# Patient Record
Sex: MALE | Race: WHITE | NOT HISPANIC OR LATINO
[De-identification: names, ages, dates, MRNs, and addresses within clinical notes are randomized per-mention and may not be internally consistent; named-entity substitution may affect disease eponyms.]

---

## 2017-02-07 PROBLEM — Z00.00 ENCOUNTER FOR PREVENTIVE HEALTH EXAMINATION: Noted: 2017-02-07

## 2017-06-16 ENCOUNTER — APPOINTMENT (OUTPATIENT)
Dept: DERMATOLOGY | Facility: CLINIC | Age: 82
End: 2017-06-16

## 2017-06-16 VITALS — HEIGHT: 68 IN | BODY MASS INDEX: 33.8 KG/M2 | WEIGHT: 223 LBS

## 2017-06-16 DIAGNOSIS — Z86.79 PERSONAL HISTORY OF OTHER DISEASES OF THE CIRCULATORY SYSTEM: ICD-10-CM

## 2017-06-16 RX ORDER — COLCHICINE 0.6 MG/1
0.6 TABLET ORAL
Qty: 30 | Refills: 0 | Status: DISCONTINUED | COMMUNITY
Start: 2017-03-27

## 2017-06-16 RX ORDER — WARFARIN SODIUM 4 MG/1
4 TABLET ORAL
Qty: 90 | Refills: 0 | Status: DISCONTINUED | COMMUNITY
Start: 2016-12-19

## 2017-07-14 ENCOUNTER — APPOINTMENT (OUTPATIENT)
Dept: RHEUMATOLOGY | Facility: CLINIC | Age: 82
End: 2017-07-14

## 2017-07-14 VITALS
WEIGHT: 225 LBS | DIASTOLIC BLOOD PRESSURE: 68 MMHG | HEART RATE: 92 BPM | BODY MASS INDEX: 34.21 KG/M2 | SYSTOLIC BLOOD PRESSURE: 109 MMHG | OXYGEN SATURATION: 97 %

## 2017-07-14 DIAGNOSIS — G89.29 PAIN IN UNSPECIFIED WRIST: ICD-10-CM

## 2017-07-14 DIAGNOSIS — M25.539 PAIN IN UNSPECIFIED WRIST: ICD-10-CM

## 2017-07-15 PROBLEM — M25.539 CHRONIC WRIST PAIN, UNSPECIFIED LATERALITY: Status: ACTIVE | Noted: 2017-07-15

## 2017-07-15 RX ADMIN — METHYLPREDNISOLONE ACETATE MG/ML: 80 INJECTION, SUSPENSION INTRA-ARTICULAR; INTRALESIONAL; INTRAMUSCULAR; SOFT TISSUE at 00:00

## 2017-07-18 ENCOUNTER — MED ADMIN CHARGE (OUTPATIENT)
Age: 82
End: 2017-07-18

## 2017-07-18 RX ORDER — METHYLPRED ACET/NACL,ISO-OS/PF 80 MG/ML
80 VIAL (ML) INJECTION
Qty: 1 | Refills: 0 | Status: COMPLETED | OUTPATIENT
Start: 2017-07-15

## 2017-08-04 ENCOUNTER — APPOINTMENT (OUTPATIENT)
Dept: RHEUMATOLOGY | Facility: CLINIC | Age: 82
End: 2017-08-04
Payer: MEDICARE

## 2017-08-04 VITALS
DIASTOLIC BLOOD PRESSURE: 76 MMHG | HEART RATE: 72 BPM | SYSTOLIC BLOOD PRESSURE: 122 MMHG | BODY MASS INDEX: 33.8 KG/M2 | OXYGEN SATURATION: 97 % | WEIGHT: 223 LBS | HEIGHT: 68 IN

## 2017-08-04 PROCEDURE — 96372 THER/PROPH/DIAG INJ SC/IM: CPT

## 2017-08-04 PROCEDURE — 99214 OFFICE O/P EST MOD 30 MIN: CPT | Mod: 25

## 2017-08-04 RX ORDER — METHYLPRED ACET/NACL,ISO-OS/PF 80 MG/ML
80 VIAL (ML) INJECTION
Qty: 1 | Refills: 0 | Status: COMPLETED | OUTPATIENT
Start: 2017-08-04

## 2017-08-04 RX ADMIN — METHYLPREDNISOLONE ACETATE MG/ML: 80 INJECTION, SUSPENSION INTRA-ARTICULAR; INTRALESIONAL; INTRAMUSCULAR; SOFT TISSUE at 00:00

## 2017-10-12 ENCOUNTER — APPOINTMENT (OUTPATIENT)
Dept: ELECTROPHYSIOLOGY | Facility: CLINIC | Age: 82
End: 2017-10-12
Payer: MEDICARE

## 2017-10-12 VITALS
SYSTOLIC BLOOD PRESSURE: 148 MMHG | HEART RATE: 64 BPM | BODY MASS INDEX: 33.34 KG/M2 | DIASTOLIC BLOOD PRESSURE: 84 MMHG | HEIGHT: 68 IN | WEIGHT: 220 LBS

## 2017-10-12 PROCEDURE — 99205 OFFICE O/P NEW HI 60 MIN: CPT

## 2017-10-12 PROCEDURE — 93000 ELECTROCARDIOGRAM COMPLETE: CPT

## 2017-10-18 ENCOUNTER — APPOINTMENT (OUTPATIENT)
Dept: RHEUMATOLOGY | Facility: CLINIC | Age: 82
End: 2017-10-18
Payer: MEDICARE

## 2017-10-18 VITALS
SYSTOLIC BLOOD PRESSURE: 106 MMHG | HEIGHT: 68 IN | HEART RATE: 83 BPM | DIASTOLIC BLOOD PRESSURE: 70 MMHG | BODY MASS INDEX: 33.65 KG/M2 | OXYGEN SATURATION: 95 % | WEIGHT: 222 LBS

## 2017-10-18 PROCEDURE — 96372 THER/PROPH/DIAG INJ SC/IM: CPT

## 2017-10-18 PROCEDURE — 99214 OFFICE O/P EST MOD 30 MIN: CPT | Mod: 25

## 2017-10-18 RX ORDER — LISINOPRIL 20 MG/1
20 TABLET ORAL
Refills: 0 | Status: DISCONTINUED | COMMUNITY
End: 2017-10-18

## 2017-10-18 RX ORDER — UBIDECARENONE 200 MG
CAPSULE ORAL
Refills: 0 | Status: ACTIVE | COMMUNITY

## 2017-11-29 ENCOUNTER — OUTPATIENT (OUTPATIENT)
Dept: OUTPATIENT SERVICES | Facility: HOSPITAL | Age: 82
LOS: 1 days | Discharge: HOME | End: 2017-11-29

## 2017-11-29 VITALS
DIASTOLIC BLOOD PRESSURE: 80 MMHG | SYSTOLIC BLOOD PRESSURE: 141 MMHG | TEMPERATURE: 98 F | RESPIRATION RATE: 14 BRPM | HEART RATE: 89 BPM | OXYGEN SATURATION: 98 % | HEIGHT: 67.5 IN | WEIGHT: 227.96 LBS

## 2017-11-29 DIAGNOSIS — I50.22 CHRONIC SYSTOLIC (CONGESTIVE) HEART FAILURE: ICD-10-CM

## 2017-11-29 DIAGNOSIS — Z98.49 CATARACT EXTRACTION STATUS, UNSPECIFIED EYE: Chronic | ICD-10-CM

## 2017-11-29 DIAGNOSIS — Z96.7 PRESENCE OF OTHER BONE AND TENDON IMPLANTS: Chronic | ICD-10-CM

## 2017-11-29 DIAGNOSIS — Z98.890 OTHER SPECIFIED POSTPROCEDURAL STATES: Chronic | ICD-10-CM

## 2017-11-29 DIAGNOSIS — I48.2 CHRONIC ATRIAL FIBRILLATION: ICD-10-CM

## 2017-11-29 LAB
ANION GAP SERPL CALC-SCNC: 8 MMOL/L — SIGNIFICANT CHANGE UP (ref 5–17)
BASOPHILS # BLD AUTO: 0.1 K/UL — SIGNIFICANT CHANGE UP (ref 0–0.2)
BASOPHILS NFR BLD AUTO: 1.3 % — SIGNIFICANT CHANGE UP (ref 0–2)
BUN SERPL-MCNC: 14 MG/DL — SIGNIFICANT CHANGE UP (ref 7–23)
CALCIUM SERPL-MCNC: 9.5 MG/DL — SIGNIFICANT CHANGE UP (ref 8.5–10.1)
CHLORIDE SERPL-SCNC: 105 MMOL/L — SIGNIFICANT CHANGE UP (ref 96–108)
CO2 SERPL-SCNC: 27 MMOL/L — SIGNIFICANT CHANGE UP (ref 22–31)
CREAT SERPL-MCNC: 1.02 MG/DL — SIGNIFICANT CHANGE UP (ref 0.5–1.3)
EOSINOPHIL # BLD AUTO: 0.1 K/UL — SIGNIFICANT CHANGE UP (ref 0–0.5)
EOSINOPHIL NFR BLD AUTO: 1.9 % — SIGNIFICANT CHANGE UP (ref 0–6)
GLUCOSE SERPL-MCNC: 149 MG/DL — HIGH (ref 70–99)
HCT VFR BLD CALC: 48.5 % — SIGNIFICANT CHANGE UP (ref 39–50)
HGB BLD-MCNC: 15.8 G/DL — SIGNIFICANT CHANGE UP (ref 13–17)
INR BLD: 1.95 RATIO — HIGH (ref 0.88–1.16)
LYMPHOCYTES # BLD AUTO: 3.5 K/UL — HIGH (ref 1–3.3)
LYMPHOCYTES # BLD AUTO: 50.1 % — HIGH (ref 13–44)
MCHC RBC-ENTMCNC: 32.5 PG — SIGNIFICANT CHANGE UP (ref 27–34)
MCHC RBC-ENTMCNC: 32.6 GM/DL — SIGNIFICANT CHANGE UP (ref 32–36)
MCV RBC AUTO: 99.8 FL — SIGNIFICANT CHANGE UP (ref 80–100)
MONOCYTES # BLD AUTO: 0.5 K/UL — SIGNIFICANT CHANGE UP (ref 0–0.9)
MONOCYTES NFR BLD AUTO: 7.1 % — SIGNIFICANT CHANGE UP (ref 2–14)
NEUTROPHILS # BLD AUTO: 2.8 K/UL — SIGNIFICANT CHANGE UP (ref 1.8–7.4)
NEUTROPHILS NFR BLD AUTO: 39.7 % — LOW (ref 43–77)
PLATELET # BLD AUTO: 166 K/UL — SIGNIFICANT CHANGE UP (ref 150–400)
POTASSIUM SERPL-MCNC: 4 MMOL/L — SIGNIFICANT CHANGE UP (ref 3.5–5.3)
POTASSIUM SERPL-SCNC: 4 MMOL/L — SIGNIFICANT CHANGE UP (ref 3.5–5.3)
PROTHROM AB SERPL-ACNC: 21.4 SEC — HIGH (ref 9.8–12.7)
RBC # BLD: 4.86 M/UL — SIGNIFICANT CHANGE UP (ref 4.2–5.8)
RBC # FLD: 12.7 % — SIGNIFICANT CHANGE UP (ref 10.3–14.5)
SODIUM SERPL-SCNC: 140 MMOL/L — SIGNIFICANT CHANGE UP (ref 135–145)
WBC # BLD: 7.1 K/UL — SIGNIFICANT CHANGE UP (ref 3.8–10.5)
WBC # FLD AUTO: 7.1 K/UL — SIGNIFICANT CHANGE UP (ref 3.8–10.5)

## 2017-11-29 NOTE — H&P PST ADULT - HISTORY OF PRESENT ILLNESS
81 yo male presents to [facility], [place] prior to proposed procedure. Reports h/o afib managed with warfarin x 2 years. Reports cardiologist Dr Mckeon referred him to DR Bear for further evaluation. Denies chest pain, syncope or dizziness.   Now for said procedure. 83 yo male presents to [facility] prior to proposed procedure. Reports h/o afib managed with warfarin x 2 years. Reports cardiologist Dr Mckeon referred him to DR Bear for further evaluation. Denies chest pain, syncope or swelling in LEs.   Now for said procedure.

## 2017-11-29 NOTE — H&P PST ADULT - ASSESSMENT
83 yo male  scheduled for EP study & possible PPM & related procedures  with Dr. Bear on 12/7/17     1. Labs as per surgeon  2. discussed EZ sponges & mupirocin  3. Medication & day of procedure instructions 83 yo male  scheduled for EP study & possible PPM & related procedures  with Dr. Bear on 12/7/17     1. Labs as per surgeon  2. discussed EZ sponges & mupirocin instructions  3. Medication & day of procedure instructions

## 2017-11-29 NOTE — H&P PST ADULT - PMH
Afib    BPH (Benign Prostatic Hyperplasia)    CA - skin cancer    Cholesterol serum elevated    Glaucoma    Gout    Hearing loss    History of ankle fracture    HTN (hypertension)    Intolerance, drug  INTOLERANCE  TO STATIN  Myocardial infarction    Osteoarthritis    Rheumatoid arthritis

## 2017-11-29 NOTE — H&P PST ADULT - PSH
H/O coronary angiogram  x 2  H/O hernia repair  x  4 surgeries  H/O shoulder surgery  right  Hx of appendectomy    Hx of tonsillectomy    S/P laser cataract surgery  b/l  S/P ORIF (open reduction internal fixation) fracture  left ankle  S/P TKR (total knee replacement)  bilateral

## 2017-11-29 NOTE — H&P PST ADULT - FAMILY HISTORY
Father  Still living? No  Family history of other heart disease, Age at diagnosis: Age Unknown     Child  Still living? Yes, Estimated age: 51-60  Family history of cardiac arrhythmia, Age at diagnosis: Age Unknown

## 2017-11-30 LAB
MRSA PCR RESULT.: SIGNIFICANT CHANGE UP
S AUREUS DNA NOSE QL NAA+PROBE: SIGNIFICANT CHANGE UP

## 2017-12-01 ENCOUNTER — TRANSCRIPTION ENCOUNTER (OUTPATIENT)
Age: 82
End: 2017-12-01

## 2017-12-06 NOTE — ASU PATIENT PROFILE, ADULT - MEDICATIONS TO HOLD
As per MD instructions, pt to hold coumadin 2 days prior to procedure, but pt stated that he did take coumadin last on 12/5/17.  MD made aware of this.

## 2017-12-07 ENCOUNTER — OUTPATIENT (OUTPATIENT)
Dept: OUTPATIENT SERVICES | Facility: HOSPITAL | Age: 82
LOS: 1 days | Discharge: HOME | End: 2017-12-07
Payer: MEDICARE

## 2017-12-07 VITALS
HEART RATE: 95 BPM | DIASTOLIC BLOOD PRESSURE: 85 MMHG | TEMPERATURE: 97 F | HEIGHT: 67.5 IN | WEIGHT: 227.08 LBS | SYSTOLIC BLOOD PRESSURE: 145 MMHG | RESPIRATION RATE: 20 BRPM

## 2017-12-07 DIAGNOSIS — Z98.890 OTHER SPECIFIED POSTPROCEDURAL STATES: Chronic | ICD-10-CM

## 2017-12-07 DIAGNOSIS — Z98.49 CATARACT EXTRACTION STATUS, UNSPECIFIED EYE: Chronic | ICD-10-CM

## 2017-12-07 DIAGNOSIS — Z96.7 PRESENCE OF OTHER BONE AND TENDON IMPLANTS: Chronic | ICD-10-CM

## 2017-12-07 PROCEDURE — 33249 INSJ/RPLCMT DEFIB W/LEAD(S): CPT

## 2017-12-07 PROCEDURE — 93010 ELECTROCARDIOGRAM REPORT: CPT

## 2017-12-07 PROCEDURE — 71010: CPT | Mod: 26

## 2017-12-07 PROCEDURE — 93650 ICAR CATH ABLTJ AV NODE FUNC: CPT

## 2017-12-07 PROCEDURE — 33225 L VENTRIC PACING LEAD ADD-ON: CPT

## 2017-12-07 RX ORDER — LATANOPROST 0.05 MG/ML
1 SOLUTION/ DROPS OPHTHALMIC; TOPICAL AT BEDTIME
Qty: 0 | Refills: 0 | Status: DISCONTINUED | OUTPATIENT
Start: 2017-12-07 | End: 2017-12-08

## 2017-12-07 RX ORDER — METOPROLOL TARTRATE 50 MG
25 TABLET ORAL
Qty: 0 | Refills: 0 | Status: DISCONTINUED | OUTPATIENT
Start: 2017-12-07 | End: 2017-12-08

## 2017-12-07 RX ORDER — WARFARIN SODIUM 2.5 MG/1
0 TABLET ORAL
Qty: 0 | Refills: 0 | COMMUNITY

## 2017-12-07 RX ORDER — VANCOMYCIN HCL 1 G
1000 VIAL (EA) INTRAVENOUS ONCE
Qty: 0 | Refills: 0 | Status: COMPLETED | OUTPATIENT
Start: 2017-12-07 | End: 2017-12-07

## 2017-12-07 RX ORDER — HYDROCHLOROTHIAZIDE 25 MG
25 TABLET ORAL DAILY
Qty: 0 | Refills: 0 | Status: DISCONTINUED | OUTPATIENT
Start: 2017-12-07 | End: 2017-12-08

## 2017-12-07 RX ORDER — WARFARIN SODIUM 2.5 MG/1
4 TABLET ORAL DAILY
Qty: 0 | Refills: 0 | Status: DISCONTINUED | OUTPATIENT
Start: 2017-12-07 | End: 2017-12-07

## 2017-12-07 RX ORDER — UBIDECARENONE 100 MG
300 CAPSULE ORAL
Qty: 0 | Refills: 0 | COMMUNITY

## 2017-12-07 RX ORDER — LISINOPRIL 2.5 MG/1
2.5 TABLET ORAL DAILY
Qty: 0 | Refills: 0 | Status: DISCONTINUED | OUTPATIENT
Start: 2017-12-07 | End: 2017-12-08

## 2017-12-07 RX ORDER — GLUCOSAMINE HCL/CHONDROITIN SU 500-400 MG
1 CAPSULE ORAL
Qty: 0 | Refills: 0 | COMMUNITY

## 2017-12-07 RX ORDER — ALLOPURINOL 300 MG
300 TABLET ORAL DAILY
Qty: 0 | Refills: 0 | Status: DISCONTINUED | OUTPATIENT
Start: 2017-12-07 | End: 2017-12-08

## 2017-12-07 RX ORDER — CHOLECALCIFEROL (VITAMIN D3) 125 MCG
1 CAPSULE ORAL
Qty: 0 | Refills: 0 | COMMUNITY

## 2017-12-07 RX ORDER — SULFASALAZINE 500 MG
500 TABLET ORAL
Qty: 0 | Refills: 0 | Status: DISCONTINUED | OUTPATIENT
Start: 2017-12-07 | End: 2017-12-08

## 2017-12-07 RX ORDER — WARFARIN SODIUM 2.5 MG/1
4 TABLET ORAL ONCE
Qty: 0 | Refills: 0 | Status: COMPLETED | OUTPATIENT
Start: 2017-12-07 | End: 2017-12-07

## 2017-12-07 RX ADMIN — WARFARIN SODIUM 4 MILLIGRAM(S): 2.5 TABLET ORAL at 21:49

## 2017-12-07 RX ADMIN — LATANOPROST 1 DROP(S): 0.05 SOLUTION/ DROPS OPHTHALMIC; TOPICAL at 21:49

## 2017-12-07 RX ADMIN — Medication 25 MILLIGRAM(S): at 20:30

## 2017-12-07 RX ADMIN — Medication 250 MILLIGRAM(S): at 10:29

## 2017-12-07 RX ADMIN — Medication 300 MILLIGRAM(S): at 18:10

## 2017-12-07 RX ADMIN — Medication 500 MILLIGRAM(S): at 20:30

## 2017-12-07 NOTE — CHART NOTE - NSCHARTNOTEFT_GEN_A_CORE
[facility]  AV Node Ablation       Patient Information    Patient Name		Dick Castle  Procedure Date		2017  Med Record #		[id number]  			1935  Age			82yrs  Gender			Male  Referring Doc		Federico Mckeon MD  Electrophysiologist	Mamadou Bear MD     Procedure:  AV Node Ablation  Indication:  Persistent Atrial Fibrillation (I48.2)    Anesthesia:    Methods:  The right groin was prepped with chorhexidine and then draped via sterile technique.  A 1% lidocaine solution was used to anesthetize the groin site.  Using the modified seldinger technique and right femoral venous access was obtained and a sheath was placed (see below for further details).   The ablation Catheter was brought up to the heart AV node ablation was attempted (please see below for further detail).  At the completion of the procedure the catheter was removed the sheath was removed and pressure was held and hemostasis obtained.  The ICD was reprogrammed and tested and noted to be functioning normally.  The patient was observed in the recovery jurado and was noted to be in stable condition.      Equipment:  8fr –   8mm Biosense Li Ablation Catheter.    Ablation:  The ICD was turned to VOO 30.   The catheter was brought into the region of the His bundle using flouro guidance and conventional mapping to search for a His signal.  His signals were noted and AV Node ablation was performed.   At the completion complete heart block was created and this persisted during a waiting period.  After the cathter was brought down to the IVC the ICD was re-interrogated in which it was noted to be functioning properly.     Complications:  None    Impression:  Successful AV node Ablation with complete heart block created    Recommendation(s):    •	Device was programmed back to prior settings of VVIR  bpm (tachycardia therapy on also as prior)  •	F/U in 2 weeks             Mamadou Bear MD, RS, [facility]    of Cardiology  - [facility]

## 2017-12-07 NOTE — CHART NOTE - NSCHARTNOTEFT_GEN_A_CORE
[address] [address]  Electrophysiology Lab  Biventricular Defibrillator Implantation    Patient Information    Patient Name		Dick Castle  Procedure Date		2017  Med Rec #		[id number]  			1935  Age			82 yrs  Gender			Male  Referring		Federico Mckeon MD  Electrophysiologist	Mamadou Bear MD    Patient History   	Dick Castle has long standing persistent AF and a severe cardiomyopathy w/ severe LV Dysfunction which is persistent despite OMT.  He has NYHA Class III CHF and is here to undergo CRT-D Implant w/ subsequent AV Node Ablation.    Indication: Chronic systolic left ventricular dysfunction and congestive heart failure (I 50.22)    Procedure:   Biventricular Defibrillator Implantation    Anesthesia:		1% Lidocaine and moderate sedation (see anesthesiologist’s note)  Methods:   The patient was brought to the EP Lab in a fasting state.     IV Antibiotics were given. (Ancef 2gm IV)  The left chest was prepped with chlorhexidine solution and draped in a sterile fashion.   An incision was made in the left infraclavicular fossa.  A pocket was made with blunt dissection and electrocautery overlying the prepectoralis fascia.  Axillary venous access was obtained on 2 occasions.   A 9 Tajik Sheath was introduced over one of the guide wires.  A right ventricular ICD lead was advanced through one of the sheaths and brought down to the right atrium using fluoroscopic guidance.  The Safe-Sheath was peeled away. The lead was brought down to the right ventricular septum where it was actively fixated by "screw-in" technique.  The lead was tested and noted to be functioning properly (see below).  The ventricular lead was sewn into position with 2 Ethibond sutures.        A 9.5 Tajik Safe-Sheath was introduced over the remaining guidewire.  An outer guide and inner guide sheath was brought in through the sheath with attempts made to engage the coronary sinus using an inner guide catheter with a whisper wire and a postero-lateral cardiac vein was engaged and the inner and outer sheaths were advanced into this vessel.  The left ventricular lead was delivered through the inner sheath.  The left ventricular lead was tested and noted to be functioning adequately.  The sheath was slit away and the lead was secured in place with 2 Ethibond sutures.  The pocket was irrigated with copious amounts of antibiotic solution and the leads were connected to the new pulse generator and the pulse generator was then placed in the pocket after the leads were connected and ports connected (and or plugged). The pocket was closed with a layer of 2.0 Vicryl followed by a running layer of 3.0 Vicryl.  Finally a running layer of 4.0 Monocryl followed by Dermabond to seal the skin.  A telfa sterile dressing was then applied and the patient was brought to the recovery area in stable condition.      Device Information:        Pulse Generator – [employer] [id number] / [id number]       Leads - Right Atrial  - Port Plug                  Right Ventricular  [employer] Easyaula [id number] / [id number]                  Left Ventricular – [employer] 4675 / [id number]    Measured Data:             Right Ventricular -  Voltage  0.4V / Pulse width 0.5ms / Impedance 612 ohms / R wave 21.0mV      Left Ventricular (LV1-RV Coil) - Voltage 4.0V  / Pulse width 0.5ms/ Impedance 690 ohms                                   Settings:       Uriel:  VVIR  bpm     Tachy:   VF		 240 bpm, 41J x6, ATP During Charging   	 VT		 180 bpm, ATP (several rounds) then 41J X6   	 VT Monitor	 150 bpm         Summary:  Successful  Biventricular ICD Implant     Recommendations:      •	Post op Antibiotics were ordered.  •	Proceed w/ AV Node Ablation  •	Follow-up in 1-2 weeks           Mamadou Bear MD, RS, [facility]    of Cardiology [facility] of Medicine  ABIM Certification in Cardiac EP / Cardiovascular Disease & Internal Medicine  [facility]

## 2017-12-08 ENCOUNTER — TRANSCRIPTION ENCOUNTER (OUTPATIENT)
Age: 82
End: 2017-12-08

## 2017-12-08 VITALS — WEIGHT: 221.79 LBS

## 2017-12-08 DIAGNOSIS — I48.1 PERSISTENT ATRIAL FIBRILLATION: ICD-10-CM

## 2017-12-08 LAB
ANION GAP SERPL CALC-SCNC: 7 MMOL/L — SIGNIFICANT CHANGE UP (ref 5–17)
BASOPHILS # BLD AUTO: 0.1 K/UL — SIGNIFICANT CHANGE UP (ref 0–0.2)
BASOPHILS NFR BLD AUTO: 1.4 % — SIGNIFICANT CHANGE UP (ref 0–2)
BUN SERPL-MCNC: 14 MG/DL — SIGNIFICANT CHANGE UP (ref 7–23)
CALCIUM SERPL-MCNC: 9 MG/DL — SIGNIFICANT CHANGE UP (ref 8.5–10.1)
CHLORIDE SERPL-SCNC: 105 MMOL/L — SIGNIFICANT CHANGE UP (ref 96–108)
CO2 SERPL-SCNC: 27 MMOL/L — SIGNIFICANT CHANGE UP (ref 22–31)
CREAT SERPL-MCNC: 0.88 MG/DL — SIGNIFICANT CHANGE UP (ref 0.5–1.3)
EOSINOPHIL # BLD AUTO: 0.1 K/UL — SIGNIFICANT CHANGE UP (ref 0–0.5)
EOSINOPHIL NFR BLD AUTO: 0.7 % — SIGNIFICANT CHANGE UP (ref 0–6)
GLUCOSE SERPL-MCNC: 89 MG/DL — SIGNIFICANT CHANGE UP (ref 70–99)
HCT VFR BLD CALC: 44.8 % — SIGNIFICANT CHANGE UP (ref 39–50)
HGB BLD-MCNC: 15 G/DL — SIGNIFICANT CHANGE UP (ref 13–17)
INR BLD: 1.57 RATIO — HIGH (ref 0.88–1.16)
LYMPHOCYTES # BLD AUTO: 3.9 K/UL — HIGH (ref 1–3.3)
LYMPHOCYTES # BLD AUTO: 48.2 % — HIGH (ref 13–44)
MCHC RBC-ENTMCNC: 33.1 PG — SIGNIFICANT CHANGE UP (ref 27–34)
MCHC RBC-ENTMCNC: 33.4 GM/DL — SIGNIFICANT CHANGE UP (ref 32–36)
MCV RBC AUTO: 99 FL — SIGNIFICANT CHANGE UP (ref 80–100)
MONOCYTES # BLD AUTO: 0.7 K/UL — SIGNIFICANT CHANGE UP (ref 0–0.9)
MONOCYTES NFR BLD AUTO: 8.5 % — SIGNIFICANT CHANGE UP (ref 2–14)
NEUTROPHILS # BLD AUTO: 3.3 K/UL — SIGNIFICANT CHANGE UP (ref 1.8–7.4)
NEUTROPHILS NFR BLD AUTO: 41.1 % — LOW (ref 43–77)
PLATELET # BLD AUTO: 141 K/UL — LOW (ref 150–400)
POTASSIUM SERPL-MCNC: 3.9 MMOL/L — SIGNIFICANT CHANGE UP (ref 3.5–5.3)
POTASSIUM SERPL-SCNC: 3.9 MMOL/L — SIGNIFICANT CHANGE UP (ref 3.5–5.3)
PROTHROM AB SERPL-ACNC: 17.1 SEC — HIGH (ref 9.8–12.7)
RBC # BLD: 4.53 M/UL — SIGNIFICANT CHANGE UP (ref 4.2–5.8)
RBC # FLD: 12.8 % — SIGNIFICANT CHANGE UP (ref 10.3–14.5)
SODIUM SERPL-SCNC: 139 MMOL/L — SIGNIFICANT CHANGE UP (ref 135–145)
WBC # BLD: 8 K/UL — SIGNIFICANT CHANGE UP (ref 3.8–10.5)
WBC # FLD AUTO: 8 K/UL — SIGNIFICANT CHANGE UP (ref 3.8–10.5)

## 2017-12-08 PROCEDURE — 93010 ELECTROCARDIOGRAM REPORT: CPT

## 2017-12-08 RX ORDER — ALLOPURINOL 300 MG
1 TABLET ORAL
Qty: 0 | Refills: 0 | DISCHARGE
Start: 2017-12-08

## 2017-12-08 RX ORDER — SULFASALAZINE 500 MG
1 TABLET ORAL
Qty: 0 | Refills: 0 | DISCHARGE
Start: 2017-12-08

## 2017-12-08 RX ORDER — WARFARIN SODIUM 2.5 MG/1
1 TABLET ORAL
Qty: 30 | Refills: 0
Start: 2017-12-08 | End: 2018-01-07

## 2017-12-08 RX ORDER — METOPROLOL TARTRATE 50 MG
1 TABLET ORAL
Qty: 0 | Refills: 0 | DISCHARGE
Start: 2017-12-08

## 2017-12-08 RX ORDER — ALLOPURINOL 300 MG
1 TABLET ORAL
Qty: 0 | Refills: 0 | COMMUNITY

## 2017-12-08 RX ORDER — LISINOPRIL 2.5 MG/1
1 TABLET ORAL
Qty: 0 | Refills: 0 | DISCHARGE
Start: 2017-12-08

## 2017-12-08 RX ORDER — METOPROLOL TARTRATE 50 MG
1 TABLET ORAL
Qty: 0 | Refills: 0 | COMMUNITY

## 2017-12-08 RX ORDER — SULFASALAZINE 500 MG
1 TABLET ORAL
Qty: 0 | Refills: 0 | COMMUNITY

## 2017-12-08 RX ORDER — WARFARIN SODIUM 2.5 MG/1
1 TABLET ORAL
Qty: 0 | Refills: 0 | DISCHARGE
Start: 2017-12-08 | End: 2018-01-07

## 2017-12-08 RX ORDER — LATANOPROST 0.05 MG/ML
1 SOLUTION/ DROPS OPHTHALMIC; TOPICAL
Qty: 0 | Refills: 0 | DISCHARGE
Start: 2017-12-08

## 2017-12-08 RX ORDER — LATANOPROST 0.05 MG/ML
1 SOLUTION/ DROPS OPHTHALMIC; TOPICAL
Qty: 0 | Refills: 0 | COMMUNITY

## 2017-12-08 RX ADMIN — Medication 25 MILLIGRAM(S): at 05:33

## 2017-12-08 RX ADMIN — LISINOPRIL 2.5 MILLIGRAM(S): 2.5 TABLET ORAL at 05:33

## 2017-12-08 RX ADMIN — Medication 500 MILLIGRAM(S): at 05:32

## 2017-12-08 NOTE — DISCHARGE NOTE ADULT - PATIENT PORTAL LINK FT
“You can access the FollowHealth Patient Portal, offered by [facility], by registering with the following website: [email or web address]”

## 2017-12-08 NOTE — DISCHARGE NOTE ADULT - CARE PROVIDER_API CALL
Mamadou Bear), Cardiac Electrophysiology; Cardiovascular Disease; Internal Medicine  [address] [place] [address]  Phone: [phone]  Fax: [phone]

## 2017-12-08 NOTE — PROGRESS NOTE ADULT - SUBJECTIVE AND OBJECTIVE BOX
Mr Dick Castle is an 82 Y.O. patient who has a long standing persistent AF and a severe cardiomyopathy w/ severe LV Dysfunction which is persistent despite OMT.  He has NYHA Class III CHF and is here to undergo CRT-D Implant w/ subsequent AV Node Ablation.    Indication: Chronic systolic left ventricular dysfunction and congestive heart failure (I 50.22)  Pt is now s/p AVJ RFCA , and CRT-D implantation ( [employer]) POD #1 Left device implant site dermabonded without any evidence of bleeding or hematoma. Right groin site soft, non tender without bleeding or hematoma formation AGUDELO.        PAST MEDICAL & SURGICAL HISTORY:  Rheumatoid arthritis  Glaucoma  Osteoarthritis  Gout  Myocardial infarction  Hearing loss  Intolerance, drug: INTOLERANCE  TO STATIN  History of ankle fracture  CA - skin cancer  BPH (Benign Prostatic Hyperplasia)  Cholesterol serum elevated  HTN (hypertension)  Afib  S/P ORIF (open reduction internal fixation) fracture: left ankle  H/O shoulder surgery: right  S/P laser cataract surgery: b/l  H/O hernia repair: x  4 surgeries  H/O coronary angiogram: x 2  S/P TKR (total knee replacement): bilateral  Hx of tonsillectomy  Hx of appendectomy      Summary of admission HPI:  Mr Dick Castle is an 82 Y.O. patient who has a long standing persistent AF and a severe cardiomyopathy w/ severe LV Dysfunction which is persistent despite OMT.  He has NYHA Class III CHF and was admitted for  CRT-D Implant w/ subsequent AV Node Ablation by Dr Bear.    Indication: Chronic systolic left ventricular dysfunction and congestive heart failure (I 50.22)  Pt is now s/p AVJ RFCA , and CRT-D implantation POD #1 Left device implant site dermabonded without any evidence of bleeding or hematoma. Right groin site soft, non tender without bleeding or hematoma formation AGUDELO.                    PREVIOUS DIAGNOSTIC TESTING:      ECHO  FINDINGS: 20%    MEDICATIONS  (STANDING):  allopurinol 300 milliGRAM(s) Oral daily  hydrochlorothiazide 25 milliGRAM(s) Oral daily  latanoprost 0.005% Ophthalmic Solution 1 Drop(s) Both EYES at bedtime  lisinopril 2.5 milliGRAM(s) Oral daily  metoprolol     tartrate 25 milliGRAM(s) Oral two times a day  sulfaSALAzine 500 milliGRAM(s) Oral two times a day      FAMILY HISTORY:  Family history of cardiac arrhythmia (Child)  Family history of other heart disease (Father)          REVIEW OF SYSTEMS:    CONSTITUTIONAL: No fever, weight loss, chills, shakes, or fatigue  EYES: No eye pain, visual disturbances, or discharge  ENMT:  No difficulty hearing, tinnitus, vertigo; No sinus or throat pain  NECK: No pain or stiffness  BREASTS: No pain, masses, or nipple discharge  RESPIRATORY: No cough, wheezing, hemoptysis, or shortness of breath  CARDIOVASCULAR: No chest pain, dyspnea, palpitations, dizziness, syncope, paroxysmal nocturnal dyspnea, orthopnea, or arm or leg swelling  GASTROINTESTINAL: No abdominal  or epigastric pain, nausea, vomiting, hematemesis, diarrhea, constipation, melena or bright red blood.  GENITOURINARY: No dysuria, nocturia, hematuria, or urinary incontinence  NEUROLOGICAL: No headaches, memory loss, slurred speech, limb weakness, loss of strength, numbness, or tremors  SKIN: No itching, burning, rashes, or lesions   LYMPH NODES: No enlarged glands  ENDOCRINE: No heat or cold intolerance, or hair loss  MUSCULOSKELETAL: No joint pain or swelling, muscle, back, or extremity pain  PSYCHIATRIC: No depression, anxiety, or difficulty sleeping  HEME/LYMPH: No easy bruising or bleeding gums  ALLERY AND IMMUNOLOGIC: No hives or rash.      Vital Signs Last 24 Hrs  T(C): 36.1 (08 Dec 2017 08:00), Max: 37.3 (08 Dec 2017 00:05)  T(F): 97 (08 Dec 2017 08:00), Max: 99.2 (08 Dec 2017 00:05)  HR: 82 (08 Dec 2017 08:00) (75 - 105)  BP: 128/70 (08 Dec 2017 08:00) (100/58 - 145/85)  BP(mean): 85 (08 Dec 2017 08:00) (66 - 102)  RR: 22 (07 Dec 2017 16:00) (14 - 22)  SpO2: 95% (07 Dec 2017 16:00) (90% - 97%)    PHYSICAL EXAM:    GENERAL: In no apparent distress, well nourished, and hydrated.  HEAD:  Atraumatic, Normocephalic  EYES: EOMI, PERRLA, conjunctiva and sclera clear  ENMT: No tonsillar erythema, exudates, or enlargement; Moist mucous membranes, Good dentition, No lesions  NECK: Supple and normal thyroid.  No JVD or carotid bruit.  Carotid pulse is 2+ bilaterally.  HEART: Regular rate and rhythm; No murmurs, rubs, or gallops.  PULMONARY: Clear to auscultation and perfusion.  No rales, wheezing, or rhonchi bilaterally.  ABDOMEN: Soft, Nontender, Nondistended; Bowel sounds present  EXTREMITIES:  2+ Peripheral Pulses, No clubbing, cyanosis, or edema  LYMPH: No lymphadenopathy noted  NEUROLOGICAL: Grossly nonfocal          INTERPRETATION OF TELEMETRY: Bi V pacing at 80-90 with occ PVCs underlying AF    ECG: Bi-V Paced at 90 Af underlying occ PVCs    I&O's Detail    07 Dec 2017 07:01  -  08 Dec 2017 07:00  --------------------------------------------------------  IN:    Oral Fluid: 120 mL  Total IN: 120 mL    OUT:    Voided: 1275 mL  Total OUT: 1275 mL    Total NET: -1155 mL          LABS:                        15.0   8.0   )-----------( 141      ( 08 Dec 2017 05:38 )             44.8     12-08    139  |  105  |  14  ----------------------------<  89  3.9   |  27  |  0.88    Ca    9.0      08 Dec 2017 05:38          PT/INR - ( 08 Dec 2017 05:38 )   PT: 17.1 sec;   INR: 1.57 ratio             BNP  I&O's Detail    07 Dec 2017 07:01  -  08 Dec 2017 07:00  --------------------------------------------------------  IN:    Oral Fluid: 120 mL  Total IN: 120 mL    OUT:    Voided: 1275 mL  Total OUT: 1275 mL    Total NET: -1155 mL        Daily Height in cm: 171.45 (07 Dec 2017 09:35)    Daily     RADIOLOGY & ADDITIONAL STUDIES:

## 2017-12-08 NOTE — PROGRESS NOTE ADULT - ASSESSMENT
Mr Dick Castle is an 82 Y.O. patient who has a long standing persistent AF and a severe cardiomyopathy w/ severe LV Dysfunction which is persistent despite OMT.  He has NYHA Class III CHF and was admitted for  CRT-D Implant w/ subsequent AV Node Ablation by Dr Bear.    Indication: Chronic systolic left ventricular dysfunction and congestive heart failure (I 50.22)  Pt is now s/p AVJ RFCA , and CRT-D implantation POD #1 Left device implant site dermabonded without any evidence of bleeding or hematoma. Right groin site soft, non tender without bleeding or hematoma formation AGUDELO.

## 2017-12-08 NOTE — PROGRESS NOTE ADULT - PROBLEM SELECTOR PLAN 1
F/U as outpatient in 10 days with Dr Bear  Continue Coumadin-check INR early next week-resume usual dosing  Continue all preprocedure meds

## 2017-12-08 NOTE — DISCHARGE NOTE ADULT - CARE PLAN
Principal Discharge DX:	Dilated cardiomyopathy  Goal:	wound healing optimal ADL  Instructions for follow-up, activity and diet:	as above

## 2017-12-08 NOTE — DISCHARGE NOTE ADULT - MEDICATION SUMMARY - MEDICATIONS TO TAKE
I will START or STAY ON the medications listed below when I get home from the hospital:    sulfaSALAzine 500 mg oral tablet  -- 1 tab(s) by mouth 2 times a day  -- Indication: For CONGESTIVE HEART FAILURE/CHRONIC LEFT VENTRICUALR SYSTOLIC DYSFUNCTION    lisinopril 2.5 mg oral tablet  -- 1 tab(s) by mouth once a day  -- Indication: For CONGESTIVE HEART FAILURE/CHRONIC LEFT VENTRICUALR SYSTOLIC DYSFUNCTION    Jantoven 3 mg oral tablet  -- 1 tab(s) by mouth once a day   -- Do not take this drug if you are pregnant.  It is very important that you take or use this exactly as directed.  Do not skip doses or discontinue unless directed by your doctor.  Obtain medical advice before taking any non-prescription drugs as some may affect the action of this medication.    -- Indication: For Persistent atrial fibrillation    allopurinol 300 mg oral tablet  -- 1 tab(s) by mouth once a day  -- Indication: For gout    lisinopril-hydroCHLOROthiazide 20 mg-25 mg oral tablet  -- 1 tab(s) by mouth once a day  -- Indication: For CONGESTIVE HEART FAILURE/CHRONIC LEFT VENTRICUALR SYSTOLIC DYSFUNCTION    metoprolol tartrate 25 mg oral tablet  -- 1 tab(s) by mouth 2 times a day  -- Indication: For CONGESTIVE HEART FAILURE/CHRONIC LEFT VENTRICUALR SYSTOLIC DYSFUNCTION    hydroCHLOROthiazide 25 mg oral tablet  -- 1 tab(s) by mouth once a day  -- Indication: For CONGESTIVE HEART FAILURE/CHRONIC LEFT VENTRICUALR SYSTOLIC DYSFUNCTION    latanoprost 0.005% ophthalmic solution  -- 1 drop(s) to each affected eye once a day (at bedtime)  -- Indication: For Persistent atrial fibrillation

## 2017-12-08 NOTE — DISCHARGE NOTE ADULT - NS AS ACTIVITY OBS
Walking-Outdoors allowed/No Heavy lifting/straining/Stairs allowed/Showering allowed/Sex allowed/Walking-Indoors allowed/Do not drive or operate machinery

## 2017-12-15 ENCOUNTER — APPOINTMENT (OUTPATIENT)
Dept: DERMATOLOGY | Facility: CLINIC | Age: 82
End: 2017-12-15
Payer: MEDICARE

## 2017-12-15 DIAGNOSIS — I11.0 HYPERTENSIVE HEART DISEASE WITH HEART FAILURE: ICD-10-CM

## 2017-12-15 DIAGNOSIS — I42.0 DILATED CARDIOMYOPATHY: ICD-10-CM

## 2017-12-15 DIAGNOSIS — Z85.828 PERSONAL HISTORY OF OTHER MALIGNANT NEOPLASM OF SKIN: ICD-10-CM

## 2017-12-15 DIAGNOSIS — Z96.653 PRESENCE OF ARTIFICIAL KNEE JOINT, BILATERAL: ICD-10-CM

## 2017-12-15 DIAGNOSIS — I44.2 ATRIOVENTRICULAR BLOCK, COMPLETE: ICD-10-CM

## 2017-12-15 DIAGNOSIS — I50.22 CHRONIC SYSTOLIC (CONGESTIVE) HEART FAILURE: ICD-10-CM

## 2017-12-15 DIAGNOSIS — N40.0 BENIGN PROSTATIC HYPERPLASIA WITHOUT LOWER URINARY TRACT SYMPTOMS: ICD-10-CM

## 2017-12-15 DIAGNOSIS — H91.90 UNSPECIFIED HEARING LOSS, UNSPECIFIED EAR: ICD-10-CM

## 2017-12-15 DIAGNOSIS — I25.2 OLD MYOCARDIAL INFARCTION: ICD-10-CM

## 2017-12-15 DIAGNOSIS — I48.1 PERSISTENT ATRIAL FIBRILLATION: ICD-10-CM

## 2017-12-15 DIAGNOSIS — M06.9 RHEUMATOID ARTHRITIS, UNSPECIFIED: ICD-10-CM

## 2017-12-15 DIAGNOSIS — I48.2 CHRONIC ATRIAL FIBRILLATION: ICD-10-CM

## 2017-12-15 DIAGNOSIS — H40.9 UNSPECIFIED GLAUCOMA: ICD-10-CM

## 2017-12-15 DIAGNOSIS — I48.91 UNSPECIFIED ATRIAL FIBRILLATION: ICD-10-CM

## 2017-12-15 DIAGNOSIS — M19.90 UNSPECIFIED OSTEOARTHRITIS, UNSPECIFIED SITE: ICD-10-CM

## 2017-12-15 DIAGNOSIS — Z79.01 LONG TERM (CURRENT) USE OF ANTICOAGULANTS: ICD-10-CM

## 2017-12-15 DIAGNOSIS — K44.9 DIAPHRAGMATIC HERNIA WITHOUT OBSTRUCTION OR GANGRENE: ICD-10-CM

## 2017-12-15 DIAGNOSIS — E78.5 HYPERLIPIDEMIA, UNSPECIFIED: ICD-10-CM

## 2017-12-15 PROCEDURE — 99213 OFFICE O/P EST LOW 20 MIN: CPT

## 2017-12-19 ENCOUNTER — RX RENEWAL (OUTPATIENT)
Age: 82
End: 2017-12-19

## 2017-12-21 ENCOUNTER — APPOINTMENT (OUTPATIENT)
Dept: ELECTROPHYSIOLOGY | Facility: CLINIC | Age: 82
End: 2017-12-21
Payer: MEDICARE

## 2017-12-21 VITALS
SYSTOLIC BLOOD PRESSURE: 118 MMHG | WEIGHT: 222 LBS | BODY MASS INDEX: 33.65 KG/M2 | HEART RATE: 68 BPM | HEIGHT: 68 IN | DIASTOLIC BLOOD PRESSURE: 70 MMHG

## 2017-12-21 DIAGNOSIS — Z86.79 PERSONAL HISTORY OF OTHER DISEASES OF THE CIRCULATORY SYSTEM: ICD-10-CM

## 2017-12-21 PROCEDURE — 99024 POSTOP FOLLOW-UP VISIT: CPT

## 2018-01-17 ENCOUNTER — APPOINTMENT (OUTPATIENT)
Dept: RHEUMATOLOGY | Facility: CLINIC | Age: 83
End: 2018-01-17
Payer: MEDICARE

## 2018-01-17 VITALS
SYSTOLIC BLOOD PRESSURE: 138 MMHG | OXYGEN SATURATION: 98 % | HEIGHT: 68 IN | DIASTOLIC BLOOD PRESSURE: 76 MMHG | HEART RATE: 70 BPM | BODY MASS INDEX: 33.95 KG/M2 | WEIGHT: 224 LBS

## 2018-01-17 DIAGNOSIS — M06.4 INFLAMMATORY POLYARTHROPATHY: ICD-10-CM

## 2018-01-17 PROCEDURE — 99214 OFFICE O/P EST MOD 30 MIN: CPT | Mod: 25

## 2018-01-17 PROCEDURE — 96372 THER/PROPH/DIAG INJ SC/IM: CPT

## 2018-01-18 ENCOUNTER — MED ADMIN CHARGE (OUTPATIENT)
Age: 83
End: 2018-01-18

## 2018-01-18 PROBLEM — M06.4 POLYARTHRITIS, INFLAMMATORY: Status: ACTIVE | Noted: 2017-07-14

## 2018-01-18 RX ORDER — METHYLPRED ACET/NACL,ISO-OS/PF 80 MG/ML
80 VIAL (ML) INJECTION
Qty: 1 | Refills: 0 | Status: COMPLETED | OUTPATIENT
Start: 2018-01-18

## 2018-01-18 RX ADMIN — METHYLPREDNISOLONE ACETATE MG/ML: 80 INJECTION, SUSPENSION INTRA-ARTICULAR; INTRALESIONAL; INTRAMUSCULAR; SOFT TISSUE at 00:00

## 2018-02-16 ENCOUNTER — APPOINTMENT (OUTPATIENT)
Dept: RHEUMATOLOGY | Facility: CLINIC | Age: 83
End: 2018-02-16
Payer: MEDICARE

## 2018-02-16 VITALS
HEART RATE: 95 BPM | WEIGHT: 222 LBS | DIASTOLIC BLOOD PRESSURE: 73 MMHG | OXYGEN SATURATION: 98 % | HEIGHT: 68 IN | BODY MASS INDEX: 33.65 KG/M2 | SYSTOLIC BLOOD PRESSURE: 116 MMHG

## 2018-02-16 PROCEDURE — 99214 OFFICE O/P EST MOD 30 MIN: CPT

## 2018-03-26 ENCOUNTER — APPOINTMENT (OUTPATIENT)
Dept: ELECTROPHYSIOLOGY | Facility: CLINIC | Age: 83
End: 2018-03-26
Payer: MEDICARE

## 2018-03-26 VITALS
HEART RATE: 91 BPM | HEIGHT: 68 IN | BODY MASS INDEX: 33.65 KG/M2 | SYSTOLIC BLOOD PRESSURE: 132 MMHG | DIASTOLIC BLOOD PRESSURE: 87 MMHG | WEIGHT: 222 LBS

## 2018-03-26 PROCEDURE — 93284 PRGRMG EVAL IMPLANTABLE DFB: CPT

## 2018-03-26 RX ORDER — TRAMADOL HYDROCHLORIDE 50 MG/1
50 TABLET, COATED ORAL
Qty: 30 | Refills: 0 | Status: DISCONTINUED | COMMUNITY
Start: 2017-10-18 | End: 2018-03-26

## 2018-03-26 RX ORDER — WARFARIN SODIUM 2.5 MG/1
2.5 TABLET ORAL
Refills: 0 | Status: DISCONTINUED | COMMUNITY
End: 2018-03-26

## 2018-03-26 RX ORDER — WARFARIN SODIUM 3 MG/1
3 TABLET ORAL
Qty: 30 | Refills: 0 | Status: DISCONTINUED | COMMUNITY
Start: 2017-08-10 | End: 2018-03-26

## 2018-03-26 RX ORDER — COLCHICINE 0.6 MG/1
0.6 TABLET ORAL
Qty: 15 | Refills: 1 | Status: DISCONTINUED | COMMUNITY
Start: 2017-08-04 | End: 2018-03-26

## 2018-03-26 RX ORDER — ALLOPURINOL 300 MG/1
300 TABLET ORAL
Refills: 0 | Status: DISCONTINUED | COMMUNITY
End: 2018-03-26

## 2018-03-28 ENCOUNTER — APPOINTMENT (OUTPATIENT)
Dept: RHEUMATOLOGY | Facility: CLINIC | Age: 83
End: 2018-03-28
Payer: MEDICARE

## 2018-03-28 VITALS
SYSTOLIC BLOOD PRESSURE: 112 MMHG | HEART RATE: 90 BPM | WEIGHT: 222 LBS | OXYGEN SATURATION: 88 % | BODY MASS INDEX: 33.65 KG/M2 | DIASTOLIC BLOOD PRESSURE: 66 MMHG | HEIGHT: 68 IN

## 2018-03-28 PROCEDURE — 99214 OFFICE O/P EST MOD 30 MIN: CPT

## 2018-04-02 ENCOUNTER — APPOINTMENT (OUTPATIENT)
Dept: ELECTROPHYSIOLOGY | Facility: CLINIC | Age: 83
End: 2018-04-02
Payer: MEDICARE

## 2018-04-02 ENCOUNTER — NON-APPOINTMENT (OUTPATIENT)
Age: 83
End: 2018-04-02

## 2018-04-02 VITALS
DIASTOLIC BLOOD PRESSURE: 80 MMHG | SYSTOLIC BLOOD PRESSURE: 135 MMHG | OXYGEN SATURATION: 97 % | HEART RATE: 73 BPM | HEIGHT: 68 IN | BODY MASS INDEX: 33.8 KG/M2 | RESPIRATION RATE: 16 BRPM | WEIGHT: 223 LBS

## 2018-04-02 PROCEDURE — 93284 PRGRMG EVAL IMPLANTABLE DFB: CPT

## 2018-04-02 PROCEDURE — 99213 OFFICE O/P EST LOW 20 MIN: CPT

## 2018-05-14 ENCOUNTER — APPOINTMENT (OUTPATIENT)
Dept: ELECTROPHYSIOLOGY | Facility: CLINIC | Age: 83
End: 2018-05-14
Payer: MEDICARE

## 2018-05-14 VITALS
HEIGHT: 68 IN | DIASTOLIC BLOOD PRESSURE: 76 MMHG | WEIGHT: 224 LBS | HEART RATE: 84 BPM | BODY MASS INDEX: 33.95 KG/M2 | SYSTOLIC BLOOD PRESSURE: 113 MMHG

## 2018-05-14 PROCEDURE — 93284 PRGRMG EVAL IMPLANTABLE DFB: CPT

## 2018-05-14 PROCEDURE — 99213 OFFICE O/P EST LOW 20 MIN: CPT

## 2018-05-14 RX ORDER — AMIODARONE HYDROCHLORIDE 200 MG/1
200 TABLET ORAL TWICE DAILY
Qty: 120 | Refills: 2 | Status: DISCONTINUED | COMMUNITY
Start: 2018-03-26 | End: 2018-05-14

## 2018-06-08 ENCOUNTER — APPOINTMENT (OUTPATIENT)
Dept: DERMATOLOGY | Facility: CLINIC | Age: 83
End: 2018-06-08
Payer: MEDICARE

## 2018-06-08 PROCEDURE — 17000 DESTRUCT PREMALG LESION: CPT

## 2018-06-08 PROCEDURE — 11100 BX SKIN SUBCUTANEOUS&/MUCOUS MEMBRANE 1 LESION: CPT | Mod: 59

## 2018-06-08 PROCEDURE — 99213 OFFICE O/P EST LOW 20 MIN: CPT | Mod: 25

## 2018-06-19 LAB — CORE LAB BIOPSY: NORMAL

## 2018-06-29 ENCOUNTER — APPOINTMENT (OUTPATIENT)
Dept: RHEUMATOLOGY | Facility: CLINIC | Age: 83
End: 2018-06-29
Payer: MEDICARE

## 2018-06-29 VITALS
OXYGEN SATURATION: 97 % | HEIGHT: 68 IN | DIASTOLIC BLOOD PRESSURE: 66 MMHG | HEART RATE: 85 BPM | SYSTOLIC BLOOD PRESSURE: 108 MMHG | BODY MASS INDEX: 33.8 KG/M2 | WEIGHT: 223 LBS

## 2018-06-29 PROCEDURE — 99214 OFFICE O/P EST MOD 30 MIN: CPT

## 2018-07-02 ENCOUNTER — APPOINTMENT (OUTPATIENT)
Dept: DERMATOLOGY | Facility: CLINIC | Age: 83
End: 2018-07-02
Payer: MEDICARE

## 2018-07-02 DIAGNOSIS — C53.9 MALIGNANT NEOPLASM OF CERVIX UTERI, UNSPECIFIED: ICD-10-CM

## 2018-07-02 DIAGNOSIS — Z85.828 PERSONAL HISTORY OF OTHER MALIGNANT NEOPLASM OF SKIN: ICD-10-CM

## 2018-07-02 PROCEDURE — 17282 DSTR MAL LS F/E/E/N/L/M1.1-2: CPT

## 2018-07-23 PROBLEM — Z85.828 HISTORY OF BASAL CELL CARCINOMA: Status: RESOLVED | Noted: 2017-06-16 | Resolved: 2018-07-23

## 2018-08-20 ENCOUNTER — APPOINTMENT (OUTPATIENT)
Dept: ELECTROPHYSIOLOGY | Facility: CLINIC | Age: 83
End: 2018-08-20
Payer: MEDICARE

## 2018-08-20 VITALS
DIASTOLIC BLOOD PRESSURE: 80 MMHG | WEIGHT: 216 LBS | SYSTOLIC BLOOD PRESSURE: 116 MMHG | HEART RATE: 87 BPM | HEIGHT: 68 IN | BODY MASS INDEX: 32.74 KG/M2

## 2018-08-20 PROBLEM — M19.90 UNSPECIFIED OSTEOARTHRITIS, UNSPECIFIED SITE: Chronic | Status: ACTIVE | Noted: 2017-11-29

## 2018-08-20 PROBLEM — M10.9 GOUT, UNSPECIFIED: Chronic | Status: ACTIVE | Noted: 2017-11-29

## 2018-08-20 PROBLEM — I21.9 ACUTE MYOCARDIAL INFARCTION, UNSPECIFIED: Chronic | Status: ACTIVE | Noted: 2017-11-29

## 2018-08-20 PROBLEM — H40.9 UNSPECIFIED GLAUCOMA: Chronic | Status: ACTIVE | Noted: 2017-11-29

## 2018-08-20 PROBLEM — M06.9 RHEUMATOID ARTHRITIS, UNSPECIFIED: Chronic | Status: ACTIVE | Noted: 2017-11-29

## 2018-08-20 PROBLEM — H91.90 UNSPECIFIED HEARING LOSS, UNSPECIFIED EAR: Chronic | Status: ACTIVE | Noted: 2017-11-29

## 2018-08-20 PROCEDURE — 93284 PRGRMG EVAL IMPLANTABLE DFB: CPT

## 2018-08-20 PROCEDURE — 99213 OFFICE O/P EST LOW 20 MIN: CPT

## 2018-09-21 ENCOUNTER — APPOINTMENT (OUTPATIENT)
Dept: ELECTROPHYSIOLOGY | Facility: CLINIC | Age: 83
End: 2018-09-21
Payer: MEDICARE

## 2018-09-21 VITALS
HEART RATE: 98 BPM | BODY MASS INDEX: 32.74 KG/M2 | DIASTOLIC BLOOD PRESSURE: 79 MMHG | WEIGHT: 216 LBS | HEIGHT: 68 IN | SYSTOLIC BLOOD PRESSURE: 124 MMHG

## 2018-09-21 PROCEDURE — 93284 PRGRMG EVAL IMPLANTABLE DFB: CPT

## 2018-10-31 ENCOUNTER — APPOINTMENT (OUTPATIENT)
Dept: RHEUMATOLOGY | Facility: CLINIC | Age: 83
End: 2018-10-31
Payer: MEDICARE

## 2018-10-31 VITALS
WEIGHT: 216 LBS | SYSTOLIC BLOOD PRESSURE: 117 MMHG | OXYGEN SATURATION: 97 % | HEART RATE: 84 BPM | DIASTOLIC BLOOD PRESSURE: 78 MMHG | BODY MASS INDEX: 32.74 KG/M2 | HEIGHT: 68 IN

## 2018-10-31 PROCEDURE — 99213 OFFICE O/P EST LOW 20 MIN: CPT

## 2018-12-07 ENCOUNTER — APPOINTMENT (OUTPATIENT)
Dept: DERMATOLOGY | Facility: CLINIC | Age: 83
End: 2018-12-07
Payer: MEDICARE

## 2018-12-07 VITALS — BODY MASS INDEX: 32.74 KG/M2 | WEIGHT: 216 LBS | HEIGHT: 68 IN

## 2018-12-07 PROCEDURE — 99213 OFFICE O/P EST LOW 20 MIN: CPT

## 2019-01-24 ENCOUNTER — RX RENEWAL (OUTPATIENT)
Age: 84
End: 2019-01-24

## 2019-02-11 ENCOUNTER — APPOINTMENT (OUTPATIENT)
Dept: RHEUMATOLOGY | Facility: CLINIC | Age: 84
End: 2019-02-11
Payer: MEDICARE

## 2019-02-11 VITALS
OXYGEN SATURATION: 98 % | SYSTOLIC BLOOD PRESSURE: 123 MMHG | HEIGHT: 68 IN | DIASTOLIC BLOOD PRESSURE: 71 MMHG | BODY MASS INDEX: 32.74 KG/M2 | HEART RATE: 74 BPM | WEIGHT: 216 LBS

## 2019-02-11 PROCEDURE — 99214 OFFICE O/P EST MOD 30 MIN: CPT

## 2019-02-11 NOTE — REVIEW OF SYSTEMS
[Arthralgias] : arthralgias [Joint Pain] : joint pain [Joint Swelling] : joint swelling [Joint Stiffness] : joint stiffness [Skin Lesions] : no skin lesions [Negative] : Heme/Lymph

## 2019-02-11 NOTE — PHYSICAL EXAM
[General Appearance - Alert] : alert [General Appearance - Well Nourished] : well nourished [General Appearance - Well Developed] : well developed [Sclera] : the sclera and conjunctiva were normal [Oropharynx] : the oropharynx was normal [Neck Appearance] : the appearance of the neck was normal [Thyroid Diffuse Enlargement] : the thyroid was not enlarged [Auscultation Breath Sounds / Voice Sounds] : lungs were clear to auscultation bilaterally [Heart Sounds] : normal S1 and S2 [Full Pulse] : the pedal pulses are present [Edema] : there was no peripheral edema [Abdomen Tenderness] : non-tender [Cervical Lymph Nodes Enlarged Anterior Bilaterally] : anterior cervical [Supraclavicular Lymph Nodes Enlarged Bilaterally] : supraclavicular [No Spinal Tenderness] : no spinal tenderness [Abnormal Walk] : normal gait [Motor Tone] : muscle strength and tone were normal [FreeTextEntry1] : Full range of motion neck and shoulders and elbows. wrists with FROM, right elbow with small nodule [] : no rash [Motor Exam] : the motor exam was normal [Affect] : the affect was normal

## 2019-02-11 NOTE — ASSESSMENT
[FreeTextEntry1] : 83 year old male with a history of coronary artery disease on anticoagulation, osteoarthritis status post knee replacements initially seen for further evaluation of a chronic inflammatory arthritis.\par His review of systems is positive for chronic wrist pain and swelling, evaluation by hand surgeon status post steroid injection with excellent results, and prolonged morning stiffness.\par \par Gout-\par He has presumed gout, his initial uric acid levels were high, he has responded very well to the allopurinol. He is currently on allopurinol 300 mg daily and his last uric acid was 5.2. His creatinine function is normal. He is to continue with daily allopurinol. He is no longer using colchicine.\par \par Rheumatoid arthritis-\par He has seronegative erosive rheumatoid arthritis, he is now on sulfasalazine 1.5 g daily. He does have a questionable nodule in the right elbow. His wrists are no longer active and he has full range of motion bilaterally. \par As far as the arthritis is concerned he is doing very well, he has noticed a metallic taste in his mouth. Sulfasalazine is known to cause a metallic taste in the mouth. Given that he has been fairly asymptomatic from the rheum point of view, he can continue dose to 1000 mg. If the joint pain returns he is to notify me.\par Will consider lowering dose further on next visit. \par \par OA-\par to try PT, to use tylenol daily and tramadol when pain is severe\par \par Followup 6 months. He is aware to call if there is any change in his underlying symptoms.

## 2019-02-11 NOTE — HISTORY OF PRESENT ILLNESS
[FreeTextEntry1] : Followup gout and rheumatoid arthritis\par \par For the gout he is using allopurinol 300 mg daily. His last uric acid is 5.2, his creatinine is normal. He has not had any gout flares.\par \par As far as her rheumatoid arthritis is concerned he is using sulfasalazine 1.5 g daily. He is definitely better as far as his wrist pain is concerned. He has not had any major flares with the pain. He rates his current wrist pain at a 2/10. He feels that the bump in his elbow is slightly smaller.\par \par He has other aches and pains, his hips hurt his back hurt his knee hurts, he rates his pain as a 3/10. He does not use Tylenol or any other medications. \par He has a good appetite and denies weight loss. He denies fevers chills or night sweats. he admits to a metallic taste in his mouth which has somewhat curbed his appetite. He is concerned about his wife's health.

## 2019-02-12 ENCOUNTER — NON-APPOINTMENT (OUTPATIENT)
Age: 84
End: 2019-02-12

## 2019-02-12 ENCOUNTER — APPOINTMENT (OUTPATIENT)
Dept: ELECTROPHYSIOLOGY | Facility: CLINIC | Age: 84
End: 2019-02-12
Payer: MEDICARE

## 2019-02-12 VITALS
HEIGHT: 68 IN | SYSTOLIC BLOOD PRESSURE: 120 MMHG | DIASTOLIC BLOOD PRESSURE: 66 MMHG | BODY MASS INDEX: 32.13 KG/M2 | WEIGHT: 212 LBS | HEART RATE: 64 BPM

## 2019-02-12 DIAGNOSIS — I47.2 VENTRICULAR TACHYCARDIA: ICD-10-CM

## 2019-02-12 PROCEDURE — 99214 OFFICE O/P EST MOD 30 MIN: CPT

## 2019-02-12 PROCEDURE — 93284 PRGRMG EVAL IMPLANTABLE DFB: CPT

## 2019-02-12 NOTE — REASON FOR VISIT
[Follow-Up - Clinic] : a clinic follow-up of [AICD Check] : implantable cardioverter-defibrillator [Atrial Fibrillation] : atrial fibrillation [Ventricular Tachycardia] : ventricular tachycardia

## 2019-02-12 NOTE — PROCEDURE
[NSR] : normal sinus rhythm [See Scanned Paceart Report] : See scanned paceart report [CRT-D] : Cardiac resynchronization therapy defibrillator

## 2019-02-12 NOTE — HISTORY OF PRESENT ILLNESS
[FreeTextEntry1] : This patient is a pleasant 83-year-old male with a history of nonischemic cardiomyopathy, hypertension and of atrial fibrillation.  In December 2017, he underwent AV Node Ablation and Biventricular ICD implant (no RA lead placed). JEFFREY ORTIZ  denies chest pain, chest pressure, shortness of breath, lightheadedness, dizziness, palpitations, syncope, presyncope, orthopnea, PND, or edema. \par

## 2019-02-12 NOTE — PHYSICAL EXAM
[General Appearance - Well Developed] : well developed [Normal Appearance] : normal appearance [Well Groomed] : well groomed [General Appearance - Well Nourished] : well nourished [No Deformities] : no deformities [General Appearance - In No Acute Distress] : no acute distress [Heart Rate And Rhythm] : heart rate and rhythm were normal [Heart Sounds] : normal S1 and S2 [Murmurs] : no murmurs present [Respiration, Rhythm And Depth] : normal respiratory rhythm and effort [Exaggerated Use Of Accessory Muscles For Inspiration] : no accessory muscle use [Auscultation Breath Sounds / Voice Sounds] : lungs were clear to auscultation bilaterally [Abdomen Soft] : soft [Abdomen Tenderness] : non-tender [Abdomen Mass (___ Cm)] : no abdominal mass palpated [Nail Clubbing] : no clubbing of the fingernails [Cyanosis, Localized] : no localized cyanosis [Petechial Hemorrhages (___cm)] : no petechial hemorrhages [Normal Conjunctiva] : the conjunctiva exhibited no abnormalities [Eyelids - No Xanthelasma] : the eyelids demonstrated no xanthelasmas [Normal Oral Mucosa] : normal oral mucosa [No Oral Pallor] : no oral pallor [No Oral Cyanosis] : no oral cyanosis [Normal Jugular Venous A Waves Present] : normal jugular venous A waves present [Normal Jugular Venous V Waves Present] : normal jugular venous V waves present [No Jugular Venous Carrera A Waves] : no jugular venous carrera A waves [Skin Color & Pigmentation] : normal skin color and pigmentation [] : no rash [No Venous Stasis] : no venous stasis [Skin Lesions] : no skin lesions [No Skin Ulcers] : no skin ulcer [No Xanthoma] : no  xanthoma was observed [Oriented To Time, Place, And Person] : oriented to person, place, and time [Affect] : the affect was normal [Mood] : the mood was normal [No Anxiety] : not feeling anxious [FreeTextEntry1] : Well healed over left ICD sided ICD PG with no erythema or induration.

## 2019-02-12 NOTE — DISCUSSION/SUMMARY
[FreeTextEntry1] : Continue Warfarin and current medical regimen.  F/U w Dr. Ferreira's practice in the next 6 months.  Remotely before then...

## 2019-03-01 ENCOUNTER — TRANSCRIPTION ENCOUNTER (OUTPATIENT)
Age: 84
End: 2019-03-01

## 2019-05-07 ENCOUNTER — APPOINTMENT (OUTPATIENT)
Dept: ELECTROPHYSIOLOGY | Facility: CLINIC | Age: 84
End: 2019-05-07
Payer: MEDICARE

## 2019-05-07 PROCEDURE — 93299: CPT

## 2019-05-07 PROCEDURE — 93297 REM INTERROG DEV EVAL ICPMS: CPT

## 2019-06-12 ENCOUNTER — APPOINTMENT (OUTPATIENT)
Dept: DERMATOLOGY | Facility: CLINIC | Age: 84
End: 2019-06-12
Payer: MEDICARE

## 2019-06-12 PROCEDURE — 17000 DESTRUCT PREMALG LESION: CPT

## 2019-06-12 PROCEDURE — 99213 OFFICE O/P EST LOW 20 MIN: CPT | Mod: 25

## 2019-06-12 PROCEDURE — 17003 DESTRUCT PREMALG LES 2-14: CPT

## 2019-06-12 NOTE — ASSESSMENT
[FreeTextEntry1] : Complete skin examination is negative for malignancy;\par No recurrent SCC in situ or BCC on face;\par LN2 to 2 AKs\par Continue regular exams; \par

## 2019-06-12 NOTE — PHYSICAL EXAM
[Full Body Skin Exam Performed] : performed [FreeTextEntry3] : Skin examination performed of the face, neck, trunk, arms, legs; \par The patient is well, alert and oriented, pleasant and cooperative.\par Eyelids, conjunctivae, oral mucosa, digits and nails all normal.  \par No cervical adenopathy.\par \par Normal findings include:\par \par Seborrheic keratoses- multiple on trunk, neck\par Angiomas\par Lentigines\par Healed D&C sites;  nose;\par scaling erythematous papules; Left posterior shoulder, scalp crown\par \par \par No lesions were suspicious for malignancy. \par \par

## 2019-06-12 NOTE — HISTORY OF PRESENT ILLNESS
[de-identified] : Pt. presents for skin check;\par No itching, bleeding, growing, changing lesions noted;\par Severity:  mild  \par Modifying factors:  none\par Associated symptoms:  none\par Context:  no association with activity \par

## 2019-08-02 ENCOUNTER — APPOINTMENT (OUTPATIENT)
Dept: RHEUMATOLOGY | Facility: CLINIC | Age: 84
End: 2019-08-02
Payer: MEDICARE

## 2019-08-02 VITALS
DIASTOLIC BLOOD PRESSURE: 75 MMHG | WEIGHT: 215 LBS | OXYGEN SATURATION: 97 % | SYSTOLIC BLOOD PRESSURE: 140 MMHG | BODY MASS INDEX: 32.58 KG/M2 | HEIGHT: 68 IN | HEART RATE: 33 BPM

## 2019-08-02 PROCEDURE — 99214 OFFICE O/P EST MOD 30 MIN: CPT

## 2019-08-02 NOTE — ASSESSMENT
[FreeTextEntry1] : 84 year old male with a history of coronary artery disease on anticoagulation, osteoarthritis status post knee replacements initially seen for further evaluation of a chronic inflammatory arthritis.\par \par Gout-\par He has presumed gout, his initial uric acid levels were high, he has responded very well to the allopurinol. He is currently on allopurinol 300 mg daily and his last uric acid was 5.2. His creatinine function is normal. He is to continue with daily allopurinol. He is no longer using colchicine.\par \par Rheumatoid arthritis-\par He has seronegative erosive rheumatoid arthritis, he is now on sulfasalazine 1 g daily.  His wrists are no longer active and he has full range of motion bilaterally. \par As far as the arthritis is concerned he is doing very well, Given that he has been fairly asymptomatic from the rheum point of view, he can lower the SSZ to 500 mg and d/c in 3 months.  If the joint pain returns he is to notify me.\par \par \par OA-\par to use tylenol daily and tramadol when pain is severe\par \par Followup 6 months. He is aware to call if there is any change in his underlying symptoms.

## 2019-08-02 NOTE — HISTORY OF PRESENT ILLNESS
[FreeTextEntry1] : Followup gout and rheumatoid arthritis\par \par For the gout he is using allopurinol 300 mg daily. His last uric acid is 5.2, his creatinine is normal. He has not had any gout flares.\par \par As far as her rheumatoid arthritis is concerned he is using sulfasalazine 1 g daily. He is definitely better as far as his wrist pain is concerned. He has not had any major flares with the pain. He rates his current wrist pain at a 2/10. He feels that the bump in his elbow is slightly smaller. the metallic taste has resolved\par \par He has other aches and pains, his hips hurt his back hurt his knee hurts, he rates his pain as a 3/10. He does not use Tylenol or any other medications. \par He has a good appetite and denies weight loss. He denies fevers chills or night sweats. 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 . He is concerned about his wife's health.

## 2019-08-02 NOTE — PHYSICAL EXAM
[General Appearance - Alert] : alert [General Appearance - Well Nourished] : well nourished [General Appearance - Well Developed] : well developed [Sclera] : the sclera and conjunctiva were normal [Oropharynx] : the oropharynx was normal [Neck Appearance] : the appearance of the neck was normal [Thyroid Diffuse Enlargement] : the thyroid was not enlarged [Auscultation Breath Sounds / Voice Sounds] : lungs were clear to auscultation bilaterally [Heart Sounds] : normal S1 and S2 [Full Pulse] : the pedal pulses are present [Edema] : there was no peripheral edema [Abdomen Tenderness] : non-tender [Cervical Lymph Nodes Enlarged Anterior Bilaterally] : anterior cervical [Supraclavicular Lymph Nodes Enlarged Bilaterally] : supraclavicular [No Spinal Tenderness] : no spinal tenderness [Abnormal Walk] : normal gait [Motor Tone] : muscle strength and tone were normal [FreeTextEntry1] : Full range of motion neck and shoulders and elbows. wrists with FROM, right elbow nodule resolved [] : no rash [Motor Exam] : the motor exam was normal [Affect] : the affect was normal

## 2019-08-13 ENCOUNTER — APPOINTMENT (OUTPATIENT)
Dept: ELECTROPHYSIOLOGY | Facility: CLINIC | Age: 84
End: 2019-08-13
Payer: MEDICARE

## 2019-08-13 PROCEDURE — 93295 DEV INTERROG REMOTE 1/2/MLT: CPT

## 2019-08-13 PROCEDURE — 93296 REM INTERROG EVL PM/IDS: CPT

## 2019-11-14 ENCOUNTER — APPOINTMENT (OUTPATIENT)
Dept: ELECTROPHYSIOLOGY | Facility: CLINIC | Age: 84
End: 2019-11-14
Payer: MEDICARE

## 2019-11-14 VITALS
HEART RATE: 75 BPM | OXYGEN SATURATION: 97 % | HEIGHT: 68 IN | SYSTOLIC BLOOD PRESSURE: 129 MMHG | BODY MASS INDEX: 33.34 KG/M2 | WEIGHT: 220 LBS | DIASTOLIC BLOOD PRESSURE: 80 MMHG

## 2019-11-14 PROCEDURE — 93284 PRGRMG EVAL IMPLANTABLE DFB: CPT

## 2019-11-14 RX ORDER — SULFASALAZINE 500 MG/1
500 TABLET, DELAYED RELEASE ORAL
Qty: 180 | Refills: 0 | Status: DISCONTINUED | COMMUNITY
Start: 2017-10-18 | End: 2019-11-14

## 2019-12-13 ENCOUNTER — APPOINTMENT (OUTPATIENT)
Dept: DERMATOLOGY | Facility: CLINIC | Age: 84
End: 2019-12-13
Payer: MEDICARE

## 2019-12-13 PROCEDURE — 17000 DESTRUCT PREMALG LESION: CPT | Mod: 59

## 2019-12-13 PROCEDURE — 99213 OFFICE O/P EST LOW 20 MIN: CPT | Mod: 25

## 2019-12-13 PROCEDURE — 11102 TANGNTL BX SKIN SINGLE LES: CPT | Mod: 59

## 2019-12-13 NOTE — PHYSICAL EXAM
[Full Body Skin Exam Performed] : performed [FreeTextEntry3] : Skin examination performed of the face, neck, trunk, arms, legs; \par The patient is well, alert and oriented, pleasant and cooperative.\par Eyelids, conjunctivae, oral mucosa, digits and nails all normal.  \par No cervical adenopathy.\par \par Normal findings include:\par \par Seborrheic keratoses- multiple on trunk, neck\par Angiomas\par Lentigines\par Healed D&C sites;  nose;\par scaling erythematous papules; nose tip, at medial edge of prior D&C site\par pigmented lesion with irregular color and border; Right anterior shoulder\par \par \par No lesions were suspicious for malignancy. \par \par

## 2019-12-13 NOTE — ASSESSMENT
[FreeTextEntry1] : The patient was instructed to check portal and/or call the office in one week for biopsy results. \par No recurrent SCC in situ or BCC on face;\par LN2 to AK nose tip; \par Bx r/o MM R anterior shoulder\par Continue regular exams; \par

## 2019-12-13 NOTE — HISTORY OF PRESENT ILLNESS
[de-identified] : Pt. presents for skin check;\par No itching, bleeding, growing, changing lesions noted;\par Severity:  mild  \par Modifying factors:  none\par Associated symptoms:  none\par Context:  no association with activity \par

## 2019-12-23 ENCOUNTER — TRANSCRIPTION ENCOUNTER (OUTPATIENT)
Age: 84
End: 2019-12-23

## 2019-12-23 LAB — CORE LAB BIOPSY: NORMAL

## 2020-02-07 ENCOUNTER — APPOINTMENT (OUTPATIENT)
Dept: RHEUMATOLOGY | Facility: CLINIC | Age: 85
End: 2020-02-07
Payer: MEDICARE

## 2020-02-07 VITALS
BODY MASS INDEX: 33.65 KG/M2 | HEIGHT: 68 IN | SYSTOLIC BLOOD PRESSURE: 128 MMHG | WEIGHT: 222 LBS | HEART RATE: 84 BPM | OXYGEN SATURATION: 95 % | DIASTOLIC BLOOD PRESSURE: 80 MMHG

## 2020-02-07 PROCEDURE — 99213 OFFICE O/P EST LOW 20 MIN: CPT

## 2020-02-07 NOTE — ASSESSMENT
[FreeTextEntry1] : 84 year old male with a history of coronary artery disease on anticoagulation, osteoarthritis status post knee replacements, CHF, CLL  with gout\par \par Gout-\par He has presumed gout, his initial uric acid levels were high, he has responded very well to the allopurinol. He is currently on allopurinol 300 mg daily and his last uric acid was 5.2. His creatinine function is normal. He is to continue with daily allopurinol. He is no longer using colchicine.\par \par Rheumatoid arthritis-\par When I initially met him he was already on allopurinol and the concern was raised for seronegative rheumatoid arthritis. I did treat him with sulfasalazine for at least 18 months and slowly weaned him off of it. He developed his Tesio from the sulfasalazine which is slowly getting better. At this time and no longer convinced with the diagnosis of rheumatoid arthritis.\par \par OA-\par to use tylenol daily and tramadol when pain is severe\par \par Followup 6 months. He is aware to call if there is any change in his underlying symptoms.

## 2020-02-07 NOTE — REVIEW OF SYSTEMS
[Arthralgias] : arthralgias [Joint Pain] : joint pain [Joint Swelling] : joint swelling [Joint Stiffness] : joint stiffness [Skin Lesions] : no skin lesions [Negative] : Endocrine

## 2020-02-07 NOTE — HISTORY OF PRESENT ILLNESS
[FreeTextEntry1] : six-month followup visit\par patient returns after 6 months. As far as the gout is concerned he is doing well. He continues to use the allopurinol 300 mg daily. He denies any gout flares. His wrist does not bother him, occasionally has had no bother him especially the right third finger. We stopped the sulfasalazine within 6 months ago.He does not misusing it. The dysgeusiahas improved but it still there to certain foods. It does not bother him. He avoids eating certain foods.\par \par He had a physical with his primary care physician. He has seen dermatology. He has also seen cardiology. His health is generally stable. He denies any major changes in it.\par \par His wife continues to suffer with her dementia but she is stable. He does stress about her health. As far as the joints are concerned he rates his symptoms but a 1/10.\par CLL has been stable

## 2020-02-07 NOTE — PHYSICAL EXAM
[General Appearance - Well Nourished] : well nourished [General Appearance - Alert] : alert [Sclera] : the sclera and conjunctiva were normal [General Appearance - Well Developed] : well developed [Oropharynx] : the oropharynx was normal [Neck Appearance] : the appearance of the neck was normal [Thyroid Diffuse Enlargement] : the thyroid was not enlarged [Heart Sounds] : normal S1 and S2 [Auscultation Breath Sounds / Voice Sounds] : lungs were clear to auscultation bilaterally [Edema] : there was no peripheral edema [Abdomen Tenderness] : non-tender [Full Pulse] : the pedal pulses are present [Supraclavicular Lymph Nodes Enlarged Bilaterally] : supraclavicular [Cervical Lymph Nodes Enlarged Anterior Bilaterally] : anterior cervical [No Spinal Tenderness] : no spinal tenderness [Abnormal Walk] : normal gait [FreeTextEntry1] : Full range of motion neck and shoulders and elbows. wrists with FROM, right elbow nodule resolved [Motor Tone] : muscle strength and tone were normal [] : no rash [Affect] : the affect was normal [Motor Exam] : the motor exam was normal

## 2020-02-11 ENCOUNTER — APPOINTMENT (OUTPATIENT)
Dept: ELECTROPHYSIOLOGY | Facility: CLINIC | Age: 85
End: 2020-02-11
Payer: MEDICARE

## 2020-02-11 PROCEDURE — 93296 REM INTERROG EVL PM/IDS: CPT

## 2020-02-11 PROCEDURE — 93295 DEV INTERROG REMOTE 1/2/MLT: CPT

## 2020-05-19 ENCOUNTER — APPOINTMENT (OUTPATIENT)
Dept: ELECTROPHYSIOLOGY | Facility: CLINIC | Age: 85
End: 2020-05-19
Payer: MEDICARE

## 2020-05-19 PROCEDURE — 93296 REM INTERROG EVL PM/IDS: CPT

## 2020-05-19 PROCEDURE — 93295 DEV INTERROG REMOTE 1/2/MLT: CPT

## 2020-07-31 ENCOUNTER — TRANSCRIPTION ENCOUNTER (OUTPATIENT)
Age: 85
End: 2020-07-31

## 2020-07-31 ENCOUNTER — RX RENEWAL (OUTPATIENT)
Age: 85
End: 2020-07-31

## 2020-08-07 ENCOUNTER — APPOINTMENT (OUTPATIENT)
Dept: RHEUMATOLOGY | Facility: CLINIC | Age: 85
End: 2020-08-07
Payer: MEDICARE

## 2020-08-07 VITALS
HEIGHT: 68 IN | DIASTOLIC BLOOD PRESSURE: 80 MMHG | BODY MASS INDEX: 33.49 KG/M2 | SYSTOLIC BLOOD PRESSURE: 140 MMHG | WEIGHT: 221 LBS | HEART RATE: 65 BPM | TEMPERATURE: 97.2 F | OXYGEN SATURATION: 96 %

## 2020-08-07 DIAGNOSIS — M25.562 PAIN IN RIGHT KNEE: ICD-10-CM

## 2020-08-07 DIAGNOSIS — M25.561 PAIN IN RIGHT KNEE: ICD-10-CM

## 2020-08-07 PROCEDURE — 99213 OFFICE O/P EST LOW 20 MIN: CPT

## 2020-08-07 NOTE — PHYSICAL EXAM
[General Appearance - Alert] : alert [General Appearance - Well Developed] : well developed [General Appearance - Well Nourished] : well nourished [Oropharynx] : the oropharynx was normal [Sclera] : the sclera and conjunctiva were normal [Neck Appearance] : the appearance of the neck was normal [Auscultation Breath Sounds / Voice Sounds] : lungs were clear to auscultation bilaterally [Thyroid Diffuse Enlargement] : the thyroid was not enlarged [Full Pulse] : the pedal pulses are present [Edema] : there was no peripheral edema [Heart Sounds] : normal S1 and S2 [Abdomen Tenderness] : non-tender [Cervical Lymph Nodes Enlarged Anterior Bilaterally] : anterior cervical [Supraclavicular Lymph Nodes Enlarged Bilaterally] : supraclavicular [No Spinal Tenderness] : no spinal tenderness [FreeTextEntry1] : Full range of motion neck and shoulders and elbows. wrists with FROM, right elbow nodule resolved [Motor Tone] : muscle strength and tone were normal [Abnormal Walk] : normal gait [] : no rash [Motor Exam] : the motor exam was normal [Affect] : the affect was normal

## 2020-08-07 NOTE — REVIEW OF SYSTEMS
[Joint Swelling] : joint swelling [Joint Pain] : joint pain [Arthralgias] : arthralgias [Skin Lesions] : no skin lesions [Joint Stiffness] : joint stiffness [Negative] : Heme/Lymph

## 2020-08-07 NOTE — HISTORY OF PRESENT ILLNESS
[FreeTextEntry1] : six-month followup visit\par patient returns after 6 months. As far as the gout is concerned he is doing well. He continues to use the allopurinol 300 mg daily. He denies any gout flares.His knees bother him, he is b/l TKR 17 years ago. \par We stopped the sulfasalazine, he does not misusing it. The dysgeusiahas improved but it still there to certain foods. It does not bother him. He avoids eating certain foods.\par \par He had a physical with his primary care physician. He has seen dermatology. He has also seen cardiology. His health is generally stable. He denies any major changes in it.\par \par His wife continues to suffer with her dementia but she is stable. He does stress about her health. As far as the joints are concerned he rates his symptoms but a 1/10.\par CLL has been stable

## 2020-08-07 NOTE — ASSESSMENT
[FreeTextEntry1] : 85 year old male with a history of coronary artery disease on anticoagulation, osteoarthritis status post knee replacements, CHF, CLL  with gout\par \par Gout-\par He has presumed gout, his initial uric acid levels were high, he has responded very well to the allopurinol. He is currently on allopurinol 300 mg daily and his last uric acid was 5.2. His creatinine function is normal. He is to continue with daily allopurinol. He is no longer using colchicine.\par \par Rheumatoid arthritis-\par When I initially met him he was already on allopurinol and the concern was raised for seronegative rheumatoid arthritis. I did treat him with sulfasalazine for at least 18 months and slowly weaned him off of it. He developed dysguesia from the sulfasalazine which has resolved . At this time and no longer convinced with the diagnosis of rheumatoid arthritis.\par \par OA-\par to use tylenol daily and tramadol when pain is severe\par \par Followup 8 months. He is aware to call if there is any change in his underlying symptoms.

## 2020-08-19 ENCOUNTER — APPOINTMENT (OUTPATIENT)
Dept: ELECTROPHYSIOLOGY | Facility: CLINIC | Age: 85
End: 2020-08-19
Payer: MEDICARE

## 2020-08-19 VITALS
OXYGEN SATURATION: 96 % | BODY MASS INDEX: 32.74 KG/M2 | HEIGHT: 68 IN | DIASTOLIC BLOOD PRESSURE: 79 MMHG | WEIGHT: 216 LBS | SYSTOLIC BLOOD PRESSURE: 144 MMHG | HEART RATE: 60 BPM

## 2020-08-19 PROCEDURE — 93284 PRGRMG EVAL IMPLANTABLE DFB: CPT

## 2020-09-09 ENCOUNTER — APPOINTMENT (OUTPATIENT)
Dept: DERMATOLOGY | Facility: CLINIC | Age: 85
End: 2020-09-09
Payer: MEDICARE

## 2020-09-09 VITALS — WEIGHT: 216 LBS | HEIGHT: 68 IN | BODY MASS INDEX: 32.74 KG/M2

## 2020-09-09 PROCEDURE — 17000 DESTRUCT PREMALG LESION: CPT

## 2020-09-09 PROCEDURE — 17003 DESTRUCT PREMALG LES 2-14: CPT

## 2020-09-09 PROCEDURE — 99213 OFFICE O/P EST LOW 20 MIN: CPT | Mod: 25

## 2020-09-09 NOTE — ASSESSMENT
[FreeTextEntry1] : Complete skin examination is negative for malignancy; \par No recurrent SCC in situ or BCC on face;\par LN2 to AKs\par \par Continue regular exams; Follow up for TBSE in 6 months \par

## 2020-09-09 NOTE — PHYSICAL EXAM
[Full Body Skin Exam Performed] : performed [FreeTextEntry3] : Skin examination performed of the face, neck, trunk, arms, legs; \par The patient is well, alert and oriented, pleasant and cooperative.\par Eyelids, conjunctivae, oral mucosa, digits and nails all normal.  \par No cervical adenopathy.\par \par Normal findings include:\par \par Seborrheic keratoses- multiple on trunk, neck\par Angiomas\par Lentigines\par Healed D&C sites;  nose;\par scaling erythematous patches:  b/l mid cheek, Right temple; \par \par \par No lesions were suspicious for malignancy. \par \par

## 2020-09-09 NOTE — HISTORY OF PRESENT ILLNESS
[de-identified] : Pt. presents for skin check;\par c/o some scaly raw areas on cheeks, face\par Severity:  mild  \par Modifying factors:  none\par Associated symptoms:  none\par Context:  no association with activity \par

## 2020-11-02 ENCOUNTER — RX RENEWAL (OUTPATIENT)
Age: 85
End: 2020-11-02

## 2020-11-18 ENCOUNTER — APPOINTMENT (OUTPATIENT)
Dept: ELECTROPHYSIOLOGY | Facility: CLINIC | Age: 85
End: 2020-11-18

## 2020-11-18 ENCOUNTER — APPOINTMENT (OUTPATIENT)
Dept: ELECTROPHYSIOLOGY | Facility: CLINIC | Age: 85
End: 2020-11-18
Payer: MEDICARE

## 2020-11-18 PROCEDURE — 93296 REM INTERROG EVL PM/IDS: CPT

## 2020-11-18 PROCEDURE — 93295 DEV INTERROG REMOTE 1/2/MLT: CPT

## 2021-01-12 ENCOUNTER — APPOINTMENT (OUTPATIENT)
Dept: RHEUMATOLOGY | Facility: CLINIC | Age: 86
End: 2021-01-12
Payer: MEDICARE

## 2021-01-12 VITALS
SYSTOLIC BLOOD PRESSURE: 140 MMHG | OXYGEN SATURATION: 96 % | TEMPERATURE: 97.1 F | WEIGHT: 222 LBS | HEART RATE: 66 BPM | BODY MASS INDEX: 33.65 KG/M2 | HEIGHT: 68 IN | DIASTOLIC BLOOD PRESSURE: 70 MMHG

## 2021-01-12 PROCEDURE — 99212 OFFICE O/P EST SF 10 MIN: CPT | Mod: 25

## 2021-01-12 PROCEDURE — 20610 DRAIN/INJ JOINT/BURSA W/O US: CPT | Mod: RT

## 2021-01-12 NOTE — PHYSICAL EXAM
[General Appearance - Alert] : alert [General Appearance - Well Nourished] : well nourished [General Appearance - Well Developed] : well developed [Auscultation Breath Sounds / Voice Sounds] : lungs were clear to auscultation bilaterally [Heart Sounds] : normal S1 and S2 [Abdomen Tenderness] : non-tender [FreeTextEntry1] : right shoulder limited ROM [Oriented To Time, Place, And Person] : oriented to person, place, and time [Impaired Insight] : insight and judgment were intact [Affect] : the affect was normal

## 2021-01-12 NOTE — PROCEDURE
[Today's Date:] : Date: [unfilled] [Patient] : the patient [Risks] : risks [Benefits] : benefits [Alternatives] : alternatives [Consent Obtained] : written consent was obtained prior to the procedure and is detailed in the patient's record [Therapeutic] : therapeutic [#1 Site: ______] : #1 site identified in the [unfilled] [Ethyl Chloride] : ethyl chloride [___ ml Inj] : [unfilled] ~Uml [2%] : 2% [Betadine] : betadine solution [Alcohol] : alcohol [Chlorhexidine] : chlorhexidine [25 gauge 1.5 inch] : A 25 gauge 1.5 inch needle was used [Depomedrol ___ mg] : Depomedrol [unfilled] mg [Tolerated Well] : the patient tolerated the procedure well [No Complications] : there were no complications [Instructions Given] : handouts/patient instructions were given to patient [Patient Instructed to Call] : patient was instructed to call if redness at site, a decrease in range of motion or an increase in pain is noted after procedure.

## 2021-01-12 NOTE — ASSESSMENT
[FreeTextEntry1] : 85-year-old male with a history of gout comes in  acutely today complaining of right shoulder pain. Today, on my exam he has evidence of right-sided rotator cuff tendinitis.\par \par Right rotator cuff tendinitis-\par -I injected the shoulder with Depo-Medrol 80 mg x1\par -He tolerated the procedure well \par -To call if pain does not improve\par \par Gout-\par -Stable\par -Denies any flares

## 2021-01-12 NOTE — HISTORY OF PRESENT ILLNESS
[FreeTextEntry1] : he comes in urgently today. He is complaining of pain in his right shoulder. He denies trauma recently. On last visit I had suggested a steroid injection but he deferred. No pain has become significant. He rates it as a 10 out of 10. He denies numbness or tingling no muscle weakness.\par \par His gout has been stable. He denies any recent flares. He continues to use his meds.

## 2021-01-29 ENCOUNTER — RX RENEWAL (OUTPATIENT)
Age: 86
End: 2021-01-29

## 2021-02-18 ENCOUNTER — APPOINTMENT (OUTPATIENT)
Dept: ELECTROPHYSIOLOGY | Facility: CLINIC | Age: 86
End: 2021-02-18
Payer: MEDICARE

## 2021-02-18 PROCEDURE — 93296 REM INTERROG EVL PM/IDS: CPT

## 2021-02-18 PROCEDURE — 93295 DEV INTERROG REMOTE 1/2/MLT: CPT

## 2021-02-19 ENCOUNTER — NON-APPOINTMENT (OUTPATIENT)
Age: 86
End: 2021-02-19

## 2021-03-08 ENCOUNTER — APPOINTMENT (OUTPATIENT)
Dept: RHEUMATOLOGY | Facility: CLINIC | Age: 86
End: 2021-03-08
Payer: MEDICARE

## 2021-03-08 VITALS
DIASTOLIC BLOOD PRESSURE: 70 MMHG | BODY MASS INDEX: 34.86 KG/M2 | HEART RATE: 42 BPM | OXYGEN SATURATION: 97 % | TEMPERATURE: 97.4 F | SYSTOLIC BLOOD PRESSURE: 140 MMHG | HEIGHT: 68 IN | WEIGHT: 230 LBS

## 2021-03-08 DIAGNOSIS — M06.09 RHEUMATOID ARTHRITIS W/OUT RHEUMATOID FACTOR, MULTIPLE SITES: ICD-10-CM

## 2021-03-08 DIAGNOSIS — M10.9 GOUT, UNSPECIFIED: ICD-10-CM

## 2021-03-08 PROCEDURE — 99213 OFFICE O/P EST LOW 20 MIN: CPT

## 2021-03-08 RX ORDER — ALLOPURINOL 300 MG/1
300 TABLET ORAL
Qty: 90 | Refills: 1 | Status: ACTIVE | COMMUNITY
Start: 2017-08-04 | End: 1900-01-01

## 2021-03-08 NOTE — PHYSICAL EXAM
[General Appearance - Alert] : alert [General Appearance - Well Nourished] : well nourished [General Appearance - Well Developed] : well developed [Auscultation Breath Sounds / Voice Sounds] : lungs were clear to auscultation bilaterally [Heart Sounds] : normal S1 and S2 [Abdomen Tenderness] : non-tender [FreeTextEntry1] : right shoulder limited ROM, no tophi [] : no rash [Oriented To Time, Place, And Person] : oriented to person, place, and time [Impaired Insight] : insight and judgment were intact [Affect] : the affect was normal

## 2021-03-08 NOTE — ASSESSMENT
[FreeTextEntry1] : 85-year-old male with a history of gout and Right rotator cuff tendinitis.\par \par Gout-\par -Stable on allopurinol 300 mg daily\par -Labs to be done with PMD\par -To call if gout flares\par \par Right shoulder pain-\par My concern is for a tear in the rotator cuff, he did not have great response to the steroid injection.\par -Unfortunately he was unable to get the MRI because of the pacemaker, he does not want to pursue a CAT scan of physical therapy\par -He has been doing Exercises on his own and has had relief with Voltaren gel, to continue use\par \par Followup 6 months to call if symptoms worsen

## 2021-03-08 NOTE — HISTORY OF PRESENT ILLNESS
[FreeTextEntry1] : Followup gout\par as far as the gout is concerned he denies any flares. He continues to use the allopurinol. He is scheduled for blood work with his primary care physician. He denies use of NSAIDs. He rates his pain at a 1/10. He denies fatigue. His wife has dementia, he is stressed with her, but his daughter is living with him now which has helped.\par \par I saw him in January with shoulder pain, I gave him a steroid injection at that time which did not help, but concern is for a tear in the right rotator cuff, he is now feeling better. He has been doing therapy on his own. He could not get the MRI since he has a pacemaker. He rates his shoulder pain a 5/10. He states that while tearing gel has been helping him. He does not want to see orthopedic

## 2021-03-19 ENCOUNTER — APPOINTMENT (OUTPATIENT)
Dept: DERMATOLOGY | Facility: CLINIC | Age: 86
End: 2021-03-19
Payer: MEDICARE

## 2021-03-19 DIAGNOSIS — Z85.828 PERSONAL HISTORY OF OTHER MALIGNANT NEOPLASM OF SKIN: ICD-10-CM

## 2021-03-19 PROCEDURE — 99213 OFFICE O/P EST LOW 20 MIN: CPT

## 2021-03-19 NOTE — HISTORY OF PRESENT ILLNESS
[de-identified] : Pt. presents for skin check;\par c/o few spots of concern;  \par Severity:  mild  \par Modifying factors:  none\par Associated symptoms:  none\par Context:  no association with activity\par \par

## 2021-03-19 NOTE — ASSESSMENT
[FreeTextEntry1] : Complete skin examination is negative for malignancy; Multiple new concerns were addressed and discussed.\par Therapeutic options and their risks and benefits; along with multiple diagnostic possibilities were discussed at length;\par risks and benefits of skin biopsy and/or other further study were discussed;\par \par No recurrent SCC in situ or BCC on face;\par \par persistent large AK R temple, diffuse on forehead;  Therapeutic options and their risks and benefits; along with multiple diagnostic possibilities were discussed at length; risks and benefits of further study were discussed;\par \par Set up PDT;  r/b explained;  to upper face, nose\par \par Continue regular exams; Follow up for TBSE in 6 months \par

## 2021-03-19 NOTE — PHYSICAL EXAM
[Full Body Skin Exam Performed] : performed [FreeTextEntry3] : Skin examination performed of the face, neck, trunk, arms, legs; \par The patient is well, alert and oriented, pleasant and cooperative.\par Eyelids, conjunctivae, oral mucosa, digits and nails all normal.  \par No cervical adenopathy.\par \par Normal findings include:\par \par Seborrheic keratoses- multiple on trunk, neck\par Angiomas\par Lentigines\par Healed D&C sites;  nose;\par scaling erythematous patches:  large plaque R temple, diffuse on forehead; \par \par No lesions were suspicious for malignancy. \par \par

## 2021-04-05 ENCOUNTER — APPOINTMENT (OUTPATIENT)
Dept: DERMATOLOGY | Facility: CLINIC | Age: 86
End: 2021-04-05
Payer: MEDICARE

## 2021-04-05 PROCEDURE — 99213 OFFICE O/P EST LOW 20 MIN: CPT | Mod: 25

## 2021-04-05 PROCEDURE — 96573 PDT DSTR PRMLG LES PHYS/QHP: CPT

## 2021-05-20 ENCOUNTER — NON-APPOINTMENT (OUTPATIENT)
Age: 86
End: 2021-05-20

## 2021-05-20 ENCOUNTER — APPOINTMENT (OUTPATIENT)
Dept: ELECTROPHYSIOLOGY | Facility: CLINIC | Age: 86
End: 2021-05-20
Payer: MEDICARE

## 2021-05-20 VITALS
BODY MASS INDEX: 34.25 KG/M2 | DIASTOLIC BLOOD PRESSURE: 79 MMHG | RESPIRATION RATE: 16 BRPM | WEIGHT: 226 LBS | SYSTOLIC BLOOD PRESSURE: 149 MMHG | HEART RATE: 67 BPM | OXYGEN SATURATION: 100 % | HEIGHT: 68 IN

## 2021-05-20 PROCEDURE — 93284 PRGRMG EVAL IMPLANTABLE DFB: CPT

## 2021-05-20 RX ORDER — AMLODIPINE BESYLATE 10 MG/1
10 TABLET ORAL DAILY
Refills: 0 | Status: DISCONTINUED | COMMUNITY
Start: 2016-12-19 | End: 2021-05-20

## 2021-05-20 RX ORDER — LATANOPROST 50 UG/ML
0.01 SOLUTION OPHTHALMIC
Refills: 0 | Status: DISCONTINUED | COMMUNITY
End: 2021-05-20

## 2021-05-20 RX ORDER — DICLOFENAC SODIUM 1% 10 MG/G
1 GEL TOPICAL
Qty: 3 | Refills: 3 | Status: DISCONTINUED | COMMUNITY
Start: 2021-03-08 | End: 2021-05-20

## 2021-05-20 RX ORDER — WARFARIN SODIUM 4 MG/1
4 TABLET ORAL DAILY
Refills: 0 | Status: DISCONTINUED | COMMUNITY
End: 2021-05-20

## 2021-05-20 RX ORDER — BISMUTH SUBSALICYLATE 262 MG
400 TABLET ORAL
Refills: 0 | Status: DISCONTINUED | COMMUNITY
End: 2021-05-20

## 2021-08-19 ENCOUNTER — APPOINTMENT (OUTPATIENT)
Dept: ELECTROPHYSIOLOGY | Facility: CLINIC | Age: 86
End: 2021-08-19
Payer: MEDICARE

## 2021-08-20 ENCOUNTER — NON-APPOINTMENT (OUTPATIENT)
Age: 86
End: 2021-08-20

## 2021-08-20 PROCEDURE — 93295 DEV INTERROG REMOTE 1/2/MLT: CPT

## 2021-08-20 PROCEDURE — 93296 REM INTERROG EVL PM/IDS: CPT

## 2021-09-24 ENCOUNTER — APPOINTMENT (OUTPATIENT)
Dept: DERMATOLOGY | Facility: CLINIC | Age: 86
End: 2021-09-24
Payer: MEDICARE

## 2021-09-24 DIAGNOSIS — L98.8 OTHER SPECIFIED DISORDERS OF THE SKIN AND SUBCUTANEOUS TISSUE: ICD-10-CM

## 2021-09-24 PROCEDURE — 99213 OFFICE O/P EST LOW 20 MIN: CPT | Mod: 25

## 2021-09-24 PROCEDURE — 11102 TANGNTL BX SKIN SINGLE LES: CPT | Mod: 59

## 2021-09-24 PROCEDURE — 17000 DESTRUCT PREMALG LESION: CPT | Mod: 59

## 2021-09-24 PROCEDURE — 17003 DESTRUCT PREMALG LES 2-14: CPT

## 2021-09-24 NOTE — HISTORY OF PRESENT ILLNESS
[de-identified] : Pt. presents for skin check;\par c/o few spots of concern;  s/p PDT to face in 4/2021\par Severity:  mild  \par Modifying factors:  none\par Associated symptoms:  none\par Context:  no association with activity\par \par

## 2021-09-24 NOTE — ASSESSMENT
[FreeTextEntry1] : Therapeutic options and their risks and benefits; along with multiple diagnostic possibilities were discussed at length; risks and benefits of further study were discussed;\par \par The patient was instructed to check portal and/or call the office in one week for biopsy results.\par \par Plan to treat by D&C if the biopsy is positive. Prob ISK, r/o BCC\par \par No recurrent SCC in situ or BCC on face;\par \par AKs did very well with PDT;  LN2 to 2 residual lesions R temple\par \par Continue regular exams; Follow up for TBSE in 6 months \par

## 2021-09-24 NOTE — PHYSICAL EXAM
[Full Body Skin Exam Performed] : performed [FreeTextEntry3] : Skin examination performed of the face, neck, trunk, arms, legs; \par The patient is well, alert and oriented, pleasant and cooperative.\par Eyelids, conjunctivae, oral mucosa, digits and nails all normal.  \par No cervical adenopathy.\par \par Normal findings include:\par \par Seborrheic keratoses- multiple on trunk, neck\par Angiomas\par Lentigines\par Healed D&C sites;  nose;\par scaling erythematous papules; R temple\par \par eroded plaque L anterior shoulder\par \par

## 2021-10-06 LAB — CORE LAB BIOPSY: NORMAL

## 2021-11-01 ENCOUNTER — APPOINTMENT (OUTPATIENT)
Dept: DERMATOLOGY | Facility: CLINIC | Age: 86
End: 2021-11-01
Payer: MEDICARE

## 2021-11-01 PROCEDURE — 11603 EXC TR-EXT MAL+MARG 2.1-3 CM: CPT

## 2021-11-01 PROCEDURE — 12032 INTMD RPR S/A/T/EXT 2.6-7.5: CPT

## 2021-11-08 LAB — CORE LAB BIOPSY: NORMAL

## 2021-11-15 ENCOUNTER — APPOINTMENT (OUTPATIENT)
Dept: DERMATOLOGY | Facility: CLINIC | Age: 86
End: 2021-11-15
Payer: MEDICARE

## 2021-11-15 PROCEDURE — ZZZZZ: CPT

## 2021-11-15 NOTE — HISTORY OF PRESENT ILLNESS
[de-identified] : Patient presents for suture removal- \par wound healed well, no signs infection\par sutures removed, dressed;\par Path:  Margins negative\par

## 2021-11-18 ENCOUNTER — APPOINTMENT (OUTPATIENT)
Dept: ELECTROPHYSIOLOGY | Facility: CLINIC | Age: 86
End: 2021-11-18
Payer: MEDICARE

## 2021-11-19 ENCOUNTER — NON-APPOINTMENT (OUTPATIENT)
Age: 86
End: 2021-11-19

## 2021-11-19 PROCEDURE — 93295 DEV INTERROG REMOTE 1/2/MLT: CPT | Mod: NC

## 2021-11-19 PROCEDURE — 93296 REM INTERROG EVL PM/IDS: CPT

## 2022-02-24 ENCOUNTER — APPOINTMENT (OUTPATIENT)
Dept: ELECTROPHYSIOLOGY | Facility: CLINIC | Age: 87
End: 2022-02-24
Payer: MEDICARE

## 2022-02-24 VITALS
OXYGEN SATURATION: 96 % | HEIGHT: 68 IN | BODY MASS INDEX: 33.34 KG/M2 | SYSTOLIC BLOOD PRESSURE: 149 MMHG | HEART RATE: 65 BPM | WEIGHT: 220 LBS | DIASTOLIC BLOOD PRESSURE: 85 MMHG

## 2022-02-24 PROCEDURE — 93284 PRGRMG EVAL IMPLANTABLE DFB: CPT

## 2022-02-24 RX ORDER — WARFARIN 3 MG/1
3 TABLET ORAL
Refills: 0 | Status: ACTIVE | COMMUNITY
Start: 2022-02-24

## 2022-02-24 RX ORDER — METOPROLOL TARTRATE 25 MG/1
25 TABLET, FILM COATED ORAL
Qty: 120 | Refills: 0 | Status: ACTIVE | COMMUNITY

## 2022-03-16 ENCOUNTER — APPOINTMENT (OUTPATIENT)
Dept: DERMATOLOGY | Facility: CLINIC | Age: 87
End: 2022-03-16
Payer: MEDICARE

## 2022-03-16 DIAGNOSIS — Z85.828 PERSONAL HISTORY OF OTHER MALIGNANT NEOPLASM OF SKIN: ICD-10-CM

## 2022-03-16 PROCEDURE — 99213 OFFICE O/P EST LOW 20 MIN: CPT

## 2022-03-16 NOTE — ASSESSMENT
[FreeTextEntry1] : Complete skin examination is negative for malignancy; Multiple new concerns were addressed and discussed.\par Therapeutic options and their risks and benefits; along with multiple diagnostic possibilities were discussed at length;\par risks and benefits of skin biopsy and/or other further study were discussed;\par \par recent SCC healed well\par Continue regular exams; Follow up for TBSE in 6 months \par

## 2022-03-16 NOTE — HISTORY OF PRESENT ILLNESS
[de-identified] : Pt. 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

## 2022-03-16 NOTE — PHYSICAL EXAM
[Full Body Skin Exam Performed] : performed [FreeTextEntry3] : Skin examination performed of the face, neck, trunk, arms, legs; \par The patient is well, alert and oriented, pleasant and cooperative.\par Eyelids, conjunctivae, oral mucosa, digits and nails all normal.  \par No cervical adenopathy.\par \par Normal findings include:\par \par Seborrheic keratoses- multiple on trunk, neck\par Angiomas\par Lentigines\par Healed D&C sites;  nose;\par healed surgical scar; L anterior shoulder\par \par  No lesions suspicious for malignancy.

## 2022-05-25 ENCOUNTER — APPOINTMENT (OUTPATIENT)
Dept: ELECTROPHYSIOLOGY | Facility: CLINIC | Age: 87
End: 2022-05-25
Payer: MEDICARE

## 2022-05-26 ENCOUNTER — NON-APPOINTMENT (OUTPATIENT)
Age: 87
End: 2022-05-26

## 2022-05-26 PROCEDURE — 93296 REM INTERROG EVL PM/IDS: CPT

## 2022-05-26 PROCEDURE — 93295 DEV INTERROG REMOTE 1/2/MLT: CPT

## 2022-08-24 ENCOUNTER — APPOINTMENT (OUTPATIENT)
Dept: ELECTROPHYSIOLOGY | Facility: CLINIC | Age: 87
End: 2022-08-24

## 2022-08-25 ENCOUNTER — APPOINTMENT (OUTPATIENT)
Dept: ELECTROPHYSIOLOGY | Facility: CLINIC | Age: 87
End: 2022-08-25

## 2022-08-25 ENCOUNTER — NON-APPOINTMENT (OUTPATIENT)
Age: 87
End: 2022-08-25

## 2022-08-25 PROCEDURE — 93296 REM INTERROG EVL PM/IDS: CPT

## 2022-08-25 PROCEDURE — 93295 DEV INTERROG REMOTE 1/2/MLT: CPT

## 2022-09-15 ENCOUNTER — INPATIENT (INPATIENT)
Facility: HOSPITAL | Age: 87
LOS: 9 days | Discharge: HOME HEALTH | DRG: 871 | End: 2022-09-25
Attending: FAMILY MEDICINE | Admitting: INTERNAL MEDICINE
Payer: MEDICARE

## 2022-09-15 VITALS — HEIGHT: 67.5 IN | OXYGEN SATURATION: 88 %

## 2022-09-15 DIAGNOSIS — J96.01 ACUTE RESPIRATORY FAILURE WITH HYPOXIA: ICD-10-CM

## 2022-09-15 DIAGNOSIS — Z98.49 CATARACT EXTRACTION STATUS, UNSPECIFIED EYE: Chronic | ICD-10-CM

## 2022-09-15 DIAGNOSIS — Z98.890 OTHER SPECIFIED POSTPROCEDURAL STATES: Chronic | ICD-10-CM

## 2022-09-15 DIAGNOSIS — Z96.7 PRESENCE OF OTHER BONE AND TENDON IMPLANTS: Chronic | ICD-10-CM

## 2022-09-15 LAB
ALBUMIN SERPL ELPH-MCNC: 4 G/DL — SIGNIFICANT CHANGE UP (ref 3.3–5)
ALP SERPL-CCNC: 139 U/L — HIGH (ref 40–120)
ALT FLD-CCNC: 56 U/L — SIGNIFICANT CHANGE UP (ref 12–78)
ANION GAP SERPL CALC-SCNC: 7 MMOL/L — SIGNIFICANT CHANGE UP (ref 5–17)
ANION GAP SERPL CALC-SCNC: 7 MMOL/L — SIGNIFICANT CHANGE UP (ref 5–17)
APPEARANCE UR: ABNORMAL
APTT BLD: 34.3 SEC — SIGNIFICANT CHANGE UP (ref 27.5–35.5)
AST SERPL-CCNC: 91 U/L — HIGH (ref 15–37)
BASE EXCESS BLDA CALC-SCNC: -11 MMOL/L — LOW (ref -2–3)
BASOPHILS # BLD AUTO: 0 K/UL — SIGNIFICANT CHANGE UP (ref 0–0.2)
BASOPHILS # BLD AUTO: 0 K/UL — SIGNIFICANT CHANGE UP (ref 0–0.2)
BASOPHILS NFR BLD AUTO: 0 % — SIGNIFICANT CHANGE UP (ref 0–2)
BASOPHILS NFR BLD AUTO: 0 % — SIGNIFICANT CHANGE UP (ref 0–2)
BILIRUB SERPL-MCNC: 1.3 MG/DL — HIGH (ref 0.2–1.2)
BILIRUB UR-MCNC: ABNORMAL
BLOOD GAS COMMENTS ARTERIAL: SIGNIFICANT CHANGE UP
BUN SERPL-MCNC: 22 MG/DL — SIGNIFICANT CHANGE UP (ref 7–23)
BUN SERPL-MCNC: 29 MG/DL — HIGH (ref 7–23)
CALCIUM SERPL-MCNC: 8.5 MG/DL — SIGNIFICANT CHANGE UP (ref 8.5–10.1)
CALCIUM SERPL-MCNC: 9.7 MG/DL — SIGNIFICANT CHANGE UP (ref 8.5–10.1)
CHLORIDE SERPL-SCNC: 106 MMOL/L — SIGNIFICANT CHANGE UP (ref 96–108)
CHLORIDE SERPL-SCNC: 110 MMOL/L — HIGH (ref 96–108)
CO2 BLDA-SCNC: 16 MMOL/L — LOW (ref 19–24)
CO2 SERPL-SCNC: 20 MMOL/L — LOW (ref 22–31)
CO2 SERPL-SCNC: 21 MMOL/L — LOW (ref 22–31)
COLOR SPEC: YELLOW — SIGNIFICANT CHANGE UP
CREAT SERPL-MCNC: 1.38 MG/DL — HIGH (ref 0.5–1.3)
CREAT SERPL-MCNC: 1.49 MG/DL — HIGH (ref 0.5–1.3)
DIFF PNL FLD: ABNORMAL
EGFR: 45 ML/MIN/1.73M2 — LOW
EGFR: 50 ML/MIN/1.73M2 — LOW
EOSINOPHIL # BLD AUTO: 0 K/UL — SIGNIFICANT CHANGE UP (ref 0–0.5)
EOSINOPHIL # BLD AUTO: 0 K/UL — SIGNIFICANT CHANGE UP (ref 0–0.5)
EOSINOPHIL NFR BLD AUTO: 0 % — SIGNIFICANT CHANGE UP (ref 0–6)
EOSINOPHIL NFR BLD AUTO: 0 % — SIGNIFICANT CHANGE UP (ref 0–6)
GAS PNL BLDA: SIGNIFICANT CHANGE UP
GLUCOSE SERPL-MCNC: 174 MG/DL — HIGH (ref 70–99)
GLUCOSE SERPL-MCNC: 93 MG/DL — SIGNIFICANT CHANGE UP (ref 70–99)
GLUCOSE UR QL: NEGATIVE — SIGNIFICANT CHANGE UP
GRAM STN FLD: SIGNIFICANT CHANGE UP
HCO3 BLDA-SCNC: 15 MMOL/L — LOW (ref 21–28)
HCT VFR BLD CALC: 47 % — SIGNIFICANT CHANGE UP (ref 39–50)
HCT VFR BLD CALC: 50.4 % — HIGH (ref 39–50)
HGB BLD-MCNC: 15.1 G/DL — SIGNIFICANT CHANGE UP (ref 13–17)
HGB BLD-MCNC: 15.3 G/DL — SIGNIFICANT CHANGE UP (ref 13–17)
INR BLD: 3.78 RATIO — HIGH (ref 0.88–1.16)
KETONES UR-MCNC: ABNORMAL
LACTATE SERPL-SCNC: 6 MMOL/L — CRITICAL HIGH (ref 0.7–2)
LEGIONELLA AG UR QL: NEGATIVE — SIGNIFICANT CHANGE UP
LEUKOCYTE ESTERASE UR-ACNC: ABNORMAL
LYMPHOCYTES # BLD AUTO: 144.6 K/UL — HIGH (ref 1–3.3)
LYMPHOCYTES # BLD AUTO: 64 % — HIGH (ref 13–44)
LYMPHOCYTES # BLD AUTO: 77.41 K/UL — HIGH (ref 1–3.3)
LYMPHOCYTES # BLD AUTO: 81 % — HIGH (ref 13–44)
MAGNESIUM SERPL-MCNC: 1.7 MG/DL — SIGNIFICANT CHANGE UP (ref 1.6–2.6)
MAGNESIUM SERPL-MCNC: 2.1 MG/DL — SIGNIFICANT CHANGE UP (ref 1.6–2.6)
MCHC RBC-ENTMCNC: 30 GM/DL — LOW (ref 32–36)
MCHC RBC-ENTMCNC: 32.2 PG — SIGNIFICANT CHANGE UP (ref 27–34)
MCHC RBC-ENTMCNC: 32.6 GM/DL — SIGNIFICANT CHANGE UP (ref 32–36)
MCHC RBC-ENTMCNC: 33.4 PG — SIGNIFICANT CHANGE UP (ref 27–34)
MCV RBC AUTO: 102.6 FL — HIGH (ref 80–100)
MCV RBC AUTO: 107.5 FL — HIGH (ref 80–100)
MONOCYTES # BLD AUTO: 1.21 K/UL — HIGH (ref 0–0.9)
MONOCYTES # BLD AUTO: 5.36 K/UL — HIGH (ref 0–0.9)
MONOCYTES NFR BLD AUTO: 1 % — LOW (ref 2–14)
MONOCYTES NFR BLD AUTO: 3 % — SIGNIFICANT CHANGE UP (ref 2–14)
NEUTROPHILS # BLD AUTO: 16.07 K/UL — HIGH (ref 1.8–7.4)
NEUTROPHILS # BLD AUTO: 7.26 K/UL — SIGNIFICANT CHANGE UP (ref 1.8–7.4)
NEUTROPHILS NFR BLD AUTO: 4 % — LOW (ref 43–77)
NEUTROPHILS NFR BLD AUTO: 9 % — LOW (ref 43–77)
NITRITE UR-MCNC: NEGATIVE — SIGNIFICANT CHANGE UP
NRBC # BLD: SIGNIFICANT CHANGE UP /100 WBCS (ref 0–0)
NRBC # BLD: SIGNIFICANT CHANGE UP /100 WBCS (ref 0–0)
NT-PROBNP SERPL-SCNC: 6050 PG/ML — HIGH (ref 0–450)
PCO2 BLDA: 35 MMHG — SIGNIFICANT CHANGE UP (ref 35–48)
PH BLDA: 7.25 — LOW (ref 7.35–7.45)
PH UR: 5 — SIGNIFICANT CHANGE UP (ref 5–8)
PHOSPHATE SERPL-MCNC: 2.8 MG/DL — SIGNIFICANT CHANGE UP (ref 2.5–4.5)
PLATELET # BLD AUTO: 149 K/UL — LOW (ref 150–400)
PLATELET # BLD AUTO: 198 K/UL — SIGNIFICANT CHANGE UP (ref 150–400)
PO2 BLDA: 81 MMHG — LOW (ref 83–108)
POTASSIUM SERPL-MCNC: 4.3 MMOL/L — SIGNIFICANT CHANGE UP (ref 3.5–5.3)
POTASSIUM SERPL-MCNC: 5.5 MMOL/L — HIGH (ref 3.5–5.3)
POTASSIUM SERPL-SCNC: 4.3 MMOL/L — SIGNIFICANT CHANGE UP (ref 3.5–5.3)
POTASSIUM SERPL-SCNC: 5.5 MMOL/L — HIGH (ref 3.5–5.3)
PROT SERPL-MCNC: 7.5 GM/DL — SIGNIFICANT CHANGE UP (ref 6–8.3)
PROT UR-MCNC: 30 MG/DL
PROTHROM AB SERPL-ACNC: 44.4 SEC — HIGH (ref 10.5–13.4)
RAPID RVP RESULT: SIGNIFICANT CHANGE UP
RBC # BLD: 4.58 M/UL — SIGNIFICANT CHANGE UP (ref 4.2–5.8)
RBC # BLD: 4.69 M/UL — SIGNIFICANT CHANGE UP (ref 4.2–5.8)
RBC # FLD: 14 % — SIGNIFICANT CHANGE UP (ref 10.3–14.5)
RBC # FLD: 14.1 % — SIGNIFICANT CHANGE UP (ref 10.3–14.5)
SAO2 % BLDA: 97 % — SIGNIFICANT CHANGE UP
SARS-COV-2 RNA SPEC QL NAA+PROBE: SIGNIFICANT CHANGE UP
SODIUM SERPL-SCNC: 134 MMOL/L — LOW (ref 135–145)
SODIUM SERPL-SCNC: 137 MMOL/L — SIGNIFICANT CHANGE UP (ref 135–145)
SP GR SPEC: 1.01 — SIGNIFICANT CHANGE UP (ref 1.01–1.02)
SPECIMEN SOURCE: SIGNIFICANT CHANGE UP
TROPONIN I, HIGH SENSITIVITY RESULT: 21.17 NG/L — SIGNIFICANT CHANGE UP
TROPONIN I, HIGH SENSITIVITY RESULT: 70.5 NG/L — SIGNIFICANT CHANGE UP
UROBILINOGEN FLD QL: 1
WBC # BLD: 120.95 K/UL — CRITICAL HIGH (ref 3.8–10.5)
WBC # BLD: 178.52 K/UL — CRITICAL HIGH (ref 3.8–10.5)
WBC # FLD AUTO: 120.95 K/UL — CRITICAL HIGH (ref 3.8–10.5)
WBC # FLD AUTO: 178.52 K/UL — CRITICAL HIGH (ref 3.8–10.5)

## 2022-09-15 PROCEDURE — 84484 ASSAY OF TROPONIN QUANT: CPT

## 2022-09-15 PROCEDURE — 71275 CT ANGIOGRAPHY CHEST: CPT | Mod: 26

## 2022-09-15 PROCEDURE — 82550 ASSAY OF CK (CPK): CPT

## 2022-09-15 PROCEDURE — 93971 EXTREMITY STUDY: CPT | Mod: 26

## 2022-09-15 PROCEDURE — 87081 CULTURE SCREEN ONLY: CPT

## 2022-09-15 PROCEDURE — 80053 COMPREHEN METABOLIC PANEL: CPT

## 2022-09-15 PROCEDURE — C9113: CPT

## 2022-09-15 PROCEDURE — 86359 T CELLS TOTAL COUNT: CPT

## 2022-09-15 PROCEDURE — 87077 CULTURE AEROBIC IDENTIFY: CPT

## 2022-09-15 PROCEDURE — 99291 CRITICAL CARE FIRST HOUR: CPT | Mod: CS

## 2022-09-15 PROCEDURE — 93308 TTE F-UP OR LMTD: CPT | Mod: 26

## 2022-09-15 PROCEDURE — 86355 B CELLS TOTAL COUNT: CPT

## 2022-09-15 PROCEDURE — 85025 COMPLETE CBC W/AUTO DIFF WBC: CPT

## 2022-09-15 PROCEDURE — 86357 NK CELLS TOTAL COUNT: CPT

## 2022-09-15 PROCEDURE — 84550 ASSAY OF BLOOD/URIC ACID: CPT

## 2022-09-15 PROCEDURE — 94761 N-INVAS EAR/PLS OXIMETRY MLT: CPT

## 2022-09-15 PROCEDURE — 93306 TTE W/DOPPLER COMPLETE: CPT | Mod: 26

## 2022-09-15 PROCEDURE — 36415 COLL VENOUS BLD VENIPUNCTURE: CPT

## 2022-09-15 PROCEDURE — 85610 PROTHROMBIN TIME: CPT

## 2022-09-15 PROCEDURE — 87449 NOS EACH ORGANISM AG IA: CPT

## 2022-09-15 PROCEDURE — 93010 ELECTROCARDIOGRAM REPORT: CPT

## 2022-09-15 PROCEDURE — 97163 PT EVAL HIGH COMPLEX 45 MIN: CPT | Mod: GP

## 2022-09-15 PROCEDURE — U0005: CPT

## 2022-09-15 PROCEDURE — 85027 COMPLETE CBC AUTOMATED: CPT

## 2022-09-15 PROCEDURE — 92610 EVALUATE SWALLOWING FUNCTION: CPT | Mod: GN

## 2022-09-15 PROCEDURE — 83735 ASSAY OF MAGNESIUM: CPT

## 2022-09-15 PROCEDURE — 94003 VENT MGMT INPAT SUBQ DAY: CPT

## 2022-09-15 PROCEDURE — 87086 URINE CULTURE/COLONY COUNT: CPT

## 2022-09-15 PROCEDURE — 97530 THERAPEUTIC ACTIVITIES: CPT | Mod: GP

## 2022-09-15 PROCEDURE — 71045 X-RAY EXAM CHEST 1 VIEW: CPT | Mod: 26

## 2022-09-15 PROCEDURE — 81001 URINALYSIS AUTO W/SCOPE: CPT

## 2022-09-15 PROCEDURE — 71275 CT ANGIOGRAPHY CHEST: CPT | Mod: MA

## 2022-09-15 PROCEDURE — 82803 BLOOD GASES ANY COMBINATION: CPT

## 2022-09-15 PROCEDURE — 99292 CRITICAL CARE ADDL 30 MIN: CPT

## 2022-09-15 PROCEDURE — 76700 US EXAM ABDOM COMPLETE: CPT

## 2022-09-15 PROCEDURE — 87070 CULTURE OTHR SPECIMN AEROBIC: CPT

## 2022-09-15 PROCEDURE — U0003: CPT

## 2022-09-15 PROCEDURE — 71045 X-RAY EXAM CHEST 1 VIEW: CPT

## 2022-09-15 PROCEDURE — 80048 BASIC METABOLIC PNL TOTAL CA: CPT

## 2022-09-15 PROCEDURE — 85810 BLOOD VISCOSITY EXAMINATION: CPT

## 2022-09-15 PROCEDURE — 87040 BLOOD CULTURE FOR BACTERIA: CPT

## 2022-09-15 PROCEDURE — 83605 ASSAY OF LACTIC ACID: CPT

## 2022-09-15 PROCEDURE — 97535 SELF CARE MNGMENT TRAINING: CPT | Mod: GP

## 2022-09-15 PROCEDURE — 84100 ASSAY OF PHOSPHORUS: CPT

## 2022-09-15 PROCEDURE — 74177 CT ABD & PELVIS W/CONTRAST: CPT

## 2022-09-15 PROCEDURE — 97116 GAIT TRAINING THERAPY: CPT | Mod: GP

## 2022-09-15 PROCEDURE — 36600 WITHDRAWAL OF ARTERIAL BLOOD: CPT

## 2022-09-15 PROCEDURE — 74177 CT ABD & PELVIS W/CONTRAST: CPT | Mod: 26

## 2022-09-15 PROCEDURE — 99291 CRITICAL CARE FIRST HOUR: CPT

## 2022-09-15 PROCEDURE — 93306 TTE W/DOPPLER COMPLETE: CPT

## 2022-09-15 PROCEDURE — 85730 THROMBOPLASTIN TIME PARTIAL: CPT

## 2022-09-15 PROCEDURE — 76604 US EXAM CHEST: CPT | Mod: 26

## 2022-09-15 PROCEDURE — 86360 T CELL ABSOLUTE COUNT/RATIO: CPT

## 2022-09-15 PROCEDURE — 92523 SPEECH SOUND LANG COMPREHEN: CPT | Mod: GN

## 2022-09-15 PROCEDURE — 80202 ASSAY OF VANCOMYCIN: CPT

## 2022-09-15 PROCEDURE — 94660 CPAP INITIATION&MGMT: CPT

## 2022-09-15 RX ORDER — CHLORHEXIDINE GLUCONATE 213 G/1000ML
15 SOLUTION TOPICAL EVERY 12 HOURS
Refills: 0 | Status: DISCONTINUED | OUTPATIENT
Start: 2022-09-15 | End: 2022-09-17

## 2022-09-15 RX ORDER — HYDROCORTISONE 20 MG
50 TABLET ORAL EVERY 8 HOURS
Refills: 0 | Status: DISCONTINUED | OUTPATIENT
Start: 2022-09-15 | End: 2022-09-17

## 2022-09-15 RX ORDER — VANCOMYCIN HCL 1 G
1250 VIAL (EA) INTRAVENOUS ONCE
Refills: 0 | Status: COMPLETED | OUTPATIENT
Start: 2022-09-15 | End: 2022-09-15

## 2022-09-15 RX ORDER — PROPOFOL 10 MG/ML
5 INJECTION, EMULSION INTRAVENOUS
Qty: 1000 | Refills: 0 | Status: ACTIVE | OUTPATIENT
Start: 2022-09-15 | End: 2023-08-14

## 2022-09-15 RX ORDER — SODIUM CHLORIDE 9 MG/ML
1000 INJECTION INTRAMUSCULAR; INTRAVENOUS; SUBCUTANEOUS ONCE
Refills: 0 | Status: COMPLETED | OUTPATIENT
Start: 2022-09-15 | End: 2022-09-15

## 2022-09-15 RX ORDER — CEFEPIME 1 G/1
2000 INJECTION, POWDER, FOR SOLUTION INTRAMUSCULAR; INTRAVENOUS EVERY 12 HOURS
Refills: 0 | Status: DISCONTINUED | OUTPATIENT
Start: 2022-09-15 | End: 2022-09-15

## 2022-09-15 RX ORDER — SODIUM CHLORIDE 9 MG/ML
1000 INJECTION, SOLUTION INTRAVENOUS
Refills: 0 | Status: DISCONTINUED | OUTPATIENT
Start: 2022-09-15 | End: 2022-09-16

## 2022-09-15 RX ORDER — SUCCINYLCHOLINE CHLORIDE 100 MG/5ML
150 SYRINGE (ML) INTRAVENOUS ONCE
Refills: 0 | Status: COMPLETED | OUTPATIENT
Start: 2022-09-15 | End: 2022-09-15

## 2022-09-15 RX ORDER — AZITHROMYCIN 500 MG/1
500 TABLET, FILM COATED ORAL EVERY 24 HOURS
Refills: 0 | Status: DISCONTINUED | OUTPATIENT
Start: 2022-09-16 | End: 2022-09-16

## 2022-09-15 RX ORDER — MIDAZOLAM HYDROCHLORIDE 1 MG/ML
2 INJECTION, SOLUTION INTRAMUSCULAR; INTRAVENOUS
Refills: 0 | Status: DISCONTINUED | OUTPATIENT
Start: 2022-09-15 | End: 2022-09-17

## 2022-09-15 RX ORDER — PANTOPRAZOLE SODIUM 20 MG/1
40 TABLET, DELAYED RELEASE ORAL DAILY
Refills: 0 | Status: DISCONTINUED | OUTPATIENT
Start: 2022-09-15 | End: 2022-09-20

## 2022-09-15 RX ORDER — ACETAMINOPHEN 500 MG
650 TABLET ORAL EVERY 6 HOURS
Refills: 0 | Status: DISCONTINUED | OUTPATIENT
Start: 2022-09-15 | End: 2022-09-25

## 2022-09-15 RX ORDER — FENTANYL CITRATE 50 UG/ML
25 INJECTION INTRAVENOUS ONCE
Refills: 0 | Status: DISCONTINUED | OUTPATIENT
Start: 2022-09-15 | End: 2022-09-15

## 2022-09-15 RX ORDER — AZITHROMYCIN 500 MG/1
500 TABLET, FILM COATED ORAL ONCE
Refills: 0 | Status: COMPLETED | OUTPATIENT
Start: 2022-09-15 | End: 2022-09-15

## 2022-09-15 RX ORDER — ACETAMINOPHEN 500 MG
1000 TABLET ORAL ONCE
Refills: 0 | Status: COMPLETED | OUTPATIENT
Start: 2022-09-15 | End: 2022-09-15

## 2022-09-15 RX ORDER — CEFEPIME 1 G/1
2000 INJECTION, POWDER, FOR SOLUTION INTRAMUSCULAR; INTRAVENOUS ONCE
Refills: 0 | Status: COMPLETED | OUTPATIENT
Start: 2022-09-15 | End: 2022-09-15

## 2022-09-15 RX ORDER — ETOMIDATE 2 MG/ML
20 INJECTION INTRAVENOUS ONCE
Refills: 0 | Status: COMPLETED | OUTPATIENT
Start: 2022-09-15 | End: 2022-09-15

## 2022-09-15 RX ORDER — AZITHROMYCIN 500 MG/1
TABLET, FILM COATED ORAL
Refills: 0 | Status: DISCONTINUED | OUTPATIENT
Start: 2022-09-15 | End: 2022-09-16

## 2022-09-15 RX ORDER — SODIUM CHLORIDE 9 MG/ML
500 INJECTION, SOLUTION INTRAVENOUS ONCE
Refills: 0 | Status: COMPLETED | OUTPATIENT
Start: 2022-09-15 | End: 2022-09-15

## 2022-09-15 RX ORDER — CEFEPIME 1 G/1
1000 INJECTION, POWDER, FOR SOLUTION INTRAMUSCULAR; INTRAVENOUS EVERY 8 HOURS
Refills: 0 | Status: DISCONTINUED | OUTPATIENT
Start: 2022-09-15 | End: 2022-09-15

## 2022-09-15 RX ORDER — MIDODRINE HYDROCHLORIDE 2.5 MG/1
10 TABLET ORAL EVERY 8 HOURS
Refills: 0 | Status: DISCONTINUED | OUTPATIENT
Start: 2022-09-15 | End: 2022-09-19

## 2022-09-15 RX ORDER — MAGNESIUM SULFATE 500 MG/ML
2 VIAL (ML) INJECTION ONCE
Refills: 0 | Status: COMPLETED | OUTPATIENT
Start: 2022-09-15 | End: 2022-09-15

## 2022-09-15 RX ORDER — DORZOLAMIDE HYDROCHLORIDE TIMOLOL MALEATE 20; 5 MG/ML; MG/ML
1 SOLUTION/ DROPS OPHTHALMIC
Refills: 0 | Status: DISCONTINUED | OUTPATIENT
Start: 2022-09-15 | End: 2022-09-25

## 2022-09-15 RX ORDER — CHLORHEXIDINE GLUCONATE 213 G/1000ML
1 SOLUTION TOPICAL
Refills: 0 | Status: DISCONTINUED | OUTPATIENT
Start: 2022-09-15 | End: 2022-09-15

## 2022-09-15 RX ORDER — LISINOPRIL/HYDROCHLOROTHIAZIDE 10-12.5 MG
1 TABLET ORAL
Qty: 0 | Refills: 0 | DISCHARGE

## 2022-09-15 RX ORDER — CEFEPIME 1 G/1
1000 INJECTION, POWDER, FOR SOLUTION INTRAMUSCULAR; INTRAVENOUS EVERY 12 HOURS
Refills: 0 | Status: DISCONTINUED | OUTPATIENT
Start: 2022-09-15 | End: 2022-09-22

## 2022-09-15 RX ORDER — NOREPINEPHRINE BITARTRATE/D5W 8 MG/250ML
0.05 PLASTIC BAG, INJECTION (ML) INTRAVENOUS
Qty: 8 | Refills: 0 | Status: DISCONTINUED | OUTPATIENT
Start: 2022-09-15 | End: 2022-09-18

## 2022-09-15 RX ORDER — ACETAMINOPHEN 500 MG
650 TABLET ORAL EVERY 6 HOURS
Refills: 0 | Status: DISCONTINUED | OUTPATIENT
Start: 2022-09-15 | End: 2022-09-15

## 2022-09-15 RX ADMIN — Medication 400 MILLIGRAM(S): at 03:52

## 2022-09-15 RX ADMIN — CEFEPIME 1000 MILLIGRAM(S): 1 INJECTION, POWDER, FOR SOLUTION INTRAMUSCULAR; INTRAVENOUS at 22:30

## 2022-09-15 RX ADMIN — MIDAZOLAM HYDROCHLORIDE 2 MILLIGRAM(S): 1 INJECTION, SOLUTION INTRAMUSCULAR; INTRAVENOUS at 17:24

## 2022-09-15 RX ADMIN — SODIUM CHLORIDE 75 MILLILITER(S): 9 INJECTION, SOLUTION INTRAVENOUS at 12:34

## 2022-09-15 RX ADMIN — MIDAZOLAM HYDROCHLORIDE 2 MILLIGRAM(S): 1 INJECTION, SOLUTION INTRAMUSCULAR; INTRAVENOUS at 22:59

## 2022-09-15 RX ADMIN — PROPOFOL 2.7 MICROGRAM(S)/KG/MIN: 10 INJECTION, EMULSION INTRAVENOUS at 14:16

## 2022-09-15 RX ADMIN — MIDAZOLAM HYDROCHLORIDE 2 MILLIGRAM(S): 1 INJECTION, SOLUTION INTRAMUSCULAR; INTRAVENOUS at 15:01

## 2022-09-15 RX ADMIN — Medication 8.44 MICROGRAM(S)/KG/MIN: at 14:16

## 2022-09-15 RX ADMIN — SODIUM CHLORIDE 75 MILLILITER(S): 9 INJECTION, SOLUTION INTRAVENOUS at 06:17

## 2022-09-15 RX ADMIN — PROPOFOL 2.7 MICROGRAM(S)/KG/MIN: 10 INJECTION, EMULSION INTRAVENOUS at 03:25

## 2022-09-15 RX ADMIN — CEFEPIME 100 MILLIGRAM(S): 1 INJECTION, POWDER, FOR SOLUTION INTRAMUSCULAR; INTRAVENOUS at 06:17

## 2022-09-15 RX ADMIN — SODIUM CHLORIDE 1000 MILLILITER(S): 9 INJECTION INTRAMUSCULAR; INTRAVENOUS; SUBCUTANEOUS at 06:18

## 2022-09-15 RX ADMIN — Medication 150 MILLIGRAM(S): at 02:19

## 2022-09-15 RX ADMIN — PROPOFOL 2.7 MICROGRAM(S)/KG/MIN: 10 INJECTION, EMULSION INTRAVENOUS at 18:41

## 2022-09-15 RX ADMIN — Medication 8.44 MICROGRAM(S)/KG/MIN: at 09:37

## 2022-09-15 RX ADMIN — AZITHROMYCIN 255 MILLIGRAM(S): 500 TABLET, FILM COATED ORAL at 11:45

## 2022-09-15 RX ADMIN — SODIUM CHLORIDE 1000 MILLILITER(S): 9 INJECTION INTRAMUSCULAR; INTRAVENOUS; SUBCUTANEOUS at 03:15

## 2022-09-15 RX ADMIN — Medication 50 MILLIGRAM(S): at 22:29

## 2022-09-15 RX ADMIN — Medication 25 GRAM(S): at 15:01

## 2022-09-15 RX ADMIN — DORZOLAMIDE HYDROCHLORIDE TIMOLOL MALEATE 1 DROP(S): 20; 5 SOLUTION/ DROPS OPHTHALMIC at 23:05

## 2022-09-15 RX ADMIN — CHLORHEXIDINE GLUCONATE 1 APPLICATION(S): 213 SOLUTION TOPICAL at 06:15

## 2022-09-15 RX ADMIN — Medication 8.44 MICROGRAM(S)/KG/MIN: at 22:59

## 2022-09-15 RX ADMIN — CEFEPIME 100 MILLIGRAM(S): 1 INJECTION, POWDER, FOR SOLUTION INTRAMUSCULAR; INTRAVENOUS at 04:15

## 2022-09-15 RX ADMIN — PROPOFOL 2.7 MICROGRAM(S)/KG/MIN: 10 INJECTION, EMULSION INTRAVENOUS at 07:49

## 2022-09-15 RX ADMIN — Medication 8.44 MICROGRAM(S)/KG/MIN: at 18:41

## 2022-09-15 RX ADMIN — MIDODRINE HYDROCHLORIDE 10 MILLIGRAM(S): 2.5 TABLET ORAL at 22:29

## 2022-09-15 RX ADMIN — SODIUM CHLORIDE 100 MILLILITER(S): 9 INJECTION, SOLUTION INTRAVENOUS at 19:41

## 2022-09-15 RX ADMIN — Medication 8.44 MICROGRAM(S)/KG/MIN: at 06:41

## 2022-09-15 RX ADMIN — CHLORHEXIDINE GLUCONATE 15 MILLILITER(S): 213 SOLUTION TOPICAL at 22:29

## 2022-09-15 RX ADMIN — PANTOPRAZOLE SODIUM 40 MILLIGRAM(S): 20 TABLET, DELAYED RELEASE ORAL at 09:09

## 2022-09-15 RX ADMIN — SODIUM CHLORIDE 100 MILLILITER(S): 9 INJECTION, SOLUTION INTRAVENOUS at 18:50

## 2022-09-15 RX ADMIN — ETOMIDATE 20 MILLIGRAM(S): 2 INJECTION INTRAVENOUS at 02:19

## 2022-09-15 RX ADMIN — Medication 650 MILLIGRAM(S): at 23:15

## 2022-09-15 RX ADMIN — CHLORHEXIDINE GLUCONATE 15 MILLILITER(S): 213 SOLUTION TOPICAL at 09:09

## 2022-09-15 RX ADMIN — SODIUM CHLORIDE 1000 MILLILITER(S): 9 INJECTION, SOLUTION INTRAVENOUS at 12:55

## 2022-09-15 RX ADMIN — MIDAZOLAM HYDROCHLORIDE 2 MILLIGRAM(S): 1 INJECTION, SOLUTION INTRAMUSCULAR; INTRAVENOUS at 19:40

## 2022-09-15 RX ADMIN — FENTANYL CITRATE 25 MICROGRAM(S): 50 INJECTION INTRAVENOUS at 13:00

## 2022-09-15 RX ADMIN — FENTANYL CITRATE 25 MICROGRAM(S): 50 INJECTION INTRAVENOUS at 12:33

## 2022-09-15 RX ADMIN — Medication 250 MILLIGRAM(S): at 07:41

## 2022-09-15 NOTE — PHARMACOTHERAPY INTERVENTION NOTE - COMMENTS
Medication reconciliation completed.  Patient was unable to provide medication information, spoke to Sindhu ([phone]) and they provided current medication list; confirmed with Dr. [facility].  Family states that any updates should be given to pt's son Dick ([phone]) as he is in the medical field and would understand better than they any information.

## 2022-09-15 NOTE — DIETITIAN INITIAL EVALUATION ADULT - OTHER INFO
88yo male with PMH significant for Afib on coumadin, severe cardiomyopathy, CAD s/p MI, HTN, HLD, CHF, s/p AICD, RA, gout, and leukemia p/w severe resp distress requiring emergent intubation.  admitted with bibasilar PNA, ADRIANA, lactic acidosis.  propofol infusing @ 15mL/hr (=396Kcal/day).   86yo male with PMH significant for Afib on coumadin, severe cardiomyopathy, CAD s/p MI, HTN, HLD, CHF, s/p AICD, RA, gout, and leukemia p/w severe resp distress requiring emergent intubation.  admitted with bibasilar PNA, ADRIANA, lactic acidosis.  propofol infusing @ 15mL/hr (=396Kcal/day).  one pressor infusing with MAP ~55-57.    *wt hx per EMR: 227.9# (11/29/17).  current bedscale wt on 9/15/22 of 219#. no significant wt change.  IBW: 151#  NFPE revealed of moderate muscle/fat wasting.   pt currently NPO.

## 2022-09-15 NOTE — ED ADULT TRIAGE NOTE - CHIEF COMPLAINT QUOTE
Worsening SOB all day, per EMS entire family is sick, febrile, COVID negative. At triage patient diaphoretic, labored breathing, mottled skin, dusky extremities, 02 sat was 70% per EMS, not tolerating CPAP, immediately brought to trauma room, MD Lange at bedside.

## 2022-09-15 NOTE — H&P ADULT - NSHPPHYSICALEXAM_GEN_ALL_CORE
General: Intubated.  Eyes: PERRL.  Neck: No JVD.  Heart: Paced rhythm, frequent PVCs.  Lungs: Diminished breath sounds b/l. General: Intubated.  Eyes: PERRL.  Neck: No JVD.  Heart: Paced rhythm, frequent PVCs.  Lungs: Diminished at bases b/l.  Abdomen: Soft, nontender, nondistended.  Skin: No rashes noted.  Extremities: No edema.  Neuro: Sedated, moving all extremities spontaneously.

## 2022-09-15 NOTE — ED ADULT NURSE NOTE - OBJECTIVE STATEMENT
Patient is an 87 year old male brought to the ED by EMS in respiratory distress. As per EMS< patient's family reported that he has been sick with cough and had worsening shortness of breath throughout the day.  EMS reported that the patient was transported on CPAP but became more unresponsive, starting with agonal breathing. BVM initiated upon arrival. Patient was immediately evaluated by Physician and was intubated at 0223am with 7 1/2 ET tube, 24 at the lip. Color change noted.

## 2022-09-15 NOTE — DIETITIAN INITIAL EVALUATION ADULT - ENTERAL
if pt becomes hemodynamically stable, start TF with Vital High Protein @ 20mL/hr and titrate to goal rate of 60mL/hr.  Which will provide ~1200Kcal, 105g protein, 1003mL free water, and total TF volume of 1200mL.

## 2022-09-15 NOTE — ED PROVIDER NOTE - CLINICAL SUMMARY MEDICAL DECISION MAKING FREE TEXT BOX
Acute respiratory failure with hypoxia; appears to be CHF, but hx of fever, cough, recent respiratory illness, will treat empirically for HCAP and send RVP.  Intubated - admit to ICU.

## 2022-09-15 NOTE — PROVIDER CONTACT NOTE (EICU) - RECOMMENDATIONS
CT chest reviewed. b/l consolidations. Agree with abx vanco and cefepime.  Continue ventilatory support.   IVF with  ml/hr for 12 hrs   Sputum and blood cx, MRSA, rvp, and Legionella  3 amps bicarbonate   Case discussed with ICU PA

## 2022-09-15 NOTE — H&P ADULT - ASSESSMENT
86 y/o M w/ PMHx a fib on coumadin, severe cardiomyopathy, CAD s/p MI, HTN, HLD, CHF, s/p AICD, RA, gout, and leukemia (diagnosed 1 yr ago, has not received treatment) admitted w/:    1.    PLAN:  - Sedated w/ propofol infusion.    Case discussed w/ eICU attending, Dr. Reveles.    CRITICAL CARE TIME SPENT: 47 minutes 86 y/o M w/ PMHx a fib on coumadin, severe cardiomyopathy, CAD s/p MI, HTN, HLD, CHF, s/p AICD, RA, gout, and leukemia (diagnosed 1 yr ago, has not received treatment) admitted w/:    1. Acute hypoxic respiratory failure  2. Bibasilar PNA  3. ADRIANA  4. Lactic acidosis    PLAN:  - Sedated w/ propofol infusion.  - Versed 2mg IV q2 hrs PRN for agitation/vent dyssynchrony.  - Vasopressor if needed to maintain MAP > 65.  - Actively titrating ventilator settings to maintain SpO2 > 92%.  - CTA chest revealed b/l lower lobe consolidations, and mucoid debris in the right bronchus intermedius and RLL bronchus.  - Start tube feeds.  - GI prophylaxis w/ protonix.  - Monitor renal function.  - Start maintenance LR at 75cc/hr.  - Insert gonzalez for strict I&Os.  - Trend lactate until clearance.  - Broad spectrum abx coverage w/ cefepime. F/u blood, urine, and sputum cultures.  - Significant leukocytosis in relation to known leukemia diagnosis. Heme/onc consult.  - Send coags STAT. If INR is subtherapeutic for a fib, will start heparin infusion.    Spoke extensively w/ pt's daughter, Jerri, in the ER. Updated/all questions answered. Confirmed FULL CODE status.    Case discussed w/ eICU attending, Dr. Reveles.    CRITICAL CARE TIME SPENT: 47 minutes

## 2022-09-15 NOTE — DIETITIAN INITIAL EVALUATION ADULT - PERTINENT LABORATORY DATA
09-15    134<L>  |  106  |  22  ----------------------------<  174<H>  5.5<H>   |  21<L>  |  1.49<H>    Ca    9.7      15 Sep 2022 02:23  Mg     2.1     09-15    TPro  7.5  /  Alb  4.0  /  TBili  1.3<H>  /  DBili  x   /  AST  91<H>  /  ALT  56  /  AlkPhos  139<H>  09-15  POCT Blood Glucose.: 179 mg/dL (09-15-22 @ 02:11)

## 2022-09-15 NOTE — ED PROVIDER NOTE - NSICDXPASTMEDICALHX_GEN_ALL_CORE_FT
PAST MEDICAL HISTORY:  Afib     BPH (Benign Prostatic Hyperplasia)     CA - skin cancer     Cholesterol serum elevated     Glaucoma     Gout     Hearing loss     History of ankle fracture     HTN (hypertension)     Intolerance, drug INTOLERANCE  TO STATIN    Myocardial infarction     Osteoarthritis     Rheumatoid arthritis      Epidermal Sutures: 4-0 Monocryl

## 2022-09-15 NOTE — ED PROVIDER NOTE - PROGRESS NOTE DETAILS
Discussed case for admission with ICU.  Agree with current management.  Some labs still pending.  Agree with CTA chest upon admission and will follow up results. Family requests to visit, but multiple family members also sick, so nursing admin discussed with family inability to come physically to bedside.  Patient currently with temp 102F, ofirmev ordered.  Patient is hypotensive, but also in CHF.  Will hang 2nd L of NSS and empiric antibiotics as lactate is 6.  If fluids do not help BP, will consider pressors. JG: Discussed intubation and ICU admission with son Caleb listed as emergency contact who agrees with management.  States patients grandson is an EMT and came into the hospital initially with him. Patient is full code.

## 2022-09-15 NOTE — CHART NOTE - NSCHARTNOTEFT_GEN_A_CORE
: Zacarias Briceno MD    INDICATION: shock, resp failure, hx leukemia     PROCEDURE:  [X] LIMITED ECHO  [X] LIMITED CHEST  [ ] LIMITED RETROPERITONEAL  [ ] LIMITED ABDOMINAL  [X] LIMITED DVT  [ ] NEEDLE GUIDANCE VASCULAR  [ ] NEEDLE GUIDANCE THORACENTESIS  [ ] NEEDLE GUIDANCE PARACENTESIS  [ ] NEEDLE GUIDANCE PERICARDIOCENTESIS  [ ] OTHER    FINDINGS: anterior a-lines, scattered mid-right b-lines, R base consolidation, no significant pleural effusion, normal to mild decreased LV sys fn, LVOT VTI 12 cm, septum round, normal RV size and fn, no pericardial effusion, leak in RA, RV, TR, IVC 1.8 cm w/o resp variation, b/l femoral and popliteal veins compressible without echogenic density       INTERPRETATION: R base consolidation, normal to mild LV sys dysfn, TR, no proximal LE DVT

## 2022-09-15 NOTE — H&P ADULT - HISTORY OF PRESENT ILLNESS
86 y/o M w/ PMHx a fib on coumadin, severe cardiomyopathy, CAD s/p MI, HTN, HLD, CHF, s/p AICD, RA, gout, and leukemia (diagnosed 1 yr ago, has not received treatment) who presented to ER in severe respiratory distress requiring emergent intubation. Per pt's daughter, Jerri, she and her daughter whom pt lives w/ have been sick w/ URI symptoms (tested negative for COVID-19). Yesterday afternoon, pt began to c/o headache, dizziness, and cough. Overnight, pt developed shortness of breath prompting EMS call. Labs revealed leukocytosis (WBC count 172K), ADRIANA (BUN/Cr 22/1.49), and lactic acidosis (lactate 6). In the ER, pt was administered 2 L IVF, cefepime, vancomycin, and ofirmev. Propofol infusion was initiated for sedation. Pt admitted to ICU for management of acute hypoxic respiratory failure.

## 2022-09-15 NOTE — ED PROVIDER NOTE - HEME LYMPH, MLM
No adenopathy or splenomegaly. No cervical or inguinal lymphadenopathy. +trace edema bilateral lower legs

## 2022-09-15 NOTE — ED PROVIDER NOTE - OBJECTIVE STATEMENT
Pt. is an 88 yo M with PMHx of DM, HTN, CAD, MI, gout, rheumatoid arthritis presents with difficulty breathing and AMS.  Per EMS family states patient has been sick with cough and had progression of worsening SOB throughout the day.  Per EMS, patient was transported on CPAP and then became more unresponsive, staring with agonal breathing.  Patient was immediately intubated in the ED.  +multiple sick contacts at home.

## 2022-09-15 NOTE — ED ADULT NURSE NOTE - NSICDXPASTMEDICALHX_GEN_ALL_CORE_FT
PAST MEDICAL HISTORY:  Afib     BPH (Benign Prostatic Hyperplasia)     CA - skin cancer     Cholesterol serum elevated     Glaucoma     Gout     Hearing loss     History of ankle fracture     HTN (hypertension)     Intolerance, drug INTOLERANCE  TO STATIN    Myocardial infarction     Osteoarthritis     Rheumatoid arthritis

## 2022-09-15 NOTE — PROVIDER CONTACT NOTE (EICU) - BACKGROUND
86 y/o male with h/o HTN, CAD/CHF with AICD, h/o MI, RA, and leukemia dxed ~1 yr ago - no treatment. Pt presented in severe respiratory distress and was emergently intubated in the ED. Pt had developed URI symptoms with shortness of breath. In the ER lactic acidosis with leukocytosis of 172K.

## 2022-09-15 NOTE — PROVIDER CONTACT NOTE (EICU) - ASSESSMENT
Acute hypoxic respiratory failure s/p intubation 9/15/22  Pneumonia, multifocal  ADRIANA  Lactic acidosis with metabolic acdid

## 2022-09-15 NOTE — ED PROVIDER NOTE - PSYCHIATRIC, MLM
Ongoing SW/CM Assessment/Plan of Care Note     See SW/CM flowsheets for goals and other objective data.    Patient/Family discharge goal (s):  Goal #1: Discharge to other institution(s) arranged          PT Recommendation:  Recommendation for Discharge: PT: Post acute therapy    OT Recommendation:  Recommendations for Discharge: OT: 24 Hour assist    SLP Recommendation:  Recommendations for Discharge: SLP: El at [facility] ([phone]) re: anticipated placement date - no call back.     Waiting to hear from Darlene at [facility] ([phone]) regarding new referral. Will continue to follow.    CHIDI Sepulveda [phone]           NA

## 2022-09-15 NOTE — DIETITIAN INITIAL EVALUATION ADULT - PERTINENT MEDS FT
MEDICATIONS  (STANDING):  azithromycin  IVPB      azithromycin  IVPB 500 milliGRAM(s) IV Intermittent once  cefepime  Injectable. 1000 milliGRAM(s) IV Push every 12 hours  chlorhexidine 0.12% Liquid 15 milliLiter(s) Oral Mucosa every 12 hours  dorzolamide 2%/timolol 0.5% Ophthalmic Solution 1 Drop(s) Both EYES two times a day  lactated ringers. 1000 milliLiter(s) (75 mL/Hr) IV Continuous <Continuous>  norepinephrine Infusion 0.05 MICROgram(s)/kG/Min (8.44 mL/Hr) IV Continuous <Continuous>  pantoprazole  Injectable 40 milliGRAM(s) IV Push daily  propofol Infusion 5 MICROgram(s)/kG/Min (2.7 mL/Hr) IV Continuous <Continuous>    MEDICATIONS  (PRN):  acetaminophen    Suspension .. 650 milliGRAM(s) Oral every 6 hours PRN Temp greater or equal to 38C (100.4F)  midazolam Injectable 2 milliGRAM(s) IV Push every 2 hours PRN Agitation/vent dyssynchrony

## 2022-09-15 NOTE — ED PROVIDER NOTE - ENMT, MLM
Airway patent with blood tinged froth in airway, Nasal mucosa clear. Mouth with normal mucosa. Throat has no vesicles, no oropharyngeal exudates and uvula is midline. +JVD

## 2022-09-15 NOTE — ED PROVIDER NOTE - NSICDXFAMILYHX_GEN_ALL_CORE_FT
FAMILY HISTORY:  Father  Still living? No  Family history of other heart disease, Age at diagnosis: Age Unknown    Child  Still living? Yes, Estimated age: 51-60  Family history of cardiac arrhythmia, Age at diagnosis: Age Unknown

## 2022-09-15 NOTE — DIETITIAN INITIAL EVALUATION ADULT - ADD RECOMMEND
1) if pt becomes hemodynamically stable, start TF with Vital High Protein @ 20mL/hr and titrate to goal rate of 60mL/hr  2) monitor TF tolerance, keep back of bed >35 degrees  3) monitor hydration status; adjust free water flushes prn  4) daily wt checks  5) monitor lytes/mins; replete/correct prn  6) monitor BM; if > 3 days, add bowel regimen

## 2022-09-15 NOTE — ED PROVIDER NOTE - NSICDXPASTSURGICALHX_GEN_ALL_CORE_FT
PAST SURGICAL HISTORY:  H/O coronary angiogram x 2    H/O hernia repair x  4 surgeries    H/O shoulder surgery right    Hx of appendectomy     Hx of tonsillectomy     S/P laser cataract surgery b/l    S/P ORIF (open reduction internal fixation) fracture left ankle    S/P TKR (total knee replacement) bilateral

## 2022-09-15 NOTE — PROGRESS NOTE ADULT - SUBJECTIVE AND OBJECTIVE BOX
Patient is a 87y old  Male who presents with a chief complaint of Acute hypoxic respiratory failure (15 Sep 2022 04:43)    24 hour events:     PAST MEDICAL & SURGICAL HISTORY:  Afib      HTN (hypertension)      Cholesterol serum elevated      BPH (Benign Prostatic Hyperplasia)      CA - skin cancer      History of ankle fracture      Intolerance, drug  INTOLERANCE  TO STATIN      Hearing loss      Myocardial infarction      Gout      Osteoarthritis      Glaucoma      Rheumatoid arthritis      Hx of appendectomy      Hx of tonsillectomy      S/P TKR (total knee replacement)  bilateral      H/O coronary angiogram  x 2      H/O hernia repair  x  4 surgeries      S/P laser cataract surgery  b/l      H/O shoulder surgery  right      S/P ORIF (open reduction internal fixation) fracture  left ankle          Allergies    penicillins (Unknown)    Intolerances      REVIEW OF SYSTEMS: SEE BELOW       ICU Vital Signs Last 24 Hrs  T(C): 37.7 (15 Sep 2022 08:00), Max: 38.9 (15 Sep 2022 03:20)  T(F): 99.9 (15 Sep 2022 08:00), Max: 102.1 (15 Sep 2022 03:20)  HR: 82 (15 Sep 2022 08:00) (65 - 102)  BP: 123/53 (15 Sep 2022 08:00) (68/23 - 123/53)  BP(mean): 70 (15 Sep 2022 08:00) (30 - 70)  ABP: --  ABP(mean): --  RR: 25 (15 Sep 2022 08:00) (22 - 30)  SpO2: 94% (15 Sep 2022 08:00) (88% - 99%)    O2 Parameters below as of 15 Sep 2022 08:00  Patient On (Oxygen Delivery Method): ventilator    O2 Concentration (%): 80        CAPILLARY BLOOD GLUCOSE      POCT Blood Glucose.: 179 mg/dL (15 Sep 2022 02:11)      I&O's Summary    14 Sep 2022 07:01  -  15 Sep 2022 07:00  --------------------------------------------------------  IN: 1050 mL / OUT: 75 mL / NET: 975 mL    15 Sep 2022 07:01  -  15 Sep 2022 09:12  --------------------------------------------------------  IN: 0 mL / OUT: 60 mL / NET: -60 mL        Mode: AC/ CMV (Assist Control/ Continuous Mandatory Ventilation)  RR (machine): 16  TV (machine): 450  FiO2: 80  PEEP: 5  ITime: 1.2  PIP: 27      MEDICATIONS  (STANDING):  cefepime   IVPB 2000 milliGRAM(s) IV Intermittent every 12 hours  chlorhexidine 0.12% Liquid 15 milliLiter(s) Oral Mucosa every 12 hours  lactated ringers. 1000 milliLiter(s) (75 mL/Hr) IV Continuous <Continuous>  norepinephrine Infusion 0.05 MICROgram(s)/kG/Min (8.44 mL/Hr) IV Continuous <Continuous>  pantoprazole  Injectable 40 milliGRAM(s) IV Push daily  propofol Infusion 5 MICROgram(s)/kG/Min (2.7 mL/Hr) IV Continuous <Continuous>      MEDICATIONS  (PRN):  acetaminophen    Suspension .. 650 milliGRAM(s) Oral every 6 hours PRN Temp greater or equal to 38C (100.4F)  midazolam Injectable 2 milliGRAM(s) IV Push every 2 hours PRN Agitation/vent dyssynchrony      PHYSICAL EXAM: SEE BELOW                          15.1   178.52 )-----------( 198      ( 15 Sep 2022 02:23 )             50.4       09-15    134<L>  |  106  |  22  ----------------------------<  174<H>  5.5<H>   |  21<L>  |  1.49<H>    Ca    9.7      15 Sep 2022 02:23  Mg     2.1     09-15    TPro  7.5  /  Alb  4.0  /  TBili  1.3<H>  /  DBili  x   /  AST  91<H>  /  ALT  56  /  AlkPhos  139<H>  09-15    Lactate 2.3           09-15 @ 05:20    Lactate 6.0           09-15 @ 02:23          PT/INR - ( 15 Sep 2022 05:20 )   PT: 44.4 sec;   INR: 3.78 ratio         PTT - ( 15 Sep 2022 05:20 )  PTT:34.3 sec        Rapid RVP Result: NotDetec (09-15 @ 02:23)       Patient is a 87y old  Male who presents with a chief complaint of Acute hypoxic respiratory failure (15 Sep 2022 04:43)    24 hour events: abdominal breathing, appears uncomfortable but he denies it   denies pain   on pressor   afebrile   poor UO     PAST MEDICAL & SURGICAL HISTORY:  Afib      HTN (hypertension)      Cholesterol serum elevated      BPH (Benign Prostatic Hyperplasia)      CA - skin cancer      History of ankle fracture      Intolerance, drug  INTOLERANCE  TO STATIN      Hearing loss      Myocardial infarction      Gout      Osteoarthritis      Glaucoma      Rheumatoid arthritis      Hx of appendectomy      Hx of tonsillectomy      S/P TKR (total knee replacement)  bilateral      H/O coronary angiogram  x 2      H/O hernia repair  x  4 surgeries      S/P laser cataract surgery  b/l      H/O shoulder surgery  right      S/P ORIF (open reduction internal fixation) fracture  left ankle          Allergies    penicillins (Unknown)    Intolerances      REVIEW OF SYSTEMS: SEE BELOW       ICU Vital Signs Last 24 Hrs  T(C): 37.7 (15 Sep 2022 08:00), Max: 38.9 (15 Sep 2022 03:20)  T(F): 99.9 (15 Sep 2022 08:00), Max: 102.1 (15 Sep 2022 03:20)  HR: 82 (15 Sep 2022 08:00) (65 - 102)  BP: 123/53 (15 Sep 2022 08:00) (68/23 - 123/53)  BP(mean): 70 (15 Sep 2022 08:00) (30 - 70)  ABP: --  ABP(mean): --  RR: 25 (15 Sep 2022 08:00) (22 - 30)  SpO2: 94% (15 Sep 2022 08:00) (88% - 99%)    O2 Parameters below as of 15 Sep 2022 08:00  Patient On (Oxygen Delivery Method): ventilator    O2 Concentration (%): 80        CAPILLARY BLOOD GLUCOSE      POCT Blood Glucose.: 179 mg/dL (15 Sep 2022 02:11)      I&O's Summary    14 Sep 2022 07:01  -  15 Sep 2022 07:00  --------------------------------------------------------  IN: 1050 mL / OUT: 75 mL / NET: 975 mL    15 Sep 2022 07:01  -  15 Sep 2022 09:12  --------------------------------------------------------  IN: 0 mL / OUT: 60 mL / NET: -60 mL        Mode: AC/ CMV (Assist Control/ Continuous Mandatory Ventilation)  RR (machine): 16  TV (machine): 450  FiO2: 80  PEEP: 5  ITime: 1.2  PIP: 27      MEDICATIONS  (STANDING):  cefepime   IVPB 2000 milliGRAM(s) IV Intermittent every 12 hours  chlorhexidine 0.12% Liquid 15 milliLiter(s) Oral Mucosa every 12 hours  lactated ringers. 1000 milliLiter(s) (75 mL/Hr) IV Continuous <Continuous>  norepinephrine Infusion 0.05 MICROgram(s)/kG/Min (8.44 mL/Hr) IV Continuous <Continuous>  pantoprazole  Injectable 40 milliGRAM(s) IV Push daily  propofol Infusion 5 MICROgram(s)/kG/Min (2.7 mL/Hr) IV Continuous <Continuous>      MEDICATIONS  (PRN):  acetaminophen    Suspension .. 650 milliGRAM(s) Oral every 6 hours PRN Temp greater or equal to 38C (100.4F)  midazolam Injectable 2 milliGRAM(s) IV Push every 2 hours PRN Agitation/vent dyssynchrony      PHYSICAL EXAM: SEE BELOW                          15.1   178.52 )-----------( 198      ( 15 Sep 2022 02:23 )             50.4       09-15    134<L>  |  106  |  22  ----------------------------<  174<H>  5.5<H>   |  21<L>  |  1.49<H>    Ca    9.7      15 Sep 2022 02:23  Mg     2.1     09-15    TPro  7.5  /  Alb  4.0  /  TBili  1.3<H>  /  DBili  x   /  AST  91<H>  /  ALT  56  /  AlkPhos  139<H>  09-15    Lactate 2.3           09-15 @ 05:20    Lactate 6.0           09-15 @ 02:23          PT/INR - ( 15 Sep 2022 05:20 )   PT: 44.4 sec;   INR: 3.78 ratio         PTT - ( 15 Sep 2022 05:20 )  PTT:34.3 sec        Rapid RVP Result: Aamirterosangela (09-15 @ 02:23)

## 2022-09-15 NOTE — PROGRESS NOTE ADULT - ASSESSMENT
Vent support   LR @ 75   Prop   Cefepime, add azithro   Vanc x 1  F/u BCx, send sputum cx, Legionella w/u   Levo 0.28  INR 3.78, hold warfarin today   Repeat labs  DVT ppx - on warfarin  Acute hypoxic resp failure   Septic shock   Bibasilar bacterial pneumonia   Prerenal ADRIANA     PMH CLL (no tx), CAD, CHF, s/p ICD, chronic A.fib on warfarin, cholecystectomy 10-20 yrs ago     Remains critical on multiple life support measures       Plan:     No SAT today   Continue prop, PRN Versed for vent synchrony   Levo for goal MAP>65  Add midodrine   Will also add stress steroids   INR 3.78, hold warfarin today   On empiric cefepime, received a dose of vanc  Will add azithro  Send Legionella, f/u sputum and blood cx   Has pneumobilia on CT scan with prior cholecystectomy, despite elevated TB he does not have any signs suggestive of biliary infection  Continue vent support, not a candidate for SBT today   IVF resuscitation - responded to it   Monitor UO and renal fn   Replete lytes prn   DVT ppx - on warfarin   Restraints to prevent pulling on medical devices   Maintain Barnes   No central line       Prognosis poor    Lori Jennings (EMT) updated by phone [phone] Acute hypoxic resp failure   Septic shock   Bibasilar bacterial pneumonia   Prerenal ADRIANA     PMH CLL (no tx), CAD, CHF, s/p ICD, chronic A.fib on warfarin, cholecystectomy 10-20 yrs ago     Remains critical on multiple life support measures       Plan:     No SAT today   Continue prop, PRN Versed for vent synchrony   Levo for goal MAP>65  Add midodrine   Will also add stress steroids   INR 3.78, hold warfarin today   F/u official ECHO   On empiric cefepime, received a dose of vanc  Will add azithro  Send Legionella, f/u sputum and blood cx   Has pneumobilia on CT scan with prior cholecystectomy, despite elevated TB he does not have any signs suggestive of biliary infection  Continue vent support, not a candidate for SBT today   IVF resuscitation - responded to it   Monitor UO and renal fn   Replete lytes prn   DVT ppx - on warfarin   Restraints to prevent pulling on medical devices   Maintain Barnes   No central line       Prognosis poor    Lori Jennings (EMT) updated by phone [phone]

## 2022-09-16 LAB
-  COAGULASE NEGATIVE STAPHYLOCOCCUS: SIGNIFICANT CHANGE UP
ALBUMIN SERPL ELPH-MCNC: 2.8 G/DL — LOW (ref 3.3–5)
ALP SERPL-CCNC: 128 U/L — HIGH (ref 40–120)
ALT FLD-CCNC: 45 U/L — SIGNIFICANT CHANGE UP (ref 12–78)
ANION GAP SERPL CALC-SCNC: 4 MMOL/L — LOW (ref 5–17)
ANION GAP SERPL CALC-SCNC: 6 MMOL/L — SIGNIFICANT CHANGE UP (ref 5–17)
ANION GAP SERPL CALC-SCNC: 7 MMOL/L — SIGNIFICANT CHANGE UP (ref 5–17)
AST SERPL-CCNC: 58 U/L — HIGH (ref 15–37)
BASE EXCESS BLDA CALC-SCNC: -6 MMOL/L — LOW (ref -2–3)
BASE EXCESS BLDA CALC-SCNC: -7.7 MMOL/L — LOW (ref -2–3)
BILIRUB SERPL-MCNC: 2.2 MG/DL — HIGH (ref 0.2–1.2)
BLOOD GAS COMMENTS ARTERIAL: SIGNIFICANT CHANGE UP
BLOOD GAS COMMENTS ARTERIAL: SIGNIFICANT CHANGE UP
BUN SERPL-MCNC: 28 MG/DL — HIGH (ref 7–23)
BUN SERPL-MCNC: 29 MG/DL — HIGH (ref 7–23)
BUN SERPL-MCNC: 30 MG/DL — HIGH (ref 7–23)
CALCIUM SERPL-MCNC: 8.5 MG/DL — SIGNIFICANT CHANGE UP (ref 8.5–10.1)
CALCIUM SERPL-MCNC: 8.8 MG/DL — SIGNIFICANT CHANGE UP (ref 8.5–10.1)
CALCIUM SERPL-MCNC: 9 MG/DL — SIGNIFICANT CHANGE UP (ref 8.5–10.1)
CHLORIDE SERPL-SCNC: 107 MMOL/L — SIGNIFICANT CHANGE UP (ref 96–108)
CHLORIDE SERPL-SCNC: 107 MMOL/L — SIGNIFICANT CHANGE UP (ref 96–108)
CHLORIDE SERPL-SCNC: 109 MMOL/L — HIGH (ref 96–108)
CK SERPL-CCNC: 40 U/L — SIGNIFICANT CHANGE UP (ref 26–308)
CO2 BLDA-SCNC: 22 MMOL/L — SIGNIFICANT CHANGE UP (ref 19–24)
CO2 BLDA-SCNC: 23 MMOL/L — SIGNIFICANT CHANGE UP (ref 19–24)
CO2 SERPL-SCNC: 19 MMOL/L — LOW (ref 22–31)
CO2 SERPL-SCNC: 22 MMOL/L — SIGNIFICANT CHANGE UP (ref 22–31)
CO2 SERPL-SCNC: 22 MMOL/L — SIGNIFICANT CHANGE UP (ref 22–31)
CREAT SERPL-MCNC: 1.42 MG/DL — HIGH (ref 0.5–1.3)
CREAT SERPL-MCNC: 1.44 MG/DL — HIGH (ref 0.5–1.3)
CREAT SERPL-MCNC: 1.47 MG/DL — HIGH (ref 0.5–1.3)
CULTURE RESULTS: NO GROWTH — SIGNIFICANT CHANGE UP
CULTURE RESULTS: SIGNIFICANT CHANGE UP
EGFR: 46 ML/MIN/1.73M2 — LOW
EGFR: 47 ML/MIN/1.73M2 — LOW
EGFR: 48 ML/MIN/1.73M2 — LOW
GAS PNL BLDA: SIGNIFICANT CHANGE UP
GAS PNL BLDA: SIGNIFICANT CHANGE UP
GLUCOSE SERPL-MCNC: 163 MG/DL — HIGH (ref 70–99)
GLUCOSE SERPL-MCNC: 86 MG/DL — SIGNIFICANT CHANGE UP (ref 70–99)
GLUCOSE SERPL-MCNC: 93 MG/DL — SIGNIFICANT CHANGE UP (ref 70–99)
GRAM STN FLD: SIGNIFICANT CHANGE UP
HCO3 BLDA-SCNC: 21 MMOL/L — SIGNIFICANT CHANGE UP (ref 21–28)
HCO3 BLDA-SCNC: 21 MMOL/L — SIGNIFICANT CHANGE UP (ref 21–28)
HCT VFR BLD CALC: 47.2 % — SIGNIFICANT CHANGE UP (ref 39–50)
HGB BLD-MCNC: 14.5 G/DL — SIGNIFICANT CHANGE UP (ref 13–17)
INR BLD: 5.16 RATIO — CRITICAL HIGH (ref 0.88–1.16)
LACTATE SERPL-SCNC: 1.9 MMOL/L — SIGNIFICANT CHANGE UP (ref 0.7–2)
MAGNESIUM SERPL-MCNC: 2.2 MG/DL — SIGNIFICANT CHANGE UP (ref 1.6–2.6)
MCHC RBC-ENTMCNC: 30.7 GM/DL — LOW (ref 32–36)
MCHC RBC-ENTMCNC: 32.2 PG — SIGNIFICANT CHANGE UP (ref 27–34)
MCV RBC AUTO: 104.7 FL — HIGH (ref 80–100)
METHOD TYPE: SIGNIFICANT CHANGE UP
PCO2 BLDA: 44 MMHG — SIGNIFICANT CHANGE UP (ref 35–48)
PCO2 BLDA: 55 MMHG — HIGH (ref 35–48)
PH BLDA: 7.19 — CRITICAL LOW (ref 7.35–7.45)
PH BLDA: 7.28 — LOW (ref 7.35–7.45)
PHOSPHATE SERPL-MCNC: 4.1 MG/DL — SIGNIFICANT CHANGE UP (ref 2.5–4.5)
PLATELET # BLD AUTO: 143 K/UL — LOW (ref 150–400)
PO2 BLDA: 78 MMHG — LOW (ref 83–108)
PO2 BLDA: 83 MMHG — SIGNIFICANT CHANGE UP (ref 83–108)
POTASSIUM SERPL-MCNC: 4.9 MMOL/L — SIGNIFICANT CHANGE UP (ref 3.5–5.3)
POTASSIUM SERPL-MCNC: 7.1 MMOL/L — CRITICAL HIGH (ref 3.5–5.3)
POTASSIUM SERPL-MCNC: 7.5 MMOL/L — CRITICAL HIGH (ref 3.5–5.3)
POTASSIUM SERPL-SCNC: 4.9 MMOL/L — SIGNIFICANT CHANGE UP (ref 3.5–5.3)
POTASSIUM SERPL-SCNC: 7.1 MMOL/L — CRITICAL HIGH (ref 3.5–5.3)
POTASSIUM SERPL-SCNC: 7.5 MMOL/L — CRITICAL HIGH (ref 3.5–5.3)
PROT SERPL-MCNC: 5.7 GM/DL — LOW (ref 6–8.3)
PROTHROM AB SERPL-ACNC: 60.9 SEC — HIGH (ref 10.5–13.4)
RBC # BLD: 4.51 M/UL — SIGNIFICANT CHANGE UP (ref 4.2–5.8)
RBC # FLD: 14.3 % — SIGNIFICANT CHANGE UP (ref 10.3–14.5)
SAO2 % BLDA: 97 % — SIGNIFICANT CHANGE UP
SAO2 % BLDA: 98 % — SIGNIFICANT CHANGE UP
SODIUM SERPL-SCNC: 133 MMOL/L — LOW (ref 135–145)
SODIUM SERPL-SCNC: 134 MMOL/L — LOW (ref 135–145)
SODIUM SERPL-SCNC: 136 MMOL/L — SIGNIFICANT CHANGE UP (ref 135–145)
SPECIMEN SOURCE: SIGNIFICANT CHANGE UP
URATE SERPL-MCNC: 4.2 MG/DL — SIGNIFICANT CHANGE UP (ref 3.4–8.8)
WBC # BLD: 156.94 K/UL — CRITICAL HIGH (ref 3.8–10.5)
WBC # FLD AUTO: 156.94 K/UL — CRITICAL HIGH (ref 3.8–10.5)

## 2022-09-16 PROCEDURE — 99292 CRITICAL CARE ADDL 30 MIN: CPT

## 2022-09-16 PROCEDURE — 99291 CRITICAL CARE FIRST HOUR: CPT

## 2022-09-16 PROCEDURE — 76700 US EXAM ABDOM COMPLETE: CPT | Mod: 26

## 2022-09-16 RX ORDER — DEXTROSE 50 % IN WATER 50 %
50 SYRINGE (ML) INTRAVENOUS ONCE
Refills: 0 | Status: COMPLETED | OUTPATIENT
Start: 2022-09-16 | End: 2022-09-16

## 2022-09-16 RX ORDER — VANCOMYCIN HCL 1 G
1500 VIAL (EA) INTRAVENOUS ONCE
Refills: 0 | Status: COMPLETED | OUTPATIENT
Start: 2022-09-16 | End: 2022-09-16

## 2022-09-16 RX ORDER — INSULIN HUMAN 100 [IU]/ML
10 INJECTION, SOLUTION SUBCUTANEOUS ONCE
Refills: 0 | Status: COMPLETED | OUTPATIENT
Start: 2022-09-16 | End: 2022-09-16

## 2022-09-16 RX ORDER — CALCIUM GLUCONATE 100 MG/ML
2 VIAL (ML) INTRAVENOUS ONCE
Refills: 0 | Status: COMPLETED | OUTPATIENT
Start: 2022-09-16 | End: 2022-09-16

## 2022-09-16 RX ORDER — VANCOMYCIN HCL 1 G
1000 VIAL (EA) INTRAVENOUS ONCE
Refills: 0 | Status: DISCONTINUED | OUTPATIENT
Start: 2022-09-16 | End: 2022-09-16

## 2022-09-16 RX ORDER — VANCOMYCIN HCL 1 G
750 VIAL (EA) INTRAVENOUS EVERY 24 HOURS
Refills: 0 | Status: DISCONTINUED | OUTPATIENT
Start: 2022-09-17 | End: 2022-09-22

## 2022-09-16 RX ORDER — SODIUM ZIRCONIUM CYCLOSILICATE 10 G/10G
5 POWDER, FOR SUSPENSION ORAL EVERY 8 HOURS
Refills: 0 | Status: DISCONTINUED | OUTPATIENT
Start: 2022-09-16 | End: 2022-09-16

## 2022-09-16 RX ORDER — POLYETHYLENE GLYCOL 3350 17 G/17G
17 POWDER, FOR SOLUTION ORAL DAILY
Refills: 0 | Status: DISCONTINUED | OUTPATIENT
Start: 2022-09-16 | End: 2022-09-25

## 2022-09-16 RX ORDER — SODIUM ZIRCONIUM CYCLOSILICATE 10 G/10G
5 POWDER, FOR SUSPENSION ORAL ONCE
Refills: 0 | Status: COMPLETED | OUTPATIENT
Start: 2022-09-16 | End: 2022-09-16

## 2022-09-16 RX ORDER — SENNA PLUS 8.6 MG/1
2 TABLET ORAL AT BEDTIME
Refills: 0 | Status: DISCONTINUED | OUTPATIENT
Start: 2022-09-16 | End: 2022-09-25

## 2022-09-16 RX ORDER — SODIUM BICARBONATE 1 MEQ/ML
50 SYRINGE (ML) INTRAVENOUS ONCE
Refills: 0 | Status: COMPLETED | OUTPATIENT
Start: 2022-09-16 | End: 2022-09-16

## 2022-09-16 RX ADMIN — Medication 200 GRAM(S): at 07:17

## 2022-09-16 RX ADMIN — Medication 50 MILLIEQUIVALENT(S): at 07:05

## 2022-09-16 RX ADMIN — DORZOLAMIDE HYDROCHLORIDE TIMOLOL MALEATE 1 DROP(S): 20; 5 SOLUTION/ DROPS OPHTHALMIC at 10:12

## 2022-09-16 RX ADMIN — INSULIN HUMAN 10 UNIT(S): 100 INJECTION, SOLUTION SUBCUTANEOUS at 07:17

## 2022-09-16 RX ADMIN — Medication 50 MILLILITER(S): at 07:17

## 2022-09-16 RX ADMIN — Medication 8.44 MICROGRAM(S)/KG/MIN: at 14:07

## 2022-09-16 RX ADMIN — Medication 8.44 MICROGRAM(S)/KG/MIN: at 18:24

## 2022-09-16 RX ADMIN — CHLORHEXIDINE GLUCONATE 15 MILLILITER(S): 213 SOLUTION TOPICAL at 21:51

## 2022-09-16 RX ADMIN — SODIUM CHLORIDE 100 MILLILITER(S): 9 INJECTION, SOLUTION INTRAVENOUS at 05:06

## 2022-09-16 RX ADMIN — MIDAZOLAM HYDROCHLORIDE 2 MILLIGRAM(S): 1 INJECTION, SOLUTION INTRAMUSCULAR; INTRAVENOUS at 13:05

## 2022-09-16 RX ADMIN — PROPOFOL 2.7 MICROGRAM(S)/KG/MIN: 10 INJECTION, EMULSION INTRAVENOUS at 17:56

## 2022-09-16 RX ADMIN — MIDODRINE HYDROCHLORIDE 10 MILLIGRAM(S): 2.5 TABLET ORAL at 13:05

## 2022-09-16 RX ADMIN — Medication 8.44 MICROGRAM(S)/KG/MIN: at 04:15

## 2022-09-16 RX ADMIN — PROPOFOL 2.7 MICROGRAM(S)/KG/MIN: 10 INJECTION, EMULSION INTRAVENOUS at 23:34

## 2022-09-16 RX ADMIN — Medication 50 MILLIGRAM(S): at 05:07

## 2022-09-16 RX ADMIN — SODIUM ZIRCONIUM CYCLOSILICATE 5 GRAM(S): 10 POWDER, FOR SUSPENSION ORAL at 10:10

## 2022-09-16 RX ADMIN — Medication 300 MILLIGRAM(S): at 08:36

## 2022-09-16 RX ADMIN — Medication 8.44 MICROGRAM(S)/KG/MIN: at 08:59

## 2022-09-16 RX ADMIN — PANTOPRAZOLE SODIUM 40 MILLIGRAM(S): 20 TABLET, DELAYED RELEASE ORAL at 10:10

## 2022-09-16 RX ADMIN — CEFEPIME 1000 MILLIGRAM(S): 1 INJECTION, POWDER, FOR SOLUTION INTRAMUSCULAR; INTRAVENOUS at 21:49

## 2022-09-16 RX ADMIN — Medication 50 MILLIGRAM(S): at 21:49

## 2022-09-16 RX ADMIN — PROPOFOL 2.7 MICROGRAM(S)/KG/MIN: 10 INJECTION, EMULSION INTRAVENOUS at 11:46

## 2022-09-16 RX ADMIN — PROPOFOL 2.7 MICROGRAM(S)/KG/MIN: 10 INJECTION, EMULSION INTRAVENOUS at 04:15

## 2022-09-16 RX ADMIN — Medication 650 MILLIGRAM(S): at 00:15

## 2022-09-16 RX ADMIN — MIDODRINE HYDROCHLORIDE 10 MILLIGRAM(S): 2.5 TABLET ORAL at 05:07

## 2022-09-16 RX ADMIN — SENNA PLUS 2 TABLET(S): 8.6 TABLET ORAL at 21:50

## 2022-09-16 RX ADMIN — MIDODRINE HYDROCHLORIDE 10 MILLIGRAM(S): 2.5 TABLET ORAL at 21:50

## 2022-09-16 RX ADMIN — MIDAZOLAM HYDROCHLORIDE 2 MILLIGRAM(S): 1 INJECTION, SOLUTION INTRAMUSCULAR; INTRAVENOUS at 16:41

## 2022-09-16 RX ADMIN — PROPOFOL 2.7 MICROGRAM(S)/KG/MIN: 10 INJECTION, EMULSION INTRAVENOUS at 00:48

## 2022-09-16 RX ADMIN — SODIUM ZIRCONIUM CYCLOSILICATE 5 GRAM(S): 10 POWDER, FOR SUSPENSION ORAL at 13:02

## 2022-09-16 RX ADMIN — Medication 8.44 MICROGRAM(S)/KG/MIN: at 23:34

## 2022-09-16 RX ADMIN — CEFEPIME 1000 MILLIGRAM(S): 1 INJECTION, POWDER, FOR SOLUTION INTRAMUSCULAR; INTRAVENOUS at 10:09

## 2022-09-16 RX ADMIN — CHLORHEXIDINE GLUCONATE 15 MILLILITER(S): 213 SOLUTION TOPICAL at 10:12

## 2022-09-16 RX ADMIN — DORZOLAMIDE HYDROCHLORIDE TIMOLOL MALEATE 1 DROP(S): 20; 5 SOLUTION/ DROPS OPHTHALMIC at 21:50

## 2022-09-16 RX ADMIN — Medication 50 MILLIGRAM(S): at 13:01

## 2022-09-16 NOTE — CONSULT NOTE ADULT - SUBJECTIVE AND OBJECTIVE BOX
Patient is a 87y old  Male who presents with a chief complaint of Acute hypoxic respiratory failure (16 Sep 2022 08:54)    HPI:  86 y/o M w/ PMHx a fib on coumadin, severe cardiomyopathy, CAD s/p MI, HTN, HLD, CHF, s/p AICD, RA, gout, and leukemia (diagnosed 1 yr ago, has not received treatment) admitted on 9/15 for evaluation of severe respiratory distress, requiring emergent intubation, patient had upper respiratory symptoms, shortness of breath, headache, dizziness and cough; history per medical record as patient is intubated and sedated.       PMH: as above  PSH: as above  Meds: per reconciliation sheet, noted below  MEDICATIONS  (STANDING):  cefepime  Injectable. 1000 milliGRAM(s) IV Push every 12 hours  chlorhexidine 0.12% Liquid 15 milliLiter(s) Oral Mucosa every 12 hours  dorzolamide 2%/timolol 0.5% Ophthalmic Solution 1 Drop(s) Both EYES two times a day  hydrocortisone sodium succinate Injectable 50 milliGRAM(s) IV Push every 8 hours  midodrine 10 milliGRAM(s) Oral every 8 hours  norepinephrine Infusion 0.05 MICROgram(s)/kG/Min (8.44 mL/Hr) IV Continuous <Continuous>  pantoprazole  Injectable 40 milliGRAM(s) IV Push daily  propofol Infusion 5 MICROgram(s)/kG/Min (2.7 mL/Hr) IV Continuous <Continuous>  sodium zirconium cyclosilicate 5 Gram(s) Oral every 8 hours    MEDICATIONS  (PRN):  acetaminophen     Tablet .. 650 milliGRAM(s) Oral every 6 hours PRN Temp greater or equal to 38C (100.4F)  midazolam Injectable 2 milliGRAM(s) IV Push every 2 hours PRN Agitation/vent dyssynchrony    Allergies    penicillins (Unknown)    Intolerances      Social: no smoking, no alcohol, no illegal drugs; no recent travel, no exposure to TB  FAMILY HISTORY:  Family history of other heart disease (Father)    Family history of cardiac arrhythmia (Child)       ROS unable to obtain secondary to patient medical condition     Vital Signs Last 24 Hrs  T(C): 36.9 (16 Sep 2022 11:00), Max: 38.1 (15 Sep 2022 23:00)  T(F): 98.4 (16 Sep 2022 11:00), Max: 100.6 (15 Sep 2022 23:00)  HR: 69 (16 Sep 2022 11:00) (65 - 111)  BP: 125/47 (16 Sep 2022 11:00) (92/44 - 142/62)  BP(mean): 66 (16 Sep 2022 11:00) (42 - 77)  RR: 22 (16 Sep 2022 11:00) (16 - 24)  SpO2: 99% (16 Sep 2022 11:00) (90% - 100%)    Parameters below as of 16 Sep 2022 11:00  Patient On (Oxygen Delivery Method): ventilator    O2 Concentration (%): 50  Daily     Daily     PE:    Constitutional: frail looking  HEENT: NC/AT, orally intubated  Neck: supple; thyroid not palpable  Back: no tenderness  Respiratory: respiratory effort normal; scattered coarse breath sounds  Cardiovascular: S1S2 regular, no murmurs  Abdomen: soft, not tender, not distended, positive BS; no liver or spleen organomegaly  Genitourinary: no suprapubic tenderness  Musculoskeletal: no muscle tenderness, no joint swelling or tenderness  Neurological/ Psychiatric: sedated  Skin: no rashes; no palpable lesions    Labs: all available labs reviewed                        14.5   156.94 )-----------( 143      ( 16 Sep 2022 05:27 )             47.2     -16    134<L>  |  109<H>  |  30<H>  ----------------------------<  93  7.1<HH>   |  19<L>  |  1.47<H>    Ca    8.8      16 Sep 2022 06:51  Phos  4.1       Mg     2.2         TPro  5.7<L>  /  Alb  2.8<L>  /  TBili  2.2<H>  /  DBili  x   /  AST  58<H>  /  ALT  45  /  AlkPhos  128<H>  16     LIVER FUNCTIONS - ( 16 Sep 2022 05:27 )  Alb: 2.8 g/dL / Pro: 5.7 gm/dL / ALK PHOS: 128 U/L / ALT: 45 U/L / AST: 58 U/L / GGT: x           Urinalysis Basic - ( 15 Sep 2022 12:40 )    Color: Yellow / Appearance: Slightly Turbid / S.015 / pH: x  Gluc: x / Ketone: Trace  / Bili: Small / Urobili: 1   Blood: x / Protein: 30 mg/dL / Nitrite: Negative   Leuk Esterase: Small / RBC: 25-50 /HPF / WBC 3-5   Sq Epi: x / Non Sq Epi: Occasional / Bacteria: Occasional    < from: CT Angio Chest PE Protocol w/ IV Cont (09.15.22 @ 04:57) >    ACC: [id number] EXAM:  CT ANGIO CHEST PULM ART [facility]                        ACC: [id number] EXAM:  CT ABDOMEN AND PELVIS IC                          PROCEDURE DATE:  09/15/2022          INTERPRETATION:  Final report:    CLINICAL INFORMATION: 87 years  Male with fever. lactate 6. Shortness of   breath. Acute respiratory failure. Hypoxia. Wells score greater than 4.    COMPARISON: None.    CONTRAST/COMPLICATIONS:  IV Contrast: IV contrast documented in associated exam (accession   [id number]), Omnipaque 350 (accession [id number])  100 cc administered   0 cc   discarded  Oral Contrast: NONE  Complications: None reported at time of study completion    PROCEDURE:  CT Angiography of the Chest was performed followed by portal venous phase   imaging of theAbdomen and Pelvis.  Sagittal and coronal reformats were performed as well as 3D (MIP)   reconstructions.    FINDINGS:  CHEST:  LUNGS AND LARGE AIRWAYS: Patent central airways. ET tube tip 3.5 cm above   the scar. Dense bilateral lower lobe consolidation with air   bronchograms secondary to atelectasis or pneumonia. Right lower lobe   bronchial secretions.  PLEURA: Small left pleural effusion. No right pleural effusion.  VESSELS: Coronary artery calcifications and/or stents. Aortic   atherosclerosis without aneurysm. No pulmonary embolism identified.  HEART: Moderate cardiomegaly. Intracardiac pacemaker leads. No   pericardial effusion.  MEDIASTINUM AND LORENZO: No lymphadenopathy.  CHEST WALL AND LOWER NECK: Pacemaker pulse generator in the left chest   wall.    ABDOMEN AND PELVIS:  LIVER: Within normal limits.  BILE DUCTS: Pneumobilia in the left intrahepatic biliary radicals.  GALLBLADDER: Cholecystectomy.  SPLEEN: Within normal limits.  PANCREAS: Within normal limits.  ADRENALS: Withinnormal limits.  KIDNEYS/URETERS: 1.7 cm right midpole cyst. Other cysts and hypodensities   too small to characterize. Nonspecific bilateral perinephric fat   stranding. No hydroureteronephrosis. Symmetrical nephrograms.    BLADDER: Collapsed around Barnes catheter.  REPRODUCTIVE ORGANS: Enlarged prostate 6.4 x 6.0 cm.    BOWEL: No bowel obstruction. Appendix is not visualized. No evidence of   inflammation in the pericecal region.. Enteric catheter tip terminating   in the stomach. Descending andsigmoid colonic diverticulosis without   diverticulitis.  PERITONEUM: No ascites.  VESSELS: Atherosclerotic changes.  RETROPERITONEUM/LYMPH NODES: No lymphadenopathy.  ABDOMINAL WALL: Bilateral fat-containing inguinal hernias, larger on the   left.  BONES: Degenerative changes.    IMPRESSION:    No pulmonary embolism identified.    Bilateral lower lobe consolidation secondary to atelectasis and/or   pneumonia.    Cardiomegaly. Pacemaker.    Pneumobilia post cholecystectomy.    Enlarged prostate.    Colonic diverticulosis without diverticulitis.    < end of copied text >        Radiology: all available radiological tests reviewed    Advanced directives addressed: full resuscitation

## 2022-09-16 NOTE — CONSULT NOTE ADULT - ASSESSMENT
88 y/o M w/ PMHx a fib on coumadin, severe cardiomyopathy, CAD s/p MI, HTN, HLD, CHF, s/p AICD, RA, gout, and leukemia (diagnosed 1 yr ago, has not received treatment) admitted on 9/15 for evaluation of severe respiratory distress, requiring emergent intubation, patient had upper respiratory symptoms, shortness of breath, headache, dizziness and cough; history per medical record as patient is intubated and sedated.     1. Patient admitted with respiratory failure and sepsis secondary to pneumonia; is immunocompromised given history of leukemia; at risk of severe infections; history of PPM and found to have coagulase negative staph in blood  - follow up cultures   - serial cbc and monitor temperature   - iv hydration and supportive care   - reviewed prior medical records to evaluate for resistant or atypical pathogens   - vent per icu  - will continue cefepime which will cover broadly for pneumonia and patient at risk for gram negative rods and other resistant bacteria   - will add vancomycin to treat resistant bacteria and staph especially given PPM  - consideration for echocardiogram to rule out vegetations  - check vancomycin trough prior to fourth dose   - will check t cell subsets, if needs PCP prophylaxis  2. other issues: per medicine

## 2022-09-16 NOTE — PROGRESS NOTE ADULT - ASSESSMENT
Acute hypoxic resp failure   Septic shock   Bibasilar bacterial pneumonia   Prerenal ADRIANA     PMH CLL (no tx), CAD, CHF, s/p ICD, chronic A.fib on warfarin, cholecystectomy 10-20 yrs ago     Remains critical on multiple life support measures       Plan:     No SAT today   Continue prop, PRN Versed for vent synchrony   Levo for goal MAP>65  Add midodrine   Will also add stress steroids   INR 3.78, hold warfarin today   F/u official ECHO   On empiric cefepime, received a dose of vanc  Will add azithro  Send Legionella, f/u sputum and blood cx   Has pneumobilia on CT scan with prior cholecystectomy, despite elevated TB he does not have any signs suggestive of biliary infection  Continue vent support, not a candidate for SBT today   IVF resuscitation - responded to it   Monitor UO and renal fn   Replete lytes prn   DVT ppx - on warfarin   Restraints to prevent pulling on medical devices   Maintain Barnes   No central line       Prognosis poor    Lori Jennings (EMT) updated by phone [phone] Acute hypoxic resp failure   Septic shock   Bibasilar gram negative pneumonia   CNS bacteremia  Prerenal ADRIANA     PMH CLL (no tx), CAD, CHF, s/p ICD, chronic A.fib on warfarin, cholecystectomy 10-20 yrs ago     Remains critical on multiple life support measures       Plan:     No SAT today   Decrease prop for goal RASS 0 to -1  PRN Versed   Levo dose slowly improving  Continue midodrine, stress steroids   INR 5.16, hold warfarin today   ECHO reviewed   Continue cefepime, also started on vanc by ID   D/c flora as Legionella neg   RUQ US w/o biliary dilatation, pneumobilia due to previous cholecystectomy   Continue vent support, not a candidate for SBT today   D/c IVF   Start TF, bowel regimen   UO and renal fn stable   Hyperkalemia resolved, d/c Lokelma   DVT ppx - on warfarin   Restraints to prevent pulling on medical devices   Maintain Barnes   No central line     Prognosis poor    Lori Jennings (EMT) updated by phone [phone]

## 2022-09-16 NOTE — PROGRESS NOTE ADULT - ASSESSMENT
86 y/o M w/ PMHx a fib on coumadin, severe cardiomyopathy, CAD s/p MI, HTN, HLD, CHF, s/p AICD, RA, gout, and CLL (diagnosed 1 yr ago, has not received treatment) admitted w/:    1. Acute hypoxic respiratory failure  2. Bibasilar PNA  3. Septic shock  4. ADRIANA  5. Lactic acidosis, resolved    PLAN:  - Sedated w/ propofol infusion.  - Versed 2mg IV q2 hrs PRN for agitation/vent dyssynchrony.  - Actively titrating levophed to maintain MAP > 65.  - Midodrine 10mg q8 hrs to assist w/ weaning of vasopressor.  - Stress dose steroids.  - F/u official TTE report.  - Actively titrating ventilator settings to maintain SpO2 > 92%.  - Monitor urine output.   - Continue cefepime. Atypical coverage added. F/u cultures.  - Start tube feeds in AM if he remains intubated.  - Coumadin held in the setting of supra therapeutic INR.

## 2022-09-16 NOTE — CHART NOTE - NSCHARTNOTEFT_GEN_A_CORE
Clinical Nutrition BRIEF NOTE    *86yo male admitted with acute hypoxic resp failure, septic shock, bibasilar bacterial PNA, prerenal ADRIANA; remains intubated on propofol at 15.2mL/hr (=401Kcal/day).  on one pressor with MAP> 65.    *received request for TF Rx: based on labs and propofol rate, rec'd Nepro with goal rate of 40mL/hr with 4pkts Prosource TF.  which will provide ~1576Kcal, 109g protein, 582mL free water, and total TF volume of 800mL.  with propofol, pt will meet 100% of nutr needs.    *labs reviewed; 09-16; hyponatremia; hyperkalemia (lokelma ordered).    134<L>  |  109<H>  |  30<H>  ----------------------------<  93  7.1<HH>   |  19<L>  |  1.47<H>    Ca    8.8      16 Sep 2022 06:51  Phos  4.1     09-16  Mg     2.2     09-16    TPro  5.7<L>  /  Alb  2.8<L>  /  TBili  2.2<H>  /  DBili  x   /  AST  58<H>  /  ALT  45  /  AlkPhos  128<H>  09-16      *burt score of 11, no PI documented.  (+4) Lt/Rt ankle edema documented.  no BM x 1 day, not on bowel regimen.      ESTIMATED NUTR NEEDS: based on adjusted BW: 76Kg  6685-2515 Kcal (20-30Kcal/Kg)  106-137g protein (1.4-1.8g/Kg)  1900-2200mL (25-30mL/Kg)    RECOMMENDATIONS:  1) Nepro with goal rate of 40mL/hr with 4pkts Prosource TF  2) monitor TF tolerance, keep back of bed >35 degrees  3) monitor hydration status; adjust free water flushes prn; consider free water flushes of 30mL q1hr if IVF stopped (=600mL additional free water daily)  4) daily wt checks  5) monitor lytes/mins; replete/correct prn  6) monitor BM; if > 3 days, add bowel regimen     *will monitor and adjust treatement plan prn*  Dannielle Red MA, RDN, CDN, [facility] [phone]  Nutrition

## 2022-09-16 NOTE — PROGRESS NOTE ADULT - SUBJECTIVE AND OBJECTIVE BOX
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      Hearing loss      Myocardial infarction      Gout      Osteoarthritis      Glaucoma      Rheumatoid arthritis      Hx of appendectomy      Hx of tonsillectomy      S/P TKR (total knee replacement)  bilateral      H/O coronary angiogram  x 2      H/O hernia repair  x  4 surgeries      S/P laser cataract surgery  b/l      H/O shoulder surgery  right      S/P ORIF (open reduction internal fixation) fracture  left ankle          Allergies    penicillins (Unknown)    Intolerances      REVIEW OF SYSTEMS: SEE BELOW       ICU Vital Signs Last 24 Hrs  T(C): 36.7 (16 Sep 2022 08:30), Max: 38.1 (15 Sep 2022 23:00)  T(F): 98.1 (16 Sep 2022 08:30), Max: 100.6 (15 Sep 2022 23:00)  HR: 66 (16 Sep 2022 08:30) (65 - 111)  BP: 142/62 (16 Sep 2022 08:30) (92/44 - 142/62)  BP(mean): 77 (16 Sep 2022 08:30) (42 - 77)  ABP: --  ABP(mean): --  RR: 22 (16 Sep 2022 08:30) (16 - 24)  SpO2: 100% (16 Sep 2022 08:30) (90% - 100%)    O2 Parameters below as of 16 Sep 2022 08:30  Patient On (Oxygen Delivery Method): ventilator    O2 Concentration (%): 60        CAPILLARY BLOOD GLUCOSE          I&O's Summary    15 Sep 2022 07:01  -  16 Sep 2022 07:00  --------------------------------------------------------  IN: 4423 mL / OUT: 1482 mL / NET: 2941 mL        Mode: AC/ CMV (Assist Control/ Continuous Mandatory Ventilation)  RR (machine): 22  TV (machine): 500  FiO2: 60  PEEP: 7  ITime: 1.2  MAP: 12  PIP: 23      MEDICATIONS  (STANDING):  cefepime  Injectable. 1000 milliGRAM(s) IV Push every 12 hours  chlorhexidine 0.12% Liquid 15 milliLiter(s) Oral Mucosa every 12 hours  dorzolamide 2%/timolol 0.5% Ophthalmic Solution 1 Drop(s) Both EYES two times a day  hydrocortisone sodium succinate Injectable 50 milliGRAM(s) IV Push every 8 hours  midodrine 10 milliGRAM(s) Oral every 8 hours  norepinephrine Infusion 0.05 MICROgram(s)/kG/Min (8.44 mL/Hr) IV Continuous <Continuous>  pantoprazole  Injectable 40 milliGRAM(s) IV Push daily  propofol Infusion 5 MICROgram(s)/kG/Min (2.7 mL/Hr) IV Continuous <Continuous>  sodium zirconium cyclosilicate 5 Gram(s) Oral once  sodium zirconium cyclosilicate 5 Gram(s) Oral every 8 hours      MEDICATIONS  (PRN):  acetaminophen     Tablet .. 650 milliGRAM(s) Oral every 6 hours PRN Temp greater or equal to 38C (100.4F)  midazolam Injectable 2 milliGRAM(s) IV Push every 2 hours PRN Agitation/vent dyssynchrony      PHYSICAL EXAM: SEE BELOW                          14.5   156.94 )-----------( 143      ( 16 Sep 2022 05:27 )             47.2     Bands 2.0        134<L>  |  109<H>  |  30<H>  ----------------------------<  93  7.1<HH>   |  19<L>  |  1.47<H>    Ca    8.8      16 Sep 2022 06:51  Phos  4.1       Mg     2.2         TPro  5.7<L>  /  Alb  2.8<L>  /  TBili  2.2<H>  /  DBili  x   /  AST  58<H>  /  ALT  45  /  AlkPhos  128<H>      Lactate 1.9            @ 05:27          PT/INR - ( 15 Sep 2022 05:20 )   PT: 44.4 sec;   INR: 3.78 ratio         PTT - ( 15 Sep 2022 05:20 )  PTT:34.3 sec  Urinalysis Basic - ( 15 Sep 2022 12:40 )    Color: Yellow / Appearance: Slightly Turbid / S.015 / pH: x  Gluc: x / Ketone: Trace  / Bili: Small / Urobili: 1   Blood: x / Protein: 30 mg/dL / Nitrite: Negative   Leuk Esterase: Small / RBC: 25-50 /HPF / WBC 3-5   Sq Epi: x / Non Sq Epi: Occasional / Bacteria: Occasional      .Sputum Sputum --   Rare Gram Negative Rods  Rare polymorphonuclear leukocytes 09-15 @ 08:00  Clean Catch Clean Catch (Midstream)   <10,000 CFU/mL Normal Urogenital Maryanne -- 09-15 @ 06:30  .Blood Blood-Peripheral   Growth in aerobic bottle: Gram Positive Cocci in Clusters  ***Blood Panel PCR results on this specimen are available  approximately 3 hours after the Gram stain result.***  Gram stain, PCR, and/or culture results may not always  correspond due to difference in methodologies.  ************************************************************  This PCR assay was performed by multiplex PCR. This  Assay tests for 66 bacterial and resistance gene targets.  Please refer to the [facility] test directory  at [email or web address] for details.   Growth in aerobic bottle: Gram Positive Cocci in Clusters 09-15 @ 02:23      Rapid RVP Result: NotDetec (09-15 @ 02:23)       Patient is a 87y old  Male who presents with a chief complaint of Acute hypoxic respiratory failure (16 Sep 2022 00:28)    24 hour events: remains on levo, prop, vent   BCx with CNS    PAST MEDICAL & SURGICAL HISTORY:  Afib      HTN (hypertension)      Cholesterol serum elevated      BPH (Benign Prostatic Hyperplasia)      CA - skin cancer      History of ankle fracture      Intolerance, drug  INTOLERANCE  TO STATIN      Hearing loss      Myocardial infarction      Gout      Osteoarthritis      Glaucoma      Rheumatoid arthritis      Hx of appendectomy      Hx of tonsillectomy      S/P TKR (total knee replacement)  bilateral      H/O coronary angiogram  x 2      H/O hernia repair  x  4 surgeries      S/P laser cataract surgery  b/l      H/O shoulder surgery  right      S/P ORIF (open reduction internal fixation) fracture  left ankle          Allergies    penicillins (Unknown)    Intolerances      REVIEW OF SYSTEMS: SEE BELOW       ICU Vital Signs Last 24 Hrs  T(C): 36.7 (16 Sep 2022 08:30), Max: 38.1 (15 Sep 2022 23:00)  T(F): 98.1 (16 Sep 2022 08:30), Max: 100.6 (15 Sep 2022 23:00)  HR: 66 (16 Sep 2022 08:30) (65 - 111)  BP: 142/62 (16 Sep 2022 08:30) (92/44 - 142/62)  BP(mean): 77 (16 Sep 2022 08:30) (42 - 77)  ABP: --  ABP(mean): --  RR: 22 (16 Sep 2022 08:30) (16 - 24)  SpO2: 100% (16 Sep 2022 08:30) (90% - 100%)    O2 Parameters below as of 16 Sep 2022 08:30  Patient On (Oxygen Delivery Method): ventilator    O2 Concentration (%): 60        CAPILLARY BLOOD GLUCOSE          I&O's Summary    15 Sep 2022 07:01  -  16 Sep 2022 07:00  --------------------------------------------------------  IN: 4423 mL / OUT: 1482 mL / NET: 2941 mL        Mode: AC/ CMV (Assist Control/ Continuous Mandatory Ventilation)  RR (machine): 22  TV (machine): 500  FiO2: 60  PEEP: 7  ITime: 1.2  MAP: 12  PIP: 23      MEDICATIONS  (STANDING):  cefepime  Injectable. 1000 milliGRAM(s) IV Push every 12 hours  chlorhexidine 0.12% Liquid 15 milliLiter(s) Oral Mucosa every 12 hours  dorzolamide 2%/timolol 0.5% Ophthalmic Solution 1 Drop(s) Both EYES two times a day  hydrocortisone sodium succinate Injectable 50 milliGRAM(s) IV Push every 8 hours  midodrine 10 milliGRAM(s) Oral every 8 hours  norepinephrine Infusion 0.05 MICROgram(s)/kG/Min (8.44 mL/Hr) IV Continuous <Continuous>  pantoprazole  Injectable 40 milliGRAM(s) IV Push daily  propofol Infusion 5 MICROgram(s)/kG/Min (2.7 mL/Hr) IV Continuous <Continuous>  sodium zirconium cyclosilicate 5 Gram(s) Oral once  sodium zirconium cyclosilicate 5 Gram(s) Oral every 8 hours      MEDICATIONS  (PRN):  acetaminophen     Tablet .. 650 milliGRAM(s) Oral every 6 hours PRN Temp greater or equal to 38C (100.4F)  midazolam Injectable 2 milliGRAM(s) IV Push every 2 hours PRN Agitation/vent dyssynchrony      PHYSICAL EXAM: SEE BELOW                          14.5   156.94 )-----------( 143      ( 16 Sep 2022 05:27 )             47.2     Bands 2.0        134<L>  |  109<H>  |  30<H>  ----------------------------<  93  7.1<HH>   |  19<L>  |  1.47<H>    Ca    8.8      16 Sep 2022 06:51  Phos  4.1       Mg     2.2         TPro  5.7<L>  /  Alb  2.8<L>  /  TBili  2.2<H>  /  DBili  x   /  AST  58<H>  /  ALT  45  /  AlkPhos  128<H>      Lactate 1.9           -16 @ 05:27          PT/INR - ( 15 Sep 2022 05:20 )   PT: 44.4 sec;   INR: 3.78 ratio         PTT - ( 15 Sep 2022 05:20 )  PTT:34.3 sec  Urinalysis Basic - ( 15 Sep 2022 12:40 )    Color: Yellow / Appearance: Slightly Turbid / S.015 / pH: x  Gluc: x / Ketone: Trace  / Bili: Small / Urobili: 1   Blood: x / Protein: 30 mg/dL / Nitrite: Negative   Leuk Esterase: Small / RBC: 25-50 /HPF / WBC 3-5   Sq Epi: x / Non Sq Epi: Occasional / Bacteria: Occasional      .Sputum Sputum --   Rare Gram Negative Rods  Rare polymorphonuclear leukocytes 09-15 @ 08:00  Clean Catch Clean Catch (Midstream)   <10,000 CFU/mL Normal Urogenital Maryanne -- 09-15 @ 06:30  .Blood Blood-Peripheral   Growth in aerobic bottle: Gram Positive Cocci in Clusters  ***Blood Panel PCR results on this specimen are available  approximately 3 hours after the Gram stain result.***  Gram stain, PCR, and/or culture results may not always  correspond due to difference in methodologies.  ************************************************************  This PCR assay was performed by multiplex PCR. This  Assay tests for 66 bacterial and resistance gene targets.  Please refer to the [facility] test directory  at [email or web address] for details.   Growth in aerobic bottle: Gram Positive Cocci in Clusters 09-15 @ 02:23      Rapid RVP Result: NotDetec (09-15 @ 02:23)

## 2022-09-16 NOTE — PROGRESS NOTE ADULT - SUBJECTIVE AND OBJECTIVE BOX
Patient is a 86 y/o male who presents with a chief complaint of acute hypoxic respiratory failure (15 Sep 2022 11:14)    BRIEF HOSPITAL COURSE: 86 y/o M w/ PMHx a fib on coumadin, severe cardiomyopathy, CAD s/p MI, HTN, HLD, CHF, s/p AICD, RA, gout, and CLL (diagnosed 1 yr ago, has not received treatment) who presented to ER on 9/15 w/ respiratory distress requiring emergent ET intubation. Pt was admitted to ICU w/ acute hypoxic respiratory failure 2/2 bibasilar PNA. Course further complicated by septic shock and ADRIANA.    Events last 24 hours: Intubated. Oxygen requirement has improved. Requiring levophed infusion to maintain MAP > 65. Urine output picked up.     PAST MEDICAL & SURGICAL HISTORY:  Afib  HTN (hypertension)  Cholesterol serum elevated  BPH (Benign Prostatic Hyperplasia)  CA - skin cancer  History of ankle fracture  Intolerance, drug  INTOLERANCE  TO STATIN  Hearing loss  Myocardial infarction  Gout  Osteoarthritis  Glaucoma  Rheumatoid arthritis  Hx of appendectomy  Hx of tonsillectomy  S/P TKR (total knee replacement)  bilateral  H/O coronary angiogram  x 2  H/O hernia repair  x  4 surgeries  S/P laser cataract surgery  b/l  H/O shoulder surgery  right  S/P ORIF (open reduction internal fixation) fracture  left ankle    Review of Systems:  Unable to obtain. Intubated/sedated.    Medications:  azithromycin  IVPB      azithromycin  IVPB 500 milliGRAM(s) IV Intermittent every 24 hours  cefepime  Injectable. 1000 milliGRAM(s) IV Push every 12 hours  midodrine 10 milliGRAM(s) Oral every 8 hours  norepinephrine Infusion 0.05 MICROgram(s)/kG/Min IV Continuous <Continuous>  acetaminophen     Tablet .. 650 milliGRAM(s) Oral every 6 hours PRN  midazolam Injectable 2 milliGRAM(s) IV Push every 2 hours PRN  propofol Infusion 5 MICROgram(s)/kG/Min IV Continuous <Continuous>  pantoprazole  Injectable 40 milliGRAM(s) IV Push daily  hydrocortisone sodium succinate Injectable 50 milliGRAM(s) IV Push every 8 hours  lactated ringers. 1000 milliLiter(s) IV Continuous <Continuous>  chlorhexidine 0.12% Liquid 15 milliLiter(s) Oral Mucosa every 12 hours  dorzolamide 2%/timolol 0.5% Ophthalmic Solution 1 Drop(s) Both EYES two times a day    Mode: AC/ CMV (Assist Control/ Continuous Mandatory Ventilation)  RR (machine): 16  TV (machine): 500  FiO2: 60  PEEP: 7  ITime: 1.2  MAP: 12  PIP: 21    ICU Vital Signs Last 24 Hrs  T(C): 37.6 (16 Sep 2022 01:00), Max: 38.9 (15 Sep 2022 03:20)  T(F): 99.7 (16 Sep 2022 01:00), Max: 102.1 (15 Sep 2022 03:20)  HR: 70 (16 Sep 2022 01:) (65 - 111)  BP: 110/44 (16 Sep 2022 01:) (68/23 - 123/53)  BP(mean): 56 (16 Sep 2022 01:00) (30 - 76)  ABP: --  ABP(mean): --  RR: 18 (16 Sep 2022 01:) (18 - 30)  SpO2: 100% (16 Sep 2022 01:00) (88% - 100%)    O2 Parameters below as of 16 Sep 2022 01:00  Patient On (Oxygen Delivery Method): ventilator    O2 Concentration (%): 60    ABG - ( 15 Sep 2022 03:50 )  pH, Arterial: 7.25  pH, Blood: x     /  pCO2: 35    /  pO2: 81    / HCO3: 15    / Base Excess: -11.0 /  SaO2: 97        I&O's Detail    14 Sep 2022 07:01  -  15 Sep 2022 07:00  --------------------------------------------------------  IN:    IV PiggyBack: 50 mL    Sodium Chloride 0.9% Bolus: 1000 mL  Total IN: 1050 mL    OUT:    Voided (mL): 75 mL  Total OUT: 75 mL    Total NET: 975 mL    15 Sep 2022 07:01  -  16 Sep 2022 01:42  --------------------------------------------------------  IN:    Free Water: 90 mL    Lactated Ringers: 750 mL    Lactated Ringers Bolus: 500 mL    Norepinephrine: 600 mL    Propofol: 180 mL  Total IN: 2120 mL    OUT:    Indwelling Catheter - Urethral (mL): 782 mL    Nasogastric/Oral tube (mL): 400 mL  Total OUT: 1182 mL    Total NET: 938 mL    LABS:                        15.3   120.95 )-----------( 149      ( 15 Sep 2022 11:09 )             47.0     09-15    137  |  110<H>  |  29<H>  ----------------------------<  93  4.3   |  20<L>  |  1.38<H>    Ca    8.5      15 Sep 2022 11:09  Phos  2.8     09-15  Mg     1.7     09-15    TPro  7.5  /  Alb  4.0  /  TBili  1.3<H>  /  DBili  x   /  AST  91<H>  /  ALT  56  /  AlkPhos  139<H>  09-15    CAPILLARY BLOOD GLUCOSE    POCT Blood Glucose.: 179 mg/dL (15 Sep 2022 02:11)    PT/INR - ( 15 Sep 2022 05:20 )   PT: 44.4 sec;   INR: 3.78 ratio    PTT - ( 15 Sep 2022 05:20 )  PTT:34.3 sec    Urinalysis Basic - ( 15 Sep 2022 12:40 )  Color: Yellow / Appearance: Slightly Turbid / S.015 / pH: x  Gluc: x / Ketone: Trace  / Bili: Small / Urobili: 1   Blood: x / Protein: 30 mg/dL / Nitrite: Negative   Leuk Esterase: Small / RBC: 25-50 /HPF / WBC 3-5   Sq Epi: x / Non Sq Epi: Occasional / Bacteria: Occasional    CULTURES:  Rapid RVP Result: NotDetec (09-15 @ 02:23)    Physical Examination:    General: Intubated.    HEENT: Pupils equal, reactive to light. Symmetric. No scleral icterus or injection.    PULM: Clear to auscultation b/l.    NECK: Supple, no lymphadenopathy, trachea midline.    CVS: Paced rhythm.    ABD: Soft, nondistended, nontender, normoactive bowel sounds.    EXT: No edema, nontender.    SKIN: Warm and well perfused, no rashes noted.    NEURO: Sedated. Moves all extremities spontaneously.    RADIOLOGY:  < from: CT Angio Chest PE Protocol w/ IV Cont (09.15.22 @ 04:57) >  ACC: [id number] EXAM:  CT ANGIO CHEST PULM ART [facility]                        ACC: [id number] EXAM:  CT ABDOMEN AND PELVIS IC                          PROCEDURE DATE:  09/15/2022      INTERPRETATION:  Final report:    CLINICAL INFORMATION: 87 years  Male with fever. lactate 6. Shortness of   breath. Acute respiratory failure. Hypoxia. Wells score greater than 4.    COMPARISON: None.    CONTRAST/COMPLICATIONS:  IV Contrast: IV contrast documented in associated exam (accession   [id number]), Omnipaque 350 (accession [id number])  100 cc administered   0 cc   discarded  Oral Contrast: NONE  Complications: None reported at time of study completion    PROCEDURE:  CT Angiography of the Chest was performed followed by portal venous phase   imaging of theAbdomen and Pelvis.  Sagittal and coronal reformats were performed as well as 3D (MIP)   reconstructions.    FINDINGS:  CHEST:  LUNGS AND LARGE AIRWAYS: Patent central airways. ET tube tip 3.5 cm above   the scar. Dense bilateral lower lobe consolidation with air   bronchograms secondary to atelectasis or pneumonia. Right lower lobe   bronchial secretions.  PLEURA: Small left pleural effusion. No right pleural effusion.  VESSELS: Coronary artery calcifications and/or stents. Aortic   atherosclerosis without aneurysm. No pulmonary embolism identified.  HEART: Moderate cardiomegaly. Intracardiac pacemaker leads. No   pericardial effusion.  MEDIASTINUM AND LORENZO: No lymphadenopathy.  CHEST WALL AND LOWER NECK: Pacemaker pulse generator in the left chest   wall.    ABDOMEN AND PELVIS:  LIVER: Within normal limits.  BILE DUCTS: Pneumobilia in the left intrahepatic biliary radicals.  GALLBLADDER: Cholecystectomy.  SPLEEN: Within normal limits.  PANCREAS: Within normal limits.  ADRENALS: Withinnormal limits.  KIDNEYS/URETERS: 1.7 cm right midpole cyst. Other cysts and hypodensities   too small to characterize. Nonspecific bilateral perinephric fat   stranding. No hydroureteronephrosis. Symmetrical nephrograms.    BLADDER: Collapsed around Barnes catheter.  REPRODUCTIVE ORGANS: Enlarged prostate 6.4 x 6.0 cm.    BOWEL: No bowel obstruction. Appendix is not visualized. No evidence of   inflammation in the pericecal region.. Enteric catheter tip terminating   in the stomach. Descending andsigmoid colonic diverticulosis without   diverticulitis.  PERITONEUM: No ascites.  VESSELS: Atherosclerotic changes.  RETROPERITONEUM/LYMPH NODES: No lymphadenopathy.  ABDOMINAL WALL: Bilateral fat-containing inguinal hernias, larger on the   left.  BONES: Degenerative changes.    IMPRESSION:    No pulmonary embolism identified.    Bilateral lower lobe consolidation secondary to atelectasis and/or   pneumonia.    Cardiomegaly. Pacemaker.    Pneumobilia post cholecystectomy.    Enlarged prostate.    Colonic diverticulosis without diverticulitis.    A preliminary report was provided by Dr. Mingo Heller    --- End of Report ---    JANE RUSSELL MD; Attending Radiologist  This document has been electronically signed. Sep 15 2022  8:45AM    < end of copied text >    CRITICAL CARE TIME SPENT: 37 minutes

## 2022-09-17 LAB
4/8 RATIO: 1.42 RATIO — SIGNIFICANT CHANGE UP (ref 0.86–4.14)
ABS CD8: 395 /UL — SIGNIFICANT CHANGE UP (ref 90–775)
ALBUMIN SERPL ELPH-MCNC: 2.4 G/DL — LOW (ref 3.3–5)
ALP SERPL-CCNC: 120 U/L — SIGNIFICANT CHANGE UP (ref 40–120)
ALT FLD-CCNC: 33 U/L — SIGNIFICANT CHANGE UP (ref 12–78)
ANION GAP SERPL CALC-SCNC: 6 MMOL/L — SIGNIFICANT CHANGE UP (ref 5–17)
AST SERPL-CCNC: 26 U/L — SIGNIFICANT CHANGE UP (ref 15–37)
BILIRUB SERPL-MCNC: 1 MG/DL — SIGNIFICANT CHANGE UP (ref 0.2–1.2)
BUN SERPL-MCNC: 35 MG/DL — HIGH (ref 7–23)
CALCIUM SERPL-MCNC: 8.9 MG/DL — SIGNIFICANT CHANGE UP (ref 8.5–10.1)
CD16+CD56+ CELLS NFR BLD: 1 % — LOW (ref 7–27)
CD16+CD56+ CELLS NFR SPEC: 869 /UL — HIGH (ref 80–426)
CD19 BLASTS SPEC-ACNC: 97 % — HIGH (ref 4–18)
CD19 BLASTS SPEC-ACNC: HIGH /UL (ref 32–326)
CD3 BLASTS SPEC-ACNC: 1 % — LOW (ref 58–84)
CD3 BLASTS SPEC-ACNC: 1131 /UL — SIGNIFICANT CHANGE UP (ref 396–2024)
CD4 %: 1 % — LOW (ref 30–56)
CD8 %: <1 % — LOW (ref 11–43)
CHLORIDE SERPL-SCNC: 109 MMOL/L — HIGH (ref 96–108)
CO2 SERPL-SCNC: 23 MMOL/L — SIGNIFICANT CHANGE UP (ref 22–31)
CREAT SERPL-MCNC: 1.18 MG/DL — SIGNIFICANT CHANGE UP (ref 0.5–1.3)
CULTURE RESULTS: SIGNIFICANT CHANGE UP
CULTURE RESULTS: SIGNIFICANT CHANGE UP
EGFR: 60 ML/MIN/1.73M2 — SIGNIFICANT CHANGE UP
GLUCOSE SERPL-MCNC: 178 MG/DL — HIGH (ref 70–99)
HCT VFR BLD CALC: 39.8 % — SIGNIFICANT CHANGE UP (ref 39–50)
HGB BLD-MCNC: 12.8 G/DL — LOW (ref 13–17)
INR BLD: 6.68 RATIO — CRITICAL HIGH (ref 0.88–1.16)
MCHC RBC-ENTMCNC: 32.2 GM/DL — SIGNIFICANT CHANGE UP (ref 32–36)
MCHC RBC-ENTMCNC: 32.3 PG — SIGNIFICANT CHANGE UP (ref 27–34)
MCV RBC AUTO: 100.5 FL — HIGH (ref 80–100)
ORGANISM # SPEC MICROSCOPIC CNT: SIGNIFICANT CHANGE UP
ORGANISM # SPEC MICROSCOPIC CNT: SIGNIFICANT CHANGE UP
PLATELET # BLD AUTO: 142 K/UL — LOW (ref 150–400)
POTASSIUM SERPL-MCNC: 4.8 MMOL/L — SIGNIFICANT CHANGE UP (ref 3.5–5.3)
POTASSIUM SERPL-SCNC: 4.8 MMOL/L — SIGNIFICANT CHANGE UP (ref 3.5–5.3)
PROT SERPL-MCNC: 5.3 GM/DL — LOW (ref 6–8.3)
PROTHROM AB SERPL-ACNC: 79 SEC — HIGH (ref 10.5–13.4)
RBC # BLD: 3.96 M/UL — LOW (ref 4.2–5.8)
RBC # FLD: 14.5 % — SIGNIFICANT CHANGE UP (ref 10.3–14.5)
SODIUM SERPL-SCNC: 138 MMOL/L — SIGNIFICANT CHANGE UP (ref 135–145)
SPECIMEN SOURCE: SIGNIFICANT CHANGE UP
SPECIMEN SOURCE: SIGNIFICANT CHANGE UP
T-CELL CD4 SUBSET PNL BLD: 561 /UL — SIGNIFICANT CHANGE UP (ref 325–1251)
WBC # BLD: 123.52 K/UL — CRITICAL HIGH (ref 3.8–10.5)
WBC # FLD AUTO: 123.52 K/UL — CRITICAL HIGH (ref 3.8–10.5)

## 2022-09-17 PROCEDURE — 99292 CRITICAL CARE ADDL 30 MIN: CPT

## 2022-09-17 PROCEDURE — 99291 CRITICAL CARE FIRST HOUR: CPT

## 2022-09-17 PROCEDURE — 71045 X-RAY EXAM CHEST 1 VIEW: CPT | Mod: 26

## 2022-09-17 RX ORDER — FUROSEMIDE 40 MG
20 TABLET ORAL ONCE
Refills: 0 | Status: COMPLETED | OUTPATIENT
Start: 2022-09-17 | End: 2022-09-17

## 2022-09-17 RX ORDER — DEXMEDETOMIDINE HYDROCHLORIDE IN 0.9% SODIUM CHLORIDE 4 UG/ML
0.2 INJECTION INTRAVENOUS
Qty: 400 | Refills: 0 | Status: DISCONTINUED | OUTPATIENT
Start: 2022-09-17 | End: 2022-09-18

## 2022-09-17 RX ORDER — MIDAZOLAM HYDROCHLORIDE 1 MG/ML
2 INJECTION, SOLUTION INTRAMUSCULAR; INTRAVENOUS EVERY 6 HOURS
Refills: 0 | Status: DISCONTINUED | OUTPATIENT
Start: 2022-09-17 | End: 2022-09-17

## 2022-09-17 RX ORDER — FUROSEMIDE 40 MG
20 TABLET ORAL ONCE
Refills: 0 | Status: DISCONTINUED | OUTPATIENT
Start: 2022-09-17 | End: 2022-09-18

## 2022-09-17 RX ORDER — HYDROCORTISONE 20 MG
50 TABLET ORAL EVERY 12 HOURS
Refills: 0 | Status: DISCONTINUED | OUTPATIENT
Start: 2022-09-17 | End: 2022-09-18

## 2022-09-17 RX ADMIN — Medication 20 MILLIGRAM(S): at 11:06

## 2022-09-17 RX ADMIN — Medication 50 MILLIGRAM(S): at 21:53

## 2022-09-17 RX ADMIN — CHLORHEXIDINE GLUCONATE 15 MILLILITER(S): 213 SOLUTION TOPICAL at 10:08

## 2022-09-17 RX ADMIN — Medication 8.44 MICROGRAM(S)/KG/MIN: at 10:45

## 2022-09-17 RX ADMIN — MIDODRINE HYDROCHLORIDE 10 MILLIGRAM(S): 2.5 TABLET ORAL at 05:35

## 2022-09-17 RX ADMIN — PANTOPRAZOLE SODIUM 40 MILLIGRAM(S): 20 TABLET, DELAYED RELEASE ORAL at 10:07

## 2022-09-17 RX ADMIN — Medication 50 MILLIGRAM(S): at 05:35

## 2022-09-17 RX ADMIN — PROPOFOL 2.7 MICROGRAM(S)/KG/MIN: 10 INJECTION, EMULSION INTRAVENOUS at 05:36

## 2022-09-17 RX ADMIN — MIDODRINE HYDROCHLORIDE 10 MILLIGRAM(S): 2.5 TABLET ORAL at 21:53

## 2022-09-17 RX ADMIN — DEXMEDETOMIDINE HYDROCHLORIDE IN 0.9% SODIUM CHLORIDE 5.07 MICROGRAM(S)/KG/HR: 4 INJECTION INTRAVENOUS at 21:52

## 2022-09-17 RX ADMIN — DORZOLAMIDE HYDROCHLORIDE TIMOLOL MALEATE 1 DROP(S): 20; 5 SOLUTION/ DROPS OPHTHALMIC at 10:08

## 2022-09-17 RX ADMIN — DEXMEDETOMIDINE HYDROCHLORIDE IN 0.9% SODIUM CHLORIDE 5.07 MICROGRAM(S)/KG/HR: 4 INJECTION INTRAVENOUS at 12:46

## 2022-09-17 RX ADMIN — CEFEPIME 1000 MILLIGRAM(S): 1 INJECTION, POWDER, FOR SOLUTION INTRAMUSCULAR; INTRAVENOUS at 21:52

## 2022-09-17 RX ADMIN — CEFEPIME 1000 MILLIGRAM(S): 1 INJECTION, POWDER, FOR SOLUTION INTRAMUSCULAR; INTRAVENOUS at 10:07

## 2022-09-17 RX ADMIN — POLYETHYLENE GLYCOL 3350 17 GRAM(S): 17 POWDER, FOR SOLUTION ORAL at 10:07

## 2022-09-17 RX ADMIN — DEXMEDETOMIDINE HYDROCHLORIDE IN 0.9% SODIUM CHLORIDE 5.07 MICROGRAM(S)/KG/HR: 4 INJECTION INTRAVENOUS at 15:37

## 2022-09-17 RX ADMIN — MIDODRINE HYDROCHLORIDE 10 MILLIGRAM(S): 2.5 TABLET ORAL at 13:04

## 2022-09-17 RX ADMIN — Medication 250 MILLIGRAM(S): at 10:06

## 2022-09-17 RX ADMIN — DORZOLAMIDE HYDROCHLORIDE TIMOLOL MALEATE 1 DROP(S): 20; 5 SOLUTION/ DROPS OPHTHALMIC at 21:53

## 2022-09-17 NOTE — PROGRESS NOTE ADULT - ASSESSMENT
Acute hypoxic resp failure   Septic shock   Bibasilar gram negative pneumonia   CNS bacteremia  Prerenal ADRIANA     PMH CLL (no tx), CAD, CHF, s/p ICD, chronic A.fib on warfarin, cholecystectomy 10-20 yrs ago     Remains critical on multiple life support measures       Plan:     SAT, SBT today   CXR with pulmonary edema - give Lasix today   Improving Levo requirement   Wean sedation   INR 6.68, hold warfarin today   Cefepime, vanc  Restraints for safety   Barnes for urinary obstruction   On TF, no BM yet, continue bowel regimen     No SAT today   Decrease prop for goal RASS 0 to -1  PRN Versed   Levo dose slowly improving  Continue midodrine, stress steroids   INR 5.16, hold warfarin today   ECHO reviewed   Continue cefepime, also started on vanc by ID   D/c flora as Legionella neg   RUQ US w/o biliary dilatation, pneumobilia due to previous cholecystectomy   Continue vent support, not a candidate for SBT today   D/c IVF   Start TF, bowel regimen   UO and renal fn stable   Hyperkalemia resolved, d/c Lokelma   DVT ppx - on warfarin   Restraints to prevent pulling on medical devices   Maintain Barnes   No central line     Prognosis poor    Lori Jennings (EMT) updated by phone [phone] Acute hypoxic resp failure   Septic shock   Bibasilar gram negative pneumonia   CNS bacteremia  Prerenal ADRIANA     PMH CLL (no tx), CAD, CHF, s/p ICD, chronic A.fib on warfarin, cholecystectomy 10-20 yrs ago       Plan:     SAT, SBT today   CXR with pulmonary edema - give Lasix today   Improving Levo requirement   Continue midodrine, wean steroid  Wean sedation, may need Precedex   INR 6.68, hold warfarin today   Cefepime, vanc  Sputum cx with H. flu, BCx with CNS, will repeat BCx in am   Restraints for safety   Barnes for urinary obstruction   No central/arterial line  On TF, no BM yet, continue bowel regimen   DVT ppx - on warfarin     Prognosis poor    Lori Jennings (EMT) updated [phone]

## 2022-09-17 NOTE — CHART NOTE - NSCHARTNOTEFT_GEN_A_CORE
Patient reassessed   Doing well on lower prop  Needed Precedex to maintain comfort and vent synchrony   Once he was awake I placed him on PSV 10/5 and he tolerated well, he also diuresed well with Lasix   He is now extubated straight to BiPAP and doing ok, will give him break off BiPAP in 2 hrs   Levo requirement is improving   Lori Jennings updated by phone   Add'l time 20 mins

## 2022-09-17 NOTE — PROGRESS NOTE ADULT - MENTAL STATUS
responds to simple commands
sedated, opens eyes to voice, does not follow commands yet
sedated, tries to open eyes, does not follow commands today

## 2022-09-17 NOTE — PROGRESS NOTE ADULT - SUBJECTIVE AND OBJECTIVE BOX
Patient is a 87y old  Male who presents with a chief complaint of Acute hypoxic respiratory failure (16 Sep 2022 11:38)    PAST MEDICAL & SURGICAL HISTORY:  Afib      HTN (hypertension)      Cholesterol serum elevated      BPH (Benign Prostatic Hyperplasia)      CA - skin cancer      History of ankle fracture      Intolerance, drug  INTOLERANCE  TO STATIN      Hearing loss      Myocardial infarction      Gout      Osteoarthritis      Glaucoma      Rheumatoid arthritis      Hx of appendectomy      Hx of tonsillectomy      S/P TKR (total knee replacement)  bilateral      H/O coronary angiogram  x 2      H/O hernia repair  x  4 surgeries      S/P laser cataract surgery  b/l      H/O shoulder surgery  right      S/P ORIF (open reduction internal fixation) fracture  left ankle        JEFFREY NOTHDURFT   87y    Male    BRIEF HOSPITAL COURSE:    Review of Systems:                       All other ROS are negative.    Allergies    penicillins (Unknown)    Intolerances          ICU Vital Signs Last 24 Hrs  T(C): 37.4 (16 Sep 2022 23:00), Max: 37.9 (16 Sep 2022 00:30)  T(F): 99.3 (16 Sep 2022 23:00), Max: 100.2 (16 Sep 2022 00:30)  HR: 75 (16 Sep 2022 23:00) (65 - 92)  BP: 140/59 (16 Sep 2022 23:00) (95/41 - 142/62)  BP(mean): 79 (16 Sep 2022 23:00) (54 - 91)  ABP: --  ABP(mean): --  RR: 22 (16 Sep 2022 23:00) (16 - 30)  SpO2: 97% (16 Sep 2022 23:00) (93% - 100%)    O2 Parameters below as of 16 Sep 2022 23:00  Patient On (Oxygen Delivery Method): ventilator    O2 Concentration (%): 40    ABG - ( 16 Sep 2022 15:21 )  pH, Arterial: 7.28  pH, Blood: x     /  pCO2: 44    /  pO2: 78    / HCO3: 21    / Base Excess: -6.0  /  SaO2: 97                Mode: AC/ CMV (Assist Control/ Continuous Mandatory Ventilation)  RR (machine): 22  TV (machine): 500  FiO2: 40  PEEP: 6  PIP: 26    Mode: AC/ CMV (Assist Control/ Continuous Mandatory Ventilation), RR (machine): 22, TV (machine): 500, FiO2: 40, PEEP: 6, PIP: 26  LABS:                        14.5   156.94 )-----------( 143      ( 16 Sep 2022 05:27 )             47.2         136  |  107  |  29<H>  ----------------------------<  163<H>  4.9   |  22  |  1.42<H>    Ca    9.0      16 Sep 2022 12:43  Phos  4.1       Mg     2.2         TPro  5.7<L>  /  Alb  2.8<L>  /  TBili  2.2<H>  /  DBili  x   /  AST  58<H>  /  ALT  45  /  AlkPhos  128<H>        CARDIAC MARKERS ( 16 Sep 2022 12:43 )  x     / x     / 40 U/L / x     / x          CAPILLARY BLOOD GLUCOSE        PT/INR - ( 16 Sep 2022 12:43 )   PT: 60.9 sec;   INR: 5.16 ratio         PTT - ( 15 Sep 2022 05:20 )  PTT:34.3 sec  Urinalysis Basic - ( 15 Sep 2022 12:40 )    Color: Yellow / Appearance: Slightly Turbid / S.015 / pH: x  Gluc: x / Ketone: Trace  / Bili: Small / Urobili: 1   Blood: x / Protein: 30 mg/dL / Nitrite: Negative   Leuk Esterase: Small / RBC: 25-50 /HPF / WBC 3-5   Sq Epi: x / Non Sq Epi: Occasional / Bacteria: Occasional      CULTURES:  Culture Results:   Culture in progress (09-15 @ 12:00)  Culture Results:   No growth (09-15 @ 12:00)  Culture Results:   Few Haemophilus influenzae "Susceptibilities not performed" (09-15 @ 08:00)  Culture Results:   <10,000 CFU/mL Normal Urogenital Maryanne (09-15 @ 06:30)  Rapid RVP Result: NotDetec (09-15 @ 02:23)  Culture Results:   Growth in aerobic bottle: Gram Positive Cocci in Clusters  ***Blood Panel PCR results on this specimen are available  approximately 3 hours after the Gram stain result.***  Gram stain, PCR, and/or culture results may not always  correspond due to difference in methodologies.  ************************************************************  This PCR assay was performed by multiplex PCR. This  Assay tests for 66 bacterial and resistance gene targets.  Please refer to the [facility] test directory  at [email or web address] for details. (09-15 @ 02:23)  Culture Results:   No growth to date. (09-15 @ :23)      Medications:  MEDICATIONS  (STANDING):  cefepime  Injectable. 1000 milliGRAM(s) IV Push every 12 hours  chlorhexidine 0.12% Liquid 15 milliLiter(s) Oral Mucosa every 12 hours  dorzolamide 2%/timolol 0.5% Ophthalmic Solution 1 Drop(s) Both EYES two times a day  hydrocortisone sodium succinate Injectable 50 milliGRAM(s) IV Push every 8 hours  midodrine 10 milliGRAM(s) Oral every 8 hours  norepinephrine Infusion 0.05 MICROgram(s)/kG/Min (8.44 mL/Hr) IV Continuous <Continuous>  pantoprazole  Injectable 40 milliGRAM(s) IV Push daily  polyethylene glycol 3350 17 Gram(s) Oral daily  propofol Infusion 5 MICROgram(s)/kG/Min (2.7 mL/Hr) IV Continuous <Continuous>  senna 2 Tablet(s) Oral at bedtime  vancomycin  IVPB 750 milliGRAM(s) IV Intermittent every 24 hours    MEDICATIONS  (PRN):  acetaminophen     Tablet .. 650 milliGRAM(s) Oral every 6 hours PRN Temp greater or equal to 38C (100.4F)  midazolam Injectable 2 milliGRAM(s) IV Push every 2 hours PRN Agitation/vent dyssynchrony        09-15 @ :  -   @ 07:00  --------------------------------------------------------  IN: 4423 mL / OUT: 1482 mL / NET: 2941 mL     @ 07: @ 00:09  --------------------------------------------------------  IN: 1900.7 mL / OUT: 915 mL / NET: 985.7 mL        RADIOLOGY/IMAGING/ECHO      < from: CT Abdomen and Pelvis w/ IV Cont (09.15.22 @ 04:57) >  IMPRESSION:    No pulmonary embolism identified.    Bilateral lower lobe consolidation secondary to atelectasis and/or   pneumonia.    Cardiomegaly. Pacemaker.    Pneumobilia post cholecystectomy.    Enlarged prostate.    Colonic diverticulosis without diverticulitis.      < from: US Abdomen Complete (US Abdomen Complete .) (22 @ 09:40) >  IMPRESSION:  Mild intrahepatic biliary ductal dilatation with findings consistent with   known pneumobilia. Extrahepatic CBD caliber unremarkable.   Cholecystectomy. See above discussion.    < end of copied text >    < from: TTE Echo Complete w/o Contrast w/ Doppler (09.15.22 @ 10:22) >     The left ventricle is normal in size, wall thickness, wall motion and   contractility.   Estimated left ventricular ejection fraction is 50-55 %.   The left atrium is mildly dilated.   The right atrium appears mildly dilated.   Normalappearing right ventricle structure. TAPSE is measuring 1.6 cm.   A device wire is seen in the RV and RA.   The aortic valve is well visualized, appears moderately sclerotic.   Mild (1+) aortic regurgitation is present.   The aortic root and ascending aorta are dilated.   The mitral valve leaflets appear calcified. The leaflet opening is   normal.   Mild (1+) tricuspid valve regurgitation is present.   No evidence of pericardial effusion.      < end of copied text >          Assessment/Plan:    87M hx a fib on warfarin CAD CHF with ICD  RA, gout  CLL not on RX      Admit 9/15 resp distress hypoxemia type 1 resp failure intubated.  + H influenza PNA and CNS bacteremia in this IC host    Septic shock on low dose nor-epi  titrating to MAP 65 midodrine added with stress steroids      Vanco and cefipime to cover GNR and GP organisms        Pre renal ADRIANA with hyperkalemia improved with fluid and lokelma     Sedate on vent with propofol and PRN versed    Feeding with PPI nepro top goal    CLL usually requires much high total WBC to cause leukostasis, will check seru viscosity level     SBT when more HD stable             CRITICAL CARE TIME SPENT: 32 minutes assessing presenting problems of acute illness, which pose high probability of life threatening deterioration or end organ damage/dysfunction, as well as medical decision making including initiating plan of care, reviewing data, reviewing radiologic exams, discussing with multidisciplinary team,  discussing goals of care with patient/family, and writing this note.  Non-inclusive of procedures performed,

## 2022-09-17 NOTE — PROGRESS NOTE ADULT - ASSESSMENT
86 y/o M w/ PMHx a fib on coumadin, severe cardiomyopathy, CAD s/p MI, HTN, HLD, CHF, s/p AICD, RA, gout, and leukemia (diagnosed 1 yr ago, has not received treatment) admitted on 9/15 for evaluation of severe respiratory distress, requiring emergent intubation, patient had upper respiratory symptoms, shortness of breath, headache, dizziness and cough; history per medical record as patient is intubated and sedated.     1. Patient admitted with respiratory failure and sepsis secondary to pneumonia; is immunocompromised given history of leukemia; at risk of severe infections; history of PPM and found to have coagulase negative staph in blood  - follow up cultures   - serial cbc and monitor temperature   - iv hydration and supportive care   - reviewed prior medical records to evaluate for resistant or atypical pathogens   - vent per icu  - will continue cefepime which will cover broadly for pneumonia and patient at risk for gram negative rods and other resistant bacteria , day #2  - will add vancomycin to treat resistant bacteria and staph especially given PPM, day #2  - consideration for echocardiogram to rule out vegetations--- noted  - check vancomycin trough prior to fourth dose   - tolerating antibiotics without rashes or side effects   - will check t cell subsets, if needs PCP prophylaxis  2. other issues: per medicine

## 2022-09-17 NOTE — PROGRESS NOTE ADULT - SUBJECTIVE AND OBJECTIVE BOX
Patient is a 87y old  Male who presents with a chief complaint of Acute hypoxic respiratory failure (17 Sep 2022 00:09)    24 hour events:     PAST MEDICAL & SURGICAL HISTORY:  Afib      HTN (hypertension)      Cholesterol serum elevated      BPH (Benign Prostatic Hyperplasia)      CA - skin cancer      History of ankle fracture      Intolerance, drug  INTOLERANCE  TO STATIN      Hearing loss      Myocardial infarction      Gout      Osteoarthritis      Glaucoma      Rheumatoid arthritis      Hx of appendectomy      Hx of tonsillectomy      S/P TKR (total knee replacement)  bilateral      H/O coronary angiogram  x 2      H/O hernia repair  x  4 surgeries      S/P laser cataract surgery  b/l      H/O shoulder surgery  right      S/P ORIF (open reduction internal fixation) fracture  left ankle          Allergies    penicillins (Unknown)    Intolerances      REVIEW OF SYSTEMS: SEE BELOW       ICU Vital Signs Last 24 Hrs  T(C): 36.9 (17 Sep 2022 07:45), Max: 37.5 (16 Sep 2022 19:45)  T(F): 98.4 (17 Sep 2022 07:45), Max: 99.5 (16 Sep 2022 19:45)  HR: 73 (17 Sep 2022 08:50) (65 - 92)  BP: 116/48 (17 Sep 2022 07:45) (102/41 - 144/96)  BP(mean): 65 (17 Sep 2022 07:45) (56 - 105)  ABP: --  ABP(mean): --  RR: 22 (17 Sep 2022 07:45) (20 - 30)  SpO2: 97% (17 Sep 2022 08:50) (93% - 99%)    O2 Parameters below as of 17 Sep 2022 07:00  Patient On (Oxygen Delivery Method): ventilator    O2 Concentration (%): 40        CAPILLARY BLOOD GLUCOSE          I&O's Summary    16 Sep 2022 07:01  -  17 Sep 2022 07:00  --------------------------------------------------------  IN: 3234.7 mL / OUT: 1235 mL / NET: 1999.7 mL        Mode: AC/ CMV (Assist Control/ Continuous Mandatory Ventilation)  RR (machine): 22  TV (machine): 500  FiO2: 35  PEEP: 6  PIP: 21      MEDICATIONS  (STANDING):  cefepime  Injectable. 1000 milliGRAM(s) IV Push every 12 hours  chlorhexidine 0.12% Liquid 15 milliLiter(s) Oral Mucosa every 12 hours  dorzolamide 2%/timolol 0.5% Ophthalmic Solution 1 Drop(s) Both EYES two times a day  hydrocortisone sodium succinate Injectable 50 milliGRAM(s) IV Push every 8 hours  midodrine 10 milliGRAM(s) Oral every 8 hours  norepinephrine Infusion 0.05 MICROgram(s)/kG/Min (8.44 mL/Hr) IV Continuous <Continuous>  pantoprazole  Injectable 40 milliGRAM(s) IV Push daily  polyethylene glycol 3350 17 Gram(s) Oral daily  propofol Infusion 5 MICROgram(s)/kG/Min (2.7 mL/Hr) IV Continuous <Continuous>  senna 2 Tablet(s) Oral at bedtime  vancomycin  IVPB 750 milliGRAM(s) IV Intermittent every 24 hours      MEDICATIONS  (PRN):  acetaminophen     Tablet .. 650 milliGRAM(s) Oral every 6 hours PRN Temp greater or equal to 38C (100.4F)  midazolam Injectable 2 milliGRAM(s) IV Push every 2 hours PRN Agitation/vent dyssynchrony      PHYSICAL EXAM: SEE BELOW                          12.8   123.52 )-----------( 142      ( 17 Sep 2022 05:35 )             39.8       09-17    138  |  109<H>  |  35<H>  ----------------------------<  178<H>  4.8   |  23  |  1.18    Ca    8.9      17 Sep 2022 05:35  Phos  4.1     -  Mg     2.2     -16    TPro  5.3<L>  /  Alb  2.4<L>  /  TBili  1.0  /  DBili  x   /  AST  26  /  ALT  33  /  AlkPhos  120  09-17      CARDIAC MARKERS ( 16 Sep 2022 12:43 )  x     / x     / 40 U/L / x     / x          PT/INR - ( 17 Sep 2022 05:35 )   PT: 79.0 sec;   INR: 6.68 ratio           Urinalysis Basic - ( 15 Sep 2022 12:40 )    Color: Yellow / Appearance: Slightly Turbid / S.015 / pH: x  Gluc: x / Ketone: Trace  / Bili: Small / Urobili: 1   Blood: x / Protein: 30 mg/dL / Nitrite: Negative   Leuk Esterase: Small / RBC: 25-50 /HPF / WBC 3-5   Sq Epi: x / Non Sq Epi: Occasional / Bacteria: Occasional      .Legionella None   Culture in progress -- 09-15 @ 12:00  .Sputum Sputum   Few Haemophilus influenzae "Susceptibilities not performed"  Normal Respiratory Maryanne present   Rare Gram Negative Rods  Rare polymorphonuclear leukocytes 09-15 @ 08:00  Clean Catch Clean Catch (Midstream)   <10,000 CFU/mL Normal Urogenital Maryanne -- 09-15 @ 06:30  .Blood Blood-Peripheral   Growth in aerobic bottle: Gram Positive Cocci in Clusters  ***Blood Panel PCR results on this specimen are available  approximately 3 hours after the Gram stain result.***  Gram stain, PCR, and/or culture results may not always  correspond due to difference in methodologies.  ************************************************************  This PCR assay was performed by multiplex PCR. This  Assay tests for 66 bacterial and resistance gene targets.  Please refer to the [facility] test directory  at [email or web address] for details.   Growth in aerobic bottle: Gram Positive Cocci in Clusters 09-15 @ 02:23      Rapid RVP Result: NotDetec (09-15 @ 02:23)       Patient is a 87y old  Male who presents with a chief complaint of Acute hypoxic respiratory failure (17 Sep 2022 00:09)    24 hour events: slowly improving     PAST MEDICAL & SURGICAL HISTORY:  Afib      HTN (hypertension)      Cholesterol serum elevated      BPH (Benign Prostatic Hyperplasia)      CA - skin cancer      History of ankle fracture      Intolerance, drug  INTOLERANCE  TO STATIN      Hearing loss      Myocardial infarction      Gout      Osteoarthritis      Glaucoma      Rheumatoid arthritis      Hx of appendectomy      Hx of tonsillectomy      S/P TKR (total knee replacement)  bilateral      H/O coronary angiogram  x 2      H/O hernia repair  x  4 surgeries      S/P laser cataract surgery  b/l      H/O shoulder surgery  right      S/P ORIF (open reduction internal fixation) fracture  left ankle          Allergies    penicillins (Unknown)    Intolerances      REVIEW OF SYSTEMS: SEE BELOW       ICU Vital Signs Last 24 Hrs  T(C): 36.9 (17 Sep 2022 07:45), Max: 37.5 (16 Sep 2022 19:45)  T(F): 98.4 (17 Sep 2022 07:45), Max: 99.5 (16 Sep 2022 19:45)  HR: 73 (17 Sep 2022 08:50) (65 - 92)  BP: 116/48 (17 Sep 2022 07:45) (102/41 - 144/96)  BP(mean): 65 (17 Sep 2022 07:45) (56 - 105)  ABP: --  ABP(mean): --  RR: 22 (17 Sep 2022 07:45) (20 - 30)  SpO2: 97% (17 Sep 2022 08:50) (93% - 99%)    O2 Parameters below as of 17 Sep 2022 07:00  Patient On (Oxygen Delivery Method): ventilator    O2 Concentration (%): 40        CAPILLARY BLOOD GLUCOSE          I&O's Summary    16 Sep 2022 07:01  -  17 Sep 2022 07:00  --------------------------------------------------------  IN: 3234.7 mL / OUT: 1235 mL / NET: 1999.7 mL        Mode: AC/ CMV (Assist Control/ Continuous Mandatory Ventilation)  RR (machine): 22  TV (machine): 500  FiO2: 35  PEEP: 6  PIP: 21      MEDICATIONS  (STANDING):  cefepime  Injectable. 1000 milliGRAM(s) IV Push every 12 hours  chlorhexidine 0.12% Liquid 15 milliLiter(s) Oral Mucosa every 12 hours  dorzolamide 2%/timolol 0.5% Ophthalmic Solution 1 Drop(s) Both EYES two times a day  hydrocortisone sodium succinate Injectable 50 milliGRAM(s) IV Push every 8 hours  midodrine 10 milliGRAM(s) Oral every 8 hours  norepinephrine Infusion 0.05 MICROgram(s)/kG/Min (8.44 mL/Hr) IV Continuous <Continuous>  pantoprazole  Injectable 40 milliGRAM(s) IV Push daily  polyethylene glycol 3350 17 Gram(s) Oral daily  propofol Infusion 5 MICROgram(s)/kG/Min (2.7 mL/Hr) IV Continuous <Continuous>  senna 2 Tablet(s) Oral at bedtime  vancomycin  IVPB 750 milliGRAM(s) IV Intermittent every 24 hours      MEDICATIONS  (PRN):  acetaminophen     Tablet .. 650 milliGRAM(s) Oral every 6 hours PRN Temp greater or equal to 38C (100.4F)  midazolam Injectable 2 milliGRAM(s) IV Push every 2 hours PRN Agitation/vent dyssynchrony      PHYSICAL EXAM: SEE BELOW                          12.8   123.52 )-----------( 142      ( 17 Sep 2022 05:35 )             39.8       09-17    138  |  109<H>  |  35<H>  ----------------------------<  178<H>  4.8   |  23  |  1.18    Ca    8.9      17 Sep 2022 05:35  Phos  4.1     -  Mg     2.2     -16    TPro  5.3<L>  /  Alb  2.4<L>  /  TBili  1.0  /  DBili  x   /  AST  26  /  ALT  33  /  AlkPhos  120  09-17      CARDIAC MARKERS ( 16 Sep 2022 12:43 )  x     / x     / 40 U/L / x     / x          PT/INR - ( 17 Sep 2022 05:35 )   PT: 79.0 sec;   INR: 6.68 ratio           Urinalysis Basic - ( 15 Sep 2022 12:40 )    Color: Yellow / Appearance: Slightly Turbid / S.015 / pH: x  Gluc: x / Ketone: Trace  / Bili: Small / Urobili: 1   Blood: x / Protein: 30 mg/dL / Nitrite: Negative   Leuk Esterase: Small / RBC: 25-50 /HPF / WBC 3-5   Sq Epi: x / Non Sq Epi: Occasional / Bacteria: Occasional      .Legionella None   Culture in progress -- 09-15 @ 12:00  .Sputum Sputum   Few Haemophilus influenzae "Susceptibilities not performed"  Normal Respiratory Maryanne present   Rare Gram Negative Rods  Rare polymorphonuclear leukocytes 09-15 @ 08:00  Clean Catch Clean Catch (Midstream)   <10,000 CFU/mL Normal Urogenital Maryanne -- 09-15 @ 06:30  .Blood Blood-Peripheral   Growth in aerobic bottle: Gram Positive Cocci in Clusters  ***Blood Panel PCR results on this specimen are available  approximately 3 hours after the Gram stain result.***  Gram stain, PCR, and/or culture results may not always  correspond due to difference in methodologies.  ************************************************************  This PCR assay was performed by multiplex PCR. This  Assay tests for 66 bacterial and resistance gene targets.  Please refer to the [facility] test directory  at [email or web address] for details.   Growth in aerobic bottle: Gram Positive Cocci in Clusters 09-15 @ 02:23      Rapid RVP Result: NotDetec (09-15 @ 02:23)

## 2022-09-18 LAB
ANION GAP SERPL CALC-SCNC: 5 MMOL/L — SIGNIFICANT CHANGE UP (ref 5–17)
APTT BLD: 46.5 SEC — HIGH (ref 27.5–35.5)
BUN SERPL-MCNC: 38 MG/DL — HIGH (ref 7–23)
CALCIUM SERPL-MCNC: 9.4 MG/DL — SIGNIFICANT CHANGE UP (ref 8.5–10.1)
CHLORIDE SERPL-SCNC: 110 MMOL/L — HIGH (ref 96–108)
CO2 SERPL-SCNC: 26 MMOL/L — SIGNIFICANT CHANGE UP (ref 22–31)
CREAT SERPL-MCNC: 1.07 MG/DL — SIGNIFICANT CHANGE UP (ref 0.5–1.3)
EGFR: 68 ML/MIN/1.73M2 — SIGNIFICANT CHANGE UP
GLUCOSE SERPL-MCNC: 120 MG/DL — HIGH (ref 70–99)
HCT VFR BLD CALC: 39.9 % — SIGNIFICANT CHANGE UP (ref 39–50)
HGB BLD-MCNC: 12.8 G/DL — LOW (ref 13–17)
INR BLD: 3.57 RATIO — HIGH (ref 0.88–1.16)
MAGNESIUM SERPL-MCNC: 2.3 MG/DL — SIGNIFICANT CHANGE UP (ref 1.6–2.6)
MCHC RBC-ENTMCNC: 32.1 GM/DL — SIGNIFICANT CHANGE UP (ref 32–36)
MCHC RBC-ENTMCNC: 32.3 PG — SIGNIFICANT CHANGE UP (ref 27–34)
MCV RBC AUTO: 100.8 FL — HIGH (ref 80–100)
PHOSPHATE SERPL-MCNC: 2.3 MG/DL — LOW (ref 2.5–4.5)
PLATELET # BLD AUTO: 110 K/UL — LOW (ref 150–400)
POTASSIUM SERPL-MCNC: 4 MMOL/L — SIGNIFICANT CHANGE UP (ref 3.5–5.3)
POTASSIUM SERPL-SCNC: 4 MMOL/L — SIGNIFICANT CHANGE UP (ref 3.5–5.3)
PROTHROM AB SERPL-ACNC: 41.9 SEC — HIGH (ref 10.5–13.4)
RBC # BLD: 3.96 M/UL — LOW (ref 4.2–5.8)
RBC # FLD: 14.4 % — SIGNIFICANT CHANGE UP (ref 10.3–14.5)
SODIUM SERPL-SCNC: 141 MMOL/L — SIGNIFICANT CHANGE UP (ref 135–145)
WBC # BLD: 76.46 K/UL — CRITICAL HIGH (ref 3.8–10.5)
WBC # FLD AUTO: 76.46 K/UL — CRITICAL HIGH (ref 3.8–10.5)

## 2022-09-18 PROCEDURE — 99291 CRITICAL CARE FIRST HOUR: CPT

## 2022-09-18 RX ORDER — FUROSEMIDE 40 MG
20 TABLET ORAL ONCE
Refills: 0 | Status: COMPLETED | OUTPATIENT
Start: 2022-09-18 | End: 2022-09-18

## 2022-09-18 RX ORDER — HYDROCORTISONE 20 MG
25 TABLET ORAL EVERY 12 HOURS
Refills: 0 | Status: DISCONTINUED | OUTPATIENT
Start: 2022-09-18 | End: 2022-09-20

## 2022-09-18 RX ORDER — SODIUM CHLORIDE 0.65 %
1 AEROSOL, SPRAY (ML) NASAL
Refills: 0 | Status: DISCONTINUED | OUTPATIENT
Start: 2022-09-18 | End: 2022-09-25

## 2022-09-18 RX ORDER — POTASSIUM PHOSPHATE, MONOBASIC POTASSIUM PHOSPHATE, DIBASIC 236; 224 MG/ML; MG/ML
15 INJECTION, SOLUTION INTRAVENOUS ONCE
Refills: 0 | Status: COMPLETED | OUTPATIENT
Start: 2022-09-18 | End: 2022-09-18

## 2022-09-18 RX ADMIN — Medication 250 MILLIGRAM(S): at 09:28

## 2022-09-18 RX ADMIN — Medication 25 MILLIGRAM(S): at 21:54

## 2022-09-18 RX ADMIN — SENNA PLUS 2 TABLET(S): 8.6 TABLET ORAL at 21:54

## 2022-09-18 RX ADMIN — Medication 20 MILLIGRAM(S): at 08:35

## 2022-09-18 RX ADMIN — PANTOPRAZOLE SODIUM 40 MILLIGRAM(S): 20 TABLET, DELAYED RELEASE ORAL at 09:27

## 2022-09-18 RX ADMIN — DORZOLAMIDE HYDROCHLORIDE TIMOLOL MALEATE 1 DROP(S): 20; 5 SOLUTION/ DROPS OPHTHALMIC at 21:55

## 2022-09-18 RX ADMIN — Medication 50 MILLIGRAM(S): at 09:27

## 2022-09-18 RX ADMIN — DORZOLAMIDE HYDROCHLORIDE TIMOLOL MALEATE 1 DROP(S): 20; 5 SOLUTION/ DROPS OPHTHALMIC at 09:27

## 2022-09-18 RX ADMIN — DEXMEDETOMIDINE HYDROCHLORIDE IN 0.9% SODIUM CHLORIDE 5.07 MICROGRAM(S)/KG/HR: 4 INJECTION INTRAVENOUS at 04:07

## 2022-09-18 RX ADMIN — CEFEPIME 1000 MILLIGRAM(S): 1 INJECTION, POWDER, FOR SOLUTION INTRAMUSCULAR; INTRAVENOUS at 09:27

## 2022-09-18 RX ADMIN — POTASSIUM PHOSPHATE, MONOBASIC POTASSIUM PHOSPHATE, DIBASIC 62.5 MILLIMOLE(S): 236; 224 INJECTION, SOLUTION INTRAVENOUS at 08:35

## 2022-09-18 RX ADMIN — CEFEPIME 1000 MILLIGRAM(S): 1 INJECTION, POWDER, FOR SOLUTION INTRAMUSCULAR; INTRAVENOUS at 21:53

## 2022-09-18 NOTE — PROGRESS NOTE ADULT - ASSESSMENT
Acute hypoxic resp failure   Septic shock   Bibasilar gram negative pneumonia   CNS bacteremia  Prerenal ADRIANA     PMH CLL (no tx), CAD, CHF, s/p ICD, chronic A.fib on warfarin, cholecystectomy 10-20 yrs ago       Plan:     Off levo   Off Precedex  Off bipap, use qhs and prn   Give 20mg Lasix   No restraints   PT, OOB   Start po diet if tolerates     SAT, SBT today   CXR with pulmonary edema - give Lasix today   Improving Levo requirement   Continue midodrine, wean steroid  Wean sedation, may need Precedex   INR 6.68, hold warfarin today   Cefepime, vanc  Sputum cx with H. flu, BCx with CNS, will repeat BCx in am   Restraints for safety   Barnes for urinary obstruction   No central/arterial line  On TF, no BM yet, continue bowel regimen   DVT ppx - on warfarin     Prognosis poor    Lori Jennings (EMT) updated [phone] Acute hypoxic resp failure   Septic shock   Bibasilar gram negative pneumonia   CNS bacteremia  Prerenal ADRIANA     PMH CLL (no tx), CAD, CHF, s/p ICD, chronic A.fib on warfarin, cholecystectomy 10-20 yrs ago       Plan:     Off levo   Wean off Precedex  Off bipap, use qhs and prn   Give 20mg Lasix   No restraints   PT, OOB   Start po diet if tolerates   Continue midodrine, wean steroid  INR 3.57, hold warfarin today   Cefepime, vanc  Sputum cx with H. flu, BCx with CNS, f/u repeat BCx 9/18  No central/arterial line  DVT ppx - on warfarin     Likely transfer to floor tomorrow    Lori Jennings (EMT) updated [phone]

## 2022-09-18 NOTE — PROGRESS NOTE ADULT - SKIN COMMENTS
multiple moles/chronic skin changes
multiple moles, chronic skin changes
multiple moles/scabs with chronic skin changes
chronic skin changes, multiple moles

## 2022-09-18 NOTE — PROGRESS NOTE ADULT - SUBJECTIVE AND OBJECTIVE BOX
Patient is a 87y old  Male who presents with a chief complaint of Acute hypoxic respiratory failure (17 Sep 2022 12:19)    24 hour events:     PAST MEDICAL & SURGICAL HISTORY:  Afib      HTN (hypertension)      Cholesterol serum elevated      BPH (Benign Prostatic Hyperplasia)      CA - skin cancer      History of ankle fracture      Intolerance, drug  INTOLERANCE  TO STATIN      Hearing loss      Myocardial infarction      Gout      Osteoarthritis      Glaucoma      Rheumatoid arthritis      Hx of appendectomy      Hx of tonsillectomy      S/P TKR (total knee replacement)  bilateral      H/O coronary angiogram  x 2      H/O hernia repair  x  4 surgeries      S/P laser cataract surgery  b/l      H/O shoulder surgery  right      S/P ORIF (open reduction internal fixation) fracture  left ankle          Allergies    penicillins (Unknown)    Intolerances      REVIEW OF SYSTEMS: SEE BELOW       ICU Vital Signs Last 24 Hrs  T(C): 36.3 (18 Sep 2022 10:00), Max: 37.5 (17 Sep 2022 12:46)  T(F): 97.3 (18 Sep 2022 10:00), Max: 99.5 (17 Sep 2022 12:46)  HR: 71 (18 Sep 2022 10:00) (64 - 80)  BP: 117/58 (18 Sep 2022 10:00) (96/42 - 168/78)  BP(mean): 72 (18 Sep 2022 10:00) (55 - 98)  ABP: --  ABP(mean): --  RR: 19 (18 Sep 2022 10:00) (14 - 24)  SpO2: 91% (18 Sep 2022 10:00) (91% - 100%)    O2 Parameters below as of 18 Sep 2022 10:00  Patient On (Oxygen Delivery Method): nasal cannula  O2 Flow (L/min): 4          CAPILLARY BLOOD GLUCOSE          I&O's Summary    17 Sep 2022 07:01  -  18 Sep 2022 07:00  --------------------------------------------------------  IN: 1524.3 mL / OUT: 2050 mL / NET: -525.7 mL        Mode: AC/ CMV (Assist Control/ Continuous Mandatory Ventilation)  RR (machine): 22  TV (machine): 500  FiO2: 30  PEEP: 5  PIP: 26      MEDICATIONS  (STANDING):  cefepime  Injectable. 1000 milliGRAM(s) IV Push every 12 hours  dexMEDEtomidine Infusion 0.2 MICROgram(s)/kG/Hr (5.07 mL/Hr) IV Continuous <Continuous>  dorzolamide 2%/timolol 0.5% Ophthalmic Solution 1 Drop(s) Both EYES two times a day  hydrocortisone sodium succinate Injectable 50 milliGRAM(s) IV Push every 12 hours  midodrine 10 milliGRAM(s) Oral every 8 hours  pantoprazole  Injectable 40 milliGRAM(s) IV Push daily  polyethylene glycol 3350 17 Gram(s) Oral daily  senna 2 Tablet(s) Oral at bedtime  vancomycin  IVPB 750 milliGRAM(s) IV Intermittent every 24 hours      MEDICATIONS  (PRN):  acetaminophen     Tablet .. 650 milliGRAM(s) Oral every 6 hours PRN Temp greater or equal to 38C (100.4F)      PHYSICAL EXAM: SEE BELOW                          12.8   76.46 )-----------( 110      ( 18 Sep 2022 05:49 )             39.9       09-18    141  |  110<H>  |  38<H>  ----------------------------<  120<H>  4.0   |  26  |  1.07    Ca    9.4      18 Sep 2022 05:49  Phos  2.3     09-18  Mg     2.3     09-18    TPro  5.3<L>  /  Alb  2.4<L>  /  TBili  1.0  /  DBili  x   /  AST  26  /  ALT  33  /  AlkPhos  120  09-17      CARDIAC MARKERS ( 16 Sep 2022 12:43 )  x     / x     / 40 U/L / x     / x          PT/INR - ( 18 Sep 2022 05:49 )   PT: 41.9 sec;   INR: 3.57 ratio         PTT - ( 18 Sep 2022 05:49 )  PTT:46.5 sec    .Legionella None   No Legionella species isolated -- 09-15 @ 12:00  .Sputum Sputum   Few Haemophilus influenzae "Susceptibilities not performed"  Normal Respiratory Maryanne present   Rare Gram Negative Rods  Rare polymorphonuclear leukocytes 09-15 @ 08:00  Clean Catch Clean Catch (Midstream)   <10,000 CFU/mL Normal Urogenital Maryanne -- 09-15 @ 06:30  .Blood Blood-Peripheral   Growth in aerobic bottle: Staphylococcus cohnii  Coag Negative Staphylococcus  Single set isolate, possible contaminant. Contact  Microbiology if susceptibility testing clinically  indicated.  ***Blood Panel PCR results on this specimen are available  approximately 3 hours after the Gram stain result.***  Gram stain, PCR, and/or culture results may not always  correspond due to difference in methodologies.  ************************************************************  This PCR assay was performed by multiplex PCR. This  Assay tests for 66 bacterial and resistance gene targets.  Please refer to the [facility] test directory  at [email or web address] for details.   Growth in aerobic bottle: Gram Positive Cocci in Clusters 09-15 @ 02:23      Rapid RVP Result: NotDetec (09-15 @ 02:23)       Patient is a 87y old  Male who presents with a chief complaint of Acute hypoxic respiratory failure (17 Sep 2022 12:19)    24 hour events: extubated 9/17  off Levo    PAST MEDICAL & SURGICAL HISTORY:  Afib      HTN (hypertension)      Cholesterol serum elevated      BPH (Benign Prostatic Hyperplasia)      CA - skin cancer      History of ankle fracture      Intolerance, drug  INTOLERANCE  TO STATIN      Hearing loss      Myocardial infarction      Gout      Osteoarthritis      Glaucoma      Rheumatoid arthritis      Hx of appendectomy      Hx of tonsillectomy      S/P TKR (total knee replacement)  bilateral      H/O coronary angiogram  x 2      H/O hernia repair  x  4 surgeries      S/P laser cataract surgery  b/l      H/O shoulder surgery  right      S/P ORIF (open reduction internal fixation) fracture  left ankle          Allergies    penicillins (Unknown)    Intolerances      REVIEW OF SYSTEMS: SEE BELOW       ICU Vital Signs Last 24 Hrs  T(C): 36.3 (18 Sep 2022 10:00), Max: 37.5 (17 Sep 2022 12:46)  T(F): 97.3 (18 Sep 2022 10:00), Max: 99.5 (17 Sep 2022 12:46)  HR: 71 (18 Sep 2022 10:00) (64 - 80)  BP: 117/58 (18 Sep 2022 10:00) (96/42 - 168/78)  BP(mean): 72 (18 Sep 2022 10:00) (55 - 98)  ABP: --  ABP(mean): --  RR: 19 (18 Sep 2022 10:00) (14 - 24)  SpO2: 91% (18 Sep 2022 10:00) (91% - 100%)    O2 Parameters below as of 18 Sep 2022 10:00  Patient On (Oxygen Delivery Method): nasal cannula  O2 Flow (L/min): 4          CAPILLARY BLOOD GLUCOSE          I&O's Summary    17 Sep 2022 07:01  -  18 Sep 2022 07:00  --------------------------------------------------------  IN: 1524.3 mL / OUT: 2050 mL / NET: -525.7 mL        Mode: AC/ CMV (Assist Control/ Continuous Mandatory Ventilation)  RR (machine): 22  TV (machine): 500  FiO2: 30  PEEP: 5  PIP: 26      MEDICATIONS  (STANDING):  cefepime  Injectable. 1000 milliGRAM(s) IV Push every 12 hours  dexMEDEtomidine Infusion 0.2 MICROgram(s)/kG/Hr (5.07 mL/Hr) IV Continuous <Continuous>  dorzolamide 2%/timolol 0.5% Ophthalmic Solution 1 Drop(s) Both EYES two times a day  hydrocortisone sodium succinate Injectable 50 milliGRAM(s) IV Push every 12 hours  midodrine 10 milliGRAM(s) Oral every 8 hours  pantoprazole  Injectable 40 milliGRAM(s) IV Push daily  polyethylene glycol 3350 17 Gram(s) Oral daily  senna 2 Tablet(s) Oral at bedtime  vancomycin  IVPB 750 milliGRAM(s) IV Intermittent every 24 hours      MEDICATIONS  (PRN):  acetaminophen     Tablet .. 650 milliGRAM(s) Oral every 6 hours PRN Temp greater or equal to 38C (100.4F)      PHYSICAL EXAM: SEE BELOW                          12.8   76.46 )-----------( 110      ( 18 Sep 2022 05:49 )             39.9       09-18    141  |  110<H>  |  38<H>  ----------------------------<  120<H>  4.0   |  26  |  1.07    Ca    9.4      18 Sep 2022 05:49  Phos  2.3     09-18  Mg     2.3     09-18    TPro  5.3<L>  /  Alb  2.4<L>  /  TBili  1.0  /  DBili  x   /  AST  26  /  ALT  33  /  AlkPhos  120  09-17      CARDIAC MARKERS ( 16 Sep 2022 12:43 )  x     / x     / 40 U/L / x     / x          PT/INR - ( 18 Sep 2022 05:49 )   PT: 41.9 sec;   INR: 3.57 ratio         PTT - ( 18 Sep 2022 05:49 )  PTT:46.5 sec    .Legionella None   No Legionella species isolated -- 09-15 @ 12:00  .Sputum Sputum   Few Haemophilus influenzae "Susceptibilities not performed"  Normal Respiratory Maryanne present   Rare Gram Negative Rods  Rare polymorphonuclear leukocytes 09-15 @ 08:00  Clean Catch Clean Catch (Midstream)   <10,000 CFU/mL Normal Urogenital Maryanne -- 09-15 @ 06:30  .Blood Blood-Peripheral   Growth in aerobic bottle: Staphylococcus cohnii  Coag Negative Staphylococcus  Single set isolate, possible contaminant. Contact  Microbiology if susceptibility testing clinically  indicated.  ***Blood Panel PCR results on this specimen are available  approximately 3 hours after the Gram stain result.***  Gram stain, PCR, and/or culture results may not always  correspond due to difference in methodologies.  ************************************************************  This PCR assay was performed by multiplex PCR. This  Assay tests for 66 bacterial and resistance gene targets.  Please refer to the [facility] test directory  at [email or web address] for details.   Growth in aerobic bottle: Gram Positive Cocci in Clusters 09-15 @ 02:23      Rapid RVP Result: NotDetec (09-15 @ 02:23)

## 2022-09-18 NOTE — PROGRESS NOTE ADULT - SUBJECTIVE AND OBJECTIVE BOX
Patient is a 87y old  Male who presents with a chief complaint of Acute hypoxic respiratory failure (18 Sep 2022 12:25)    PAST MEDICAL & SURGICAL HISTORY:  Afib      HTN (hypertension)      Cholesterol serum elevated      BPH (Benign Prostatic Hyperplasia)      CA - skin cancer      History of ankle fracture      Intolerance, drug  INTOLERANCE  TO STATIN      Hearing loss      Myocardial infarction      Gout      Osteoarthritis      Glaucoma      Rheumatoid arthritis      Hx of appendectomy      Hx of tonsillectomy      S/P TKR (total knee replacement)  bilateral      H/O coronary angiogram  x 2      H/O hernia repair  x  4 surgeries      S/P laser cataract surgery  b/l      H/O shoulder surgery  right      S/P ORIF (open reduction internal fixation) fracture  left ankle        JEFFREY JACOBSONHDURFT   87y    Male    BRIEF HOSPITAL COURSE:    Review of Systems:                       All other ROS are negative.    Allergies    penicillins (Unknown)    Intolerances          ICU Vital Signs Last 24 Hrs  T(C): 37.6 (18 Sep 2022 16:00), Max: 37.6 (18 Sep 2022 16:00)  T(F): 99.7 (18 Sep 2022 16:00), Max: 99.7 (18 Sep 2022 16:00)  HR: 78 (18 Sep 2022 23:10) (64 - 114)  BP: 134/53 (18 Sep 2022 20:00) (114/51 - 168/78)  BP(mean): 70 (18 Sep 2022 20:00) (66 - 102)  ABP: --  ABP(mean): --  RR: 16 (18 Sep 2022 20:00) (14 - 27)  SpO2: 96% (18 Sep 2022 23:10) (91% - 100%)    O2 Parameters below as of 18 Sep 2022 20:00  Patient On (Oxygen Delivery Method): BiPAP/CPAP    O2 Concentration (%): 45      Physical Examination:    General:     HEENT:     PULM:     CVS:     ABD:     EXT:     SKIN:     Neuro:          LABS:                        12.8   76.46 )-----------( 110      ( 18 Sep 2022 05:49 )             39.9     09-18    141  |  110<H>  |  38<H>  ----------------------------<  120<H>  4.0   |  26  |  1.07    Ca    9.4      18 Sep 2022 05:49  Phos  2.3     09-18  Mg     2.3     09-18    TPro  5.3<L>  /  Alb  2.4<L>  /  TBili  1.0  /  DBili  x   /  AST  26  /  ALT  33  /  AlkPhos  120  09-17          CAPILLARY BLOOD GLUCOSE        PT/INR - ( 18 Sep 2022 05:49 )   PT: 41.9 sec;   INR: 3.57 ratio         PTT - ( 18 Sep 2022 05:49 )  PTT:46.5 sec    CULTURES:  Culture Results:   No Legionella species isolated (09-15 @ 12:00)  Culture Results:   No growth (09-15 @ 12:00)  Culture Results:   Few Haemophilus influenzae "Susceptibilities not performed"  Normal Respiratory Maryanne present (09-15 @ 08:00)  Culture Results:   <10,000 CFU/mL Normal Urogenital Maryanne (09-15 @ 06:30)  Rapid RVP Result: NotDetec (09-15 @ 02:23)  Culture Results:   Growth in aerobic bottle: Staphylococcus cohnii  Coag Negative Staphylococcus  Single set isolate, possible contaminant. Contact  Microbiology if susceptibility testing clinically  indicated.  ***Blood Panel PCR results on this specimen are available  approximately 3 hours after the Gram stain result.***  Gram stain, PCR, and/or culture results may not always  correspond due to difference in methodologies.  ************************************************************  This PCR assay was performed by multiplex PCR. This  Assay tests for 66 bacterial and resistance gene targets.  Please refer to the [facility] test directory  at [email or web address] for details. (09-15 @ 02:23)  Culture Results:   No growth to date. (09-15 @ 02:23)      Medications:  MEDICATIONS  (STANDING):  cefepime  Injectable. 1000 milliGRAM(s) IV Push every 12 hours  dorzolamide 2%/timolol 0.5% Ophthalmic Solution 1 Drop(s) Both EYES two times a day  hydrocortisone sodium succinate Injectable 25 milliGRAM(s) IV Push every 12 hours  midodrine 10 milliGRAM(s) Oral every 8 hours  pantoprazole  Injectable 40 milliGRAM(s) IV Push daily  polyethylene glycol 3350 17 Gram(s) Oral daily  senna 2 Tablet(s) Oral at bedtime  vancomycin  IVPB 750 milliGRAM(s) IV Intermittent every 24 hours    MEDICATIONS  (PRN):  acetaminophen     Tablet .. 650 milliGRAM(s) Oral every 6 hours PRN Temp greater or equal to 38C (100.4F)  sodium chloride 0.65% Nasal 1 Spray(s) Both Nostrils four times a day PRN Nasal Congestion        09-17 @ 07:01  -  09-18 @ 07:00  --------------------------------------------------------  IN: 1524.3 mL / OUT: 2050 mL / NET: -525.7 mL    09-18 @ 07:01  - 09-18 @ 23:57  --------------------------------------------------------  IN: 250 mL / OUT: 1850 mL / NET: -1600 mL        RADIOLOGY/IMAGING/ECHO    Critical care point of care ultrasound:    Assessment/Plan:          CRITICAL CARE TIME SPENT: 37 minutes assessing presenting problems of acute illness, which pose high probability of life threatening deterioration or end organ damage/dysfunction, as well as medical decision making including initiating plan of care, reviewing data, reviewing radiologic exams, discussing with multidisciplinary team,  discussing goals of care with patient/family, and writing this note.  Non-inclusive of procedures performed,         Patient is a 87y old  Male who presents with a chief complaint of Acute hypoxic respiratory failure (18 Sep 2022 12:25)    PAST MEDICAL & SURGICAL HISTORY:  Afib      HTN (hypertension)      Cholesterol serum elevated      BPH (Benign Prostatic Hyperplasia)      CA - skin cancer      History of ankle fracture      Intolerance, drug  INTOLERANCE  TO STATIN      Hearing loss      Myocardial infarction      Gout      Osteoarthritis      Glaucoma      Rheumatoid arthritis      Hx of appendectomy      Hx of tonsillectomy      S/P TKR (total knee replacement)  bilateral      H/O coronary angiogram  x 2      H/O hernia repair  x  4 surgeries      S/P laser cataract surgery  b/l      H/O shoulder surgery  right      S/P ORIF (open reduction internal fixation) fracture  left ankle        JEFFREY SCHAEFERURFELLEN   87y    Male    BRIEF HOSPITAL COURSE:    Review of Systems:  mild sob                     All other ROS are negative.    Allergies    penicillins (Unknown)    Intolerances          ICU Vital Signs Last 24 Hrs  T(C): 37.6 (18 Sep 2022 16:00), Max: 37.6 (18 Sep 2022 16:00)  T(F): 99.7 (18 Sep 2022 16:00), Max: 99.7 (18 Sep 2022 16:00)  HR: 78 (18 Sep 2022 23:10) (64 - 114)  BP: 134/53 (18 Sep 2022 20:00) (114/51 - 168/78)  BP(mean): 70 (18 Sep 2022 20:00) (66 - 102)  ABP: --  ABP(mean): --  RR: 16 (18 Sep 2022 20:00) (14 - 27)  SpO2: 96% (18 Sep 2022 23:10) (91% - 100%)    O2 Parameters below as of 18 Sep 2022 20:00  Patient On (Oxygen Delivery Method): BiPAP/CPAP    O2 Concentration (%): 45      Physical Examination:    General: BIPAP   NAD    HEENT: no JVD     PULM: bilateral BS     CVS: s1 s2 irreg    ABD: soft NT     EXT: trace edema     SKIN: warm     Neuro: alert awake NF       LABS:                        12.8   76.46 )-----------( 110      ( 18 Sep 2022 05:49 )             39.9     09-18    141  |  110<H>  |  38<H>  ----------------------------<  120<H>  4.0   |  26  |  1.07    Ca    9.4      18 Sep 2022 05:49  Phos  2.3     09-18  Mg     2.3     09-18    TPro  5.3<L>  /  Alb  2.4<L>  /  TBili  1.0  /  DBili  x   /  AST  26  /  ALT  33  /  AlkPhos  120  09-17          CAPILLARY BLOOD GLUCOSE        PT/INR - ( 18 Sep 2022 05:49 )   PT: 41.9 sec;   INR: 3.57 ratio         PTT - ( 18 Sep 2022 05:49 )  PTT:46.5 sec    CULTURES:  Culture Results:   No Legionella species isolated (09-15 @ 12:00)  Culture Results:   No growth (09-15 @ 12:00)  Culture Results:   Few Haemophilus influenzae "Susceptibilities not performed"  Normal Respiratory Maryanne present (09-15 @ 08:00)  Culture Results:   <10,000 CFU/mL Normal Urogenital Maryanne (09-15 @ 06:30)  Rapid RVP Result: NotDetec (09-15 @ 02:23)  Culture Results:   Growth in aerobic bottle: Staphylococcus cohnii  Coag Negative Staphylococcus  Single set isolate, possible contaminant. Contact  Microbiology if susceptibility testing clinically  indicated.  ***Blood Panel PCR results on this specimen are available  approximately 3 hours after the Gram stain result.***  Gram stain, PCR, and/or culture results may not always  correspond due to difference in methodologies.  ************************************************************  This PCR assay was performed by multiplex PCR. This  Assay tests for 66 bacterial and resistance gene targets.  Please refer to the [facility] Labs test directory  at [email or web address] for details. (09-15 @ 02:23)  Culture Results:   No growth to date. (09-15 @ 02:23)      Medications:  MEDICATIONS  (STANDING):  cefepime  Injectable. 1000 milliGRAM(s) IV Push every 12 hours  dorzolamide 2%/timolol 0.5% Ophthalmic Solution 1 Drop(s) Both EYES two times a day  hydrocortisone sodium succinate Injectable 25 milliGRAM(s) IV Push every 12 hours  midodrine 10 milliGRAM(s) Oral every 8 hours  pantoprazole  Injectable 40 milliGRAM(s) IV Push daily  polyethylene glycol 3350 17 Gram(s) Oral daily  senna 2 Tablet(s) Oral at bedtime  vancomycin  IVPB 750 milliGRAM(s) IV Intermittent every 24 hours    MEDICATIONS  (PRN):  acetaminophen     Tablet .. 650 milliGRAM(s) Oral every 6 hours PRN Temp greater or equal to 38C (100.4F)  sodium chloride 0.65% Nasal 1 Spray(s) Both Nostrils four times a day PRN Nasal Congestion        09-17 @ 07:01  -  09-18 @ 07:00  --------------------------------------------------------  IN: 1524.3 mL / OUT: 2050 mL / NET: -525.7 mL    09-18 @ 07:01  -  09-18 @ 23:57  --------------------------------------------------------  IN: 250 mL / OUT: 1850 mL / NET: -1600 mL        RADIOLOGY/IMAGING/ECHO      < from: Xray Chest 1 View- PORTABLE-Urgent (Xray Chest 1 View- PORTABLE-Urgent .) (09.17.22 @ 09:55) >    IMPRESSION:  Progression of congestion. No focal infiltrate identified.        Assessment/Plan:    87M hx a fib on warfarin CAD CHF with ICD  RA, gout  CLL not on RX      Admit 9/15 resp distress hypoxemia type 1 resp failure intubated.  + H influenza PNA and CNS bacteremia in this IC host    Septic shock now off pressor    Wbc heading back to baseline but CD 4 count low.      Extubated 9/17   unladed today with lasix with a brisk diuresis.      Vanco and cefepime to cover GNR and GP organisms  Check Vanco trough as renal fx improved         Pre renal ADRIANA with hyperkalemia and MA  resolved with hydration.    PRN/Nocturnal BIPAP      INR has been high.  Redose warfarin when appropriate     Taper steroids  and midodrine.

## 2022-09-18 NOTE — PROGRESS NOTE ADULT - SUBJECTIVE AND OBJECTIVE BOX
Date of service: 09-18-22 @ 12:26  Patient is extubated, mildly confused; afebrile      ROS unable to obtain secondary to patient medical condition     MEDICATIONS  (STANDING):  cefepime  Injectable. 1000 milliGRAM(s) IV Push every 12 hours  dexMEDEtomidine Infusion 0.2 MICROgram(s)/kG/Hr (5.07 mL/Hr) IV Continuous <Continuous>  dorzolamide 2%/timolol 0.5% Ophthalmic Solution 1 Drop(s) Both EYES two times a day  hydrocortisone sodium succinate Injectable 50 milliGRAM(s) IV Push every 12 hours  midodrine 10 milliGRAM(s) Oral every 8 hours  pantoprazole  Injectable 40 milliGRAM(s) IV Push daily  polyethylene glycol 3350 17 Gram(s) Oral daily  senna 2 Tablet(s) Oral at bedtime  vancomycin  IVPB 750 milliGRAM(s) IV Intermittent every 24 hours    MEDICATIONS  (PRN):  acetaminophen     Tablet .. 650 milliGRAM(s) Oral every 6 hours PRN Temp greater or equal to 38C (100.4F)      Vital Signs Last 24 Hrs  T(C): 36.3 (18 Sep 2022 11:00), Max: 37.5 (17 Sep 2022 12:46)  T(F): 97.3 (18 Sep 2022 11:00), Max: 99.5 (17 Sep 2022 12:46)  HR: 75 (18 Sep 2022 11:00) (64 - 80)  BP: 126/63 (18 Sep 2022 11:00) (96/42 - 168/78)  BP(mean): 77 (18 Sep 2022 11:00) (55 - 98)  RR: 16 (18 Sep 2022 11:00) (14 - 24)  SpO2: 97% (18 Sep 2022 11:00) (91% - 100%)    Parameters below as of 18 Sep 2022 11:00  Patient On (Oxygen Delivery Method): nasal cannula  O2 Flow (L/min): 4      Mode: AC/ CMV (Assist Control/ Continuous Mandatory Ventilation), RR (machine): 22, TV (machine): 500, FiO2: 30, PEEP: 5, PIP: 26    Physical Exam:      Constitutional: frail looking  HEENT: NC/AT  Neck: supple; thyroid not palpable  Back: no tenderness  Respiratory: respiratory effort normal; scattered coarse breath sounds  Cardiovascular: S1S2 regular, no murmurs  Abdomen: soft, not tender, not distended, positive BS; no liver or spleen organomegaly  Genitourinary: no suprapubic tenderness  Musculoskeletal: no muscle tenderness, no joint swelling or tenderness  Neurological/ Psychiatric: confused  Skin: no rashes; no palpable lesions    Labs: all available labs reviewed               Labs:                       Labs:                        12.8   76.46 )-----------( 110      ( 18 Sep 2022 05:49 )             39.9     09-18    141  |  110<H>  |  38<H>  ----------------------------<  120<H>  4.0   |  26  |  1.07    Ca    9.4      18 Sep 2022 05:49  Phos  2.3     09-18  Mg     2.3     09-18    TPro  5.3<L>  /  Alb  2.4<L>  /  TBili  1.0  /  DBili  x   /  AST  26  /  ALT  33  /  AlkPhos  120  09-17           Cultures:       Culture - Urine (collected 09-15-22 @ 12:00)  Source: Catheterized Catheterized  Final Report (09-16-22 @ 16:57):    No growth    Culture - Legionella (collected 09-15-22 @ 12:00)  Source: .Legionella None  Preliminary Report (09-17-22 @ 21:39):    No Legionella species isolated    Culture - Sputum (collected 09-15-22 @ 08:00)  Source: .Sputum Sputum  Gram Stain (09-15-22 @ 15:36):    Rare Gram Negative Rods    Rare polymorphonuclear leukocytes  Final Report (09-17-22 @ 09:11):    Few Haemophilus influenzae "Susceptibilities not performed"    Normal Respiratory Maryanne present    Culture - Urine (collected 09-15-22 @ 06:30)  Source: Clean Catch Clean Catch (Midstream)  Final Report (09-16-22 @ 07:22):    <10,000 CFU/mL Normal Urogenital Maryanne    Culture - Blood (collected 09-15-22 @ 02:23)  Source: .Blood Blood-Peripheral  Preliminary Report (09-16-22 @ 09:01):    No growth to date.    Culture - Blood (collected 09-15-22 @ 02:23)  Source: .Blood Blood-Peripheral  Gram Stain (09-16-22 @ 05:49):    Growth in aerobic bottle: Gram Positive Cocci in Clusters  Final Report (09-17-22 @ 12:26):    Growth in aerobic bottle: Staphylococcus cohnii    Coag Negative Staphylococcus    Single set isolate, possible contaminant. Contact    Microbiology if susceptibility testing clinically    indicated.    ***Blood Panel PCR results on this specimen are available    approximately 3 hours after the Gram stain result.***    Gram stain, PCR, and/or culture results may not always    correspond due to difference in methodologies.    ************************************************************    This PCR assay was performed by multiplex PCR. This    Assay tests for 66 bacterial and resistance gene targets.    Please refer to the [facility] Labs test directory    at [email or web address] for details.  Organism: Blood Culture PCR (09-17-22 @ 12:26)  Organism: Blood Culture PCR (09-17-22 @ 12:26)      -  Coagulase negative Staphylococcus: Detec      Method Type: PCR      Full T Cell Subset (09.16.22 @ 12:43)    CD19 %: 97 %    CD3 %: 1 %    CD4 %: 1 %    CD8 %: <1 %    4/8 Ratio: 1.42 RATIO    ABS [id number]: 869 /uL    ABS CD19: 00051 /uL    ABS CD3: 1131 /uL    ABS CD4: 561 /uL    ABS CD8: 395 /uL    [id number] %: 1 %                      < from: TTE Echo Complete w/o Contrast w/ Doppler (09.15.22 @ 10:22) >     Impression     Summary     The left ventricle is normal in size, wall thickness, wall motion and   contractility.   Estimated left ventricular ejection fraction is 50-55 %.   The left atrium is mildly dilated.   The right atrium appears mildly dilated.   Normalappearing right ventricle structure. TAPSE is measuring 1.6 cm.   A device wire is seen in the RV and RA.   The aortic valve is well visualized, appears moderately sclerotic.   Mild (1+) aortic regurgitation is present.   The aortic root and ascending aorta are dilated.   The mitral valve leaflets appear calcified. The leaflet opening is   normal.   Mild (1+) tricuspid valve regurgitation is present.   No evidence of pericardial effusion.    < end of copied text >                          < from: CT Angio Chest PE Protocol w/ IV Cont (09.15.22 @ 04:57) >    ACC: [id number] EXAM:  CT ANGIO CHEST PULM ART [facility]                        ACC: [id number] EXAM:  CT ABDOMEN AND PELVIS IC                          PROCEDURE DATE:  09/15/2022          INTERPRETATION:  Final report:    CLINICAL INFORMATION: 87 years  Male with fever. lactate 6. Shortness of   breath. Acute respiratory failure. Hypoxia. Wells score greater than 4.    COMPARISON: None.    CONTRAST/COMPLICATIONS:  IV Contrast: IV contrast documented in associated exam (accession   [id number]), Omnipaque 350 (accession [id number])  100 cc administered   0 cc   discarded  Oral Contrast: NONE  Complications: None reported at time of study completion    PROCEDURE:  CT Angiography of the Chest was performed followed by portal venous phase   imaging of theAbdomen and Pelvis.  Sagittal and coronal reformats were performed as well as 3D (MIP)   reconstructions.    FINDINGS:  CHEST:  LUNGS AND LARGE AIRWAYS: Patent central airways. ET tube tip 3.5 cm above   the scar. Dense bilateral lower lobe consolidation with air   bronchograms secondary to atelectasis or pneumonia. Right lower lobe   bronchial secretions.  PLEURA: Small left pleural effusion. No right pleural effusion.  VESSELS: Coronary artery calcifications and/or stents. Aortic   atherosclerosis without aneurysm. No pulmonary embolism identified.  HEART: Moderate cardiomegaly. Intracardiac pacemaker leads. No   pericardial effusion.  MEDIASTINUM AND LORENZO: No lymphadenopathy.  CHEST WALL AND LOWER NECK: Pacemaker pulse generator in the left chest   wall.    ABDOMEN AND PELVIS:  LIVER: Within normal limits.  BILE DUCTS: Pneumobilia in the left intrahepatic biliary radicals.  GALLBLADDER: Cholecystectomy.  SPLEEN: Within normal limits.  PANCREAS: Within normal limits.  ADRENALS: Withinnormal limits.  KIDNEYS/URETERS: 1.7 cm right midpole cyst. Other cysts and hypodensities   too small to characterize. Nonspecific bilateral perinephric fat   stranding. No hydroureteronephrosis. Symmetrical nephrograms.    BLADDER: Collapsed around Barnes catheter.  REPRODUCTIVE ORGANS: Enlarged prostate 6.4 x 6.0 cm.    BOWEL: No bowel obstruction. Appendix is not visualized. No evidence of   inflammation in the pericecal region.. Enteric catheter tip terminating   in the stomach. Descending andsigmoid colonic diverticulosis without   diverticulitis.  PERITONEUM: No ascites.  VESSELS: Atherosclerotic changes.  RETROPERITONEUM/LYMPH NODES: No lymphadenopathy.  ABDOMINAL WALL: Bilateral fat-containing inguinal hernias, larger on the   left.  BONES: Degenerative changes.    IMPRESSION:    No pulmonary embolism identified.    Bilateral lower lobe consolidation secondary to atelectasis and/or   pneumonia.    Cardiomegaly. Pacemaker.    Pneumobilia post cholecystectomy.    Enlarged prostate.    Colonic diverticulosis without diverticulitis.    < end of copied text >        Radiology: all available radiological tests reviewed    Advanced directives addressed: full resuscitation

## 2022-09-18 NOTE — PROGRESS NOTE ADULT - ASSESSMENT
86 y/o M w/ PMHx a fib on coumadin, severe cardiomyopathy, CAD s/p MI, HTN, HLD, CHF, s/p AICD, RA, gout, and leukemia (diagnosed 1 yr ago, has not received treatment) admitted on 9/15 for evaluation of severe respiratory distress, requiring emergent intubation, patient had upper respiratory symptoms, shortness of breath, headache, dizziness and cough; history per medical record as patient is intubated and sedated.     1. Patient admitted with respiratory failure and sepsis secondary to pneumonia; is immunocompromised given history of leukemia; at risk of severe infections; history of PPM and found to have coagulase negative staph in blood  - follow up cultures --- Haemophilus in sputum is covered by cefepime  - serial cbc and monitor temperature   - iv hydration and supportive care   - reviewed prior medical records to evaluate for resistant or atypical pathogens   - vent per icu  - will continue cefepime which will cover broadly for pneumonia and patient at risk for gram negative rods and other resistant bacteria , day #3  - will add vancomycin to treat resistant bacteria and staph especially given PPM, day #3  - consideration for echocardiogram to rule out vegetations--- noted  - check vancomycin trough prior to fourth dose   - tolerating antibiotics without rashes or side effects   - will check t cell subsets, if needs PCP prophylaxis, has only 1% CD4; will discuss with oncology, re PCP prophylaxis  2. other issues: per medicine

## 2022-09-19 LAB
ANION GAP SERPL CALC-SCNC: 5 MMOL/L — SIGNIFICANT CHANGE UP (ref 5–17)
BUN SERPL-MCNC: 37 MG/DL — HIGH (ref 7–23)
CALCIUM SERPL-MCNC: 9.6 MG/DL — SIGNIFICANT CHANGE UP (ref 8.5–10.1)
CHLORIDE SERPL-SCNC: 108 MMOL/L — SIGNIFICANT CHANGE UP (ref 96–108)
CO2 SERPL-SCNC: 28 MMOL/L — SIGNIFICANT CHANGE UP (ref 22–31)
CREAT SERPL-MCNC: 0.94 MG/DL — SIGNIFICANT CHANGE UP (ref 0.5–1.3)
EGFR: 79 ML/MIN/1.73M2 — SIGNIFICANT CHANGE UP
GLUCOSE SERPL-MCNC: 102 MG/DL — HIGH (ref 70–99)
HCT VFR BLD CALC: 41 % — SIGNIFICANT CHANGE UP (ref 39–50)
HGB BLD-MCNC: 12.9 G/DL — LOW (ref 13–17)
INR BLD: 2.58 RATIO — HIGH (ref 0.88–1.16)
MAGNESIUM SERPL-MCNC: 2.2 MG/DL — SIGNIFICANT CHANGE UP (ref 1.6–2.6)
MCHC RBC-ENTMCNC: 31.5 GM/DL — LOW (ref 32–36)
MCHC RBC-ENTMCNC: 31.9 PG — SIGNIFICANT CHANGE UP (ref 27–34)
MCV RBC AUTO: 101.2 FL — HIGH (ref 80–100)
PHOSPHATE SERPL-MCNC: 2 MG/DL — LOW (ref 2.5–4.5)
PLATELET # BLD AUTO: 139 K/UL — LOW (ref 150–400)
POTASSIUM SERPL-MCNC: 4.4 MMOL/L — SIGNIFICANT CHANGE UP (ref 3.5–5.3)
POTASSIUM SERPL-SCNC: 4.4 MMOL/L — SIGNIFICANT CHANGE UP (ref 3.5–5.3)
PROTHROM AB SERPL-ACNC: 30.2 SEC — HIGH (ref 10.5–13.4)
RBC # BLD: 4.05 M/UL — LOW (ref 4.2–5.8)
RBC # FLD: 14.2 % — SIGNIFICANT CHANGE UP (ref 10.3–14.5)
SODIUM SERPL-SCNC: 141 MMOL/L — SIGNIFICANT CHANGE UP (ref 135–145)
VANCOMYCIN TROUGH SERPL-MCNC: 10.8 UG/ML — SIGNIFICANT CHANGE UP (ref 10–20)
WBC # BLD: 99.95 K/UL — CRITICAL HIGH (ref 3.8–10.5)
WBC # FLD AUTO: 99.95 K/UL — CRITICAL HIGH (ref 3.8–10.5)

## 2022-09-19 PROCEDURE — 99291 CRITICAL CARE FIRST HOUR: CPT

## 2022-09-19 RX ORDER — UBIDECARENONE 100 MG
1 CAPSULE ORAL
Qty: 0 | Refills: 0 | DISCHARGE

## 2022-09-19 RX ORDER — GLUCOSAMINE/CHONDROITIN/C/MANG 500-400 MG
1200 CAPSULE ORAL
Qty: 0 | Refills: 0 | DISCHARGE

## 2022-09-19 RX ORDER — DORZOLAMIDE HYDROCHLORIDE TIMOLOL MALEATE 20; 5 MG/ML; MG/ML
1 SOLUTION/ DROPS OPHTHALMIC
Qty: 0 | Refills: 0 | DISCHARGE

## 2022-09-19 RX ORDER — FUROSEMIDE 40 MG
20 TABLET ORAL ONCE
Refills: 0 | Status: COMPLETED | OUTPATIENT
Start: 2022-09-19 | End: 2022-09-19

## 2022-09-19 RX ORDER — METOPROLOL TARTRATE 50 MG
25 TABLET ORAL
Refills: 0 | Status: DISCONTINUED | OUTPATIENT
Start: 2022-09-19 | End: 2022-09-25

## 2022-09-19 RX ORDER — LISINOPRIL/HYDROCHLOROTHIAZIDE 10-12.5 MG
1 TABLET ORAL
Qty: 0 | Refills: 0 | DISCHARGE

## 2022-09-19 RX ORDER — ASPIRIN/ACETAMINOPHEN/CAFFEINE 250-250-65
1 TABLET ORAL
Qty: 0 | Refills: 0 | DISCHARGE

## 2022-09-19 RX ORDER — WARFARIN SODIUM 2.5 MG/1
3 TABLET ORAL ONCE
Refills: 0 | Status: COMPLETED | OUTPATIENT
Start: 2022-09-19 | End: 2022-09-19

## 2022-09-19 RX ORDER — SODIUM,POTASSIUM PHOSPHATES 278-250MG
2 POWDER IN PACKET (EA) ORAL
Refills: 0 | Status: COMPLETED | OUTPATIENT
Start: 2022-09-19 | End: 2022-09-21

## 2022-09-19 RX ADMIN — Medication 250 MILLIGRAM(S): at 10:30

## 2022-09-19 RX ADMIN — CEFEPIME 1000 MILLIGRAM(S): 1 INJECTION, POWDER, FOR SOLUTION INTRAMUSCULAR; INTRAVENOUS at 22:45

## 2022-09-19 RX ADMIN — Medication 1 SPRAY(S): at 22:03

## 2022-09-19 RX ADMIN — Medication 650 MILLIGRAM(S): at 01:39

## 2022-09-19 RX ADMIN — WARFARIN SODIUM 3 MILLIGRAM(S): 2.5 TABLET ORAL at 22:03

## 2022-09-19 RX ADMIN — Medication 25 MILLIGRAM(S): at 10:08

## 2022-09-19 RX ADMIN — DORZOLAMIDE HYDROCHLORIDE TIMOLOL MALEATE 1 DROP(S): 20; 5 SOLUTION/ DROPS OPHTHALMIC at 22:46

## 2022-09-19 RX ADMIN — PANTOPRAZOLE SODIUM 40 MILLIGRAM(S): 20 TABLET, DELAYED RELEASE ORAL at 10:07

## 2022-09-19 RX ADMIN — DORZOLAMIDE HYDROCHLORIDE TIMOLOL MALEATE 1 DROP(S): 20; 5 SOLUTION/ DROPS OPHTHALMIC at 10:35

## 2022-09-19 RX ADMIN — Medication 25 MILLIGRAM(S): at 22:45

## 2022-09-19 RX ADMIN — Medication 25 MILLIGRAM(S): at 22:03

## 2022-09-19 RX ADMIN — CEFEPIME 1000 MILLIGRAM(S): 1 INJECTION, POWDER, FOR SOLUTION INTRAMUSCULAR; INTRAVENOUS at 10:08

## 2022-09-19 RX ADMIN — SENNA PLUS 2 TABLET(S): 8.6 TABLET ORAL at 22:46

## 2022-09-19 RX ADMIN — Medication 20 MILLIGRAM(S): at 12:50

## 2022-09-19 NOTE — SWALLOW BEDSIDE ASSESSMENT ADULT - ORAL PREPARATORY PHASE
Pulmonary endurance when feeding was variably reduced. His labial grading on utensils was functional when alert/calm/accepting food. No dribbling was noted.

## 2022-09-19 NOTE — PROGRESS NOTE ADULT - ASSESSMENT
Acute hypoxic resp failure   Septic shock   Bibasilar gram negative pneumonia   CNS bacteremia  Prerenal ADRIANA     PMH CLL (no tx), CAD, CHF, s/p ICD, chronic A.fib on warfarin, cholecystectomy 10-20 yrs ago       Plan:   ICU  Stop NIV or use as needed  Give 20mg Lasix again  Replace phos  PT, OOB   Swallow eval  Wean steroid  Dose Coumadin today  Cefepime, vanc  Sputum cx with H. flu, BCx with CNS, f/u repeat BCx 9/18  DVT ppx - on warfarin     Lori Jennings (EMT) updated [phone]

## 2022-09-19 NOTE — SWALLOW BEDSIDE ASSESSMENT ADULT - SWALLOW EVAL: PROGNOSIS
2) On encounter, the pt was intermittently SOB. He was alert and interactive but somewhat anxious/variably distractible at times. The pt was able to verbalize during communicative probes. At these times, pt's motor speech abilities were felt to be grossly functional and his verbalizations were linguistically intact. Of note is that his breath support for phonation was somewhat reduced at times in setting of pneumonia/hypoxia warranting transient intubation. With that being stated, his voice was functionally audible, breath support for phonation should improve when he recovers from PNA and he was able to verbalize his needs when in an alert/calm state.

## 2022-09-19 NOTE — SWALLOW BEDSIDE ASSESSMENT ADULT - ORAL PHASE
Bolus formation/transfer were mechanically functional for age and he cleared oral debris on his own after age acceptable piecemeal deglutition, when he was alert/calm.

## 2022-09-19 NOTE — PHYSICAL THERAPY INITIAL EVALUATION ADULT - MANUAL MUSCLE TESTING RESULTS, REHAB EVAL
Right shoulder 2-2+/5, elbow and wrist 3-3+/5. Left UE 3+-4/5 throughout grossly. Bilateral LE strength 3-3+/5.

## 2022-09-19 NOTE — PHYSICAL THERAPY INITIAL EVALUATION ADULT - LEVEL OF INDEPENDENCE: SUPINE/SIT, REHAB EVAL
Did not attempt OOB at this time. Pt with O2 sat 94-96% on 4 L/min NC but pt exhibiting mod SOB during eval. Will attempt to increase mobility on 9/20/2022 if possible.

## 2022-09-19 NOTE — PROCEDURE NOTE - ADDITIONAL PROCEDURE DETAILS
Frequent episodes of NSVT (10 since 9/18/22) and 2 episodes of VT1 with no therapies delivered. Consider medical management to suppress ventricular ectopy burden. Frequent episodes of NSVT (10 since 9/18/22) and 2 episodes of VT1 with no therapies delivered. Consider medical management to suppress ventricular ectopy burden. Patient previously on Lopressor, held on admission due to severe sepsis, and should be resumed now as BP tolerates.

## 2022-09-19 NOTE — SWALLOW BEDSIDE ASSESSMENT ADULT - SWALLOW EVAL: CRITERIA FOR SKILLED INTERVENTION MET
DO NOT FEEL THAT ACUTE SPEECH PATHOLOGY FOLLOW UP WOULD CHANGE CLINICAL MANAGEMENT/OUTCOME WHILE IN HOSPITAL SETTING. NO NAVNEET PRIMARY MOTOR SPEECH OR PRIMARY LINGUISTIC PATHOLOGY WERE EVIDENT, NO NAVNEET ORAL MOTOR FOCALITY WAS EVIDENT AND PHARYNGEAL INTEGRITY SUBJECTIVELY APPEARED TO BE GROSSLY FUNCTIONAL FOR AGE. HIS OROPHARYNGEAL SWALLOWING INTEGRITY DOES APPEAR TO BE FUNCTIONAL FOR AGE WHEN ALERT/BREATHING COMFORTABLY AND HE WAS ABLE TO VERBALIZE INTENTS WHEN IN AN ALERT/CALM STATE. GIVEN ABOVE, THIS SERVICE WILL NOT ACTIVELY FOLLOW. RECONSULT PRN SHOULD STATUS CHANGE AND CONDITION WARRANT.

## 2022-09-19 NOTE — PHYSICAL THERAPY INITIAL EVALUATION ADULT - PERTINENT HX OF CURRENT PROBLEM, REHAB EVAL
Pt admitted to  secondary to AHRF. Pt intubated and then extubated on 9/17/2022. WBC- 99.95. INR- 2.58.

## 2022-09-19 NOTE — PROGRESS NOTE ADULT - SUBJECTIVE AND OBJECTIVE BOX
Date of service: 09-19-22 @ 15:08      Patient lying in bed, awake, alert; wants to eat    ROS unable to obtain secondary to patient medical condition     MEDICATIONS  (STANDING):  cefepime  Injectable. 1000 milliGRAM(s) IV Push every 12 hours  dorzolamide 2%/timolol 0.5% Ophthalmic Solution 1 Drop(s) Both EYES two times a day  hydrocortisone sodium succinate Injectable 25 milliGRAM(s) IV Push every 12 hours  pantoprazole  Injectable 40 milliGRAM(s) IV Push daily  polyethylene glycol 3350 17 Gram(s) Oral daily  potassium phosphate / sodium phosphate Powder (PHOS-NaK) 2 Packet(s) Oral two times a day  senna 2 Tablet(s) Oral at bedtime  vancomycin  IVPB 750 milliGRAM(s) IV Intermittent every 24 hours  warfarin 3 milliGRAM(s) Oral once    MEDICATIONS  (PRN):  acetaminophen     Tablet .. 650 milliGRAM(s) Oral every 6 hours PRN Temp greater or equal to 38C (100.4F)  sodium chloride 0.65% Nasal 1 Spray(s) Both Nostrils four times a day PRN Nasal Congestion      Vital Signs Last 24 Hrs  T(C): 37.5 (19 Sep 2022 04:00), Max: 38.1 (19 Sep 2022 01:00)  T(F): 99.5 (19 Sep 2022 04:00), Max: 100.5 (19 Sep 2022 01:00)  HR: 110 (19 Sep 2022 15:00) (69 - 114)  BP: 149/57 (19 Sep 2022 14:00) (123/51 - 161/107)  BP(mean): 81 (19 Sep 2022 14:00) (68 - 119)  RR: 23 (19 Sep 2022 15:00) (15 - 27)  SpO2: 94% (19 Sep 2022 15:00) (93% - 99%)    Parameters below as of 19 Sep 2022 12:00  Patient On (Oxygen Delivery Method): nasal cannula  O2 Flow (L/min): 4          Physical Exam:          Constitutional: frail looking  HEENT: NC/AT  Neck: supple; thyroid not palpable  Back: no tenderness  Respiratory: respiratory effort normal; scattered coarse breath sounds  Cardiovascular: S1S2 regular, no murmurs  Abdomen: soft, not tender, not distended, positive BS; no liver or spleen organomegaly  Genitourinary: no suprapubic tenderness  Musculoskeletal: no muscle tenderness, no joint swelling or tenderness  Neurological/ Psychiatric: confused  Skin: no rashes; no palpable lesions    Labs: all available labs reviewed                 Labs:                        12.9   99.95 )-----------( 139      ( 19 Sep 2022 05:29 )             41.0     09-19    141  |  108  |  37<H>  ----------------------------<  102<H>  4.4   |  28  |  0.94    Ca    9.6      19 Sep 2022 05:29  Phos  2.0     09-19  Mg     2.2     09-19         Vancomycin Level, Trough: 10.8 ug/mL (09-19 @ 09:59)      Cultures:       Culture - Blood (collected 09-18-22 @ 05:49)  Source: .Blood None  Preliminary Report (09-19-22 @ 10:01):    No growth to date.    Culture - Blood (collected 09-18-22 @ 05:49)  Source: .Blood None  Preliminary Report (09-19-22 @ 10:01):    No growth to date.    Culture - Urine (collected 09-15-22 @ 12:00)  Source: Catheterized Catheterized  Final Report (09-16-22 @ 16:57):    No growth    Culture - Legionella (collected 09-15-22 @ 12:00)  Source: .Legionella None  Preliminary Report (09-17-22 @ 21:39):    No Legionella species isolated    Culture - Sputum (collected 09-15-22 @ 08:00)  Source: .Sputum Sputum  Gram Stain (09-15-22 @ 15:36):    Rare Gram Negative Rods    Rare polymorphonuclear leukocytes  Final Report (09-17-22 @ 09:11):    Few Haemophilus influenzae "Susceptibilities not performed"    Normal Respiratory Maryanne present    Culture - Urine (collected 09-15-22 @ 06:30)  Source: Clean Catch Clean Catch (Midstream)  Final Report (09-16-22 @ 07:22):    <10,000 CFU/mL Normal Urogenital Maryanne    Culture - Blood (collected 09-15-22 @ 02:23)  Source: .Blood Blood-Peripheral  Preliminary Report (09-16-22 @ 09:01):    No growth to date.    Culture - Blood (collected 09-15-22 @ 02:23)  Source: .Blood Blood-Peripheral  Gram Stain (09-16-22 @ 05:49):    Growth in aerobic bottle: Gram Positive Cocci in Clusters  Final Report (09-17-22 @ 12:26):    Growth in aerobic bottle: Staphylococcus cohnii    Coag Negative Staphylococcus    Single set isolate, possible contaminant. Contact    Microbiology if susceptibility testing clinically    indicated.    ***Blood Panel PCR results on this specimen are available    approximately 3 hours after the Gram stain result.***    Gram stain, PCR, and/or culture results may not always    correspond due to difference in methodologies.    ************************************************************    This PCR assay was performed by multiplex PCR. This    Assay tests for 66 bacterial and resistance gene targets.    Please refer to the [facility] test directory    at [email or web address] for details.  Organism: Blood Culture PCR (09-17-22 @ 12:26)  Organism: Blood Culture PCR (09-17-22 @ 12:26)      -  Coagulase negative Staphylococcus: Detec      Method Type: PCR                Full T Cell Subset (09.16.22 @ 12:43)    CD19 %: 97 %    CD3 %: 1 %    CD4 %: 1 %    CD8 %: <1 %    4/8 Ratio: 1.42 RATIO    ABS [id number]: 869 /uL    ABS CD19: 45168 /uL    ABS CD3: 1131 /uL    ABS CD4: 561 /uL    ABS CD8: 395 /uL    [id number] %: 1 %                      < from: TTE Echo Complete w/o Contrast w/ Doppler (09.15.22 @ 10:22) >     Impression     Summary     The left ventricle is normal in size, wall thickness, wall motion and   contractility.   Estimated left ventricular ejection fraction is 50-55 %.   The left atrium is mildly dilated.   The right atrium appears mildly dilated.   Normalappearing right ventricle structure. TAPSE is measuring 1.6 cm.   A device wire is seen in the RV and RA.   The aortic valve is well visualized, appears moderately sclerotic.   Mild (1+) aortic regurgitation is present.   The aortic root and ascending aorta are dilated.   The mitral valve leaflets appear calcified. The leaflet opening is   normal.   Mild (1+) tricuspid valve regurgitation is present.   No evidence of pericardial effusion.    < end of copied text >                          < from: CT Angio Chest PE Protocol w/ IV Cont (09.15.22 @ 04:57) >    ACC: [id number] EXAM:  CT ANGIO CHEST PULM ART [facility]                        ACC: [id number] EXAM:  CT ABDOMEN AND PELVIS IC                          PROCEDURE DATE:  09/15/2022          INTERPRETATION:  Final report:    CLINICAL INFORMATION: 87 years  Male with fever. lactate 6. Shortness of   breath. Acute respiratory failure. Hypoxia. Wells score greater than 4.    COMPARISON: None.    CONTRAST/COMPLICATIONS:  IV Contrast: IV contrast documented in associated exam (accession   [id number]), Omnipaque 350 (accession [id number])  100 cc administered   0 cc   discarded  Oral Contrast: NONE  Complications: None reported at time of study completion    PROCEDURE:  CT Angiography of the Chest was performed followed by portal venous phase   imaging of theAbdomen and Pelvis.  Sagittal and coronal reformats were performed as well as 3D (MIP)   reconstructions.    FINDINGS:  CHEST:  LUNGS AND LARGE AIRWAYS: Patent central airways. ET tube tip 3.5 cm above   the scar. Dense bilateral lower lobe consolidation with air   bronchograms secondary to atelectasis or pneumonia. Right lower lobe   bronchial secretions.  PLEURA: Small left pleural effusion. No right pleural effusion.  VESSELS: Coronary artery calcifications and/or stents. Aortic   atherosclerosis without aneurysm. No pulmonary embolism identified.  HEART: Moderate cardiomegaly. Intracardiac pacemaker leads. No   pericardial effusion.  MEDIASTINUM AND LORENZO: No lymphadenopathy.  CHEST WALL AND LOWER NECK: Pacemaker pulse generator in the left chest   wall.    ABDOMEN AND PELVIS:  LIVER: Within normal limits.  BILE DUCTS: Pneumobilia in the left intrahepatic biliary radicals.  GALLBLADDER: Cholecystectomy.  SPLEEN: Within normal limits.  PANCREAS: Within normal limits.  ADRENALS: Withinnormal limits.  KIDNEYS/URETERS: 1.7 cm right midpole cyst. Other cysts and hypodensities   too small to characterize. Nonspecific bilateral perinephric fat   stranding. No hydroureteronephrosis. Symmetrical nephrograms.    BLADDER: Collapsed around Barnes catheter.  REPRODUCTIVE ORGANS: Enlarged prostate 6.4 x 6.0 cm.    BOWEL: No bowel obstruction. Appendix is not visualized. No evidence of   inflammation in the pericecal region.. Enteric catheter tip terminating   in the stomach. Descending andsigmoid colonic diverticulosis without   diverticulitis.  PERITONEUM: No ascites.  VESSELS: Atherosclerotic changes.  RETROPERITONEUM/LYMPH NODES: No lymphadenopathy.  ABDOMINAL WALL: Bilateral fat-containing inguinal hernias, larger on the   left.  BONES: Degenerative changes.    IMPRESSION:    No pulmonary embolism identified.    Bilateral lower lobe consolidation secondary to atelectasis and/or   pneumonia.    Cardiomegaly. Pacemaker.    Pneumobilia post cholecystectomy.    Enlarged prostate.    Colonic diverticulosis without diverticulitis.    < end of copied text >        Radiology: all available radiological tests reviewed    Advanced directives addressed: full resuscitation

## 2022-09-19 NOTE — SWALLOW BEDSIDE ASSESSMENT ADULT - SWALLOW EVAL: DIAGNOSIS
1) Feeding integrity is hindered by intermittent dyspnea and variably decreased cardiopulmonary endurance in setting of sepsis/pneumonia/hypoxia warranting transient mechanical ventilation which is atop know cardiac dz. With that being stated, the pt exhibited grossly functional Oropharyngeal Swallowing abilities for age when in an alert state while breathing comfortably(not dyspneic). NO behavioral aspiration signs exhibited. Odynophagia denied and no change in O2 sats noted.

## 2022-09-19 NOTE — PROGRESS NOTE ADULT - SUBJECTIVE AND OBJECTIVE BOX
HPI:  86 y/o M w/ PMHx a fib on coumadin, severe cardiomyopathy, CAD s/p MI, HTN, HLD, CHF, s/p AICD, RA, gout, and leukemia (diagnosed 1 yr ago, has not received treatment) who presented to ER in severe respiratory distress requiring emergent intubation. Per pt's daughter, Jerri, she and her daughter whom pt lives w/ have been sick w/ URI symptoms (tested negative for COVID-19).  Onday PTA the pt began to c/o headache, dizziness, and cough. Overnight, pt developed shortness of breath prompting EMS call. Labs revealed leukocytosis (WBC count 172K), ADRIANA (BUN/Cr 22/1.49), and lactic acidosis (lactate 6). In the ER, pt was administered 2 L IVF, cefepime, vancomycin, and ofirmev. Propofol infusion was initiated for sedation. Pt admitted to ICU for management of acute hypoxic respiratory failure.    9/19: He remains confused, on 50% VM O2         PAST MEDICAL & SURGICAL HISTORY:  Afib      HTN (hypertension)      Cholesterol serum elevated      BPH (Benign Prostatic Hyperplasia)      CA - skin cancer      History of ankle fracture      Intolerance, drug  INTOLERANCE  TO STATIN      Hearing loss      Myocardial infarction      Gout      Osteoarthritis      Glaucoma      Rheumatoid arthritis      Hx of appendectomy      Hx of tonsillectomy      S/P TKR (total knee replacement)  bilateral      H/O coronary angiogram  x 2      H/O hernia repair  x  4 surgeries      S/P laser cataract surgery  b/l      H/O shoulder surgery  right      S/P ORIF (open reduction internal fixation) fracture  left ankle          FAMILY HISTORY:  Family history of other heart disease (Father)    Family history of cardiac arrhythmia (Child)        Social Hx:    Allergies    penicillins (Unknown)    Intolerances            ICU Vital Signs Last 24 Hrs  T(C): 37.5 (19 Sep 2022 04:00), Max: 38.1 (19 Sep 2022 01:00)  T(F): 99.5 (19 Sep 2022 04:00), Max: 100.5 (19 Sep 2022 01:00)  HR: 88 (19 Sep 2022 12:00) (69 - 114)  BP: 154/86 (19 Sep 2022 12:00) (123/51 - 161/107)  BP(mean): 102 (19 Sep 2022 12:00) (68 - 119)  ABP: --  ABP(mean): --  RR: 23 (19 Sep 2022 12:00) (15 - 27)  SpO2: 96% (19 Sep 2022 12:00) (93% - 99%)    O2 Parameters below as of 19 Sep 2022 12:00  Patient On (Oxygen Delivery Method): nasal cannula  O2 Flow (L/min): 4              I&O's Summary    18 Sep 2022 07:01  -  19 Sep 2022 07:00  --------------------------------------------------------  IN: 250 mL / OUT: 2250 mL / NET: -2000 mL    19 Sep 2022 07:01  -  19 Sep 2022 12:27  --------------------------------------------------------  IN: 250 mL / OUT: 60 mL / NET: 190 mL                              12.9   99.95 )-----------( 139      ( 19 Sep 2022 05:29 )             41.0       09-19    141  |  108  |  37<H>  ----------------------------<  102<H>  4.4   |  28  |  0.94    Ca    9.6      19 Sep 2022 05:29  Phos  2.0     09-19  Mg     2.2     09-19                      MEDICATIONS  (STANDING):  cefepime  Injectable. 1000 milliGRAM(s) IV Push every 12 hours  dorzolamide 2%/timolol 0.5% Ophthalmic Solution 1 Drop(s) Both EYES two times a day  hydrocortisone sodium succinate Injectable 25 milliGRAM(s) IV Push every 12 hours  pantoprazole  Injectable 40 milliGRAM(s) IV Push daily  polyethylene glycol 3350 17 Gram(s) Oral daily  potassium phosphate / sodium phosphate Powder (PHOS-NaK) 2 Packet(s) Oral two times a day  senna 2 Tablet(s) Oral at bedtime  vancomycin  IVPB 750 milliGRAM(s) IV Intermittent every 24 hours  warfarin 3 milliGRAM(s) Oral once    MEDICATIONS  (PRN):  acetaminophen     Tablet .. 650 milliGRAM(s) Oral every 6 hours PRN Temp greater or equal to 38C (100.4F)  sodium chloride 0.65% Nasal 1 Spray(s) Both Nostrils four times a day PRN Nasal Congestion      DVT Prophylaxis:    Advanced Directives:  Discussed with:    Visit Information:    ** Time is exclusive of billed procedures and/or teaching and/or routine family updates.

## 2022-09-19 NOTE — PROGRESS NOTE ADULT - ASSESSMENT
86 y/o M w/ PMHx a fib on coumadin, severe cardiomyopathy, CAD s/p MI, HTN, HLD, CHF, s/p AICD, RA, gout, and leukemia (diagnosed 1 yr ago, has not received treatment) admitted on 9/15 for evaluation of severe respiratory distress, requiring emergent intubation, patient had upper respiratory symptoms, shortness of breath, headache, dizziness and cough; history per medical record as patient is intubated and sedated.     1. Patient admitted with respiratory failure and sepsis secondary to pneumonia; is immunocompromised given history of leukemia; at risk of severe infections; history of PPM and found to have coagulase negative staph in blood  - follow up cultures --- Haemophilus in sputum is covered by cefepime  - serial cbc and monitor temperature   - iv hydration and supportive care   - reviewed prior medical records to evaluate for resistant or atypical pathogens   - vent per icu  - will continue cefepime which will cover broadly for pneumonia and patient at risk for gram negative rods and other resistant bacteria , day #4  - will add vancomycin to treat resistant bacteria and staph especially given PPM, day #4  - consideration for echocardiogram to rule out vegetations--- noted  - check vancomycin trough prior to fourth dose --- slightly low but will maintain current dose  - tolerating antibiotics without rashes or side effects   - will check t cell subsets, if needs PCP prophylaxis, has only 1% CD4; will discuss with oncology, re PCP prophylaxis  2. other issues: per medicine

## 2022-09-19 NOTE — PHYSICAL THERAPY INITIAL EVALUATION ADULT - GENERAL OBSERVATIONS, REHAB EVAL
Pt found supine in bed with O2 NC 4 L/min, ICU monitors, IV, and bed alarm activated. Pt with no c/o pain. O2 sat 94-96% on O2 NX 4/min, but visibly with min-mod SOB.

## 2022-09-19 NOTE — SWALLOW BEDSIDE ASSESSMENT ADULT - SWALLOW EVAL: FEEDING ASSISTANCE
PT FED SELF WHEN ALERT/CALM. NOTE THAT PT WAS VARIABLY ANXIOUS AND DYSPNEIC AT WHICH TIMES PO TRIALS WERE HELD.

## 2022-09-19 NOTE — SWALLOW BEDSIDE ASSESSMENT ADULT - MODE OF PRESENTATION
Pt fed self at times and was assisted to feed at other times. Note that pt was anxious/dyspneic at times. PO trials were on hold when he was very anxious/SOB.

## 2022-09-19 NOTE — SWALLOW BEDSIDE ASSESSMENT ADULT - ADDITIONAL RECOMMENDATIONS
INTENSIVIST, ID AND NUTRITION F/U. PT SHOULD EAT ONLY WHEN HE IS ALERT/CALM AND BREATHING COMFORTABLY WHICH MAY NOT ALWAYS BE THE CASE.

## 2022-09-19 NOTE — SWALLOW BEDSIDE ASSESSMENT ADULT - COMMENTS
Pt admitted to  with SOB. Hospital course is notable for ADRIANA and sepsis/pneumonia/hypoxia warranting transient mechanical ventilation. This profile is superimposed upon a history of CAD s/p MI, CHF, A-Fib, pacemaker/AICD placements, HTN, HTN, RA, OA, gout, glaucoma, BPH, Leukemia NOS, skin cancer removal, left ankle ORIF, previous appy and past tonsillectomy. See below for additional prior medical information and past surgical history.

## 2022-09-19 NOTE — SWALLOW BEDSIDE ASSESSMENT ADULT - SWALLOW EVAL: RECOMMENDED FEEDING/EATING TECHNIQUES
pt should be breathing comfortably without overt dyspnea when feeding/position upright (90 degrees)/small sips/bites

## 2022-09-19 NOTE — PHYSICAL THERAPY INITIAL EVALUATION ADULT - PLANNED THERAPY INTERVENTIONS, PT EVAL
Increase mobility when and if possible. Eval. Pt left in bed with all observation section equipment/material intact and bed alarm activated. Pt with no c/o pain. Will cont to follow pt and increase as tolerated./bed mobility training/gait training/ROM/strengthening/transfer training

## 2022-09-19 NOTE — SWALLOW BEDSIDE ASSESSMENT ADULT - SLP GENERAL OBSERVATIONS
On encounter, the pt was intermittently SOB. He was alert and interactive but somewhat anxious/variably distractible at times. The pt was able to verbalize during communicative probes. At these times, pt's motor speech abilities were felt to be grossly functional and his verbalizations were linguistically intact. Of note is that his breath support for phonation was somewhat reduced at times in setting of pneumonia/hypoxia warranting transient intubation. With that being stated, his voice was functionally audible, breath support for phonation should improve when he recovers from PNA and he was able to verbalize his needs when in an alert/calm state.

## 2022-09-19 NOTE — PHYSICAL THERAPY INITIAL EVALUATION ADULT - ACTIVE RANGE OF MOTION EXAMINATION, REHAB EVAL
Right shoulder AAROM/PROM shoulder flex ~ 90 degrees all other WFL. Bilateral hip flex ~ 90 degrees and bilateral DF neutral./Left UE Active ROM was WFL (within functional limits)/Right UE Active ROM was WFL (within functional limits)/Left LE Active ROM was WFL (within functional limits)/Right LE Active ROM was WFL (within functional limits)

## 2022-09-20 LAB
ANION GAP SERPL CALC-SCNC: 3 MMOL/L — LOW (ref 5–17)
BUN SERPL-MCNC: 42 MG/DL — HIGH (ref 7–23)
CALCIUM SERPL-MCNC: 10 MG/DL — SIGNIFICANT CHANGE UP (ref 8.5–10.1)
CHLORIDE SERPL-SCNC: 110 MMOL/L — HIGH (ref 96–108)
CO2 SERPL-SCNC: 29 MMOL/L — SIGNIFICANT CHANGE UP (ref 22–31)
CREAT SERPL-MCNC: 0.93 MG/DL — SIGNIFICANT CHANGE UP (ref 0.5–1.3)
CULTURE RESULTS: SIGNIFICANT CHANGE UP
EGFR: 80 ML/MIN/1.73M2 — SIGNIFICANT CHANGE UP
GLUCOSE SERPL-MCNC: 109 MG/DL — HIGH (ref 70–99)
HCT VFR BLD CALC: 41.7 % — SIGNIFICANT CHANGE UP (ref 39–50)
HGB BLD-MCNC: 13.4 G/DL — SIGNIFICANT CHANGE UP (ref 13–17)
INR BLD: 2.7 RATIO — HIGH (ref 0.88–1.16)
MAGNESIUM SERPL-MCNC: 2.2 MG/DL — SIGNIFICANT CHANGE UP (ref 1.6–2.6)
MCHC RBC-ENTMCNC: 32.1 GM/DL — SIGNIFICANT CHANGE UP (ref 32–36)
MCHC RBC-ENTMCNC: 33 PG — SIGNIFICANT CHANGE UP (ref 27–34)
MCV RBC AUTO: 102.7 FL — HIGH (ref 80–100)
PHOSPHATE SERPL-MCNC: 2.1 MG/DL — LOW (ref 2.5–4.5)
PLATELET # BLD AUTO: 132 K/UL — LOW (ref 150–400)
POTASSIUM SERPL-MCNC: 4.2 MMOL/L — SIGNIFICANT CHANGE UP (ref 3.5–5.3)
POTASSIUM SERPL-SCNC: 4.2 MMOL/L — SIGNIFICANT CHANGE UP (ref 3.5–5.3)
PROTHROM AB SERPL-ACNC: 31.6 SEC — HIGH (ref 10.5–13.4)
RBC # BLD: 4.06 M/UL — LOW (ref 4.2–5.8)
RBC # FLD: 14.3 % — SIGNIFICANT CHANGE UP (ref 10.3–14.5)
SODIUM SERPL-SCNC: 142 MMOL/L — SIGNIFICANT CHANGE UP (ref 135–145)
SPECIMEN SOURCE: SIGNIFICANT CHANGE UP
VISC SER: 1.4 — SIGNIFICANT CHANGE UP (ref 1.4–2.1)
WBC # BLD: 104.06 K/UL — CRITICAL HIGH (ref 3.8–10.5)
WBC # FLD AUTO: 104.06 K/UL — CRITICAL HIGH (ref 3.8–10.5)

## 2022-09-20 PROCEDURE — 99291 CRITICAL CARE FIRST HOUR: CPT

## 2022-09-20 RX ORDER — FUROSEMIDE 40 MG
40 TABLET ORAL EVERY 12 HOURS
Refills: 0 | Status: DISCONTINUED | OUTPATIENT
Start: 2022-09-20 | End: 2022-09-22

## 2022-09-20 RX ORDER — SODIUM,POTASSIUM PHOSPHATES 278-250MG
2 POWDER IN PACKET (EA) ORAL
Refills: 0 | Status: COMPLETED | OUTPATIENT
Start: 2022-09-20 | End: 2022-09-21

## 2022-09-20 RX ORDER — FUROSEMIDE 40 MG
40 TABLET ORAL ONCE
Refills: 0 | Status: COMPLETED | OUTPATIENT
Start: 2022-09-20 | End: 2022-09-20

## 2022-09-20 RX ORDER — WARFARIN SODIUM 2.5 MG/1
3 TABLET ORAL ONCE
Refills: 0 | Status: COMPLETED | OUTPATIENT
Start: 2022-09-20 | End: 2022-09-20

## 2022-09-20 RX ADMIN — Medication 250 MILLIGRAM(S): at 09:57

## 2022-09-20 RX ADMIN — Medication 2 PACKET(S): at 21:26

## 2022-09-20 RX ADMIN — Medication 25 MILLIGRAM(S): at 21:26

## 2022-09-20 RX ADMIN — Medication 40 MILLIGRAM(S): at 03:35

## 2022-09-20 RX ADMIN — CEFEPIME 1000 MILLIGRAM(S): 1 INJECTION, POWDER, FOR SOLUTION INTRAMUSCULAR; INTRAVENOUS at 21:27

## 2022-09-20 RX ADMIN — Medication 2 PACKET(S): at 17:19

## 2022-09-20 RX ADMIN — PANTOPRAZOLE SODIUM 40 MILLIGRAM(S): 20 TABLET, DELAYED RELEASE ORAL at 09:57

## 2022-09-20 RX ADMIN — Medication 25 MILLIGRAM(S): at 09:58

## 2022-09-20 RX ADMIN — Medication 2 PACKET(S): at 05:46

## 2022-09-20 RX ADMIN — DORZOLAMIDE HYDROCHLORIDE TIMOLOL MALEATE 1 DROP(S): 20; 5 SOLUTION/ DROPS OPHTHALMIC at 21:27

## 2022-09-20 RX ADMIN — Medication 2 PACKET(S): at 09:58

## 2022-09-20 RX ADMIN — Medication 40 MILLIGRAM(S): at 10:53

## 2022-09-20 RX ADMIN — CEFEPIME 1000 MILLIGRAM(S): 1 INJECTION, POWDER, FOR SOLUTION INTRAMUSCULAR; INTRAVENOUS at 09:57

## 2022-09-20 RX ADMIN — DORZOLAMIDE HYDROCHLORIDE TIMOLOL MALEATE 1 DROP(S): 20; 5 SOLUTION/ DROPS OPHTHALMIC at 10:00

## 2022-09-20 RX ADMIN — WARFARIN SODIUM 3 MILLIGRAM(S): 2.5 TABLET ORAL at 21:26

## 2022-09-20 RX ADMIN — POLYETHYLENE GLYCOL 3350 17 GRAM(S): 17 POWDER, FOR SOLUTION ORAL at 09:58

## 2022-09-20 RX ADMIN — Medication 40 MILLIGRAM(S): at 17:19

## 2022-09-20 NOTE — PROGRESS NOTE ADULT - SUBJECTIVE AND OBJECTIVE BOX
HPI:  86 y/o M w/ PMHx a fib on coumadin, severe cardiomyopathy, CAD s/p MI, HTN, HLD, CHF, s/p AICD, RA, gout, and leukemia (diagnosed 1 yr ago, has not received treatment) who presented to ER in severe respiratory distress requiring emergent intubation. Per pt's daughter, Jerri, she and her daughter whom pt lives w/ have been sick w/ URI symptoms (tested negative for COVID-19).  Onday PTA the pt began to c/o headache, dizziness, and cough. Overnight, pt developed shortness of breath prompting EMS call. Labs revealed leukocytosis (WBC count 172K), ADRIANA (BUN/Cr 22/1.49), and lactic acidosis (lactate 6). In the ER, pt was administered 2 L IVF, cefepime, vancomycin, and ofirmev. Propofol infusion was initiated for sedation. Pt admitted to ICU for management of acute hypoxic respiratory failure.    9/19: He remains confused, on 50% VM O2  9/20: he continues to improve with diuresis.  Used NIV over night               PAST MEDICAL & SURGICAL HISTORY:  Afib      HTN (hypertension)      Cholesterol serum elevated      BPH (Benign Prostatic Hyperplasia)      CA - skin cancer      History of ankle fracture      Intolerance, drug  INTOLERANCE  TO STATIN      Hearing loss      Myocardial infarction      Gout      Osteoarthritis      Glaucoma      Rheumatoid arthritis      Hx of appendectomy      Hx of tonsillectomy      S/P TKR (total knee replacement)  bilateral      H/O coronary angiogram  x 2      H/O hernia repair  x  4 surgeries      S/P laser cataract surgery  b/l      H/O shoulder surgery  right      S/P ORIF (open reduction internal fixation) fracture  left ankle          FAMILY HISTORY:  Family history of other heart disease (Father)    Family history of cardiac arrhythmia (Child)        Social Hx:    Allergies    penicillins (Unknown)    Intolerances            ICU Vital Signs Last 24 Hrs  T(C): 37 (20 Sep 2022 08:00), Max: 37.5 (19 Sep 2022 20:00)  T(F): 98.6 (20 Sep 2022 08:00), Max: 99.5 (19 Sep 2022 20:00)  HR: 67 (20 Sep 2022 11:00) (66 - 110)  BP: 134/58 (20 Sep 2022 11:00) (127/60 - 166/95)  BP(mean): 77 (20 Sep 2022 11:00) (77 - 113)  ABP: --  ABP(mean): --  RR: 19 (20 Sep 2022 11:00) (18 - 30)  SpO2: 95% (20 Sep 2022 11:00) (87% - 97%)    O2 Parameters below as of 20 Sep 2022 11:00  Patient On (Oxygen Delivery Method): nasal cannula  O2 Flow (L/min): 3              I&O's Summary    19 Sep 2022 07:01  -  20 Sep 2022 07:00  --------------------------------------------------------  IN: 250 mL / OUT: 1930 mL / NET: -1680 mL                              13.4   104.06 )-----------( 132      ( 20 Sep 2022 05:34 )             41.7       09-20    142  |  110<H>  |  42<H>  ----------------------------<  109<H>  4.2   |  29  |  0.93    Ca    10.0      20 Sep 2022 05:34  Phos  2.1     09-20  Mg     2.2     09-20            MEDICATIONS  (STANDING):  bisacodyl Suppository 10 milliGRAM(s) Rectal once  cefepime  Injectable. 1000 milliGRAM(s) IV Push every 12 hours  dorzolamide 2%/timolol 0.5% Ophthalmic Solution 1 Drop(s) Both EYES two times a day  furosemide   Injectable 40 milliGRAM(s) IV Push every 12 hours  metoprolol tartrate 25 milliGRAM(s) Oral two times a day  polyethylene glycol 3350 17 Gram(s) Oral two times a day  potassium phosphate / sodium phosphate Powder (PHOS-NaK) 2 Packet(s) Oral two times a day  potassium phosphate / sodium phosphate Powder (PHOS-NaK) 2 Packet(s) Oral two times a day  senna 2 Tablet(s) Oral at bedtime  vancomycin  IVPB 750 milliGRAM(s) IV Intermittent every 24 hours  warfarin 3 milliGRAM(s) Oral once    MEDICATIONS  (PRN):  acetaminophen     Tablet .. 650 milliGRAM(s) Oral every 6 hours PRN Temp greater or equal to 38C (100.4F)  sodium chloride 0.65% Nasal 1 Spray(s) Both Nostrils four times a day PRN Nasal Congestion      DVT Prophylaxis:    Advanced Directives:  Discussed with:    Visit Information:    ** Time is exclusive of billed procedures and/or teaching and/or routine family updates.

## 2022-09-20 NOTE — PROGRESS NOTE ADULT - ASSESSMENT
Acute hypoxic resp failure   Septic shock   Bibasilar gram negative pneumonia   CNS bacteremia  Prerenal ADRIANA     PMH CLL (no tx), CAD, CHF, s/p ICD, chronic A.fib on warfarin, cholecystectomy 10-20 yrs ago       Plan:   ICU  NIV use as needed  Lasix 40 this morning  Replace lytes  PT, OOB   Reg Diet  Wean steroids  Dose Coumadin today  Cefepime, vanc  Sputum cx with H. flu, BCx with CNS, f/u repeat BCx 9/18  DVT ppx - on warfarin     Lori Jennings (EMT) updated [phone]

## 2022-09-20 NOTE — CHART NOTE - NSCHARTNOTEFT_GEN_A_CORE
Clinical Nutrition BRIEF NOTE    *86yo male admitted with acute hypoxic resp failure, septic shock, bibasilar gram negative PNA, CNS bacteremia, prerenal ADRIANA. extubated on 9/17.  remains confused.    *seen for f/u, has been NPO x 4 days.  speech eval'd pt on 9/19, rec'd regular texture with thin liquids.    *labs reviewed; 09-20; lytes WNL.     142  |  110<H>  |  42<H>  ----------------------------<  109<H>  4.2   |  29  |  0.93    Ca    10.0      20 Sep 2022 05:34  Phos  2.1     09-20  Mg     2.2     09-20    *BM (+) 9/17; add bowel regimen prn.    *(+1) generalized edema documented.  high risk for malnutrition.    ESTIMATED NUTR NEEDS: based on adjusted BW: 76Kg  4381-7147 Kcal (20-30Kcal/Kg)  106-137g protein (1.4-1.8g/Kg)  1900-2200mL (25-30mL/Kg)    RECOMMENDATIONS:  1) ADAT to regular with ensure plus BID to optimize PO intake  2) obtain new wt when feasible to track/trends closely  3) monitor BM: adjust bowel regimen prn  4) vitamin D 25OH level and supplement prn     *will monitor and adjust treatement plan prn*  Dannielle Red MA, RDN, CDN, [facility] [phone]  Nutrition

## 2022-09-20 NOTE — PROGRESS NOTE ADULT - SUBJECTIVE AND OBJECTIVE BOX
Patient is a 87y old  Male who presents with a chief complaint of Acute hypoxic respiratory failure (19 Sep 2022 15:07)    PAST MEDICAL & SURGICAL HISTORY:  Afib      HTN (hypertension)      Cholesterol serum elevated      BPH (Benign Prostatic Hyperplasia)      CA - skin cancer      History of ankle fracture      Intolerance, drug  INTOLERANCE  TO STATIN      Hearing loss      Myocardial infarction      Gout      Osteoarthritis      Glaucoma      Rheumatoid arthritis      Hx of appendectomy      Hx of tonsillectomy      S/P TKR (total knee replacement)  bilateral      H/O coronary angiogram  x 2      H/O hernia repair  x  4 surgeries      S/P laser cataract surgery  b/l      H/O shoulder surgery  right      S/P ORIF (open reduction internal fixation) fracture  left ankle        JEFFREY SCHAEFERURFELLEN   87y    Male    BRIEF HOSPITAL COURSE:    Review of Systems:                       All other ROS are negative.    Allergies    penicillins (Unknown)    Intolerances          ICU Vital Signs Last 24 Hrs  T(C): 37.1 (20 Sep 2022 04:00), Max: 37.5 (19 Sep 2022 20:00)  T(F): 98.8 (20 Sep 2022 04:00), Max: 99.5 (19 Sep 2022 20:00)  HR: 77 (20 Sep 2022 04:00) (69 - 110)  BP: 164/72 (20 Sep 2022 04:00) (146/64 - 166/95)  BP(mean): 97 (20 Sep 2022 04:00) (80 - 113)  ABP: --  ABP(mean): --  RR: 22 (20 Sep 2022 04:00) (20 - 30)  SpO2: 87% (20 Sep 2022 04:00) (87% - 97%)    O2 Parameters below as of 19 Sep 2022 16:00  Patient On (Oxygen Delivery Method): nasal cannula  O2 Flow (L/min): 4        Physical Examination:    General:     HEENT: + JVD    PULM:  Bilateral BS crackles     CVS:  s1 s2 irreg int paced    ABD:  obese distended + BS     EXT: 3+ edema     SKIN: warm     Neuro: alert awake           LABS:                        12.9   99.95 )-----------( 139      ( 19 Sep 2022 05:29 )             41.0     09-19    141  |  108  |  37<H>  ----------------------------<  102<H>  4.4   |  28  |  0.94    Ca    9.6      19 Sep 2022 05:29  Phos  2.0     09-19  Mg     2.2     09-19            CAPILLARY BLOOD GLUCOSE        PT/INR - ( 19 Sep 2022 05:29 )   PT: 30.2 sec;   INR: 2.58 ratio         PTT - ( 18 Sep 2022 05:49 )  PTT:46.5 sec    CULTURES:  Culture Results:   No growth to date. (09-18 @ 05:49)  Culture Results:   No growth to date. (09-18 @ 05:49)  Culture Results:   No Legionella species isolated (09-15 @ 12:00)  Culture Results:   No growth (09-15 @ 12:00)  Culture Results:   Few Haemophilus influenzae "Susceptibilities not performed"  Normal Respiratory Maryanne present (09-15 @ 08:00)  Culture Results:   <10,000 CFU/mL Normal Urogenital Maryanne (09-15 @ 06:30)  Rapid RVP Result: NotDetec (09-15 @ 02:23)  Culture Results:   Growth in aerobic bottle: Staphylococcus cohnii  Coag Negative Staphylococcus  Single set isolate, possible contaminant. Contact  Microbiology if susceptibility testing clinically  indicated.  ***Blood Panel PCR results on this specimen are available  approximately 3 hours after the Gram stain result.***  Gram stain, PCR, and/or culture results may not always  correspond due to difference in methodologies.  ************************************************************  This PCR assay was performed by multiplex PCR. This  Assay tests for 66 bacterial and resistance gene targets.  Please refer to the [facility] Labs test directory  at [email or web address] for details. (09-15 @ 02:23)  Culture Results:   No growth to date. (09-15 @ 02:23)      Medications:  MEDICATIONS  (STANDING):  cefepime  Injectable. 1000 milliGRAM(s) IV Push every 12 hours  dorzolamide 2%/timolol 0.5% Ophthalmic Solution 1 Drop(s) Both EYES two times a day  furosemide   Injectable 40 milliGRAM(s) IV Push every 12 hours  metoprolol tartrate 25 milliGRAM(s) Oral two times a day  pantoprazole  Injectable 40 milliGRAM(s) IV Push daily  polyethylene glycol 3350 17 Gram(s) Oral daily  potassium phosphate / sodium phosphate Powder (PHOS-NaK) 2 Packet(s) Oral two times a day  senna 2 Tablet(s) Oral at bedtime  vancomycin  IVPB 750 milliGRAM(s) IV Intermittent every 24 hours    MEDICATIONS  (PRN):  acetaminophen     Tablet .. 650 milliGRAM(s) Oral every 6 hours PRN Temp greater or equal to 38C (100.4F)  sodium chloride 0.65% Nasal 1 Spray(s) Both Nostrils four times a day PRN Nasal Congestion        09-18 @ 07:01  -  09-19 @ 07:00  --------------------------------------------------------  IN: 250 mL / OUT: 2250 mL / NET: -2000 mL    09-19 @ 07:01  -  09-20 @ 04:40  --------------------------------------------------------  IN: 250 mL / OUT: 1630 mL / NET: -1380 mL        RADIOLOGY/IMAGING/ECHO      < from: Xray Chest 1 View- PORTABLE-Urgent (Xray Chest 1 View- PORTABLE-Urgent .) (09.17.22 @ 09:55) >  IMPRESSION:  Progression of congestion. No focal infiltrate identified.      Critical care point of care ultrasound:  bilateral scattered B lines and effusions     < from: TTE Echo Complete w/o Contrast w/ Doppler (09.15.22 @ 10:22) >   Summary     The left ventricle is normal in size, wall thickness, wall motion and   contractility.   Estimated left ventricular ejection fraction is 50-55 %.   The left atrium is mildly dilated.   The right atrium appears mildly dilated.   Normalappearing right ventricle structure. TAPSE is measuring 1.6 cm.   A device wire is seen in the RV and RA.   The aortic valve is well visualized, appears moderately sclerotic.   Mild (1+) aortic regurgitation is present.   The aortic root and ascending aorta are dilated.   The mitral valve leaflets appear calcified. The leaflet opening is   normal.   Mild (1+) tricuspid valve regurgitation is present.   No evidence of pericardial effusion.    < end of copied text >      Assessment/Plan:    87M hx a fib on warfarin CAD CHF with preserved EF > ICD  with ICD  RA, gout  CLL not on RX      Admit 9/15 resp distress hypoxemia type 1 resp failure intubated.  + H influenza PNA and CNS bacteremia in this IC host.  Septic shock now off pressor Vanco and cefepime to cover GNR and GP organisms     Extubated 9/17           Tachypneic on exam  CXR/pocus  with overload diastolic HF   Lasix q12  watch lytes and renal fx, just recovered from ADRIANA but  received lots of IVF when he was in shock and he is markedly  edematous       PRN/Nocturnal BIPAP       Runs of NSVT on ICD interrogation   Lopressor added.      .    Coumadin dosing based on INR.       CRITICAL CARE TIME SPENT: 37 minutes assessing presenting problems of acute illness, which pose high probability of life threatening deterioration or end organ damage/dysfunction, as well as medical decision making including initiating plan of care, reviewing data, reviewing radiologic exams, discussing with multidisciplinary team,  discussing goals of care with patient/family, and writing this note.  Non-inclusive of procedures performed,

## 2022-09-21 LAB
ANION GAP SERPL CALC-SCNC: 3 MMOL/L — LOW (ref 5–17)
BUN SERPL-MCNC: 42 MG/DL — HIGH (ref 7–23)
CALCIUM SERPL-MCNC: 9.6 MG/DL — SIGNIFICANT CHANGE UP (ref 8.5–10.1)
CHLORIDE SERPL-SCNC: 107 MMOL/L — SIGNIFICANT CHANGE UP (ref 96–108)
CO2 SERPL-SCNC: 34 MMOL/L — HIGH (ref 22–31)
CREAT SERPL-MCNC: 0.93 MG/DL — SIGNIFICANT CHANGE UP (ref 0.5–1.3)
EGFR: 80 ML/MIN/1.73M2 — SIGNIFICANT CHANGE UP
GLUCOSE SERPL-MCNC: 101 MG/DL — HIGH (ref 70–99)
HCT VFR BLD CALC: 43.1 % — SIGNIFICANT CHANGE UP (ref 39–50)
HGB BLD-MCNC: 13.4 G/DL — SIGNIFICANT CHANGE UP (ref 13–17)
INR BLD: 3.54 RATIO — HIGH (ref 0.88–1.16)
MAGNESIUM SERPL-MCNC: 2.2 MG/DL — SIGNIFICANT CHANGE UP (ref 1.6–2.6)
MCHC RBC-ENTMCNC: 31.1 GM/DL — LOW (ref 32–36)
MCHC RBC-ENTMCNC: 32.1 PG — SIGNIFICANT CHANGE UP (ref 27–34)
MCV RBC AUTO: 103.4 FL — HIGH (ref 80–100)
PHOSPHATE SERPL-MCNC: 3 MG/DL — SIGNIFICANT CHANGE UP (ref 2.5–4.5)
PLATELET # BLD AUTO: 136 K/UL — LOW (ref 150–400)
POTASSIUM SERPL-MCNC: 4 MMOL/L — SIGNIFICANT CHANGE UP (ref 3.5–5.3)
POTASSIUM SERPL-SCNC: 4 MMOL/L — SIGNIFICANT CHANGE UP (ref 3.5–5.3)
PROTHROM AB SERPL-ACNC: 41.6 SEC — HIGH (ref 10.5–13.4)
RBC # BLD: 4.17 M/UL — LOW (ref 4.2–5.8)
RBC # FLD: 14.1 % — SIGNIFICANT CHANGE UP (ref 10.3–14.5)
SARS-COV-2 RNA SPEC QL NAA+PROBE: SIGNIFICANT CHANGE UP
SODIUM SERPL-SCNC: 144 MMOL/L — SIGNIFICANT CHANGE UP (ref 135–145)
WBC # BLD: 98.67 K/UL — CRITICAL HIGH (ref 3.8–10.5)
WBC # FLD AUTO: 98.67 K/UL — CRITICAL HIGH (ref 3.8–10.5)

## 2022-09-21 PROCEDURE — 99233 SBSQ HOSP IP/OBS HIGH 50: CPT

## 2022-09-21 PROCEDURE — 71045 X-RAY EXAM CHEST 1 VIEW: CPT | Mod: 26

## 2022-09-21 RX ADMIN — Medication 2 PACKET(S): at 05:48

## 2022-09-21 RX ADMIN — Medication 40 MILLIGRAM(S): at 05:48

## 2022-09-21 RX ADMIN — Medication 25 MILLIGRAM(S): at 21:58

## 2022-09-21 RX ADMIN — DORZOLAMIDE HYDROCHLORIDE TIMOLOL MALEATE 1 DROP(S): 20; 5 SOLUTION/ DROPS OPHTHALMIC at 21:39

## 2022-09-21 RX ADMIN — Medication 25 MILLIGRAM(S): at 10:02

## 2022-09-21 RX ADMIN — Medication 250 MILLIGRAM(S): at 10:02

## 2022-09-21 RX ADMIN — Medication 2 PACKET(S): at 21:39

## 2022-09-21 RX ADMIN — CEFEPIME 1000 MILLIGRAM(S): 1 INJECTION, POWDER, FOR SOLUTION INTRAMUSCULAR; INTRAVENOUS at 21:39

## 2022-09-21 RX ADMIN — DORZOLAMIDE HYDROCHLORIDE TIMOLOL MALEATE 1 DROP(S): 20; 5 SOLUTION/ DROPS OPHTHALMIC at 10:03

## 2022-09-21 RX ADMIN — Medication 2 PACKET(S): at 10:02

## 2022-09-21 RX ADMIN — CEFEPIME 1000 MILLIGRAM(S): 1 INJECTION, POWDER, FOR SOLUTION INTRAMUSCULAR; INTRAVENOUS at 10:02

## 2022-09-21 NOTE — PROGRESS NOTE ADULT - ASSESSMENT
Acute hypoxic resp failure   Septic shock   Bibasilar gram negative pneumonia   CNS bacteremia  Prerenal ADRIANA     PMH CLL (no tx), CAD, CHF, s/p ICD, chronic A.fib on warfarin, cholecystectomy 10-20 yrs ago       Plan:   ICU  NIV use as needed  Lasix changed to 40 IV daily  Hold Coumadin today as INR >3  PT, OOB   Reg Diet  Wean steroids  Cefepime, vanc for Pneumonia as per ID  Sputum cx with H. flu, BCx with CNS, f/u repeat BCx 9/18  DVT ppx - on warfarin    Acute hypoxic resp failure   Septic shock   Bibasilar gram negative pneumonia   CNS bacteremia  Prerenal ADRIANA     PMH CLL (no tx), CAD, CHF, s/p ICD, chronic A.fib on warfarin, cholecystectomy 10-20 yrs ago       Plan:   ICU  NIV use as needed  Lasix changed to 40 IV daily  Hold Coumadin today as INR >3  PT, OOB   Reg Diet  Wean steroids  Cefepime, vanc for Pneumonia as per ID  Sputum cx with H. flu, BCx with CNS, f/u repeat BCx 9/18  DVT ppx - on warfarin     Transfer to a reg floor  Called into the Hospitalist at 2:21PM

## 2022-09-21 NOTE — PROGRESS NOTE ADULT - SUBJECTIVE AND OBJECTIVE BOX
Date of service: 09-21-22 @ 11:09      Patient awake, alert, sitting in bed; no complaints, afebrile    ROS unable to obtain secondary to patient medical condition   MEDICATIONS  (STANDING):  cefepime  Injectable. 1000 milliGRAM(s) IV Push every 12 hours  dorzolamide 2%/timolol 0.5% Ophthalmic Solution 1 Drop(s) Both EYES two times a day  furosemide   Injectable 40 milliGRAM(s) IV Push daily  metoprolol tartrate 25 milliGRAM(s) Oral two times a day  polyethylene glycol 3350 17 Gram(s) Oral two times a day  potassium phosphate / sodium phosphate Powder (PHOS-NaK) 2 Packet(s) Oral two times a day  senna 2 Tablet(s) Oral at bedtime  vancomycin  IVPB 750 milliGRAM(s) IV Intermittent every 24 hours    MEDICATIONS  (PRN):  acetaminophen     Tablet .. 650 milliGRAM(s) Oral every 6 hours PRN Temp greater or equal to 38C (100.4F)  sodium chloride 0.65% Nasal 1 Spray(s) Both Nostrils four times a day PRN Nasal Congestion      Vital Signs Last 24 Hrs  T(C): 37.4 (21 Sep 2022 08:00), Max: 37.4 (21 Sep 2022 08:00)  T(F): 99.4 (21 Sep 2022 08:00), Max: 99.4 (21 Sep 2022 08:00)  HR: 65 (21 Sep 2022 10:00) (65 - 76)  BP: 137/59 (21 Sep 2022 10:00) (105/50 - 150/64)  BP(mean): 79 (21 Sep 2022 10:00) (63 - 99)  RR: 24 (21 Sep 2022 10:00) (15 - 32)  SpO2: 93% (21 Sep 2022 10:00) (92% - 100%)    Parameters below as of 21 Sep 2022 10:00  Patient On (Oxygen Delivery Method): nasal cannula  O2 Flow (L/min): 4          Physical Exam:      Constitutional: frail looking  HEENT: NC/AT  Neck: supple; thyroid not palpable  Back: no tenderness  Respiratory: respiratory effort normal; scattered coarse breath sounds  Cardiovascular: S1S2 regular, no murmurs  Abdomen: soft, not tender, not distended, positive BS; no liver or spleen organomegaly  Genitourinary: no suprapubic tenderness  Musculoskeletal: no muscle tenderness, no joint swelling or tenderness  Neurological/ Psychiatric: confused  Skin: no rashes; no palpable lesions    Labs: all available labs reviewed                 Labs:                        Labs:                        13.4   98.67 )-----------( 136      ( 21 Sep 2022 05:34 )             43.1     09-21    144  |  107  |  42<H>  ----------------------------<  101<H>  4.0   |  34<H>  |  0.93    Ca    9.6      21 Sep 2022 05:34  Phos  3.0     09-21  Mg     2.2     09-21             Cultures:       Culture - Blood (collected 09-18-22 @ 05:49)  Source: .Blood None  Preliminary Report (09-19-22 @ 10:01):    No growth to date.    Culture - Blood (collected 09-18-22 @ 05:49)  Source: .Blood None  Preliminary Report (09-19-22 @ 10:01):    No growth to date.    Culture - Urine (collected 09-15-22 @ 12:00)  Source: Catheterized Catheterized  Final Report (09-16-22 @ 16:57):    No growth    Culture - Legionella (collected 09-15-22 @ 12:00)  Source: .Legionella None  Preliminary Report (09-17-22 @ 21:39):    No Legionella species isolated    Culture - Sputum (collected 09-15-22 @ 08:00)  Source: .Sputum Sputum  Gram Stain (09-15-22 @ 15:36):    Rare Gram Negative Rods    Rare polymorphonuclear leukocytes  Final Report (09-17-22 @ 09:11):    Few Haemophilus influenzae "Susceptibilities not performed"    Normal Respiratory Maryanne present    Culture - Urine (collected 09-15-22 @ 06:30)  Source: Clean Catch Clean Catch (Midstream)  Final Report (09-16-22 @ 07:22):    <10,000 CFU/mL Normal Urogenital Maryanne    Culture - Blood (collected 09-15-22 @ 02:23)  Source: .Blood Blood-Peripheral  Final Report (09-20-22 @ 09:00):    No Growth Final    Culture - Blood (collected 09-15-22 @ 02:23)  Source: .Blood Blood-Peripheral  Gram Stain (09-16-22 @ 05:49):    Growth in aerobic bottle: Gram Positive Cocci in Clusters  Final Report (09-17-22 @ 12:26):    Growth in aerobic bottle: Staphylococcus cohnii    Coag Negative Staphylococcus    Single set isolate, possible contaminant. Contact    Microbiology if susceptibility testing clinically    indicated.    ***Blood Panel PCR results on this specimen are available    approximately 3 hours after the Gram stain result.***    Gram stain, PCR, and/or culture results may not always    correspond due to difference in methodologies.    ************************************************************    This PCR assay was performed by multiplex PCR. This    Assay tests for 66 bacterial and resistance gene targets.    Please refer to the [facility] Labs test directory    at [email or web address] for details.  Organism: Blood Culture PCR (09-17-22 @ 12:26)  Organism: Blood Culture PCR (09-17-22 @ 12:26)      -  Coagulase negative Staphylococcus: Detec      Method Type: PCR                    Full T Cell Subset (09.16.22 @ 12:43)    CD19 %: 97 %    CD3 %: 1 %    CD4 %: 1 %    CD8 %: <1 %    4/8 Ratio: 1.42 RATIO    ABS RH1940: 869 /uL    ABS CD19: 10708 /uL    ABS CD3: 1131 /uL    ABS CD4: 561 /uL    ABS CD8: 395 /uL    FQ9198 %: 1 %                      < from: TTE Echo Complete w/o Contrast w/ Doppler (09.15.22 @ 10:22) >     Impression     Summary     The left ventricle is normal in size, wall thickness, wall motion and   contractility.   Estimated left ventricular ejection fraction is 50-55 %.   The left atrium is mildly dilated.   The right atrium appears mildly dilated.   Normalappearing right ventricle structure. TAPSE is measuring 1.6 cm.   A device wire is seen in the RV and RA.   The aortic valve is well visualized, appears moderately sclerotic.   Mild (1+) aortic regurgitation is present.   The aortic root and ascending aorta are dilated.   The mitral valve leaflets appear calcified. The leaflet opening is   normal.   Mild (1+) tricuspid valve regurgitation is present.   No evidence of pericardial effusion.    < end of copied text >                          < from: CT Angio Chest PE Protocol w/ IV Cont (09.15.22 @ 04:57) >    ACC: [id number] EXAM:  CT ANGIO CHEST PULM ART [facility]                        ACC: [id number] EXAM:  CT ABDOMEN AND PELVIS IC                          PROCEDURE DATE:  09/15/2022          INTERPRETATION:  Final report:    CLINICAL INFORMATION: 87 years  Male with fever. lactate 6. Shortness of   breath. Acute respiratory failure. Hypoxia. Wells score greater than 4.    COMPARISON: None.    CONTRAST/COMPLICATIONS:  IV Contrast: IV contrast documented in associated exam (accession   [id number]), Omnipaque 350 (accession [id number])  100 cc administered   0 cc   discarded  Oral Contrast: NONE  Complications: None reported at time of study completion    PROCEDURE:  CT Angiography of the Chest was performed followed by portal venous phase   imaging of theAbdomen and Pelvis.  Sagittal and coronal reformats were performed as well as 3D (MIP)   reconstructions.    FINDINGS:  CHEST:  LUNGS AND LARGE AIRWAYS: Patent central airways. ET tube tip 3.5 cm above   the scar. Dense bilateral lower lobe consolidation with air   bronchograms secondary to atelectasis or pneumonia. Right lower lobe   bronchial secretions.  PLEURA: Small left pleural effusion. No right pleural effusion.  VESSELS: Coronary artery calcifications and/or stents. Aortic   atherosclerosis without aneurysm. No pulmonary embolism identified.  HEART: Moderate cardiomegaly. Intracardiac pacemaker leads. No   pericardial effusion.  MEDIASTINUM AND LORENZO: No lymphadenopathy.  CHEST WALL AND LOWER NECK: Pacemaker pulse generator in the left chest   wall.    ABDOMEN AND PELVIS:  LIVER: Within normal limits.  BILE DUCTS: Pneumobilia in the left intrahepatic biliary radicals.  GALLBLADDER: Cholecystectomy.  SPLEEN: Within normal limits.  PANCREAS: Within normal limits.  ADRENALS: Withinnormal limits.  KIDNEYS/URETERS: 1.7 cm right midpole cyst. Other cysts and hypodensities   too small to characterize. Nonspecific bilateral perinephric fat   stranding. No hydroureteronephrosis. Symmetrical nephrograms.    BLADDER: Collapsed around Barnes catheter.  REPRODUCTIVE ORGANS: Enlarged prostate 6.4 x 6.0 cm.    BOWEL: No bowel obstruction. Appendix is not visualized. No evidence of   inflammation in the pericecal region.. Enteric catheter tip terminating   in the stomach. Descending andsigmoid colonic diverticulosis without   diverticulitis.  PERITONEUM: No ascites.  VESSELS: Atherosclerotic changes.  RETROPERITONEUM/LYMPH NODES: No lymphadenopathy.  ABDOMINAL WALL: Bilateral fat-containing inguinal hernias, larger on the   left.  BONES: Degenerative changes.    IMPRESSION:    No pulmonary embolism identified.    Bilateral lower lobe consolidation secondary to atelectasis and/or   pneumonia.    Cardiomegaly. Pacemaker.    Pneumobilia post cholecystectomy.    Enlarged prostate.    Colonic diverticulosis without diverticulitis.    < end of copied text >        Radiology: all available radiological tests reviewed    Advanced directives addressed: full resuscitation

## 2022-09-21 NOTE — PROGRESS NOTE ADULT - ASSESSMENT
86 y/o M w/ PMHx a fib on coumadin, severe cardiomyopathy, CAD s/p MI, HTN, HLD, CHF, s/p AICD, RA, gout, and leukemia (diagnosed 1 yr ago, has not received treatment) admitted on 9/15 for evaluation of severe respiratory distress, requiring emergent intubation, patient had upper respiratory symptoms, shortness of breath, headache, dizziness and cough; history per medical record as patient is intubated and sedated.     1. Patient admitted with respiratory failure and sepsis secondary to pneumonia; is immunocompromised given history of leukemia; at risk of severe infections; history of PPM and found to have coagulase negative staph in blood  - follow up cultures --- Haemophilus in sputum is covered by cefepime  - serial cbc and monitor temperature   - iv hydration and supportive care   - reviewed prior medical records to evaluate for resistant or atypical pathogens   - vent per icu  - will continue cefepime which will cover broadly for pneumonia and patient at risk for gram negative rods and other resistant bacteria , day #6  - will add vancomycin to treat resistant bacteria and staph especially given PPM, day #6  - expect one more day of antibiotics  - consideration for echocardiogram to rule out vegetations--- noted  - check vancomycin trough prior to fourth dose --- slightly low but will maintain current dose  - tolerating antibiotics without rashes or side effects   - will check t cell subsets, if needs PCP prophylaxis, has only 1% CD4; will discuss with oncology, re PCP prophylaxis  2. other issues: per medicine

## 2022-09-21 NOTE — PROGRESS NOTE ADULT - GASTROINTESTINAL
soft/nontender/normal active bowel sounds

## 2022-09-21 NOTE — PROGRESS NOTE ADULT - RESPIRATORY
clear to auscultation bilaterally/no respiratory distress/breath sounds equal/good air movement/rales
clear to auscultation bilaterally/no respiratory distress/breath sounds equal/good air movement/rales
clear to auscultation bilaterally/no wheezes/no respiratory distress/no use of accessory muscles/rales
clear to auscultation bilaterally/no respiratory distress/breath sounds equal/good air movement/rales
no wheezes/breath sounds equal/good air movement/respiratory distress/rales

## 2022-09-21 NOTE — PROGRESS NOTE ADULT - SUBJECTIVE AND OBJECTIVE BOX
HPI:  88 y/o M w/ PMHx a fib on coumadin, severe cardiomyopathy, CAD s/p MI, HTN, HLD, CHF, s/p AICD, RA, gout, and leukemia (diagnosed 1 yr ago, has not received treatment) who presented to ER in severe respiratory distress requiring emergent intubation. Per pt's daughter, Jerri, she and her daughter whom pt lives w/ have been sick w/ URI symptoms (tested negative for COVID-19).  Onday PTA the pt began to c/o headache, dizziness, and cough. Overnight, pt developed shortness of breath prompting EMS call. Labs revealed leukocytosis (WBC count 172K), ADRIANA (BUN/Cr 22/1.49), and lactic acidosis (lactate 6). In the ER, pt was administered 2 L IVF, cefepime, vancomycin, and ofirmev. Propofol infusion was initiated for sedation. Pt admitted to ICU for management of acute hypoxic respiratory failure.    9/19: He remains confused, on 50% VM O2  9/20: he continues to improve with diuresis.  Used NIV over night  9/21: Much less SOB, diuresing well, used NIV over night           PAST MEDICAL & SURGICAL HISTORY:  Afib      HTN (hypertension)      Cholesterol serum elevated      BPH (Benign Prostatic Hyperplasia)      CA - skin cancer      History of ankle fracture      Intolerance, drug  INTOLERANCE  TO STATIN      Hearing loss      Myocardial infarction      Gout      Osteoarthritis      Glaucoma      Rheumatoid arthritis      Hx of appendectomy      Hx of tonsillectomy      S/P TKR (total knee replacement)  bilateral      H/O coronary angiogram  x 2      H/O hernia repair  x  4 surgeries      S/P laser cataract surgery  b/l      H/O shoulder surgery  right      S/P ORIF (open reduction internal fixation) fracture  left ankle          FAMILY HISTORY:  Family history of other heart disease (Father)    Family history of cardiac arrhythmia (Child)        Social Hx:    Allergies    penicillins (Unknown)    Intolerances            ICU Vital Signs Last 24 Hrs  T(C): 37.1 (21 Sep 2022 12:00), Max: 37.4 (21 Sep 2022 08:00)  T(F): 98.7 (21 Sep 2022 12:00), Max: 99.4 (21 Sep 2022 08:00)  HR: 67 (21 Sep 2022 14:00) (65 - 76)  BP: 113/53 (21 Sep 2022 14:00) (105/50 - 150/64)  BP(mean): 67 (21 Sep 2022 14:00) (59 - 99)  ABP: --  ABP(mean): --  RR: 22 (21 Sep 2022 14:00) (15 - 32)  SpO2: 95% (21 Sep 2022 14:00) (92% - 100%)    O2 Parameters below as of 21 Sep 2022 14:00  Patient On (Oxygen Delivery Method): nasal cannula  O2 Flow (L/min): 4              I&O's Summary    20 Sep 2022 07:01  -  21 Sep 2022 07:00  --------------------------------------------------------  IN: 1250 mL / OUT: 1800 mL / NET: -550 mL    21 Sep 2022 07:01  -  21 Sep 2022 14:17  --------------------------------------------------------  IN: 0 mL / OUT: 1700 mL / NET: -1700 mL                              13.4   98.67 )-----------( 136      ( 21 Sep 2022 05:34 )             43.1       09-21    144  |  107  |  42<H>  ----------------------------<  101<H>  4.0   |  34<H>  |  0.93    Ca    9.6      21 Sep 2022 05:34  Phos  3.0     09-21  Mg     2.2     09-21          MEDICATIONS  (STANDING):  cefepime  Injectable. 1000 milliGRAM(s) IV Push every 12 hours  dorzolamide 2%/timolol 0.5% Ophthalmic Solution 1 Drop(s) Both EYES two times a day  furosemide   Injectable 40 milliGRAM(s) IV Push daily  metoprolol tartrate 25 milliGRAM(s) Oral two times a day  polyethylene glycol 3350 17 Gram(s) Oral two times a day  potassium phosphate / sodium phosphate Powder (PHOS-NaK) 2 Packet(s) Oral two times a day  senna 2 Tablet(s) Oral at bedtime  vancomycin  IVPB 750 milliGRAM(s) IV Intermittent every 24 hours    MEDICATIONS  (PRN):  acetaminophen     Tablet .. 650 milliGRAM(s) Oral every 6 hours PRN Temp greater or equal to 38C (100.4F)  sodium chloride 0.65% Nasal 1 Spray(s) Both Nostrils four times a day PRN Nasal Congestion      DVT Prophylaxis: INR >2    Advanced Directives:  Discussed with:    Visit Information:    ** Time is exclusive of billed procedures and/or teaching and/or routine family updates.

## 2022-09-21 NOTE — PROGRESS NOTE ADULT - CARDIOVASCULAR
regular rate and rhythm/S1 S2 present/peripheral edema

## 2022-09-21 NOTE — PROGRESS NOTE ADULT - NEUROLOGICAL
sensation intact/responds to pain/responds to verbal commands/no spontaneous movement
sensation intact/responds to pain/no spontaneous movement

## 2022-09-22 LAB
ANION GAP SERPL CALC-SCNC: 5 MMOL/L — SIGNIFICANT CHANGE UP (ref 5–17)
BUN SERPL-MCNC: 36 MG/DL — HIGH (ref 7–23)
CALCIUM SERPL-MCNC: 9.2 MG/DL — SIGNIFICANT CHANGE UP (ref 8.5–10.1)
CHLORIDE SERPL-SCNC: 101 MMOL/L — SIGNIFICANT CHANGE UP (ref 96–108)
CO2 SERPL-SCNC: 37 MMOL/L — HIGH (ref 22–31)
CREAT SERPL-MCNC: 0.76 MG/DL — SIGNIFICANT CHANGE UP (ref 0.5–1.3)
EGFR: 88 ML/MIN/1.73M2 — SIGNIFICANT CHANGE UP
GLUCOSE SERPL-MCNC: 95 MG/DL — SIGNIFICANT CHANGE UP (ref 70–99)
HCT VFR BLD CALC: 43.4 % — SIGNIFICANT CHANGE UP (ref 39–50)
HGB BLD-MCNC: 13.5 G/DL — SIGNIFICANT CHANGE UP (ref 13–17)
INR BLD: 3.01 RATIO — HIGH (ref 0.88–1.16)
MAGNESIUM SERPL-MCNC: 1.9 MG/DL — SIGNIFICANT CHANGE UP (ref 1.6–2.6)
MCHC RBC-ENTMCNC: 31.1 GM/DL — LOW (ref 32–36)
MCHC RBC-ENTMCNC: 32.1 PG — SIGNIFICANT CHANGE UP (ref 27–34)
MCV RBC AUTO: 103.1 FL — HIGH (ref 80–100)
PHOSPHATE SERPL-MCNC: 3.2 MG/DL — SIGNIFICANT CHANGE UP (ref 2.5–4.5)
PLATELET # BLD AUTO: 153 K/UL — SIGNIFICANT CHANGE UP (ref 150–400)
POTASSIUM SERPL-MCNC: 3.2 MMOL/L — LOW (ref 3.5–5.3)
POTASSIUM SERPL-SCNC: 3.2 MMOL/L — LOW (ref 3.5–5.3)
PROTHROM AB SERPL-ACNC: 35.3 SEC — HIGH (ref 10.5–13.4)
RBC # BLD: 4.21 M/UL — SIGNIFICANT CHANGE UP (ref 4.2–5.8)
RBC # FLD: 13.9 % — SIGNIFICANT CHANGE UP (ref 10.3–14.5)
SODIUM SERPL-SCNC: 143 MMOL/L — SIGNIFICANT CHANGE UP (ref 135–145)
WBC # BLD: 110.49 K/UL — CRITICAL HIGH (ref 3.8–10.5)
WBC # FLD AUTO: 110.49 K/UL — CRITICAL HIGH (ref 3.8–10.5)

## 2022-09-22 PROCEDURE — 99233 SBSQ HOSP IP/OBS HIGH 50: CPT | Mod: GC

## 2022-09-22 RX ORDER — LACTOBACILLUS ACIDOPHILUS 100MM CELL
1 CAPSULE ORAL DAILY
Refills: 0 | Status: DISCONTINUED | OUTPATIENT
Start: 2022-09-22 | End: 2022-09-25

## 2022-09-22 RX ORDER — POTASSIUM CHLORIDE 20 MEQ
40 PACKET (EA) ORAL ONCE
Refills: 0 | Status: COMPLETED | OUTPATIENT
Start: 2022-09-22 | End: 2022-09-22

## 2022-09-22 RX ADMIN — DORZOLAMIDE HYDROCHLORIDE TIMOLOL MALEATE 1 DROP(S): 20; 5 SOLUTION/ DROPS OPHTHALMIC at 09:20

## 2022-09-22 RX ADMIN — CEFEPIME 1000 MILLIGRAM(S): 1 INJECTION, POWDER, FOR SOLUTION INTRAMUSCULAR; INTRAVENOUS at 09:18

## 2022-09-22 RX ADMIN — SENNA PLUS 2 TABLET(S): 8.6 TABLET ORAL at 21:10

## 2022-09-22 RX ADMIN — Medication 25 MILLIGRAM(S): at 09:18

## 2022-09-22 RX ADMIN — Medication 250 MILLIGRAM(S): at 09:19

## 2022-09-22 RX ADMIN — Medication 40 MILLIGRAM(S): at 05:38

## 2022-09-22 RX ADMIN — Medication 25 MILLIGRAM(S): at 21:09

## 2022-09-22 RX ADMIN — Medication 40 MILLIEQUIVALENT(S): at 11:25

## 2022-09-22 RX ADMIN — DORZOLAMIDE HYDROCHLORIDE TIMOLOL MALEATE 1 DROP(S): 20; 5 SOLUTION/ DROPS OPHTHALMIC at 21:10

## 2022-09-22 RX ADMIN — POLYETHYLENE GLYCOL 3350 17 GRAM(S): 17 POWDER, FOR SOLUTION ORAL at 09:18

## 2022-09-22 NOTE — PROGRESS NOTE ADULT - ASSESSMENT
86 y/o M w/ PMHx a fib on coumadin, severe cardiomyopathy, CAD s/p MI, HTN, HLD, CHF, s/p AICD, RA, gout, and leukemia (diagnosed 1 yr ago, has not received treatment) admitted on 9/15 for evaluation of severe respiratory distress, requiring emergent intubation, patient had upper respiratory symptoms, shortness of breath, headache, dizziness and cough; history per medical record as patient is intubated and sedated.     1. Patient admitted with respiratory failure and sepsis secondary to pneumonia; is immunocompromised given history of leukemia; at risk of severe infections; history of PPM and found to have coagulase negative staph in blood  - follow up cultures --- Haemophilus in sputum is covered by cefepime  - serial cbc and monitor temperature   - iv hydration and supportive care   - reviewed prior medical records to evaluate for resistant or atypical pathogens   - vent per icu  - will continue cefepime which will cover broadly for pneumonia and patient at risk for gram negative rods and other resistant bacteria , day #7  - will add vancomycin to treat resistant bacteria and staph especially given PPM, day #7  - expect one more day of antibiotics  - consideration for echocardiogram to rule out vegetations--- noted  - check vancomycin trough prior to fourth dose --- slightly low but will maintain current dose  - will stop antibiotics today and observe off antibiotics  2. other issues: per medicine

## 2022-09-22 NOTE — PROGRESS NOTE ADULT - SUBJECTIVE AND OBJECTIVE BOX
Date of service: 09-22-22 @ 11:34      Patient lying in bed; eating his breakfast when seen earlier, no complaints      ROS unable to obtain secondary to patient medical condition     MEDICATIONS  (STANDING):  cefepime  Injectable. 1000 milliGRAM(s) IV Push every 12 hours  dorzolamide 2%/timolol 0.5% Ophthalmic Solution 1 Drop(s) Both EYES two times a day  furosemide   Injectable 40 milliGRAM(s) IV Push daily  metoprolol tartrate 25 milliGRAM(s) Oral two times a day  polyethylene glycol 3350 17 Gram(s) Oral two times a day  senna 2 Tablet(s) Oral at bedtime  vancomycin  IVPB 750 milliGRAM(s) IV Intermittent every 24 hours    MEDICATIONS  (PRN):  acetaminophen     Tablet .. 650 milliGRAM(s) Oral every 6 hours PRN Temp greater or equal to 38C (100.4F)  sodium chloride 0.65% Nasal 1 Spray(s) Both Nostrils four times a day PRN Nasal Congestion      Vital Signs Last 24 Hrs  T(C): 36.6 (22 Sep 2022 08:16), Max: 37.1 (21 Sep 2022 12:00)  T(F): 97.9 (22 Sep 2022 08:16), Max: 98.7 (21 Sep 2022 12:00)  HR: 70 (22 Sep 2022 08:16) (55 - 70)  BP: 147/65 (22 Sep 2022 08:16) (107/45 - 147/65)  BP(mean): 83 (21 Sep 2022 18:00) (59 - 97)  RR: 20 (22 Sep 2022 08:16) (18 - 24)  SpO2: 96% (22 Sep 2022 08:16) (94% - 100%)    Parameters below as of 22 Sep 2022 08:16  Patient On (Oxygen Delivery Method): nasal cannula            Physical Exam:      Constitutional: frail looking  HEENT: NC/AT  Neck: supple; thyroid not palpable  Back: no tenderness  Respiratory: respiratory effort normal; scattered coarse breath sounds  Cardiovascular: S1S2 regular, no murmurs  Abdomen: soft, not tender, not distended, positive BS; no liver or spleen organomegaly  Genitourinary: no suprapubic tenderness  Musculoskeletal: no muscle tenderness, no joint swelling or tenderness  Neurological/ Psychiatric: confused  Skin: no rashes; no palpable lesions    Labs: all available labs reviewed                 Labs:                        Labs:               Labs:                        13.5   110.49 )-----------( 153      ( 22 Sep 2022 07:10 )             43.4     09-22    143  |  101  |  36<H>  ----------------------------<  95  3.2<L>   |  37<H>  |  0.76    Ca    9.2      22 Sep 2022 07:10  Phos  3.2     09-22  Mg     1.9     09-22             Cultures:       Culture - Blood (collected 09-18-22 @ 05:49)  Source: .Blood None  Preliminary Report (09-19-22 @ 10:01):    No growth to date.    Culture - Blood (collected 09-18-22 @ 05:49)  Source: .Blood None  Preliminary Report (09-19-22 @ 10:01):    No growth to date.    Culture - Urine (collected 09-15-22 @ 12:00)  Source: Catheterized Catheterized  Final Report (09-16-22 @ 16:57):    No growth    Culture - Legionella (collected 09-15-22 @ 12:00)  Source: .Legionella None  Preliminary Report (09-17-22 @ 21:39):    No Legionella species isolated                  Full T Cell Subset (09.16.22 @ 12:43)    CD19 %: 97 %    CD3 %: 1 %    CD4 %: 1 %    CD8 %: <1 %    4/8 Ratio: 1.42 RATIO    ABS [id number]: 869 /uL    ABS CD19: 19282 /uL    ABS CD3: 1131 /uL    ABS CD4: 561 /uL    ABS CD8: 395 /uL    RO0337 %: 1 %                      < from: TTE Echo Complete w/o Contrast w/ Doppler (09.15.22 @ 10:22) >     Impression     Summary     The left ventricle is normal in size, wall thickness, wall motion and   contractility.   Estimated left ventricular ejection fraction is 50-55 %.   The left atrium is mildly dilated.   The right atrium appears mildly dilated.   Normalappearing right ventricle structure. TAPSE is measuring 1.6 cm.   A device wire is seen in the RV and RA.   The aortic valve is well visualized, appears moderately sclerotic.   Mild (1+) aortic regurgitation is present.   The aortic root and ascending aorta are dilated.   The mitral valve leaflets appear calcified. The leaflet opening is   normal.   Mild (1+) tricuspid valve regurgitation is present.   No evidence of pericardial effusion.    < end of copied text >                          < from: CT Angio Chest PE Protocol w/ IV Cont (09.15.22 @ 04:57) >    ACC: [id number] EXAM:  CT ANGIO CHEST PULM ART [facility]                        ACC: [id number] EXAM:  CT ABDOMEN AND PELVIS IC                          PROCEDURE DATE:  09/15/2022          INTERPRETATION:  Final report:    CLINICAL INFORMATION: 87 years  Male with fever. lactate 6. Shortness of   breath. Acute respiratory failure. Hypoxia. Wells score greater than 4.    COMPARISON: None.    CONTRAST/COMPLICATIONS:  IV Contrast: IV contrast documented in associated exam (accession   [id number]), Omnipaque 350 (accession [id number])  100 cc administered   0 cc   discarded  Oral Contrast: NONE  Complications: None reported at time of study completion    PROCEDURE:  CT Angiography of the Chest was performed followed by portal venous phase   imaging of theAbdomen and Pelvis.  Sagittal and coronal reformats were performed as well as 3D (MIP)   reconstructions.    FINDINGS:  CHEST:  LUNGS AND LARGE AIRWAYS: Patent central airways. ET tube tip 3.5 cm above   the scar. Dense bilateral lower lobe consolidation with air   bronchograms secondary to atelectasis or pneumonia. Right lower lobe   bronchial secretions.  PLEURA: Small left pleural effusion. No right pleural effusion.  VESSELS: Coronary artery calcifications and/or stents. Aortic   atherosclerosis without aneurysm. No pulmonary embolism identified.  HEART: Moderate cardiomegaly. Intracardiac pacemaker leads. No   pericardial effusion.  MEDIASTINUM AND LORENZO: No lymphadenopathy.  CHEST WALL AND LOWER NECK: Pacemaker pulse generator in the left chest   wall.    ABDOMEN AND PELVIS:  LIVER: Within normal limits.  BILE DUCTS: Pneumobilia in the left intrahepatic biliary radicals.  GALLBLADDER: Cholecystectomy.  SPLEEN: Within normal limits.  PANCREAS: Within normal limits.  ADRENALS: Withinnormal limits.  KIDNEYS/URETERS: 1.7 cm right midpole cyst. Other cysts and hypodensities   too small to characterize. Nonspecific bilateral perinephric fat   stranding. No hydroureteronephrosis. Symmetrical nephrograms.    BLADDER: Collapsed around Barnes catheter.  REPRODUCTIVE ORGANS: Enlarged prostate 6.4 x 6.0 cm.    BOWEL: No bowel obstruction. Appendix is not visualized. No evidence of   inflammation in the pericecal region.. Enteric catheter tip terminating   in the stomach. Descending andsigmoid colonic diverticulosis without   diverticulitis.  PERITONEUM: No ascites.  VESSELS: Atherosclerotic changes.  RETROPERITONEUM/LYMPH NODES: No lymphadenopathy.  ABDOMINAL WALL: Bilateral fat-containing inguinal hernias, larger on the   left.  BONES: Degenerative changes.    IMPRESSION:    No pulmonary embolism identified.    Bilateral lower lobe consolidation secondary to atelectasis and/or   pneumonia.    Cardiomegaly. Pacemaker.    Pneumobilia post cholecystectomy.    Enlarged prostate.    Colonic diverticulosis without diverticulitis.    < end of copied text >        Radiology: all available radiological tests reviewed    Advanced directives addressed: full resuscitation

## 2022-09-22 NOTE — PROGRESS NOTE ADULT - SUBJECTIVE AND OBJECTIVE BOX
HPI:  88 y/o M w/ PMHx a fib on coumadin, severe cardiomyopathy, CAD s/p MI, HTN, HLD, CHF, s/p AICD, RA, gout, and leukemia (diagnosed 1 yr ago, has not received treatment) who presented to ER in severe respiratory distress requiring emergent intubation. Per pt's daughter, Jerri, she and her daughter whom pt lives w/ have been sick w/ URI symptoms (tested negative for COVID-19).  Onday PTA the pt began to c/o headache, dizziness, and cough. Overnight, pt developed shortness of breath prompting EMS call. Labs revealed leukocytosis (WBC count 172K), ADRIANA (BUN/Cr 22/1.49), and lactic acidosis (lactate 6). In the ER, pt was administered 2 L IVF, cefepime, vancomycin, and ofirmev. Propofol infusion was initiated for sedation. Pt admitted to ICU for management of acute hypoxic respiratory failure. On 9/21/22, pt was transferred to the floor for further management.     9/22/22  Pt seen by beside with his son in the room. Pt speaking in full sentences with NS 4L. No complaints at this time.     REVIEW OF SYSTEMS:  CONSTITUTIONAL: No weakness, fevers or chills  EYES/ENT: No visual changes;  No vertigo or throat pain   NECK: No pain or stiffness  RESPIRATORY: No cough, wheezing, hemoptysis; No shortness of breath  CARDIOVASCULAR: No chest pain or palpitations  GASTROINTESTINAL: No abdominal or epigastric pain. No nausea, vomiting; No diarrhea or constipation.   GENITOURINARY: No dysuria, frequency or hematuria  NEUROLOGICAL: No numbness or weakness  SKIN: No itching, rashes    Vitals:  ============  T(F): 97.9 (22 Sep 2022 08:16), Max: 98.6 (21 Sep 2022 17:00)  HR: 70 (22 Sep 2022 08:16)  BP: 147/65 (22 Sep 2022 08:16)  RR: 20 (22 Sep 2022 08:16)  SpO2: 96% (22 Sep 2022 08:16) (94% - 100%)  temp max in last 48H T(F): , Max: 99.4 (09-21-22 @ 08:00)    =======================================================  PHYSICAL EXAM:  Constitutional: Pt lying in bed, awake and alert, NAD  HEENT: EOMI, normal hearing, moist mucous membranes  Neck: Soft and supple, no JVD  Respiratory: CTABL, No wheezing, rales or rhonchi  Cardiovascular: S1S2+, RRR  Gastrointestinal: BS+, soft, distended, no guarding, no rebound  Extremities: +1 peripheral edema  Neurological: AAOx3  Skin: No rashes  =======================================================  Labs:                        13.5   110.49 )-----------( 153      ( 22 Sep 2022 07:10 )             43.4     09-22    143  |  101  |  36<H>  ----------------------------<  95  3.2<L>   |  37<H>  |  0.76    Ca    9.2      22 Sep 2022 07:10  Phos  3.2     09-22  Mg     1.9     09-22        Culture - Blood (collected 09-18-22 @ 05:49)  Source: .Blood None    Culture - Blood (collected 09-18-22 @ 05:49)  Source: .Blood None    Culture - Urine (collected 09-15-22 @ 12:00)  Source: Catheterized Catheterized  Final Report (09-16-22 @ 16:57):    No growth    Culture - Legionella (collected 09-15-22 @ 12:00)  Source: .Legionella None    Culture - Sputum (collected 09-15-22 @ 08:00)  Source: .Sputum Sputum  Gram Stain (09-15-22 @ 15:36):    Rare Gram Negative Rods    Rare polymorphonuclear leukocytes  Final Report (09-17-22 @ 09:11):    Few Haemophilus influenzae "Susceptibilities not performed"    Normal Respiratory Maryanne present    Culture - Urine (collected 09-15-22 @ 06:30)  Source: Clean Catch Clean Catch (Midstream)  Final Report (09-16-22 @ 07:22):    <10,000 CFU/mL Normal Urogenital Maryanne    Culture - Blood (collected 09-15-22 @ 02:23)  Source: .Blood Blood-Peripheral  Final Report (09-20-22 @ 09:00):    No Growth Final    Culture - Blood (collected 09-15-22 @ 02:23)  Source: .Blood Blood-Peripheral  Gram Stain (09-16-22 @ 05:49):    Growth in aerobic bottle: Gram Positive Cocci in Clusters  Final Report (09-17-22 @ 12:26):    Growth in aerobic bottle: Staphylococcus cohnii    Coag Negative Staphylococcus    Single set isolate, possible contaminant. Contact    Microbiology if susceptibility testing clinically    indicated.    ***Blood Panel PCR results on this specimen are available    approximately 3 hours after the Gram stain result.***    Gram stain, PCR, and/or culture results may not always    correspond due to difference in methodologies.    ************************************************************    This PCR assay was performed by multiplex PCR. This    Assay tests for 66 bacterial and resistance gene targets.    Please refer to the [facility] test directory    at [email or web address] for details.  Organism: Blood Culture PCR (09-17-22 @ 12:26)  Organism: Blood Culture PCR (09-17-22 @ 12:26)    Sensitivities:      -  Coagulase negative Staphylococcus: Detec      Method Type: PCR    Creatinine, Serum: 0.76 mg/dL (09-22-22 @ 07:10)  Creatinine, Serum: 0.93 mg/dL (09-21-22 @ 05:34)  Creatinine, Serum: 0.93 mg/dL (09-20-22 @ 05:34)  Creatinine, Serum: 0.94 mg/dL (09-19-22 @ 05:29)  Creatinine, Serum: 1.07 mg/dL (09-18-22 @ 05:49)    WBC Count: 110.49 K/uL (09-22-22 @ 07:10)  WBC Count: 98.67 K/uL (09-21-22 @ 05:34)  WBC Count: 104.06 K/uL (09-20-22 @ 05:34)  WBC Count: 99.95 K/uL (09-19-22 @ 05:29)  WBC Count: 76.46 K/uL (09-18-22 @ 05:49)    COVID-19 PCR: NotDetec (09-21-22 @ 06:00)  SARS-CoV-2: NotDetec (09-15-22 @ 02:23)    Medication   MEDICATIONS  (STANDING):  dorzolamide 2%/timolol 0.5% Ophthalmic Solution 1 Drop(s) Both EYES two times a day  metoprolol tartrate 25 milliGRAM(s) Oral two times a day  polyethylene glycol 3350 17 Gram(s) Oral two times a day  senna 2 Tablet(s) Oral at bedtime    MEDICATIONS  (PRN):  acetaminophen     Tablet .. 650 milliGRAM(s) Oral every 6 hours PRN Temp greater or equal to 38C (100.4F)  sodium chloride 0.65% Nasal 1 Spray(s) Both Nostrils four times a day PRN Nasal Congestion    Imaging:  CXR (9/17)  Progression of congestion. No focal infiltrate identified.    US Abdomen (9/16)  Mild intrahepatic biliary ductal dilatation with findings consistent with known pneumobilia. Extrahepatic CBD caliber unremarkable.   Cholecystectomy. See above discussion.    CT ABD (9/15)  No pulmonary embolism identified.  Bilateral lower lobe consolidation secondary to atelectasis and/or pneumonia.  Cardiomegaly. Pacemaker.  Pneumobilia post cholecystectomy.  Enlarged prostate.  Colonic diverticulosis without diverticulitis.    CXR (9/15)  Cardiomegaly with pacemaker in the right ventricle but without pulmonary edema.  Left lower lobe atelectasis.  Slightly elevated right hemidiaphragm, unchanged.

## 2022-09-22 NOTE — CHART NOTE - NSCHARTNOTEFT_GEN_A_CORE
[facility]. [facility] seen at [facility] and states that he is currently under the care of Dr. Gutierrez. [facility] consulted for CLL. Patient seen at the bedside and states that he is currently under the care of Dr. Gutierrez.  Please consult Dr. Carson service/ [facility].

## 2022-09-22 NOTE — PROGRESS NOTE ADULT - ASSESSMENT
87M with PMH of CLL (no tx), CAD, CHF, s/p ICD, chronic A.fib on warfarin, cholecystectomy 10-20 yrs ago who was admitted to the ICU on 9/15/22 secondary to acute hypoxic respiratory failure and septic shock in the setting of H influenza neumonia. Pt also presented with CNS bacteremia and prerenal ADRIANA. Pt is no longer septic and bacteremia has resolved. Pt now on the floor for the management of respiratory failure.     #Acute hypoxic resp failure 2/2 pneumonia   -s/p intubation, downgraded from ICU on 1  -on 4L NC @96%  -NIV PRN/ nocturnal BIBAP PRN  -Clear to auscultate bilaterally today  -discontinue Lasix   -wean off oxygen   -PT, OBB    #Bacteremia 2/2 Haemophilus influenzae  -sputum clx grew haemophilus   -resolved, bood culture x2 no growth as of 9/22  -s/p completion of vanc and cefepime   -will monitor for symptoms  87M with PMH of CLL (no tx), CAD, CHF, s/p ICD, chronic A.fib on warfarin, cholecystectomy 10-20 yrs ago who was admitted to the ICU on 9/15/22 secondary to acute hypoxic respiratory failure and septic shock in the setting of H influenza neumonia. Pt also presented with CNS bacteremia and prerenal ADRIANA. Pt is no longer septic and bacteremia has resolved. Pt now on the floor for the management of respiratory failure.     #Acute hypoxic resp failure 2/2 pneumonia   -s/p intubation, downgraded from ICU on 1  -on 4L NC @96%  -NIV PRN/ nocturnal BIBAP PRN  -Clear to auscultate bilaterally today  -discontinue Lasix   -wean off oxygen   -PT, OBB    #pneumonia 2/2 H influenzae   -sputum clx grew haemophilus   -CXR + consolidation (9/15)  -s/p completion of abx   -pt on 4L 96%, speaking in full sentences, afebrile 9  -repeat CXR (9/21)    #septic shock  -resoved  -VSS  -continue to monitor    #Bacteremia   -initial blood culture grew coagulase negative staphylococcus, possible contaminant   -s/p completion of vanc and cefepime   -now resolved, blood culture x2 no growth as of 9/22  -will monitor for symptoms

## 2022-09-23 ENCOUNTER — TRANSCRIPTION ENCOUNTER (OUTPATIENT)
Age: 87
End: 2022-09-23

## 2022-09-23 ENCOUNTER — APPOINTMENT (OUTPATIENT)
Dept: DERMATOLOGY | Facility: CLINIC | Age: 87
End: 2022-09-23

## 2022-09-23 LAB
ANION GAP SERPL CALC-SCNC: 1 MMOL/L — LOW (ref 5–17)
BUN SERPL-MCNC: 35 MG/DL — HIGH (ref 7–23)
CALCIUM SERPL-MCNC: 8.9 MG/DL — SIGNIFICANT CHANGE UP (ref 8.5–10.1)
CHLORIDE SERPL-SCNC: 101 MMOL/L — SIGNIFICANT CHANGE UP (ref 96–108)
CO2 SERPL-SCNC: 38 MMOL/L — HIGH (ref 22–31)
CREAT SERPL-MCNC: 0.75 MG/DL — SIGNIFICANT CHANGE UP (ref 0.5–1.3)
CULTURE RESULTS: SIGNIFICANT CHANGE UP
EGFR: 88 ML/MIN/1.73M2 — SIGNIFICANT CHANGE UP
GLUCOSE SERPL-MCNC: 89 MG/DL — SIGNIFICANT CHANGE UP (ref 70–99)
HCT VFR BLD CALC: 42.3 % — SIGNIFICANT CHANGE UP (ref 39–50)
HGB BLD-MCNC: 13 G/DL — SIGNIFICANT CHANGE UP (ref 13–17)
INR BLD: 2.11 RATIO — HIGH (ref 0.88–1.16)
MCHC RBC-ENTMCNC: 30.7 GM/DL — LOW (ref 32–36)
MCHC RBC-ENTMCNC: 32.3 PG — SIGNIFICANT CHANGE UP (ref 27–34)
MCV RBC AUTO: 105.2 FL — HIGH (ref 80–100)
PLATELET # BLD AUTO: 149 K/UL — LOW (ref 150–400)
POTASSIUM SERPL-MCNC: 4.4 MMOL/L — SIGNIFICANT CHANGE UP (ref 3.5–5.3)
POTASSIUM SERPL-SCNC: 4.4 MMOL/L — SIGNIFICANT CHANGE UP (ref 3.5–5.3)
PROTHROM AB SERPL-ACNC: 24.7 SEC — HIGH (ref 10.5–13.4)
RBC # BLD: 4.02 M/UL — LOW (ref 4.2–5.8)
RBC # FLD: 13.9 % — SIGNIFICANT CHANGE UP (ref 10.3–14.5)
SODIUM SERPL-SCNC: 140 MMOL/L — SIGNIFICANT CHANGE UP (ref 135–145)
SPECIMEN SOURCE: SIGNIFICANT CHANGE UP
WBC # BLD: 95.42 K/UL — CRITICAL HIGH (ref 3.8–10.5)
WBC # FLD AUTO: 95.42 K/UL — CRITICAL HIGH (ref 3.8–10.5)

## 2022-09-23 PROCEDURE — 99233 SBSQ HOSP IP/OBS HIGH 50: CPT | Mod: GC

## 2022-09-23 RX ORDER — WARFARIN SODIUM 2.5 MG/1
3 TABLET ORAL ONCE
Refills: 0 | Status: COMPLETED | OUTPATIENT
Start: 2022-09-23 | End: 2022-09-23

## 2022-09-23 RX ADMIN — Medication 25 MILLIGRAM(S): at 22:04

## 2022-09-23 RX ADMIN — POLYETHYLENE GLYCOL 3350 17 GRAM(S): 17 POWDER, FOR SOLUTION ORAL at 09:40

## 2022-09-23 RX ADMIN — DORZOLAMIDE HYDROCHLORIDE TIMOLOL MALEATE 1 DROP(S): 20; 5 SOLUTION/ DROPS OPHTHALMIC at 09:39

## 2022-09-23 RX ADMIN — DORZOLAMIDE HYDROCHLORIDE TIMOLOL MALEATE 1 DROP(S): 20; 5 SOLUTION/ DROPS OPHTHALMIC at 22:05

## 2022-09-23 RX ADMIN — Medication 1 TABLET(S): at 09:39

## 2022-09-23 RX ADMIN — Medication 25 MILLIGRAM(S): at 09:39

## 2022-09-23 RX ADMIN — POLYETHYLENE GLYCOL 3350 17 GRAM(S): 17 POWDER, FOR SOLUTION ORAL at 22:05

## 2022-09-23 RX ADMIN — SENNA PLUS 2 TABLET(S): 8.6 TABLET ORAL at 22:11

## 2022-09-23 RX ADMIN — WARFARIN SODIUM 3 MILLIGRAM(S): 2.5 TABLET ORAL at 22:04

## 2022-09-23 NOTE — DISCHARGE NOTE NURSING/CASE MANAGEMENT/SOCIAL WORK - NSDCPEFALRISK_GEN_ALL_CORE
For information on Fall & Injury Prevention, visit: [email or web address] OR  [email or web address] OR  https://www.cdc.gov/steadi/patient.html

## 2022-09-23 NOTE — CHART NOTE - NSCHARTNOTEFT_GEN_A_CORE
Patient will require home O2 for discharge  .  Patient is aware and agreeable to home O2.  Patient is in a chronic stable state of CHF and cardiopathy.    - Room air pulse ox. at rest:  90%       - Room air pulse ox while ambulatin%    - Pulse ox while ambulating on 2 liters n/c  O2 94%      Patient treated for pneumonia: YES    Pneumonia treated and resolved: YES

## 2022-09-23 NOTE — PROVIDER CONTACT NOTE (CRITICAL VALUE NOTIFICATION) - PERSON GIVING RESULT:
Lab/ AUDREY Duncan
RICHARD Dumas
Ellis/Laboratory
Vale Dumas
lab
navi Salazar
Nitesh/ Lab
Lyubov
Alesia Strickland
Alesia/ Iona
Cindy Alesia
Lab/ Jared
PEREZ mcelroy
navi Simons

## 2022-09-23 NOTE — PROGRESS NOTE ADULT - ATTENDING COMMENTS
Please see resident note for full details regarding the service. Patient seen and examined at the bedside.     General: Awake and alert, cooperative with exam. No acute distress.   Cardiology: Normal S1, S2. No murmurs. RRR.   Respiratory: Lungs CTABL. No wheezes, rales, or rhonchi.   Gastrointestinal: + BS. Soft. NT. ND. No guarding, rigidity, or rebound tenderness.  Extremities: No peripheral edema bilaterally.  Neurological: A+Ox3. CN 2-12 intact. No FND. Normal speech. No facial droop.   Psychiatric: Normal affect. Normal mood.     Assessment:   - Acute hypoxic respiratory failure secondary to pneumonia requiring emergent intubation in the ER, pt is s/p extubation   - Septic shock (improved)   - Acute kidney injury   - BCx x 1 with coagulase negative staph -> repeat BCx negative   - Leukocytosis secondary to CLL/steroid induced   - History of A fib on coumadin, severe cardiomyopathy, CAD s/p MI, HTN, HLD, CHF, s/p AICD, RA, gout, and CLL (diagnosed 1 yr ago, has not received treatment)    Plan:   - INR 3.01, hold Coumadin today, recheck PT/PTT/INR   - s/p Lasix 40 mg yesterday; d/c further doses as pt is euvolemic   - Pt completed course of steroids and abx   - Repeat BCx x 2 negative   - ECHO 9/15 - no vegetations, EF 50-55% LA dilation, mild AR and TR   - Will discuss PCP ppx with Heme/Onc -  Dr. Carson   - Wean off O2 as tolerated; may need d/c with home O2   - D/C Planning
Please see resident note for full details regarding the service. Patient seen and examined at the bedside.     General: Awake and alert, cooperative with exam. No acute distress.   Cardiology: Normal S1, S2. No murmurs. RRR.   Respiratory: Lungs CTABL. No wheezes, rales, or rhonchi.   Gastrointestinal: + BS. Soft. NT. ND. No guarding, rigidity, or rebound tenderness.  Extremities: No peripheral edema bilaterally.  Neurological: A+Ox3. CN 2-12 intact. No FND. Normal speech. No facial droop.   Psychiatric: Normal affect. Normal mood.     Assessment:   - Acute hypoxic respiratory failure secondary to pneumonia requiring emergent intubation in the ER, pt is s/p extubation   - Septic shock (improved)   - Acute kidney injury   - BCx x 1 with coagulase negative staph -> repeat BCx negative   - Leukocytosis secondary to CLL/steroid induced   - History of A fib on coumadin, severe cardiomyopathy, CAD s/p MI, HTN, HLD, CHF, s/p AICD, RA, gout, and CLL (diagnosed 1 yr ago, has not received treatment)    Plan:   - INR 3.01, hold Coumadin today, recheck PT/PTT/INR   - s/p Lasix 40 mg yesterday; d/c further doses as pt is euvolemic   - Pt completed course of steroids and abx   - Repeat BCx x 2 negative   - ECHO 9/15 - no vegetations, EF 50-55% LA dilation, mild AR and TR   - Will discuss PCP ppx with Heme/Onc -  Dr. Carson   - Wean off O2 as tolerated; may need d/c with home O2   - D/C Planning

## 2022-09-23 NOTE — DISCHARGE NOTE NURSING/CASE MANAGEMENT/SOCIAL WORK - PATIENT PORTAL LINK FT
You can access the FollowMyHealth Patient Portal offered by [facility] by registering at the following website: [email or web address]. By joining [employer]’s FollowMyHealth portal, you will also be able to view your health information using other applications (apps) compatible with our system.

## 2022-09-23 NOTE — PROGRESS NOTE ADULT - SUBJECTIVE AND OBJECTIVE BOX
HPI:  88 y/o M w/ PMHx a fib on coumadin, severe cardiomyopathy, CAD s/p MI, HTN, HLD, CHF, s/p AICD, RA, gout, and leukemia (diagnosed 1 yr ago, has not received treatment) who presented to ER in severe respiratory distress requiring emergent intubation. Per pt's daughter, Jerri, she and her daughter whom pt lives w/ have been sick w/ URI symptoms (tested negative for COVID-19).  Onday PTA the pt began to c/o headache, dizziness, and cough. Overnight, pt developed shortness of breath prompting EMS call. Labs revealed leukocytosis (WBC count 172K), ADRIANA (BUN/Cr 22/1.49), and lactic acidosis (lactate 6). In the ER, pt was administered 2 L IVF, cefepime, vancomycin, and ofirmev. Propofol infusion was initiated for sedation. Pt admitted to ICU for management of acute hypoxic respiratory failure. On 9/21/22, pt was transferred to the floor for further management.     9/23/22  Pt seen by beside. Pt speaking in full sentences with NS 4L. Pt failed to wean off oxygen last night.     REVIEW OF SYSTEMS:  CONSTITUTIONAL: No weakness, fevers or chills  EYES/ENT: No visual changes;  No vertigo or throat pain   NECK: No pain or stiffness  RESPIRATORY: No cough, wheezing, hemoptysis; No shortness of breath  CARDIOVASCULAR: No chest pain or palpitations  GASTROINTESTINAL: No abdominal or epigastric pain. No nausea, vomiting; No diarrhea or constipation.   GENITOURINARY: No dysuria, frequency or hematuria  NEUROLOGICAL: No numbness or weakness  SKIN: No itching, rashes    Vitals:  ============  T(F): 97.8 (23 Sep 2022 15:35), Max: 98.6 (23 Sep 2022 07:47)  HR: 72 (23 Sep 2022 15:35)  BP: 129/66 (23 Sep 2022 15:35)  RR: 18 (23 Sep 2022 15:35)  SpO2: 94% (23 Sep 2022 15:35) (94% - 100%)  temp max in last 48H T(F): , Max: 98.6 (09-23-22 @ 07:47)    =======================================================    PHYSICAL EXAM:  Constitutional: Pt lying in bed, awake and alert, NAD  HEENT: EOMI, normal hearing, moist mucous membranes  Neck: Soft and supple, no JVD  Respiratory: CTABL, No wheezing, rales or rhonchi  Cardiovascular: S1S2+, RRR  Gastrointestinal: BS+, soft, distended, no guarding, no rebound  Extremities: +1 peripheral edema  Neurological: AAOx3  Skin: No rashes    =======================================================  Labs:                        13.0   95.42 )-----------( 149      ( 23 Sep 2022 06:28 )             42.3     09-23    140  |  101  |  35<H>  ----------------------------<  89  4.4   |  38<H>  |  0.75    Ca    8.9      23 Sep 2022 06:28  Phos  3.2     09-22  Mg     1.9     09-22      Culture - Blood (collected 09-18-22 @ 05:49)  Source: .Blood None  Final Report (09-23-22 @ 10:00):    No Growth Final    Culture - Blood (collected 09-18-22 @ 05:49)  Source: .Blood None  Final Report (09-23-22 @ 10:00):    No Growth Final    Culture - Urine (collected 09-15-22 @ 12:00)  Source: Catheterized Catheterized  Final Report (09-16-22 @ 16:57):    No growth    Culture - Legionella (collected 09-15-22 @ 12:00)  Source: .Legionella None    Culture - Sputum (collected 09-15-22 @ 08:00)  Source: .Sputum Sputum  Gram Stain (09-15-22 @ 15:36):    Rare Gram Negative Rods    Rare polymorphonuclear leukocytes  Final Report (09-17-22 @ 09:11):    Few Haemophilus influenzae "Susceptibilities not performed"    Normal Respiratory Maryanne present    Culture - Urine (collected 09-15-22 @ 06:30)  Source: Clean Catch Clean Catch (Midstream)  Final Report (09-16-22 @ 07:22):    <10,000 CFU/mL Normal Urogenital Maryanne    Culture - Blood (collected 09-15-22 @ 02:23)  Source: .Blood Blood-Peripheral  Final Report (09-20-22 @ 09:00):    No Growth Final    Culture - Blood (collected 09-15-22 @ 02:23)  Source: .Blood Blood-Peripheral  Gram Stain (09-16-22 @ 05:49):    Growth in aerobic bottle: Gram Positive Cocci in Clusters  Final Report (09-17-22 @ 12:26):    Growth in aerobic bottle: Staphylococcus cohnii    Coag Negative Staphylococcus    Single set isolate, possible contaminant. Contact    Microbiology if susceptibility testing clinically    indicated.    ***Blood Panel PCR results on this specimen are available    approximately 3 hours after the Gram stain result.***    Gram stain, PCR, and/or culture results may not always    correspond due to difference in methodologies.    ************************************************************    This PCR assay was performed by multiplex PCR. This    Assay tests for 66 bacterial and resistance gene targets.    Please refer to the [facility] Labs test directory    at [email or web address] for details.  Organism: Blood Culture PCR (09-17-22 @ 12:26)  Organism: Blood Culture PCR (09-17-22 @ 12:26)    Sensitivities:      -  Coagulase negative Staphylococcus: Detec      Method Type: PCR      Creatinine, Serum: 0.75 mg/dL (09-23-22 @ 06:28)  Creatinine, Serum: 0.76 mg/dL (09-22-22 @ 07:10)  Creatinine, Serum: 0.93 mg/dL (09-21-22 @ 05:34)  Creatinine, Serum: 0.93 mg/dL (09-20-22 @ 05:34)  Creatinine, Serum: 0.94 mg/dL (09-19-22 @ 05:29)      WBC Count: 95.42 K/uL (09-23-22 @ 06:28)  WBC Count: 110.49 K/uL (09-22-22 @ 07:10)  WBC Count: 98.67 K/uL (09-21-22 @ 05:34)  WBC Count: 104.06 K/uL (09-20-22 @ 05:34)  WBC Count: 99.95 K/uL (09-19-22 @ 05:29)    COVID-19 PCR: NotDetec (09-21-22 @ 06:00)  SARS-CoV-2: NotDetec (09-15-22 @ 02:23)    =======================================================    Medication   MEDICATIONS  (STANDING):  dorzolamide 2%/timolol 0.5% Ophthalmic Solution 1 Drop(s) Both EYES two times a day  lactobacillus acidophilus 1 Tablet(s) Oral daily  metoprolol tartrate 25 milliGRAM(s) Oral two times a day  polyethylene glycol 3350 17 Gram(s) Oral two times a day  senna 2 Tablet(s) Oral at bedtime  warfarin 3 milliGRAM(s) Oral once    MEDICATIONS  (PRN):  acetaminophen     Tablet .. 650 milliGRAM(s) Oral every 6 hours PRN Temp greater or equal to 38C (100.4F)  sodium chloride 0.65% Nasal 1 Spray(s) Both Nostrils four times a day PRN Nasal Congestion      Imaging:  CXR (9/17)  Progression of congestion. No focal infiltrate identified.    US Abdomen (9/16)  Mild intrahepatic biliary ductal dilatation with findings consistent with known pneumobilia. Extrahepatic CBD caliber unremarkable.   Cholecystectomy. See above discussion.    CT ABD (9/15)  No pulmonary embolism identified.  Bilateral lower lobe consolidation secondary to atelectasis and/or pneumonia.  Cardiomegaly. Pacemaker.  Pneumobilia post cholecystectomy.  Enlarged prostate.  Colonic diverticulosis without diverticulitis.    CXR (9/15)  Cardiomegaly with pacemaker in the right ventricle but without pulmonary edema.  Left lower lobe atelectasis.  Slightly elevated right hemidiaphragm, unchanged.         HPI:  86 y/o M w/ PMHx a fib on coumadin, severe cardiomyopathy, CAD s/p MI, HTN, HLD, CHF, s/p AICD, RA, gout, and leukemia (diagnosed 1 yr ago, has not received treatment) who presented to ER in severe respiratory distress requiring emergent intubation. Per pt's daughter, Jerri, she and her daughter whom pt lives w/ have been sick w/ URI symptoms (tested negative for COVID-19).  Onday PTA the pt began to c/o headache, dizziness, and cough. Overnight, pt developed shortness of breath prompting EMS call. Labs revealed leukocytosis (WBC count 172K), ADRIANA (BUN/Cr 22/1.49), and lactic acidosis (lactate 6). In the ER, pt was administered 2 L IVF, cefepime, vancomycin, and ofirmev. Propofol infusion was initiated for sedation. Pt admitted to ICU for management of acute hypoxic respiratory failure. On 9/21/22, pt was transferred to the floor for further management.     9/24/22  Pt seen by beside. Pt speaking in full sentences with NS 2L. Pt is feeling better     REVIEW OF SYSTEMS:  CONSTITUTIONAL: No weakness, fevers or chills  EYES/ENT: No visual changes;  No vertigo or throat pain   NECK: No pain or stiffness  RESPIRATORY: No cough, wheezing, hemoptysis; No shortness of breath  CARDIOVASCULAR: No chest pain or palpitations  GASTROINTESTINAL: No abdominal or epigastric pain. No nausea, vomiting; No diarrhea or constipation.   GENITOURINARY: No dysuria, frequency or hematuria  NEUROLOGICAL: No numbness or weakness  SKIN: No itching, rashes    Vitals:  Vital Signs Last 24 Hrs  T(C): 36.7 (24 Sep 2022 15:40), Max: 36.8 (24 Sep 2022 07:43)  T(F): 98.1 (24 Sep 2022 15:40), Max: 98.2 (24 Sep 2022 07:43)  HR: 62 (24 Sep 2022 15:40) (53 - 69)  BP: 116/59 (24 Sep 2022 15:40) (116/59 - 158/77)  BP(mean): --  RR: 18 (24 Sep 2022 15:40) (18 - 19)  SpO2: 96% (24 Sep 2022 15:40) (92% - 98%)    Parameters below as of 24 Sep 2022 15:40  Patient On (Oxygen Delivery Method): nasal cannula        PHYSICAL EXAM:  Constitutional: Pt lying in bed, awake and alert, NAD  HEENT: EOMI, normal hearing, moist mucous membranes  Neck: Soft and supple, no JVD  Respiratory: CTABL, No wheezing, rales or rhonchi  Cardiovascular: S1S2+, RRR  Gastrointestinal: BS+, soft, distended, no guarding, no rebound  Extremities: +1 peripheral edema  Neurological: AAOx3  Skin: No rashes    Labs:                                         13.1   98.06 )-----------( 158      ( 24 Sep 2022 08:11 )             42.8     09-24    139  |  100  |  28<H>  ----------------------------<  88  4.4   |  40<H>  |  0.80    Ca    9.2      24 Sep 2022 08:11          Culture - Blood (collected 09-18-22 @ 05:49)  Source: .Blood None  Final Report (09-23-22 @ 10:00):    No Growth Final    Culture - Blood (collected 09-18-22 @ 05:49)  Source: .Blood None  Final Report (09-23-22 @ 10:00):    No Growth Final    Culture - Urine (collected 09-15-22 @ 12:00)  Source: Catheterized Catheterized  Final Report (09-16-22 @ 16:57):    No growth    Culture - Legionella (collected 09-15-22 @ 12:00)  Source: .Legionella None    Culture - Sputum (collected 09-15-22 @ 08:00)  Source: .Sputum Sputum  Gram Stain (09-15-22 @ 15:36):    Rare Gram Negative Rods    Rare polymorphonuclear leukocytes  Final Report (09-17-22 @ 09:11):    Few Haemophilus influenzae "Susceptibilities not performed"    Normal Respiratory Maryanne present    Culture - Urine (collected 09-15-22 @ 06:30)  Source: Clean Catch Clean Catch (Midstream)  Final Report (09-16-22 @ 07:22):    <10,000 CFU/mL Normal Urogenital Maryanne    Culture - Blood (collected 09-15-22 @ 02:23)  Source: .Blood Blood-Peripheral  Final Report (09-20-22 @ 09:00):    No Growth Final    Culture - Blood (collected 09-15-22 @ 02:23)  Source: .Blood Blood-Peripheral  Gram Stain (09-16-22 @ 05:49):    Growth in aerobic bottle: Gram Positive Cocci in Clusters  Final Report (09-17-22 @ 12:26):    Growth in aerobic bottle: Staphylococcus cohnii    Coag Negative Staphylococcus    Single set isolate, possible contaminant. Contact    Microbiology if susceptibility testing clinically    indicated.    ***Blood Panel PCR results on this specimen are available    approximately 3 hours after the Gram stain result.***    Gram stain, PCR, and/or culture results may not always    correspond due to difference in methodologies.    ************************************************************    This PCR assay was performed by multiplex PCR. This    Assay tests for 66 bacterial and resistance gene targets.    Please refer to the [facility] Labs test directory    at [email or web address] for details.  Organism: Blood Culture PCR (09-17-22 @ 12:26)  Organism: Blood Culture PCR (09-17-22 @ 12:26)    Sensitivities:      -  Coagulase negative Staphylococcus: Detec      Method Type: PCR      Creatinine, Serum: 0.75 mg/dL (09-23-22 @ 06:28)  Creatinine, Serum: 0.76 mg/dL (09-22-22 @ 07:10)  Creatinine, Serum: 0.93 mg/dL (09-21-22 @ 05:34)  Creatinine, Serum: 0.93 mg/dL (09-20-22 @ 05:34)  Creatinine, Serum: 0.94 mg/dL (09-19-22 @ 05:29)      WBC Count: 95.42 K/uL (09-23-22 @ 06:28)  WBC Count: 110.49 K/uL (09-22-22 @ 07:10)  WBC Count: 98.67 K/uL (09-21-22 @ 05:34)  WBC Count: 104.06 K/uL (09-20-22 @ 05:34)  WBC Count: 99.95 K/uL (09-19-22 @ 05:29)    COVID-19 PCR: NotDetec (09-21-22 @ 06:00)  SARS-CoV-2: NotDetec (09-15-22 @ 02:23)    =======================================================    Medication   MEDICATIONS  (STANDING):  dorzolamide 2%/timolol 0.5% Ophthalmic Solution 1 Drop(s) Both EYES two times a day  lactobacillus acidophilus 1 Tablet(s) Oral daily  metoprolol tartrate 25 milliGRAM(s) Oral two times a day  polyethylene glycol 3350 17 Gram(s) Oral two times a day  senna 2 Tablet(s) Oral at bedtime  warfarin 3 milliGRAM(s) Oral once    MEDICATIONS  (PRN):  acetaminophen     Tablet .. 650 milliGRAM(s) Oral every 6 hours PRN Temp greater or equal to 38C (100.4F)  sodium chloride 0.65% Nasal 1 Spray(s) Both Nostrils four times a day PRN Nasal Congestion      Imaging:  CXR (9/17)  Progression of congestion. No focal infiltrate identified.    US Abdomen (9/16)  Mild intrahepatic biliary ductal dilatation with findings consistent with known pneumobilia. Extrahepatic CBD caliber unremarkable.   Cholecystectomy. See above discussion.    CT ABD (9/15)  No pulmonary embolism identified.  Bilateral lower lobe consolidation secondary to atelectasis and/or pneumonia.  Cardiomegaly. Pacemaker.  Pneumobilia post cholecystectomy.  Enlarged prostate.  Colonic diverticulosis without diverticulitis.    CXR (9/15)  Cardiomegaly with pacemaker in the right ventricle but without pulmonary edema.  Left lower lobe atelectasis.  Slightly elevated right hemidiaphragm, unchanged.         HPI:  86 y/o M w/ PMHx a fib on coumadin, severe cardiomyopathy, CAD s/p MI, HTN, HLD, CHF, s/p AICD, RA, gout, and leukemia (diagnosed 1 yr ago, has not received treatment) who presented to ER in severe respiratory distress requiring emergent intubation. Per pt's daughter, Jerri, she and her daughter whom pt lives w/ have been sick w/ URI symptoms (tested negative for COVID-19).  Onday PTA the pt began to c/o headache, dizziness, and cough. Overnight, pt developed shortness of breath prompting EMS call. Labs revealed leukocytosis (WBC count 172K), ADRIANA (BUN/Cr 22/1.49), and lactic acidosis (lactate 6). In the ER, pt was administered 2 L IVF, cefepime, vancomycin, and ofirmev. Propofol infusion was initiated for sedation. Pt admitted to ICU for management of acute hypoxic respiratory failure. On 9/21/22, pt was transferred to the floor for further management.     9/23/22  Pt seen by beside. Pt speaking in full sentences with NS 2L. Pt is feeling better     REVIEW OF SYSTEMS:  CONSTITUTIONAL: No weakness, fevers or chills  EYES/ENT: No visual changes;  No vertigo or throat pain   NECK: No pain or stiffness  RESPIRATORY: No cough, wheezing, hemoptysis; No shortness of breath  CARDIOVASCULAR: No chest pain or palpitations  GASTROINTESTINAL: No abdominal or epigastric pain. No nausea, vomiting; No diarrhea or constipation.   GENITOURINARY: No dysuria, frequency or hematuria  NEUROLOGICAL: No numbness or weakness  SKIN: No itching, rashes    Vitals:  Vital Signs Last 24 Hrs  T(C): 36.7 (24 Sep 2022 15:40), Max: 36.8 (24 Sep 2022 07:43)  T(F): 98.1 (24 Sep 2022 15:40), Max: 98.2 (24 Sep 2022 07:43)  HR: 62 (24 Sep 2022 15:40) (53 - 69)  BP: 116/59 (24 Sep 2022 15:40) (116/59 - 158/77)  BP(mean): --  RR: 18 (24 Sep 2022 15:40) (18 - 19)  SpO2: 96% (24 Sep 2022 15:40) (92% - 98%)    Parameters below as of 24 Sep 2022 15:40  Patient On (Oxygen Delivery Method): nasal cannula        PHYSICAL EXAM:  Constitutional: Pt lying in bed, awake and alert, NAD  HEENT: EOMI, normal hearing, moist mucous membranes  Neck: Soft and supple, no JVD  Respiratory: CTABL, No wheezing, rales or rhonchi  Cardiovascular: S1S2+, RRR  Gastrointestinal: BS+, soft, distended, no guarding, no rebound  Extremities: +1 peripheral edema  Neurological: AAOx3  Skin: No rashes    Labs:                                         13.1   98.06 )-----------( 158      ( 24 Sep 2022 08:11 )             42.8     09-24    139  |  100  |  28<H>  ----------------------------<  88  4.4   |  40<H>  |  0.80    Ca    9.2      24 Sep 2022 08:11          Culture - Blood (collected 09-18-22 @ 05:49)  Source: .Blood None  Final Report (09-23-22 @ 10:00):    No Growth Final    Culture - Blood (collected 09-18-22 @ 05:49)  Source: .Blood None  Final Report (09-23-22 @ 10:00):    No Growth Final    Culture - Urine (collected 09-15-22 @ 12:00)  Source: Catheterized Catheterized  Final Report (09-16-22 @ 16:57):    No growth    Culture - Legionella (collected 09-15-22 @ 12:00)  Source: .Legionella None    Culture - Sputum (collected 09-15-22 @ 08:00)  Source: .Sputum Sputum  Gram Stain (09-15-22 @ 15:36):    Rare Gram Negative Rods    Rare polymorphonuclear leukocytes  Final Report (09-17-22 @ 09:11):    Few Haemophilus influenzae "Susceptibilities not performed"    Normal Respiratory Maryanne present    Culture - Urine (collected 09-15-22 @ 06:30)  Source: Clean Catch Clean Catch (Midstream)  Final Report (09-16-22 @ 07:22):    <10,000 CFU/mL Normal Urogenital Maryanne    Culture - Blood (collected 09-15-22 @ 02:23)  Source: .Blood Blood-Peripheral  Final Report (09-20-22 @ 09:00):    No Growth Final    Culture - Blood (collected 09-15-22 @ 02:23)  Source: .Blood Blood-Peripheral  Gram Stain (09-16-22 @ 05:49):    Growth in aerobic bottle: Gram Positive Cocci in Clusters  Final Report (09-17-22 @ 12:26):    Growth in aerobic bottle: Staphylococcus cohnii    Coag Negative Staphylococcus    Single set isolate, possible contaminant. Contact    Microbiology if susceptibility testing clinically    indicated.    ***Blood Panel PCR results on this specimen are available    approximately 3 hours after the Gram stain result.***    Gram stain, PCR, and/or culture results may not always    correspond due to difference in methodologies.    ************************************************************    This PCR assay was performed by multiplex PCR. This    Assay tests for 66 bacterial and resistance gene targets.    Please refer to the [facility] Labs test directory    at [email or web address] for details.  Organism: Blood Culture PCR (09-17-22 @ 12:26)  Organism: Blood Culture PCR (09-17-22 @ 12:26)    Sensitivities:      -  Coagulase negative Staphylococcus: Detec      Method Type: PCR      Creatinine, Serum: 0.75 mg/dL (09-23-22 @ 06:28)  Creatinine, Serum: 0.76 mg/dL (09-22-22 @ 07:10)  Creatinine, Serum: 0.93 mg/dL (09-21-22 @ 05:34)  Creatinine, Serum: 0.93 mg/dL (09-20-22 @ 05:34)  Creatinine, Serum: 0.94 mg/dL (09-19-22 @ 05:29)      WBC Count: 95.42 K/uL (09-23-22 @ 06:28)  WBC Count: 110.49 K/uL (09-22-22 @ 07:10)  WBC Count: 98.67 K/uL (09-21-22 @ 05:34)  WBC Count: 104.06 K/uL (09-20-22 @ 05:34)  WBC Count: 99.95 K/uL (09-19-22 @ 05:29)    COVID-19 PCR: NotDetec (09-21-22 @ 06:00)  SARS-CoV-2: NotDetec (09-15-22 @ 02:23)    =======================================================    Medication   MEDICATIONS  (STANDING):  dorzolamide 2%/timolol 0.5% Ophthalmic Solution 1 Drop(s) Both EYES two times a day  lactobacillus acidophilus 1 Tablet(s) Oral daily  metoprolol tartrate 25 milliGRAM(s) Oral two times a day  polyethylene glycol 3350 17 Gram(s) Oral two times a day  senna 2 Tablet(s) Oral at bedtime  warfarin 3 milliGRAM(s) Oral once    MEDICATIONS  (PRN):  acetaminophen     Tablet .. 650 milliGRAM(s) Oral every 6 hours PRN Temp greater or equal to 38C (100.4F)  sodium chloride 0.65% Nasal 1 Spray(s) Both Nostrils four times a day PRN Nasal Congestion      Imaging:  CXR (9/17)  Progression of congestion. No focal infiltrate identified.    US Abdomen (9/16)  Mild intrahepatic biliary ductal dilatation with findings consistent with known pneumobilia. Extrahepatic CBD caliber unremarkable.   Cholecystectomy. See above discussion.    CT ABD (9/15)  No pulmonary embolism identified.  Bilateral lower lobe consolidation secondary to atelectasis and/or pneumonia.  Cardiomegaly. Pacemaker.  Pneumobilia post cholecystectomy.  Enlarged prostate.  Colonic diverticulosis without diverticulitis.    CXR (9/15)  Cardiomegaly with pacemaker in the right ventricle but without pulmonary edema.  Left lower lobe atelectasis.  Slightly elevated right hemidiaphragm, unchanged.

## 2022-09-23 NOTE — PROGRESS NOTE ADULT - ASSESSMENT
87M with PMH of CLL (no tx), CAD, CHF, s/p ICD, chronic A.fib on warfarin, cholecystectomy 10-20 yrs ago who was admitted to the ICU on 9/15/22 secondary to acute hypoxic respiratory failure and septic shock in the setting of H influenza neumonia. Pt also presented with CNS bacteremia and prerenal ADRIANA. Pt is no longer septic and bacteremia has resolved. Pt now on the floor for the management of respiratory failure.     #Acute hypoxic resp failure 2/2 pneumonia   -s/p intubation, downgraded from ICU on 1  -on 4L NC @100%  -NIV PRN/ nocturnal BIBAP PRN  -Clear to auscultate bilaterally today  -discontinue Lasix   -wean off oxygen  -PT, OBB  -completed home oxygen eval  -pt require home O2 2/2 CHF and cardiopathy     #pneumonia 2/2 H influenzae   -sputum clx grew haemophilus   -CXR + consolidation (9/15)  -s/p completion of abx   -pt on 4L 100%, speaking in full sentences, afebrile   -repeat CXR (9/21)    #septic shock  -resoved  -VSS  -continue to monitor    #Bacteremia   -initial blood culture grew coagulase negative staphylococcus, possible contaminant   -s/p completion of vanc and cefepime   -now resolved, blood culture x2 no growth as of 9/22  -will monitor for symptoms     Dispo: pending home oxygen, anticipate d/c tomorrow     Discussed with Dr. Rodarte.   87M with PMH of CLL (no tx), CAD, CHF, s/p ICD, chronic A.fib on warfarin, cholecystectomy 10-20 yrs ago who was admitted to the ICU on 9/15/22 secondary to acute hypoxic respiratory failure and septic shock in the setting of H influenza neumonia. Pt also presented with CNS bacteremia and prerenal ADRIANA. Pt is no longer septic and bacteremia has resolved. Pt now on the floor for the management of respiratory failure.     #Acute hypoxic resp failure 2/2 pneumonia   -s/p intubation, downgraded from ICU on 1  -on 4L NC @100%  -NIV PRN/ nocturnal BIBAP PRN  -Clear to auscultate bilaterally today  -discontinue Lasix   -wean off oxygen  -PT, OBB  -completed home oxygen eval  -pt require home O2 2/2 CHF and cardiopathy     #pneumonia 2/2 H influenzae   -sputum clx grew haemophilus   -CXR + consolidation (9/15)  -s/p completion of abx   -pt on 4L 100%, speaking in full sentences, afebrile   -repeat CXR (9/21)    #septic shock  -resoved  -VSS  -continue to monitor    #Bacteremia   -initial blood culture grew coagulase negative staphylococcus, possible contaminant   -s/p completion of vanc and cefepime   -now resolved, blood culture x2 no growth as of 9/22  -will monitor for symptoms     #DVT ppx  -INR 2.11  -restarted on warfarin     Dispo: pending home oxygen, anticipate d/c tomorrow     Discussed with Dr. Rodarte.   87M with PMH of CLL (no tx), CAD, CHF, s/p ICD, chronic A.fib on warfarin, cholecystectomy 10-20 yrs ago who was admitted to the ICU on 9/15/22 secondary to acute hypoxic respiratory failure and septic shock in the setting of H influenza neumonia. Pt also presented with CNS bacteremia and prerenal ADRIANA. Pt is no longer septic and bacteremia has resolved. Pt now on the floor for the management of respiratory failure.     #Acute hypoxic resp failure 2/2 pneumonia   -Improved   -s/p intubation, downgraded from ICU on 1  -on 2L NC   -NIV PRN/ nocturnal BIBAP PRN- WIll try to ween off BiPAP tonight  -Clear to auscultate bilaterally today  -discontinue Lasix   -PT, OBB  -completed home oxygen eval  -pt require home O2 2/2 CHF and cardiopathy     #Pneumonia 2/2 H influenzae   -Improved   -sputum clx grew haemophilus   -CXR + consolidation (9/15)  -s/p completion of abx   -pt on 4L 100%, speaking in full sentences, afebrile   -repeat CXR (9/21)    #Septic shock  -Resolved  -VSS  -continue to monitor    #Bacteremia   -Resolved   -initial blood culture grew coagulase negative staphylococcus, possible contaminant   -s/p completion of vanc and cefepime   -now resolved, blood culture x2 no growth as of 9/22  -will monitor for symptoms     #A-Fib on warfarin   -INR sub- therapeutic- 1.68   -Today he received 4mg   -F/U IINR in the am     #DVT ppx  -INR 2.11  -restarted on warfarin     Dispo: Pt can be discharged if INR is in between 2-3. Pt will be going home  with home care     Discussed with Dr. Rodarte.   87M with PMH of CLL (no tx), CAD, CHF, s/p ICD, chronic A.fib on warfarin, cholecystectomy 10-20 yrs ago who was admitted to the ICU on 9/15/22 secondary to acute hypoxic respiratory failure and septic shock in the setting of H influenza neumonia. Pt also presented with CNS bacteremia and prerenal ADRIANA. Pt is no longer septic and bacteremia has resolved. Pt now on the floor for the management of respiratory failure.     #Acute hypoxic resp failure 2/2 pneumonia   -Improved   -s/p intubation, downgraded from ICU on 1  -on 2L NC   -NIV PRN/ nocturnal BIBAP PRN- WIll try to ween off BiPAP tonight  -Clear to auscultate bilaterally today  -discontinue Lasix   -PT, OBB  -completed home oxygen eval  -pt require home O2 2/2 CHF and cardiopathy     #Pneumonia 2/2 H influenzae   -Improved   -sputum clx grew haemophilus   -CXR + consolidation (9/15)  -s/p completion of abx   -pt on 4L 100%, speaking in full sentences, afebrile   -repeat CXR (9/21)    #Septic shock  -Resolved  -VSS  -continue to monitor    #Leukemia  -Elevated WBC   -[facility] consult pending       #Bacteremia   -Resolved   -initial blood culture grew coagulase negative staphylococcus, possible contaminant   -s/p completion of vanc and cefepime   -now resolved, blood culture x2 no growth as of 9/22  -will monitor for symptoms     #A-Fib on warfarin   -INR sub- therapeutic- 1.68   -Today he received 4mg   -F/U IINR in the am     #DVT ppx  -INR 2.11  -restarted on warfarin     Dispo: Pt can be discharged if INR is in between 2-3. Pt will be going home  with home care   -Pt has to follow-up [facility] for Leukemia     Discussed with Dr. Rodarte.

## 2022-09-23 NOTE — CHART NOTE - NSCHARTNOTEFT_GEN_A_CORE
Please see resident note for full details regarding the service. Patient seen and examined at the bedside.     General: Awake and alert, cooperative with exam. No acute distress.   Cardiology: Normal S1, S2. No murmurs. RRR.   Respiratory: Lungs CTABL. No wheezes, rales, or rhonchi. On nasal cannula.   Gastrointestinal: + BS. Soft. NT. ND. No guarding, rigidity, or rebound tenderness.  Extremities: No peripheral edema bilaterally.  Neurological: A+Ox3. CN 2-12 intact. No FND. Normal speech. No facial droop.   Psychiatric: Normal affect. Normal mood.     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 Carson   - Wean off O2 as tolerated  - Pt ambulated 97% on 2L nasal cannula, resting 87% on room air -> will need supplemental O2 upon d/c   - Spoke to pt today and recommended ANA. He would like to go home with home services and discussed this with his family  - D/C planning

## 2022-09-23 NOTE — PROVIDER CONTACT NOTE (CRITICAL VALUE NOTIFICATION) - TEST AND RESULT REPORTED:
WBC: 76.46
wbc=99.95
.06
WBC 95.42
Blood culture on 9/15; Positive Gram Cocci in Cluster in Aerobic bottle
INR-5.16
K+7.5
WBC: 123.52  INR: 6.68
wbc=98.67
.95
K+7.1
WBC: 156.94
pH of AB.19
.49

## 2022-09-23 NOTE — PROVIDER CONTACT NOTE (CRITICAL VALUE NOTIFICATION) - ACTION/TREATMENT ORDERED:
No new orders at this time. Pt. has leukemia
Change RR on ventilator to 22
Pt has CLL no new orders at this time.

## 2022-09-23 NOTE — PROVIDER CONTACT NOTE (CRITICAL VALUE NOTIFICATION) - NAME OF MD/NP/PA/DO NOTIFIED:
ALETHEA Gutiérrez
ALETHEA Campoverde
ALETHEA Nicole
ALETHEA Nicole
Dr. Yen
MD STAPLETON
Dr Briceno
Dr Yen
Marina Kurtz
, Critical care PA
ALETHEA Nicole
Marina Kurtz
Marina Kurtz

## 2022-09-24 LAB
BUN SERPL-MCNC: 28 MG/DL — HIGH (ref 7–23)
CALCIUM SERPL-MCNC: 9.2 MG/DL — SIGNIFICANT CHANGE UP (ref 8.5–10.1)
CHLORIDE SERPL-SCNC: 100 MMOL/L — SIGNIFICANT CHANGE UP (ref 96–108)
CO2 SERPL-SCNC: 40 MMOL/L — HIGH (ref 22–31)
CREAT SERPL-MCNC: 0.8 MG/DL — SIGNIFICANT CHANGE UP (ref 0.5–1.3)
EGFR: 86 ML/MIN/1.73M2 — SIGNIFICANT CHANGE UP
GLUCOSE SERPL-MCNC: 88 MG/DL — SIGNIFICANT CHANGE UP (ref 70–99)
HCT VFR BLD CALC: 42.8 % — SIGNIFICANT CHANGE UP (ref 39–50)
HGB BLD-MCNC: 13.1 G/DL — SIGNIFICANT CHANGE UP (ref 13–17)
INR BLD: 1.68 RATIO — HIGH (ref 0.88–1.16)
MCHC RBC-ENTMCNC: 30.6 GM/DL — LOW (ref 32–36)
MCHC RBC-ENTMCNC: 32.3 PG — SIGNIFICANT CHANGE UP (ref 27–34)
MCV RBC AUTO: 105.7 FL — HIGH (ref 80–100)
PLATELET # BLD AUTO: 158 K/UL — SIGNIFICANT CHANGE UP (ref 150–400)
POTASSIUM SERPL-MCNC: 4.4 MMOL/L — SIGNIFICANT CHANGE UP (ref 3.5–5.3)
POTASSIUM SERPL-SCNC: 4.4 MMOL/L — SIGNIFICANT CHANGE UP (ref 3.5–5.3)
PROTHROM AB SERPL-ACNC: 19.6 SEC — HIGH (ref 10.5–13.4)
RBC # BLD: 4.05 M/UL — LOW (ref 4.2–5.8)
RBC # FLD: 13.6 % — SIGNIFICANT CHANGE UP (ref 10.3–14.5)
SODIUM SERPL-SCNC: 139 MMOL/L — SIGNIFICANT CHANGE UP (ref 135–145)
WBC # BLD: 98.06 K/UL — CRITICAL HIGH (ref 3.8–10.5)
WBC # FLD AUTO: 98.06 K/UL — CRITICAL HIGH (ref 3.8–10.5)

## 2022-09-24 PROCEDURE — 99232 SBSQ HOSP IP/OBS MODERATE 35: CPT | Mod: GC

## 2022-09-24 RX ORDER — WARFARIN SODIUM 2.5 MG/1
4 TABLET ORAL ONCE
Refills: 0 | Status: COMPLETED | OUTPATIENT
Start: 2022-09-24 | End: 2022-09-24

## 2022-09-24 RX ORDER — WARFARIN SODIUM 2.5 MG/1
4 TABLET ORAL ONCE
Refills: 0 | Status: DISCONTINUED | OUTPATIENT
Start: 2022-09-24 | End: 2022-09-24

## 2022-09-24 RX ADMIN — Medication 25 MILLIGRAM(S): at 11:15

## 2022-09-24 RX ADMIN — DORZOLAMIDE HYDROCHLORIDE TIMOLOL MALEATE 1 DROP(S): 20; 5 SOLUTION/ DROPS OPHTHALMIC at 11:13

## 2022-09-24 RX ADMIN — DORZOLAMIDE HYDROCHLORIDE TIMOLOL MALEATE 1 DROP(S): 20; 5 SOLUTION/ DROPS OPHTHALMIC at 22:48

## 2022-09-24 RX ADMIN — WARFARIN SODIUM 4 MILLIGRAM(S): 2.5 TABLET ORAL at 22:49

## 2022-09-24 RX ADMIN — POLYETHYLENE GLYCOL 3350 17 GRAM(S): 17 POWDER, FOR SOLUTION ORAL at 11:15

## 2022-09-24 RX ADMIN — Medication 25 MILLIGRAM(S): at 22:49

## 2022-09-24 RX ADMIN — SENNA PLUS 2 TABLET(S): 8.6 TABLET ORAL at 22:48

## 2022-09-24 RX ADMIN — Medication 1 TABLET(S): at 11:15

## 2022-09-24 NOTE — CHART NOTE - NSCHARTNOTEFT_GEN_A_CORE
Please see resident note for full details regarding the service. Patient seen and examined at the bedside.     General: Awake and alert, cooperative with exam. No acute distress.   Cardiology: Normal S1, S2. No murmurs. RRR.   Respiratory: Lungs CTABL. No wheezes, rales, or rhonchi. On nasal cannula.   Gastrointestinal: + BS. Soft. NT. ND. No guarding, rigidity, or rebound tenderness.  Extremities: No peripheral edema bilaterally.  Neurological: A+Ox3. CN 2-12 intact. No FND. Normal speech. No facial droop.   Psychiatric: Normal affect. Normal mood.     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 Carson   - Supplemental O2 for pt upon d/c, will need to titrate down at home and wean off   - Trial off Bipap this evening   - If INR improved tomorrow, will d/c home

## 2022-09-24 NOTE — PROGRESS NOTE ADULT - REASON FOR ADMISSION
Acute hypoxic respiratory failure

## 2022-09-24 NOTE — PROGRESS NOTE ADULT - ASSESSMENT
87M with PMH of CLL (no tx), CAD, CHF, s/p ICD, chronic A.fib on warfarin, cholecystectomy 10-20 yrs ago who was admitted to the ICU on 9/15/22 secondary to acute hypoxic respiratory failure and septic shock in the setting of H influenza neumonia. Pt also presented with CNS bacteremia and prerenal ADRIANA. Pt is no longer septic and bacteremia has resolved. Pt now on the floor for the management of respiratory failure.     #Acute hypoxic resp failure 2/2 pneumonia   -Improved   -s/p intubation, downgraded from ICU on 1  -on 2L NC   -NIV PRN/ nocturnal BIBAP PRN- WIll try to ween off BiPAP tonight  -Clear to auscultate bilaterally today  -discontinue Lasix   -PT, OBB  -completed home oxygen eval  -pt require home O2 2/2 CHF and cardiopathy     #Pneumonia 2/2 H influenzae   -Improved   -sputum clx grew haemophilus   -CXR + consolidation (9/15)  -s/p completion of abx   -pt on 4L 100%, speaking in full sentences, afebrile   -repeat CXR (9/21)    #Septic shock  -Resolved  -VSS  -continue to monitor    #Leukemia  -Elevated WBC   -[facility] consult pending       #Bacteremia   -Resolved   -initial blood culture grew coagulase negative staphylococcus, possible contaminant   -s/p completion of vanc and cefepime   -now resolved, blood culture x2 no growth as of 9/22  -will monitor for symptoms     #A-Fib on warfarin   -INR sub- therapeutic- 1.68   -Today he received 4mg   -F/U IINR in the am     #DVT ppx  -INr-1.68  -restarted on warfarin     Dispo: Pt can be discharged if INR is in between 2-3. Pt will be going home  with home care   -Pt has to follow-up [facility] for Leukemia     Discussed with Dr. Rodarte.

## 2022-09-24 NOTE — PROGRESS NOTE ADULT - SUBJECTIVE AND OBJECTIVE BOX
HPI:  86 y/o M w/ PMHx a fib on coumadin, severe cardiomyopathy, CAD s/p MI, HTN, HLD, CHF, s/p AICD, RA, gout, and leukemia (diagnosed 1 yr ago, has not received treatment) who presented to ER in severe respiratory distress requiring emergent intubation. Per pt's daughter, Jerri, she and her daughter whom pt lives w/ have been sick w/ URI symptoms (tested negative for COVID-19).  Onday PTA the pt began to c/o headache, dizziness, and cough. Overnight, pt developed shortness of breath prompting EMS call. Labs revealed leukocytosis (WBC count 172K), ADRIANA (BUN/Cr 22/1.49), and lactic acidosis (lactate 6). In the ER, pt was administered 2 L IVF, cefepime, vancomycin, and ofirmev. Propofol infusion was initiated for sedation. Pt admitted to ICU for management of acute hypoxic respiratory failure. On 9/21/22, pt was transferred to the floor for further management.     9/24/22  Pt seen by beside. Pt speaking in full sentences with NS 2L. Pt is feeling better     REVIEW OF SYSTEMS:  CONSTITUTIONAL: No weakness, fevers or chills  EYES/ENT: No visual changes;  No vertigo or throat pain   NECK: No pain or stiffness  RESPIRATORY: No cough, wheezing, hemoptysis; No shortness of breath  CARDIOVASCULAR: No chest pain or palpitations  GASTROINTESTINAL: No abdominal or epigastric pain. No nausea, vomiting; No diarrhea or constipation.   GENITOURINARY: No dysuria, frequency or hematuria  NEUROLOGICAL: No numbness or weakness  SKIN: No itching, rashes    Vitals:  Vital Signs Last 24 Hrs  T(C): 36.7 (24 Sep 2022 15:40), Max: 36.8 (24 Sep 2022 07:43)  T(F): 98.1 (24 Sep 2022 15:40), Max: 98.2 (24 Sep 2022 07:43)  HR: 62 (24 Sep 2022 15:40) (53 - 69)  BP: 116/59 (24 Sep 2022 15:40) (116/59 - 158/77)  BP(mean): --  RR: 18 (24 Sep 2022 15:40) (18 - 19)  SpO2: 96% (24 Sep 2022 15:40) (92% - 98%)    Parameters below as of 24 Sep 2022 15:40  Patient On (Oxygen Delivery Method): nasal cannula        PHYSICAL EXAM:  Constitutional: Pt lying in bed, awake and alert, NAD  HEENT: EOMI, normal hearing, moist mucous membranes  Neck: Soft and supple, no JVD  Respiratory: CTABL, No wheezing, rales or rhonchi  Cardiovascular: S1S2+, RRR  Gastrointestinal: BS+, soft, distended, no guarding, no rebound  Extremities: +1 peripheral edema  Neurological: AAOx3  Skin: No rashes    Labs:                                         13.1   98.06 )-----------( 158      ( 24 Sep 2022 08:11 )             42.8     09-24    139  |  100  |  28<H>  ----------------------------<  88  4.4   |  40<H>  |  0.80    Ca    9.2      24 Sep 2022 08:11          Culture - Blood (collected 09-18-22 @ 05:49)  Source: .Blood None  Final Report (09-23-22 @ 10:00):    No Growth Final    Culture - Blood (collected 09-18-22 @ 05:49)  Source: .Blood None  Final Report (09-23-22 @ 10:00):    No Growth Final    Culture - Urine (collected 09-15-22 @ 12:00)  Source: Catheterized Catheterized  Final Report (09-16-22 @ 16:57):    No growth    Culture - Legionella (collected 09-15-22 @ 12:00)  Source: .Legionella None    Culture - Sputum (collected 09-15-22 @ 08:00)  Source: .Sputum Sputum  Gram Stain (09-15-22 @ 15:36):    Rare Gram Negative Rods    Rare polymorphonuclear leukocytes  Final Report (09-17-22 @ 09:11):    Few Haemophilus influenzae "Susceptibilities not performed"    Normal Respiratory Maryanne present    Culture - Urine (collected 09-15-22 @ 06:30)  Source: Clean Catch Clean Catch (Midstream)  Final Report (09-16-22 @ 07:22):    <10,000 CFU/mL Normal Urogenital Maryanne    Culture - Blood (collected 09-15-22 @ 02:23)  Source: .Blood Blood-Peripheral  Final Report (09-20-22 @ 09:00):    No Growth Final    Culture - Blood (collected 09-15-22 @ 02:23)  Source: .Blood Blood-Peripheral  Gram Stain (09-16-22 @ 05:49):    Growth in aerobic bottle: Gram Positive Cocci in Clusters  Final Report (09-17-22 @ 12:26):    Growth in aerobic bottle: Staphylococcus cohnii    Coag Negative Staphylococcus    Single set isolate, possible contaminant. Contact    Microbiology if susceptibility testing clinically    indicated.    ***Blood Panel PCR results on this specimen are available    approximately 3 hours after the Gram stain result.***    Gram stain, PCR, and/or culture results may not always    correspond due to difference in methodologies.    ************************************************************    This PCR assay was performed by multiplex PCR. This    Assay tests for 66 bacterial and resistance gene targets.    Please refer to the [facility] test directory    at [email or web address] for details.  Organism: Blood Culture PCR (09-17-22 @ 12:26)  Organism: Blood Culture PCR (09-17-22 @ 12:26)    Sensitivities:      -  Coagulase negative Staphylococcus: Detec      Method Type: PCR      Creatinine, Serum: 0.75 mg/dL (09-23-22 @ 06:28)  Creatinine, Serum: 0.76 mg/dL (09-22-22 @ 07:10)  Creatinine, Serum: 0.93 mg/dL (09-21-22 @ 05:34)  Creatinine, Serum: 0.93 mg/dL (09-20-22 @ 05:34)  Creatinine, Serum: 0.94 mg/dL (09-19-22 @ 05:29)      WBC Count: 95.42 K/uL (09-23-22 @ 06:28)  WBC Count: 110.49 K/uL (09-22-22 @ 07:10)  WBC Count: 98.67 K/uL (09-21-22 @ 05:34)  WBC Count: 104.06 K/uL (09-20-22 @ 05:34)  WBC Count: 99.95 K/uL (09-19-22 @ 05:29)    COVID-19 PCR: NotDetec (09-21-22 @ 06:00)  SARS-CoV-2: NotDetec (09-15-22 @ 02:23)    =======================================================    Medication   MEDICATIONS  (STANDING):  dorzolamide 2%/timolol 0.5% Ophthalmic Solution 1 Drop(s) Both EYES two times a day  lactobacillus acidophilus 1 Tablet(s) Oral daily  metoprolol tartrate 25 milliGRAM(s) Oral two times a day  polyethylene glycol 3350 17 Gram(s) Oral two times a day  senna 2 Tablet(s) Oral at bedtime  warfarin 3 milliGRAM(s) Oral once    MEDICATIONS  (PRN):  acetaminophen     Tablet .. 650 milliGRAM(s) Oral every 6 hours PRN Temp greater or equal to 38C (100.4F)  sodium chloride 0.65% Nasal 1 Spray(s) Both Nostrils four times a day PRN Nasal Congestion      Imaging:  CXR (9/17)  Progression of congestion. No focal infiltrate identified.    US Abdomen (9/16)  Mild intrahepatic biliary ductal dilatation with findings consistent with known pneumobilia. Extrahepatic CBD caliber unremarkable.   Cholecystectomy. See above discussion.    CT ABD (9/15)  No pulmonary embolism identified.  Bilateral lower lobe consolidation secondary to atelectasis and/or pneumonia.  Cardiomegaly. Pacemaker.  Pneumobilia post cholecystectomy.  Enlarged prostate.  Colonic diverticulosis without diverticulitis.    CXR (9/15)  Cardiomegaly with pacemaker in the right ventricle but without pulmonary edema.  Left lower lobe atelectasis.  Slightly elevated right hemidiaphragm, unchanged.

## 2022-09-24 NOTE — PROGRESS NOTE ADULT - PROVIDER SPECIALTY LIST ADULT
Critical Care
Infectious Disease
Critical Care
Infectious Disease
Critical Care
Family Medicine
Family Medicine
Infectious Disease
Critical Care
Critical Care
Family Medicine
Critical Care

## 2022-09-25 ENCOUNTER — TRANSCRIPTION ENCOUNTER (OUTPATIENT)
Age: 87
End: 2022-09-25

## 2022-09-25 VITALS
OXYGEN SATURATION: 95 % | RESPIRATION RATE: 18 BRPM | DIASTOLIC BLOOD PRESSURE: 68 MMHG | HEART RATE: 64 BPM | TEMPERATURE: 98 F | SYSTOLIC BLOOD PRESSURE: 142 MMHG

## 2022-09-25 LAB
ALBUMIN SERPL ELPH-MCNC: 2.5 G/DL — LOW (ref 3.3–5)
ALP SERPL-CCNC: 102 U/L — SIGNIFICANT CHANGE UP (ref 40–120)
ALT FLD-CCNC: 54 U/L — SIGNIFICANT CHANGE UP (ref 12–78)
ANION GAP SERPL CALC-SCNC: 3 MMOL/L — LOW (ref 5–17)
AST SERPL-CCNC: 37 U/L — SIGNIFICANT CHANGE UP (ref 15–37)
BILIRUB SERPL-MCNC: 1.9 MG/DL — HIGH (ref 0.2–1.2)
BUN SERPL-MCNC: 24 MG/DL — HIGH (ref 7–23)
CALCIUM SERPL-MCNC: 8.9 MG/DL — SIGNIFICANT CHANGE UP (ref 8.5–10.1)
CHLORIDE SERPL-SCNC: 101 MMOL/L — SIGNIFICANT CHANGE UP (ref 96–108)
CO2 SERPL-SCNC: 35 MMOL/L — HIGH (ref 22–31)
CREAT SERPL-MCNC: 0.75 MG/DL — SIGNIFICANT CHANGE UP (ref 0.5–1.3)
EGFR: 88 ML/MIN/1.73M2 — SIGNIFICANT CHANGE UP
GLUCOSE SERPL-MCNC: 92 MG/DL — SIGNIFICANT CHANGE UP (ref 70–99)
HCT VFR BLD CALC: 40.6 % — SIGNIFICANT CHANGE UP (ref 39–50)
HGB BLD-MCNC: 12.5 G/DL — LOW (ref 13–17)
INR BLD: 1.68 RATIO — HIGH (ref 0.88–1.16)
MCHC RBC-ENTMCNC: 30.8 GM/DL — LOW (ref 32–36)
MCHC RBC-ENTMCNC: 32.5 PG — SIGNIFICANT CHANGE UP (ref 27–34)
MCV RBC AUTO: 105.5 FL — HIGH (ref 80–100)
PLATELET # BLD AUTO: 161 K/UL — SIGNIFICANT CHANGE UP (ref 150–400)
POTASSIUM SERPL-MCNC: 5.2 MMOL/L — SIGNIFICANT CHANGE UP (ref 3.5–5.3)
POTASSIUM SERPL-SCNC: 5.2 MMOL/L — SIGNIFICANT CHANGE UP (ref 3.5–5.3)
PROT SERPL-MCNC: 5.5 GM/DL — LOW (ref 6–8.3)
PROTHROM AB SERPL-ACNC: 19.6 SEC — HIGH (ref 10.5–13.4)
RBC # BLD: 3.85 M/UL — LOW (ref 4.2–5.8)
RBC # FLD: 13.6 % — SIGNIFICANT CHANGE UP (ref 10.3–14.5)
SODIUM SERPL-SCNC: 139 MMOL/L — SIGNIFICANT CHANGE UP (ref 135–145)
WBC # BLD: 101.29 K/UL — CRITICAL HIGH (ref 3.8–10.5)
WBC # FLD AUTO: 101.29 K/UL — CRITICAL HIGH (ref 3.8–10.5)

## 2022-09-25 PROCEDURE — 99233 SBSQ HOSP IP/OBS HIGH 50: CPT | Mod: GC

## 2022-09-25 RX ORDER — DORZOLAMIDE HYDROCHLORIDE TIMOLOL MALEATE 20; 5 MG/ML; MG/ML
1 SOLUTION/ DROPS OPHTHALMIC
Qty: 0 | Refills: 0 | DISCHARGE
Start: 2022-09-25

## 2022-09-25 RX ORDER — WARFARIN SODIUM 2.5 MG/1
1 TABLET ORAL
Qty: 5 | Refills: 0
Start: 2022-09-25 | End: 2022-09-29

## 2022-09-25 RX ORDER — ACETAMINOPHEN 500 MG
2 TABLET ORAL
Qty: 24 | Refills: 0
Start: 2022-09-25 | End: 2022-09-27

## 2022-09-25 RX ORDER — ACETAMINOPHEN 500 MG
2 TABLET ORAL
Qty: 0 | Refills: 0 | DISCHARGE
Start: 2022-09-25

## 2022-09-25 RX ORDER — LACTOBACILLUS ACIDOPHILUS 100MM CELL
1 CAPSULE ORAL
Qty: 0 | Refills: 0 | DISCHARGE
Start: 2022-09-25

## 2022-09-25 RX ORDER — SODIUM CHLORIDE 0.65 %
1 AEROSOL, SPRAY (ML) NASAL
Qty: 0 | Refills: 0 | DISCHARGE
Start: 2022-09-25

## 2022-09-25 RX ADMIN — Medication 1 TABLET(S): at 09:53

## 2022-09-25 RX ADMIN — POLYETHYLENE GLYCOL 3350 17 GRAM(S): 17 POWDER, FOR SOLUTION ORAL at 10:00

## 2022-09-25 RX ADMIN — DORZOLAMIDE HYDROCHLORIDE TIMOLOL MALEATE 1 DROP(S): 20; 5 SOLUTION/ DROPS OPHTHALMIC at 09:52

## 2022-09-25 RX ADMIN — Medication 25 MILLIGRAM(S): at 09:54

## 2022-09-25 NOTE — DISCHARGE NOTE PROVIDER - NSDCMRMEDTOKEN_GEN_ALL_CORE_FT
metoprolol tartrate 25 mg oral tablet: 1 tab(s) orally 2 times a day   dorzolamide-timolol 2%-0.5% preservative-free ophthalmic solution: 1 drop(s) to each affected eye 2 times a day  lactobacillus acidophilus oral capsule: 1 tab(s) orally once a day  metoprolol tartrate 25 mg oral tablet: 1 tab(s) orally 2 times a day  Pharbetol 325 mg oral tablet: 2 tab(s) orally every 6 hours, As needed, Temp greater or equal to 38C (100.4F)  sodium chloride 0.65% nasal spray: 1 spray(s) in each nostril 4 times a day as needed    acetaminophen 325 mg oral capsule: 2 cap(s) orally every 6 hours as needed for temp greater or equal to 100.4 F  dorzolamide-timolol 2%-0.5% preservative-free ophthalmic solution: 1 drop(s) to each affected eye 2 times a day  lactobacillus acidophilus oral capsule: 1 tab(s) orally once a day  metoprolol tartrate 25 mg oral tablet: 1 tab(s) orally 2 times a day  sodium chloride 0.65% nasal spray: 1 spray(s) in each nostril 4 times a day as needed   warfarin 4 mg oral tablet: 1 tab(s) orally once a day

## 2022-09-25 NOTE — DISCHARGE NOTE PROVIDER - ATTENDING DISCHARGE PHYSICAL EXAMINATION:
Constitutional: Pt lying in bed, awake and alert, NAD  HEENT: EOMI, normal hearing, moist mucous membranes  Neck: Soft and supple, no JVD  Respiratory: CTABL, No wheezing, rales or rhonchi  Cardiovascular: S1S2+, RRR  Gastrointestinal: BS+, soft, distended, no guarding, no rebound  Extremities: +1 peripheral edema  Neurological: AAOx3  Skin: No rash

## 2022-09-25 NOTE — DISCHARGE NOTE PROVIDER - NSDCFUSCHEDAPPT_GEN_ALL_CORE_FT
[facility] Physician [facility]  [employer] [address]  Scheduled Appointment: 11/17/2022

## 2022-09-25 NOTE — DISCHARGE NOTE PROVIDER - HOSPITAL COURSE
86 y/o M w/ PMHx a fib on coumadin, severe cardiomyopathy, CAD s/p MI, HTN, HLD, CHF, s/p AICD, RA, gout, and leukemia (diagnosed 1 yr ago, has not received treatment) who presented to [facility] on 9/15/22 in severe respiratory distress requiring emergent intubation. Labs revealed leukocytosis (WBC count 172K), ADRIANA (BUN/Cr 22/1.49), and lactic acidosis (lactate 6). Pt was administered 2 L IVF, cefepime, vancomycin, and ofirmev. Propofol infusion was initiated for sedation. Pt admitted to ICU for management of acute hypoxic respiratory failure. In the ICU, pt was further managed for septic shock, bibasilar gram negative pneumonia, CNS bacteremia, and prerenal ADRIANA which were all resolved. On 9/21/22, pt was transferred to the floor for further management hypoxic respiratory failure. Pt continued to require O2 on nasal cannula for respiratory support. Oxygen was slowly weaned from 4L to 2L. On 9/23/22, 3mg of  Coumadin was restarted as INR was 2-3. It was increased to 4mg the following day as INR decreased to 1.68. Pt was advised to follow up his  coumadin clinic after discharge.  Home oxygen evaluation was done and pt required home O2 secondary to CHF and cardiopathy. On the day of discharge,9/25/22, pt medically cleared to return home.     9/25/22  Pt seen by bedside. Pt 88 y/o M w/ PMHx a fib on coumadin, severe cardiomyopathy, CAD s/p MI, HTN, HLD, CHF, s/p AICD, RA, gout, and leukemia (diagnosed 1 yr ago, has not received treatment) who presented to [facility] on 9/15/22 in severe respiratory distress requiring emergent intubation. Labs revealed leukocytosis (WBC count 172K), ADRIANA (BUN/Cr 22/1.49), and lactic acidosis (lactate 6). Pt was administered 2 L IVF, cefepime, vancomycin, and ofirmev. Propofol infusion was initiated for sedation. Pt admitted to ICU for management of acute hypoxic respiratory failure. In the ICU, pt was further managed for septic shock, bibasilar gram negative pneumonia, CNS bacteremia, and prerenal ADRIANA which were all resolved. On 9/21/22, pt was transferred to the floor for further management hypoxic respiratory failure. Pt continued to require O2 on nasal cannula for respiratory support. Oxygen was slowly weaned from 4L to 2L. On 9/23/22, 3mg of  Coumadin was restarted as INR was 2-3. It was increased to 4mg the following day as INR decreased to 1.68. Pt was advised to follow up his  coumadin clinic after discharge.  Home oxygen evaluation was done and pt required home O2 secondary to CHF and cardiopathy. On the day of discharge,9/25/22, pt medically cleared to return home.     9/25/22  Pt seen by bedside. Pt resting comfortable on 2L O2 and speaking in full sentences. Pt is medically cleared to return home.     ICU Vital Signs Last 24 Hrs  T(C): 36.9 (25 Sep 2022 08:02), Max: 36.9 (25 Sep 2022 08:02)  T(F): 98.5 (25 Sep 2022 08:02), Max: 98.5 (25 Sep 2022 08:02)  HR: 64 (25 Sep 2022 08:02) (62 - 65)  BP: 142/68 (25 Sep 2022 08:02) (116/59 - 142/68)  RR: 18 (25 Sep 2022 08:02) (18 - 18)  SpO2: 95% (25 Sep 2022 08:02) (95% - 96%)  O2 Parameters below as of 25 Sep 2022 08:02  Patient On (Oxygen Delivery Method): nasal cannula  O2 Flow (L/min): 2    Constitutional: Pt lying in bed, awake and alert, NAD  HEENT: EOMI, normal hearing, moist mucous membranes  Neck: Soft and supple, no JVD  Respiratory: CTABL, No wheezing, rales or rhonchi  Cardiovascular: S1S2+, RRR  Gastrointestinal: BS+, soft, distended, no guarding, no rebound  Extremities: +1 peripheral edema  Neurological: AAOx3  Skin: No rash

## 2022-09-25 NOTE — DISCHARGE NOTE PROVIDER - NSDCCPCAREPLAN_GEN_ALL_CORE_FT
PRINCIPAL DISCHARGE DIAGNOSIS  Diagnosis: Acute respiratory failure with hypoxia  Assessment and Plan of Treatment: Your were treated for respiratory failure with supplemental oxygen and it has improved. Please continue with home oxygen therapy and follow up with your PCP for further management.      SECONDARY DISCHARGE DIAGNOSES  Diagnosis: Afib  Assessment and Plan of Treatment: You were treated for Afib with metoprol and coumadin. Please follow up with your primary care provider for further management. Continue taking your medication. Also, please visit your Coumadin clinic as soon as possible to check your INR.    Diagnosis: Bacteremia  Assessment and Plan of Treatment: You were treated for bacteremia with antibotics and it has resolved. Please follow up with your PCP further management.    Diagnosis: Pneumonia  Assessment and Plan of Treatment: You were treated for pneumonia secondary to H. influenzae with antibiotics during your hospitalization. It has resolved.  Please continue to use oxygen therapy at home. Follow up with your PCP for further managment.    Diagnosis: Leukemia  Assessment and Plan of Treatment: Please follow up with your oncologist for futher managemet.    Diagnosis: Septic shock  Assessment and Plan of Treatment: You were treated for septic shock during this hospitalization which has resolved. Please follow up with your primary care provider for further management.

## 2022-09-25 NOTE — DISCHARGE NOTE PROVIDER - CARE PROVIDER_API CALL
Federico Combs)  Internal Medicine  [address] [facility] [address], [address]  [place] [address]  Phone: [phone]  Fax: [phone]  Established Patient  Follow Up Time: 1 week

## 2022-09-30 DIAGNOSIS — Z85.828 PERSONAL HISTORY OF OTHER MALIGNANT NEOPLASM OF SKIN: ICD-10-CM

## 2022-09-30 DIAGNOSIS — J96.01 ACUTE RESPIRATORY FAILURE WITH HYPOXIA: ICD-10-CM

## 2022-09-30 DIAGNOSIS — Z87.81 PERSONAL HISTORY OF (HEALED) TRAUMATIC FRACTURE: ICD-10-CM

## 2022-09-30 DIAGNOSIS — I25.2 OLD MYOCARDIAL INFARCTION: ICD-10-CM

## 2022-09-30 DIAGNOSIS — K57.90 DIVERTICULOSIS OF INTESTINE, PART UNSPECIFIED, WITHOUT PERFORATION OR ABSCESS WITHOUT BLEEDING: ICD-10-CM

## 2022-09-30 DIAGNOSIS — H91.90 UNSPECIFIED HEARING LOSS, UNSPECIFIED EAR: ICD-10-CM

## 2022-09-30 DIAGNOSIS — I50.32 CHRONIC DIASTOLIC (CONGESTIVE) HEART FAILURE: ICD-10-CM

## 2022-09-30 DIAGNOSIS — R65.21 SEVERE SEPSIS WITH SEPTIC SHOCK: ICD-10-CM

## 2022-09-30 DIAGNOSIS — H40.9 UNSPECIFIED GLAUCOMA: ICD-10-CM

## 2022-09-30 DIAGNOSIS — M06.9 RHEUMATOID ARTHRITIS, UNSPECIFIED: ICD-10-CM

## 2022-09-30 DIAGNOSIS — J15.6 PNEUMONIA DUE TO OTHER GRAM-NEGATIVE BACTERIA: ICD-10-CM

## 2022-09-30 DIAGNOSIS — T38.0X5A ADVERSE EFFECT OF GLUCOCORTICOIDS AND SYNTHETIC ANALOGUES, INITIAL ENCOUNTER: ICD-10-CM

## 2022-09-30 DIAGNOSIS — E11.9 TYPE 2 DIABETES MELLITUS WITHOUT COMPLICATIONS: ICD-10-CM

## 2022-09-30 DIAGNOSIS — C95.90 LEUKEMIA, UNSPECIFIED NOT HAVING ACHIEVED REMISSION: ICD-10-CM

## 2022-09-30 DIAGNOSIS — Z78.1 PHYSICAL RESTRAINT STATUS: ICD-10-CM

## 2022-09-30 DIAGNOSIS — I25.10 ATHEROSCLEROTIC HEART DISEASE OF NATIVE CORONARY ARTERY WITHOUT ANGINA PECTORIS: ICD-10-CM

## 2022-09-30 DIAGNOSIS — A41.9 SEPSIS, UNSPECIFIED ORGANISM: ICD-10-CM

## 2022-09-30 DIAGNOSIS — I47.1 SUPRAVENTRICULAR TACHYCARDIA: ICD-10-CM

## 2022-09-30 DIAGNOSIS — D84.9 IMMUNODEFICIENCY, UNSPECIFIED: ICD-10-CM

## 2022-09-30 DIAGNOSIS — E87.5 HYPERKALEMIA: ICD-10-CM

## 2022-09-30 DIAGNOSIS — Z96.653 PRESENCE OF ARTIFICIAL KNEE JOINT, BILATERAL: ICD-10-CM

## 2022-09-30 DIAGNOSIS — I11.0 HYPERTENSIVE HEART DISEASE WITH HEART FAILURE: ICD-10-CM

## 2022-09-30 DIAGNOSIS — Z90.49 ACQUIRED ABSENCE OF OTHER SPECIFIED PARTS OF DIGESTIVE TRACT: ICD-10-CM

## 2022-09-30 DIAGNOSIS — N17.9 ACUTE KIDNEY FAILURE, UNSPECIFIED: ICD-10-CM

## 2022-09-30 DIAGNOSIS — M10.9 GOUT, UNSPECIFIED: ICD-10-CM

## 2022-09-30 DIAGNOSIS — I42.9 CARDIOMYOPATHY, UNSPECIFIED: ICD-10-CM

## 2022-09-30 DIAGNOSIS — Z88.0 ALLERGY STATUS TO PENICILLIN: ICD-10-CM

## 2022-09-30 DIAGNOSIS — Z79.01 LONG TERM (CURRENT) USE OF ANTICOAGULANTS: ICD-10-CM

## 2022-09-30 DIAGNOSIS — Z95.810 PRESENCE OF AUTOMATIC (IMPLANTABLE) CARDIAC DEFIBRILLATOR: ICD-10-CM

## 2022-09-30 DIAGNOSIS — J10.1 INFLUENZA DUE TO OTHER IDENTIFIED INFLUENZA VIRUS WITH OTHER RESPIRATORY MANIFESTATIONS: ICD-10-CM

## 2022-09-30 DIAGNOSIS — I48.20 CHRONIC ATRIAL FIBRILLATION, UNSPECIFIED: ICD-10-CM

## 2022-09-30 DIAGNOSIS — E87.2 ACIDOSIS: ICD-10-CM

## 2022-09-30 DIAGNOSIS — N40.0 BENIGN PROSTATIC HYPERPLASIA WITHOUT LOWER URINARY TRACT SYMPTOMS: ICD-10-CM

## 2022-10-03 ENCOUNTER — APPOINTMENT (OUTPATIENT)
Dept: DERMATOLOGY | Facility: CLINIC | Age: 87
End: 2022-10-03

## 2022-10-03 DIAGNOSIS — D48.5 NEOPLASM OF UNCERTAIN BEHAVIOR OF SKIN: ICD-10-CM

## 2022-10-03 PROCEDURE — 99213 OFFICE O/P EST LOW 20 MIN: CPT | Mod: 25

## 2022-10-03 PROCEDURE — 11102 TANGNTL BX SKIN SINGLE LES: CPT

## 2022-10-03 NOTE — HISTORY OF PRESENT ILLNESS
[de-identified] : The patient has been fit in for urgent appointment.\par c/o growing lesion Left cheek; \par was especially problematic during recent hospital admission to ICU for flu/pneumonia; was intubated; \par

## 2022-10-03 NOTE — ASSESSMENT
[FreeTextEntry1] : Therapeutic options and their risks and benefits; along with multiple diagnostic possibilities were discussed at length; risks and benefits of further study were discussed;\par \par The patient was instructed to check portal and/or call the office in one week for biopsy results.\par \par Plan to treat by D&C if the biopsy is positive. \par \par f/u for regular skin check in winter

## 2022-10-03 NOTE — PHYSICAL EXAM
[FreeTextEntry3] : + lentigines and solar damage are present in sun exposed areas; \par Scaling waxy stuck on papules; \par \par keratotic pedunculated nodule L cheek

## 2022-10-14 LAB — CORE LAB BIOPSY: NORMAL

## 2022-11-08 ENCOUNTER — APPOINTMENT (OUTPATIENT)
Dept: DERMATOLOGY | Facility: CLINIC | Age: 87
End: 2022-11-08

## 2022-11-08 PROCEDURE — 17282 DSTR MAL LS F/E/E/N/L/M1.1-2: CPT

## 2022-11-17 ENCOUNTER — APPOINTMENT (OUTPATIENT)
Dept: ELECTROPHYSIOLOGY | Facility: CLINIC | Age: 87
End: 2022-11-17

## 2022-11-17 ENCOUNTER — NON-APPOINTMENT (OUTPATIENT)
Age: 87
End: 2022-11-17

## 2022-11-17 VITALS
HEIGHT: 68 IN | BODY MASS INDEX: 33.34 KG/M2 | SYSTOLIC BLOOD PRESSURE: 150 MMHG | RESPIRATION RATE: 16 BRPM | OXYGEN SATURATION: 98 % | HEART RATE: 66 BPM | WEIGHT: 220 LBS | DIASTOLIC BLOOD PRESSURE: 62 MMHG

## 2022-11-17 PROCEDURE — 93284 PRGRMG EVAL IMPLANTABLE DFB: CPT

## 2023-01-06 ENCOUNTER — APPOINTMENT (OUTPATIENT)
Dept: DERMATOLOGY | Facility: CLINIC | Age: 88
End: 2023-01-06
Payer: MEDICARE

## 2023-01-06 DIAGNOSIS — C44.42 SQUAMOUS CELL CARCINOMA OF SKIN OF SCALP AND NECK: ICD-10-CM

## 2023-01-06 PROCEDURE — 17272 DSTR MAL LES S/N/H/F/G 1.1-2: CPT

## 2023-01-06 PROCEDURE — 99213 OFFICE O/P EST LOW 20 MIN: CPT | Mod: 25

## 2023-01-06 NOTE — HISTORY OF PRESENT ILLNESS
[de-identified] : Pt. 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

## 2023-01-06 NOTE — PHYSICAL EXAM
[Full Body Skin Exam Performed] : performed [FreeTextEntry3] : Skin examination performed of the face, neck, trunk, arms, legs; \par The patient is well, alert and oriented, pleasant and cooperative.\par Eyelids, conjunctivae, oral mucosa, digits and nails all normal.  \par No cervical adenopathy.\par \par Normal findings include:\par \par Seborrheic keratoses- multiple on trunk, neck\par Angiomas\par Lentigines\par Healed D&C sites;  nose; L cheek;\par healed surgical scar; L anterior shoulder\par \par Hyperkeratotic firm papule with scale; Left scalp crown

## 2023-01-06 NOTE — ASSESSMENT
[FreeTextEntry1] : Therapeutic options and their risks and benefits; along with multiple diagnostic possibilities were discussed at length; risks and benefits of further study were discussed;\par \par The patient was instructed to check portal and/or call the office in one week for biopsy results.\par \par No further tx if positive\par \par recent SCC healed well\par Continue regular exams; Follow up for TBSE in 6 months \par

## 2023-02-15 ENCOUNTER — APPOINTMENT (OUTPATIENT)
Dept: ELECTROPHYSIOLOGY | Facility: CLINIC | Age: 88
End: 2023-02-15
Payer: MEDICARE

## 2023-02-16 ENCOUNTER — NON-APPOINTMENT (OUTPATIENT)
Age: 88
End: 2023-02-16

## 2023-02-16 PROCEDURE — 93295 DEV INTERROG REMOTE 1/2/MLT: CPT

## 2023-02-16 PROCEDURE — 93296 REM INTERROG EVL PM/IDS: CPT

## 2023-05-10 ENCOUNTER — NON-APPOINTMENT (OUTPATIENT)
Age: 88
End: 2023-05-10

## 2023-05-11 ENCOUNTER — INPATIENT (INPATIENT)
Facility: HOSPITAL | Age: 88
LOS: 7 days | Discharge: HOME | DRG: 871 | End: 2023-05-19
Attending: INTERNAL MEDICINE | Admitting: INTERNAL MEDICINE
Payer: MEDICARE

## 2023-05-11 VITALS
SYSTOLIC BLOOD PRESSURE: 156 MMHG | DIASTOLIC BLOOD PRESSURE: 69 MMHG | RESPIRATION RATE: 16 BRPM | HEART RATE: 64 BPM | OXYGEN SATURATION: 95 % | TEMPERATURE: 103 F

## 2023-05-11 DIAGNOSIS — F03.90 UNSPECIFIED DEMENTIA, UNSPECIFIED SEVERITY, WITHOUT BEHAVIORAL DISTURBANCE, PSYCHOTIC DISTURBANCE, MOOD DISTURBANCE, AND ANXIETY: ICD-10-CM

## 2023-05-11 DIAGNOSIS — B97.89 OTHER VIRAL AGENTS AS THE CAUSE OF DISEASES CLASSIFIED ELSEWHERE: ICD-10-CM

## 2023-05-11 DIAGNOSIS — R65.21 SEVERE SEPSIS WITH SEPTIC SHOCK: ICD-10-CM

## 2023-05-11 DIAGNOSIS — I42.9 CARDIOMYOPATHY, UNSPECIFIED: ICD-10-CM

## 2023-05-11 DIAGNOSIS — Z98.49 CATARACT EXTRACTION STATUS, UNSPECIFIED EYE: Chronic | ICD-10-CM

## 2023-05-11 DIAGNOSIS — I25.2 OLD MYOCARDIAL INFARCTION: ICD-10-CM

## 2023-05-11 DIAGNOSIS — I11.0 HYPERTENSIVE HEART DISEASE WITH HEART FAILURE: ICD-10-CM

## 2023-05-11 DIAGNOSIS — Z98.890 OTHER SPECIFIED POSTPROCEDURAL STATES: Chronic | ICD-10-CM

## 2023-05-11 DIAGNOSIS — Z96.7 PRESENCE OF OTHER BONE AND TENDON IMPLANTS: Chronic | ICD-10-CM

## 2023-05-11 DIAGNOSIS — J15.6 PNEUMONIA DUE TO OTHER GRAM-NEGATIVE BACTERIA: ICD-10-CM

## 2023-05-11 DIAGNOSIS — Z88.0 ALLERGY STATUS TO PENICILLIN: ICD-10-CM

## 2023-05-11 DIAGNOSIS — I48.0 PAROXYSMAL ATRIAL FIBRILLATION: ICD-10-CM

## 2023-05-11 DIAGNOSIS — C91.10 CHRONIC LYMPHOCYTIC LEUKEMIA OF B-CELL TYPE NOT HAVING ACHIEVED REMISSION: ICD-10-CM

## 2023-05-11 DIAGNOSIS — A41.9 SEPSIS, UNSPECIFIED ORGANISM: ICD-10-CM

## 2023-05-11 DIAGNOSIS — D84.9 IMMUNODEFICIENCY, UNSPECIFIED: ICD-10-CM

## 2023-05-11 DIAGNOSIS — J06.9 ACUTE UPPER RESPIRATORY INFECTION, UNSPECIFIED: ICD-10-CM

## 2023-05-11 DIAGNOSIS — I25.10 ATHEROSCLEROTIC HEART DISEASE OF NATIVE CORONARY ARTERY WITHOUT ANGINA PECTORIS: ICD-10-CM

## 2023-05-11 DIAGNOSIS — E87.5 HYPERKALEMIA: ICD-10-CM

## 2023-05-11 DIAGNOSIS — J96.01 ACUTE RESPIRATORY FAILURE WITH HYPOXIA: ICD-10-CM

## 2023-05-11 DIAGNOSIS — D69.6 THROMBOCYTOPENIA, UNSPECIFIED: ICD-10-CM

## 2023-05-11 DIAGNOSIS — Z79.01 LONG TERM (CURRENT) USE OF ANTICOAGULANTS: ICD-10-CM

## 2023-05-11 DIAGNOSIS — E78.5 HYPERLIPIDEMIA, UNSPECIFIED: ICD-10-CM

## 2023-05-11 DIAGNOSIS — M06.9 RHEUMATOID ARTHRITIS, UNSPECIFIED: ICD-10-CM

## 2023-05-11 DIAGNOSIS — I50.32 CHRONIC DIASTOLIC (CONGESTIVE) HEART FAILURE: ICD-10-CM

## 2023-05-11 DIAGNOSIS — Z95.810 PRESENCE OF AUTOMATIC (IMPLANTABLE) CARDIAC DEFIBRILLATOR: ICD-10-CM

## 2023-05-11 DIAGNOSIS — E44.0 MODERATE PROTEIN-CALORIE MALNUTRITION: ICD-10-CM

## 2023-05-11 LAB
ALBUMIN SERPL ELPH-MCNC: 4 G/DL — SIGNIFICANT CHANGE UP (ref 3.3–5)
ALP SERPL-CCNC: 112 U/L — SIGNIFICANT CHANGE UP (ref 40–120)
ALT FLD-CCNC: 35 U/L — SIGNIFICANT CHANGE UP (ref 12–78)
ANION GAP SERPL CALC-SCNC: 1 MMOL/L — LOW (ref 5–17)
ANION GAP SERPL CALC-SCNC: 2 MMOL/L — LOW (ref 5–17)
ANISOCYTOSIS BLD QL: SLIGHT — SIGNIFICANT CHANGE UP
APPEARANCE UR: CLEAR — SIGNIFICANT CHANGE UP
APTT BLD: 30.3 SEC — SIGNIFICANT CHANGE UP (ref 27.5–35.5)
AST SERPL-CCNC: 41 U/L — HIGH (ref 15–37)
BACTERIA # UR AUTO: NEGATIVE — SIGNIFICANT CHANGE UP
BASE EXCESS BLDV CALC-SCNC: -2.4 MMOL/L — LOW (ref -2–3)
BASOPHILS # BLD AUTO: 0 K/UL — SIGNIFICANT CHANGE UP (ref 0–0.2)
BASOPHILS NFR BLD AUTO: 0 % — SIGNIFICANT CHANGE UP (ref 0–2)
BILIRUB SERPL-MCNC: 1.5 MG/DL — HIGH (ref 0.2–1.2)
BILIRUB UR-MCNC: ABNORMAL
BUN SERPL-MCNC: 28 MG/DL — HIGH (ref 7–23)
BUN SERPL-MCNC: 29 MG/DL — HIGH (ref 7–23)
CALCIUM SERPL-MCNC: 9.4 MG/DL — SIGNIFICANT CHANGE UP (ref 8.5–10.1)
CALCIUM SERPL-MCNC: 9.5 MG/DL — SIGNIFICANT CHANGE UP (ref 8.5–10.1)
CHLORIDE SERPL-SCNC: 109 MMOL/L — HIGH (ref 96–108)
CHLORIDE SERPL-SCNC: 109 MMOL/L — HIGH (ref 96–108)
CK SERPL-CCNC: 41 U/L — SIGNIFICANT CHANGE UP (ref 26–308)
CO2 SERPL-SCNC: 25 MMOL/L — SIGNIFICANT CHANGE UP (ref 22–31)
CO2 SERPL-SCNC: 27 MMOL/L — SIGNIFICANT CHANGE UP (ref 22–31)
COLOR SPEC: YELLOW — SIGNIFICANT CHANGE UP
CREAT SERPL-MCNC: 1.09 MG/DL — SIGNIFICANT CHANGE UP (ref 0.5–1.3)
CREAT SERPL-MCNC: 1.21 MG/DL — SIGNIFICANT CHANGE UP (ref 0.5–1.3)
DIFF PNL FLD: ABNORMAL
EGFR: 58 ML/MIN/1.73M2 — LOW
EGFR: 66 ML/MIN/1.73M2 — SIGNIFICANT CHANGE UP
EOSINOPHIL # BLD AUTO: 0 K/UL — SIGNIFICANT CHANGE UP (ref 0–0.5)
EOSINOPHIL NFR BLD AUTO: 0 % — SIGNIFICANT CHANGE UP (ref 0–6)
EPI CELLS # UR: NEGATIVE — SIGNIFICANT CHANGE UP
GAS PNL BLDV: SIGNIFICANT CHANGE UP
GLUCOSE SERPL-MCNC: 120 MG/DL — HIGH (ref 70–99)
GLUCOSE SERPL-MCNC: 95 MG/DL — SIGNIFICANT CHANGE UP (ref 70–99)
GLUCOSE UR QL: NEGATIVE — SIGNIFICANT CHANGE UP
HCO3 BLDV-SCNC: 24 MMOL/L — SIGNIFICANT CHANGE UP (ref 22–29)
HCT VFR BLD CALC: 46.3 % — SIGNIFICANT CHANGE UP (ref 39–50)
HGB BLD-MCNC: 14.5 G/DL — SIGNIFICANT CHANGE UP (ref 13–17)
INR BLD: 2.39 RATIO — HIGH (ref 0.88–1.16)
KETONES UR-MCNC: ABNORMAL
LACTATE SERPL-SCNC: 1 MMOL/L — SIGNIFICANT CHANGE UP (ref 0.7–2)
LDH SERPL L TO P-CCNC: 234 U/L — SIGNIFICANT CHANGE UP (ref 84–241)
LEUKOCYTE ESTERASE UR-ACNC: ABNORMAL
LYMPHOCYTES # BLD AUTO: 105.97 K/UL — HIGH (ref 1–3.3)
LYMPHOCYTES # BLD AUTO: 87 % — HIGH (ref 13–44)
LYMPHOCYTES # SPEC AUTO: 2 % — HIGH (ref 0–0)
MACROCYTES BLD QL: SLIGHT — SIGNIFICANT CHANGE UP
MANUAL SMEAR VERIFICATION: SIGNIFICANT CHANGE UP
MCHC RBC-ENTMCNC: 31.3 GM/DL — LOW (ref 32–36)
MCHC RBC-ENTMCNC: 31.4 PG — SIGNIFICANT CHANGE UP (ref 27–34)
MCV RBC AUTO: 100.2 FL — HIGH (ref 80–100)
MONOCYTES # BLD AUTO: 1.22 K/UL — HIGH (ref 0–0.9)
MONOCYTES NFR BLD AUTO: 1 % — LOW (ref 2–14)
NEUTROPHILS # BLD AUTO: 7.31 K/UL — SIGNIFICANT CHANGE UP (ref 1.8–7.4)
NEUTROPHILS NFR BLD AUTO: 6 % — LOW (ref 43–77)
NITRITE UR-MCNC: NEGATIVE — SIGNIFICANT CHANGE UP
NRBC # BLD: 0 /100 — SIGNIFICANT CHANGE UP (ref 0–0)
NRBC # BLD: SIGNIFICANT CHANGE UP /100 WBCS (ref 0–0)
NT-PROBNP SERPL-SCNC: 3793 PG/ML — HIGH (ref 0–450)
PCO2 BLDV: 45 MMHG — SIGNIFICANT CHANGE UP (ref 42–55)
PH BLDV: 7.33 — SIGNIFICANT CHANGE UP (ref 7.32–7.43)
PH UR: 5 — SIGNIFICANT CHANGE UP (ref 5–8)
PHOSPHATE SERPL-MCNC: 2.3 MG/DL — LOW (ref 2.5–4.5)
PLAT MORPH BLD: NORMAL — SIGNIFICANT CHANGE UP
PLATELET # BLD AUTO: 163 K/UL — SIGNIFICANT CHANGE UP (ref 150–400)
PO2 BLDV: 46 MMHG — HIGH (ref 25–45)
POTASSIUM SERPL-MCNC: 4.8 MMOL/L — SIGNIFICANT CHANGE UP (ref 3.5–5.3)
POTASSIUM SERPL-MCNC: 6.2 MMOL/L — CRITICAL HIGH (ref 3.5–5.3)
POTASSIUM SERPL-SCNC: 4.8 MMOL/L — SIGNIFICANT CHANGE UP (ref 3.5–5.3)
POTASSIUM SERPL-SCNC: 6.2 MMOL/L — CRITICAL HIGH (ref 3.5–5.3)
PROT SERPL-MCNC: 7.1 GM/DL — SIGNIFICANT CHANGE UP (ref 6–8.3)
PROT UR-MCNC: NEGATIVE — SIGNIFICANT CHANGE UP
PROTHROM AB SERPL-ACNC: 28 SEC — HIGH (ref 10.5–13.4)
RAPID RVP RESULT: DETECTED
RBC # BLD: 4.62 M/UL — SIGNIFICANT CHANGE UP (ref 4.2–5.8)
RBC # FLD: 13.4 % — SIGNIFICANT CHANGE UP (ref 10.3–14.5)
RBC BLD AUTO: ABNORMAL
RBC CASTS # UR COMP ASSIST: SIGNIFICANT CHANGE UP /HPF (ref 0–4)
RV+EV RNA SPEC QL NAA+PROBE: DETECTED
SAO2 % BLDV: 79 % — SIGNIFICANT CHANGE UP (ref 67–88)
SARS-COV-2 RNA SPEC QL NAA+PROBE: SIGNIFICANT CHANGE UP
SMUDGE CELLS # BLD: PRESENT — SIGNIFICANT CHANGE UP
SODIUM SERPL-SCNC: 135 MMOL/L — SIGNIFICANT CHANGE UP (ref 135–145)
SODIUM SERPL-SCNC: 138 MMOL/L — SIGNIFICANT CHANGE UP (ref 135–145)
SP GR SPEC: 1.02 — SIGNIFICANT CHANGE UP (ref 1.01–1.02)
TROPONIN I, HIGH SENSITIVITY RESULT: 14.12 NG/L — SIGNIFICANT CHANGE UP
URATE SERPL-MCNC: 4.6 MG/DL — SIGNIFICANT CHANGE UP (ref 3.4–8.8)
UROBILINOGEN FLD QL: 1
VARIANT LYMPHS # BLD: 4 % — SIGNIFICANT CHANGE UP (ref 0–6)
WBC # BLD: 121.8 K/UL — CRITICAL HIGH (ref 3.8–10.5)
WBC # FLD AUTO: 121.8 K/UL — CRITICAL HIGH (ref 3.8–10.5)
WBC UR QL: SIGNIFICANT CHANGE UP /HPF (ref 0–5)

## 2023-05-11 PROCEDURE — 85610 PROTHROMBIN TIME: CPT

## 2023-05-11 PROCEDURE — 0225U NFCT DS DNA&RNA 21 SARSCOV2: CPT

## 2023-05-11 PROCEDURE — 85025 COMPLETE CBC W/AUTO DIFF WBC: CPT

## 2023-05-11 PROCEDURE — 82784 ASSAY IGA/IGD/IGG/IGM EACH: CPT

## 2023-05-11 PROCEDURE — C9113: CPT

## 2023-05-11 PROCEDURE — 83735 ASSAY OF MAGNESIUM: CPT

## 2023-05-11 PROCEDURE — 93010 ELECTROCARDIOGRAM REPORT: CPT

## 2023-05-11 PROCEDURE — 85027 COMPLETE CBC AUTOMATED: CPT

## 2023-05-11 PROCEDURE — 87070 CULTURE OTHR SPECIMN AEROBIC: CPT

## 2023-05-11 PROCEDURE — 84132 ASSAY OF SERUM POTASSIUM: CPT

## 2023-05-11 PROCEDURE — 80053 COMPREHEN METABOLIC PANEL: CPT

## 2023-05-11 PROCEDURE — 87086 URINE CULTURE/COLONY COUNT: CPT

## 2023-05-11 PROCEDURE — 71250 CT THORAX DX C-: CPT | Mod: MA

## 2023-05-11 PROCEDURE — 80048 BASIC METABOLIC PNL TOTAL CA: CPT

## 2023-05-11 PROCEDURE — 71045 X-RAY EXAM CHEST 1 VIEW: CPT | Mod: 26

## 2023-05-11 PROCEDURE — 82550 ASSAY OF CK (CPK): CPT

## 2023-05-11 PROCEDURE — 93306 TTE W/DOPPLER COMPLETE: CPT

## 2023-05-11 PROCEDURE — 83615 LACTATE (LD) (LDH) ENZYME: CPT

## 2023-05-11 PROCEDURE — 94640 AIRWAY INHALATION TREATMENT: CPT

## 2023-05-11 PROCEDURE — 71250 CT THORAX DX C-: CPT | Mod: 26

## 2023-05-11 PROCEDURE — 99291 CRITICAL CARE FIRST HOUR: CPT

## 2023-05-11 PROCEDURE — 97116 GAIT TRAINING THERAPY: CPT | Mod: GP

## 2023-05-11 PROCEDURE — 97162 PT EVAL MOD COMPLEX 30 MIN: CPT | Mod: GP

## 2023-05-11 PROCEDURE — 94760 N-INVAS EAR/PLS OXIMETRY 1: CPT

## 2023-05-11 PROCEDURE — 84145 PROCALCITONIN (PCT): CPT

## 2023-05-11 PROCEDURE — 84100 ASSAY OF PHOSPHORUS: CPT

## 2023-05-11 PROCEDURE — 81001 URINALYSIS AUTO W/SCOPE: CPT

## 2023-05-11 PROCEDURE — 84550 ASSAY OF BLOOD/URIC ACID: CPT

## 2023-05-11 PROCEDURE — 71045 X-RAY EXAM CHEST 1 VIEW: CPT

## 2023-05-11 PROCEDURE — 36415 COLL VENOUS BLD VENIPUNCTURE: CPT

## 2023-05-11 RX ORDER — SODIUM ZIRCONIUM CYCLOSILICATE 10 G/10G
10 POWDER, FOR SUSPENSION ORAL ONCE
Refills: 0 | Status: COMPLETED | OUTPATIENT
Start: 2023-05-11 | End: 2023-05-11

## 2023-05-11 RX ORDER — AZITHROMYCIN 500 MG/1
500 TABLET, FILM COATED ORAL EVERY 24 HOURS
Refills: 0 | Status: COMPLETED | OUTPATIENT
Start: 2023-05-12 | End: 2023-05-15

## 2023-05-11 RX ORDER — CHLORHEXIDINE GLUCONATE 213 G/1000ML
1 SOLUTION TOPICAL
Refills: 0 | Status: DISCONTINUED | OUTPATIENT
Start: 2023-05-11 | End: 2023-05-12

## 2023-05-11 RX ORDER — DEXTROSE 50 % IN WATER 50 %
50 SYRINGE (ML) INTRAVENOUS ONCE
Refills: 0 | Status: COMPLETED | OUTPATIENT
Start: 2023-05-11 | End: 2023-05-11

## 2023-05-11 RX ORDER — AZITHROMYCIN 500 MG/1
500 TABLET, FILM COATED ORAL ONCE
Refills: 0 | Status: COMPLETED | OUTPATIENT
Start: 2023-05-11 | End: 2023-05-11

## 2023-05-11 RX ORDER — CEFTRIAXONE 500 MG/1
1000 INJECTION, POWDER, FOR SOLUTION INTRAMUSCULAR; INTRAVENOUS EVERY 24 HOURS
Refills: 0 | Status: COMPLETED | OUTPATIENT
Start: 2023-05-11 | End: 2023-05-16

## 2023-05-11 RX ORDER — PANTOPRAZOLE SODIUM 20 MG/1
40 TABLET, DELAYED RELEASE ORAL ONCE
Refills: 0 | Status: COMPLETED | OUTPATIENT
Start: 2023-05-11 | End: 2023-05-12

## 2023-05-11 RX ORDER — CEFTRIAXONE 500 MG/1
1000 INJECTION, POWDER, FOR SOLUTION INTRAMUSCULAR; INTRAVENOUS ONCE
Refills: 0 | Status: COMPLETED | OUTPATIENT
Start: 2023-05-11 | End: 2023-05-11

## 2023-05-11 RX ORDER — METOPROLOL TARTRATE 50 MG
25 TABLET ORAL
Refills: 0 | Status: DISCONTINUED | OUTPATIENT
Start: 2023-05-11 | End: 2023-05-19

## 2023-05-11 RX ORDER — ACETAMINOPHEN 500 MG
1000 TABLET ORAL ONCE
Refills: 0 | Status: COMPLETED | OUTPATIENT
Start: 2023-05-11 | End: 2023-05-11

## 2023-05-11 RX ORDER — WARFARIN SODIUM 2.5 MG/1
3 TABLET ORAL DAILY
Refills: 0 | Status: DISCONTINUED | OUTPATIENT
Start: 2023-05-11 | End: 2023-05-14

## 2023-05-11 RX ORDER — ACETAMINOPHEN 500 MG
750 TABLET ORAL ONCE
Refills: 0 | Status: DISCONTINUED | OUTPATIENT
Start: 2023-05-11 | End: 2023-05-11

## 2023-05-11 RX ORDER — INSULIN HUMAN 100 [IU]/ML
5 INJECTION, SOLUTION SUBCUTANEOUS ONCE
Refills: 0 | Status: COMPLETED | OUTPATIENT
Start: 2023-05-11 | End: 2023-05-11

## 2023-05-11 RX ADMIN — Medication 50 MILLILITER(S): at 16:09

## 2023-05-11 RX ADMIN — AZITHROMYCIN 255 MILLIGRAM(S): 500 TABLET, FILM COATED ORAL at 15:10

## 2023-05-11 RX ADMIN — INSULIN HUMAN 5 UNIT(S): 100 INJECTION, SOLUTION SUBCUTANEOUS at 16:10

## 2023-05-11 RX ADMIN — SODIUM ZIRCONIUM CYCLOSILICATE 10 GRAM(S): 10 POWDER, FOR SUSPENSION ORAL at 16:11

## 2023-05-11 RX ADMIN — CEFTRIAXONE 1000 MILLIGRAM(S): 500 INJECTION, POWDER, FOR SOLUTION INTRAMUSCULAR; INTRAVENOUS at 15:09

## 2023-05-11 RX ADMIN — Medication 400 MILLIGRAM(S): at 15:43

## 2023-05-11 NOTE — PATIENT PROFILE ADULT - FALL HARM RISK - HARM RISK INTERVENTIONS

## 2023-05-11 NOTE — H&P ADULT - HISTORY OF PRESENT ILLNESS
S:    Pt seen and examined  HD # 1  FULL CODE  PMHx CLL baseline WBC ~ 80k, Afib on coumadin, severe cardiomyopathy, CAD s/p MI, HTN, HLD, CHF, s/p AICD, RA  presents to the ED c/o on and off SOB x 2 days. + Subjective fevers. Pt went to , found to be hypoxic with increasing SOB and sent to ED for eval. Reports Hx of PNA which required hospitalization, symptoms today feel the same    Placed on bipap, ICU called    5/11: Resp failure on bipap, hypoxia. CXR consistent with PNA. Similar presentation several months ago. Has felt sick for ~ 1 week with dry cough; acute SOB, fever this AM with worsening in cough.

## 2023-05-11 NOTE — PHARMACOTHERAPY INTERVENTION NOTE - COMMENTS
Medication history complete, reviewed medications with patient provided med list and confirmed with doctor first med hx.

## 2023-05-11 NOTE — H&P ADULT - NSHPLABSRESULTS_GEN_ALL_CORE
LABS:    CBC Full  -  ( 11 May 2023 14:50 )  WBC Count : 121.80 K/uL  RBC Count : 4.62 M/uL  Hemoglobin : 14.5 g/dL  Hematocrit : 46.3 %  Platelet Count - Automated : 163 K/uL  Mean Cell Volume : 100.2 fl  Mean Cell Hemoglobin : 31.4 pg  Mean Cell Hemoglobin Concentration : 31.3 gm/dL  Auto Neutrophil # : 7.31 K/uL  Auto Lymphocyte # : 105.97 K/uL  Auto Monocyte # : 1.22 K/uL  Auto Eosinophil # : 0.00 K/uL  Auto Basophil # : 0.00 K/uL  Auto Neutrophil % : 6.0 %  Auto Lymphocyte % : 87.0 %  Auto Monocyte % : 1.0 %  Auto Eosinophil % : 0.0 %  Auto Basophil % : 0.0 %    05-11    135  |  109<H>  |  28<H>  ----------------------------<  95  6.2<HH>   |  25  |  1.09    Ca    9.4      11 May 2023 14:50    TPro  7.1  /  Alb  4.0  /  TBili  1.5<H>  /  DBili  x   /  AST  41<H>  /  ALT  35  /  AlkPhos  112  05-11    PT/INR - ( 11 May 2023 14:50 )   PT: 28.0 sec;   INR: 2.39 ratio         PTT - ( 11 May 2023 14:50 )  PTT:30.3 sec    CAPILLARY BLOOD GLUCOSE            LIVER FUNCTIONS - ( 11 May 2023 14:50 )  Alb: 4.0 g/dL / Pro: 7.1 gm/dL / ALK PHOS: 112 U/L / ALT: 35 U/L / AST: 41 U/L / GGT: x

## 2023-05-11 NOTE — H&P ADULT - NSHPPHYSICALEXAM_GEN_ALL_CORE
O:    Elderly M lying in bed  + bipap mask in place, some tachypnea and inability to speak full sentences without getting winded  No JVD  S1S2, paced at 70  Coarse BS B/L  Abd softly distended  0-1+ non pitting edema B/L  Awake and alert  Skin pink, warm

## 2023-05-11 NOTE — H&P ADULT - ASSESSMENT
A:    88M  HD # 1  FULL CODE    Here for:    1.    This patient requires critical care for support of one or more vital organ systems with a high probability of imminent or life threatening deterioration in his/her condition    P:    Neuro: GCS 15. Monitor for delirium.  Continue to optimize pain control. Serial Neurologic assessments.    HEENT: No issues.    CV: Continue hemodynamic monitoring    Pulm: Pulmonary toilet.  Continue incentive spirometer.  Chest PT.  Encourage OOB to chair and ambulation. Nebs. f/u ABG, CXR.    GI/Nutrition: Cont diet, bowel regimen.    /Renal: Monitor UOP. Monitor BMP.  Replete Lytes as needed.    HEME- Chemical and mechanical DVT ppx. f/u CBC. f/u coags as needed.    ID:  Cont abx. f/u Cx's.    Lines/Tubes:     Endo: Maintain euglycemia.    Skin:  Cont skin care, pressure ulcer prevention.    Dispo: Cont critical care.    TOTAL CRITICAL CARE TIME:   (EXCLUSIVE of any non bundled procedures)    Note: This time spent INCLUDES time spent directly as this patient's bedside with evaluation, review of chart including review of laboratory and imaging studies, interpretation of vital signs and cardiac output measurements, any necessary ventilator management, and time spent discussing plan of care with patient and family, including goals of care discussion.   A:    88M  HD # 1  FULL CODE    Here for:    1. Acute hypoxic resp failure 2/2  2. PNA, multifocal  3. CLL  4. Afib  5. Hyperkalemia  6. ICM    This patient requires critical care for support of one or more vital organ systems with a high probability of imminent or life threatening deterioration in his/her condition    P:    Acute hypoxic respiratory failure 2/2 multifocal PNA  Hyperkalemia, no overt ADRIANA by labs    Of note, Hx of Hflu admission ~ 6 months ago    NIV via bipap, 10/5/.1.0; actively titrating now to optimize oxygenation and ventilation while minimizing airway pressures  PRN vasopressors; lopressor as able for rate control in AF  Hx of ICM; obtain TTE to compare to prior, EF ~ 50-55%  Broad spectrum abx CTX 1g IV qd, AZ 500mg IV qd for coverage of CAP; Hx of CLL, baseline WBC usually ~80k or so  VTE ppx coumadin PUD ppx  NPO + meds while on NIV, PO diet when off  Tx hyperK+ with lokelma, insulin/dextrose; f/u rpt BMP  BG < 180  HGb 14.5, no s/s active bleeding  PIV access    Dispo: Cont critical care.    TOTAL CRITICAL CARE TIME: 110 minutes (EXCLUSIVE of any non bundled procedures)    Note: This time spent INCLUDES time spent directly as this patient's bedside with evaluation, review of chart including review of laboratory and imaging studies, interpretation of vital signs and cardiac output measurements, any necessary ventilator management, and time spent discussing plan of care with patient and family, including goals of care discussion.

## 2023-05-11 NOTE — ED ADULT TRIAGE NOTE - CHIEF COMPLAINT QUOTE
Pt presents to ED, BIBEMS from urgent care for shortness of breath, productive cough, fever and bradycardia. As per EMS, Urgent care staff noticed patient had one episode of bradycardia where his heart rate dropped to 38bpm. Pt observed to have increased work of breathing, currently on 6L NC w/ SpO2 95%.

## 2023-05-11 NOTE — ED PROVIDER NOTE - MUSCULOSKELETAL, MLM
Bilateral LE edema. Spine appears normal, range of motion is not limited, no muscle or joint tenderness

## 2023-05-11 NOTE — ED PROVIDER NOTE - ATTENDING CONTRIBUTION TO CARE
I, Phoebe Burgos DO,  performed the initial face to face bedside interview with this patient regarding history of present illness, review of symptoms and relevant past medical, social and family history.  I completed an independent physical examination.  I was the initial provider who evaluated this patient. I have signed out the follow up of any pending tests (i.e. labs, radiological studies) to the resident.  I have communicated the patient’s plan of care and disposition with the resident.  The history, relevant review of systems, past medical and surgical history, medical decision making, and physical examination was documented by the scribe in my presence and I attest to the accuracy of the documentation.

## 2023-05-11 NOTE — ED PROVIDER NOTE - NSICDXPASTMEDICALHX_GEN_ALL_CORE_FT
PAST MEDICAL HISTORY:  Afib     BPH (Benign Prostatic Hyperplasia)     CA - skin cancer     Cholesterol serum elevated     Glaucoma     Gout     Hearing loss     History of ankle fracture     HTN (hypertension)     Intolerance, drug INTOLERANCE  TO STATIN    Myocardial infarction     Osteoarthritis     Rheumatoid arthritis       Chronic CHF

## 2023-05-11 NOTE — ED PROVIDER NOTE - CLINICAL SUMMARY MEDICAL DECISION MAKING FREE TEXT BOX
89 y/o male with PMHx of Afib on coumadin, severe cardiomyopathy, CAD s/p MI, HTN, HLD, CHF, s/p AICD, RA, and leukemia with SOB, productive cough, and fever. Hx of PNA with concern for similar presentation. Pt febrile upon arrival, sepsis protocol initiated. Screening EKG, CXR, basic labs, IV antibiotics, and admission.

## 2023-05-11 NOTE — ED ADULT NURSE NOTE - OBJECTIVE STATEMENT
87 y/o male awake alert and oriented x4 presents to ED c/o SOB x 2 days. + subjective fever. Pt was sent by urgent care for hypoxia, using accessory muscles with increased SOB. hx MI, HTN, HLD, Leukemia

## 2023-05-11 NOTE — ED ADULT NURSE NOTE - NSFALLHARMRISKINTERV_ED_ALL_ED
Assistance OOB with selected safe patient handling equipment if applicable/Communicate risk of Fall with Harm to all staff, patient, and family/Provide visual cue: red socks, yellow wristband, yellow gown, etc/Reinforce activity limits and safety measures with patient and family/Bed in lowest position, wheels locked, appropriate side rails in place/Call bell, personal items and telephone in reach/Instruct patient to call for assistance before getting out of bed/chair/stretcher/Non-slip footwear applied when patient is off stretcher/[place] to call system/Physically safe environment - no spills, clutter or unnecessary equipment/Purposeful Proactive Rounding/Room/bathroom lighting operational, light cord in reach

## 2023-05-11 NOTE — ED PROVIDER NOTE - PROGRESS NOTE DETAILS
Mary Ellen Burgos: 30 cc per kg bolus IVF held secondary to pt's Hx of CHF. ICU/stepdown consulted at this time, pending their assessment.  Patient is status post BiPAP, symptoms improved from initial evaluation.  Hyperkalemia noted on lab work, insulin and dextrose ordered at this time.  WBC noted in the 120s consistent with his history of leukemia pending the complete differential at this time.  Pending close reassessments. Aubrey SINGH: endorsed to CCU team.

## 2023-05-11 NOTE — PATIENT PROFILE ADULT - FALL HARM RISK - DATE OF FALL
Date: 04/25/23   Name: Anya Pederson    Pre-Procedural Diagnosis:    Diagnosis Orders   1. Lumbosacral radiculopathy  FL NERVE BLOCK LUMBOSACRAL 1ST    FL NERVE BLOCK LUMBOSACRAL EACH ADD    triamcinolone acetonide (KENALOG-40) injection 160 mg      Was having sx on left, now on right. Will inject on right. Procedure: Selective Nerve Root Blocks (Transforaminal) - Multiple Level    Precautions: [facility] Precautions spine injections: None. Patient denies any prior sensitivity to steroid, local anesthetic, contrast dye, iodine or shellfish. The procedure was discussed at length with the patient and informed consent was signed. The patient was placed in a prone position on the fluoroscopy table and the skin was prepped and draped in a routine sterile fashion. The areas to be injected were each anesthetized with approximately 5 cc of 1% Lidocaine. A 22-gauge 3.5 inch spinal needle was carefully advanced under fluoroscopic guidance to the right L5 transforaminal space and subsequently the right S1 transforaminal space. At this time 0.25 cc of omnipaque administered. . Once proper placement was confirmed, 2 cc of 0.25% Marcaine and 80 mg of Kenalog were injected through the spinal needle at each site. Fluoroscopic guidance was used intermittently over a 10-minute period to insure proper needle placement and patient safety. A hard copy of the fluoroscopic  images has been placed in the patient's chart. The patient was monitored after the procedure and discharged home in stable fashion. A total of 4 cc of Kenalog were administered during this procedure. Resume Meds:   N/A Remains on warfarin/coumadin.     Jamel Ponce MD  04/25/23
05-Mar-2023

## 2023-05-11 NOTE — ED ADULT NURSE NOTE - NSFALLRISKFACTORS_ED_ALL_ED
Age: 85 years old or older/Coagulation: Bleeding disorder either through use of anticoagulants or underlying clinical condition(s)

## 2023-05-11 NOTE — ED PROVIDER NOTE - OBJECTIVE STATEMENT
89 y/o male with PMHx of Afib on coumadin, severe cardiomyopathy, CAD s/p MI, HTN, HLD, CHF, s/p AICD, RA, and leukemia presents to the ED c/o on and off SOB x 2 days. + Subjective fevers. Pt went to , found to be hypoxic with increasing SOB and sent to ED for eval. Reports Hx of PNA which required hospitalization, symptoms today feel the same.

## 2023-05-12 LAB
ADD ON TEST-SPECIMEN IN LAB: SIGNIFICANT CHANGE UP
ALBUMIN SERPL ELPH-MCNC: 3.2 G/DL — LOW (ref 3.3–5)
ALP SERPL-CCNC: 89 U/L — SIGNIFICANT CHANGE UP (ref 40–120)
ALT FLD-CCNC: 31 U/L — SIGNIFICANT CHANGE UP (ref 12–78)
ANION GAP SERPL CALC-SCNC: 2 MMOL/L — LOW (ref 5–17)
AST SERPL-CCNC: 34 U/L — SIGNIFICANT CHANGE UP (ref 15–37)
BASOPHILS # BLD AUTO: 0.52 K/UL — HIGH (ref 0–0.2)
BASOPHILS NFR BLD AUTO: 0.5 % — SIGNIFICANT CHANGE UP (ref 0–2)
BILIRUB SERPL-MCNC: 1.9 MG/DL — HIGH (ref 0.2–1.2)
BUN SERPL-MCNC: 30 MG/DL — HIGH (ref 7–23)
CALCIUM SERPL-MCNC: 9.2 MG/DL — SIGNIFICANT CHANGE UP (ref 8.5–10.1)
CHLORIDE SERPL-SCNC: 107 MMOL/L — SIGNIFICANT CHANGE UP (ref 96–108)
CO2 SERPL-SCNC: 29 MMOL/L — SIGNIFICANT CHANGE UP (ref 22–31)
CREAT SERPL-MCNC: 1.05 MG/DL — SIGNIFICANT CHANGE UP (ref 0.5–1.3)
EGFR: 69 ML/MIN/1.73M2 — SIGNIFICANT CHANGE UP
EOSINOPHIL # BLD AUTO: 0.02 K/UL — SIGNIFICANT CHANGE UP (ref 0–0.5)
EOSINOPHIL NFR BLD AUTO: 0 % — SIGNIFICANT CHANGE UP (ref 0–6)
GLUCOSE SERPL-MCNC: 116 MG/DL — HIGH (ref 70–99)
GRAM STN FLD: SIGNIFICANT CHANGE UP
HCT VFR BLD CALC: 40.5 % — SIGNIFICANT CHANGE UP (ref 39–50)
HGB BLD-MCNC: 12.7 G/DL — LOW (ref 13–17)
IMM GRANULOCYTES NFR BLD AUTO: 0.7 % — SIGNIFICANT CHANGE UP (ref 0–0.9)
LYMPHOCYTES # BLD AUTO: 83 % — HIGH (ref 13–44)
LYMPHOCYTES # BLD AUTO: 86.56 K/UL — HIGH (ref 1–3.3)
MAGNESIUM SERPL-MCNC: 1.8 MG/DL — SIGNIFICANT CHANGE UP (ref 1.6–2.6)
MANUAL DIF COMMENT BLD-IMP: SIGNIFICANT CHANGE UP
MANUAL SMEAR VERIFICATION: SIGNIFICANT CHANGE UP
MCHC RBC-ENTMCNC: 31.4 GM/DL — LOW (ref 32–36)
MCHC RBC-ENTMCNC: 31.8 PG — SIGNIFICANT CHANGE UP (ref 27–34)
MCV RBC AUTO: 101.3 FL — HIGH (ref 80–100)
MONOCYTES # BLD AUTO: 2.13 K/UL — HIGH (ref 0–0.9)
MONOCYTES NFR BLD AUTO: 2 % — SIGNIFICANT CHANGE UP (ref 2–14)
NEUTROPHILS # BLD AUTO: 14.32 K/UL — HIGH (ref 1.8–7.4)
NEUTROPHILS NFR BLD AUTO: 13.8 % — LOW (ref 43–77)
PHOSPHATE SERPL-MCNC: 3.2 MG/DL — SIGNIFICANT CHANGE UP (ref 2.5–4.5)
PLAT MORPH BLD: SIGNIFICANT CHANGE UP
PLATELET # BLD AUTO: 119 K/UL — LOW (ref 150–400)
POTASSIUM SERPL-MCNC: 5.2 MMOL/L — SIGNIFICANT CHANGE UP (ref 3.5–5.3)
POTASSIUM SERPL-SCNC: 5.2 MMOL/L — SIGNIFICANT CHANGE UP (ref 3.5–5.3)
PROCALCITONIN SERPL-MCNC: 5.38 NG/ML — HIGH (ref 0.02–0.1)
PROT SERPL-MCNC: 6 GM/DL — SIGNIFICANT CHANGE UP (ref 6–8.3)
RBC # BLD: 4 M/UL — LOW (ref 4.2–5.8)
RBC # FLD: 13.6 % — SIGNIFICANT CHANGE UP (ref 10.3–14.5)
RBC BLD AUTO: SIGNIFICANT CHANGE UP
SODIUM SERPL-SCNC: 138 MMOL/L — SIGNIFICANT CHANGE UP (ref 135–145)
SPECIMEN SOURCE: SIGNIFICANT CHANGE UP
WBC # BLD: 104.25 K/UL — CRITICAL HIGH (ref 3.8–10.5)
WBC # FLD AUTO: 104.25 K/UL — CRITICAL HIGH (ref 3.8–10.5)

## 2023-05-12 PROCEDURE — 93306 TTE W/DOPPLER COMPLETE: CPT | Mod: 26

## 2023-05-12 PROCEDURE — 99291 CRITICAL CARE FIRST HOUR: CPT

## 2023-05-12 RX ORDER — DORZOLAMIDE HYDROCHLORIDE TIMOLOL MALEATE 20; 5 MG/ML; MG/ML
1 SOLUTION/ DROPS OPHTHALMIC
Refills: 0 | Status: DISCONTINUED | OUTPATIENT
Start: 2023-05-12 | End: 2023-05-19

## 2023-05-12 RX ORDER — CHLORHEXIDINE GLUCONATE 213 G/1000ML
1 SOLUTION TOPICAL
Refills: 0 | Status: DISCONTINUED | OUTPATIENT
Start: 2023-05-12 | End: 2023-05-19

## 2023-05-12 RX ADMIN — WARFARIN SODIUM 3 MILLIGRAM(S): 2.5 TABLET ORAL at 10:47

## 2023-05-12 RX ADMIN — AZITHROMYCIN 255 MILLIGRAM(S): 500 TABLET, FILM COATED ORAL at 11:30

## 2023-05-12 RX ADMIN — CEFTRIAXONE 1000 MILLIGRAM(S): 500 INJECTION, POWDER, FOR SOLUTION INTRAMUSCULAR; INTRAVENOUS at 10:47

## 2023-05-12 RX ADMIN — Medication 600 MILLIGRAM(S): at 21:25

## 2023-05-12 RX ADMIN — Medication 25 MILLIGRAM(S): at 21:25

## 2023-05-12 RX ADMIN — DORZOLAMIDE HYDROCHLORIDE TIMOLOL MALEATE 1 DROP(S): 20; 5 SOLUTION/ DROPS OPHTHALMIC at 21:26

## 2023-05-12 RX ADMIN — Medication 25 MILLIGRAM(S): at 15:03

## 2023-05-12 RX ADMIN — Medication 600 MILLIGRAM(S): at 10:48

## 2023-05-12 RX ADMIN — PANTOPRAZOLE SODIUM 40 MILLIGRAM(S): 20 TABLET, DELAYED RELEASE ORAL at 01:27

## 2023-05-12 NOTE — PROGRESS NOTE ADULT - SUBJECTIVE AND OBJECTIVE BOX
Patient is a 88y old  Male who presents with a chief complaint of Resp failure (11 May 2023 15:55)    24 hour events:     Allergies    penicillins (Unknown)    Intolerances      REVIEW OF SYSTEMS: SEE BELOW       ICU Vital Signs Last 24 Hrs  T(C): 36.7 (12 May 2023 05:04), Max: 39.4 (11 May 2023 14:29)  T(F): 98 (12 May 2023 05:04), Max: 102.9 (11 May 2023 14:29)  HR: 80 (12 May 2023 06:00) (64 - 85)  BP: 113/55 (12 May 2023 06:00) (91/46 - 156/69)  BP(mean): 69 (12 May 2023 06:00) (57 - 101)  ABP: --  ABP(mean): --  RR: 23 (12 May 2023 06:00) (16 - 31)  SpO2: 100% (12 May 2023 06:00) (95% - 100%)    O2 Parameters below as of 12 May 2023 01:00  Patient On (Oxygen Delivery Method): mask, Venturi    O2 Concentration (%): 50        CAPILLARY BLOOD GLUCOSE          I&O's Summary    11 May 2023 07:01  -  12 May 2023 07:00  --------------------------------------------------------  IN: 0 mL / OUT: 430 mL / NET: -430 mL            MEDICATIONS  (STANDING):  azithromycin  IVPB 500 milliGRAM(s) IV Intermittent every 24 hours  cefTRIAXone Injectable. 1000 milliGRAM(s) IV Push every 24 hours  chlorhexidine 4% Liquid 1 Application(s) Topical <User Schedule>  dorzolamide 2%/timolol 0.5% Ophthalmic Solution 1 Drop(s) Both EYES two times a day  guaiFENesin  milliGRAM(s) Oral every 12 hours  metoprolol tartrate 25 milliGRAM(s) Oral two times a day  warfarin 3 milliGRAM(s) Oral daily      MEDICATIONS  (PRN):      PHYSICAL EXAM: SEE BELOW                          12.7   104.25 )-----------( 119      ( 12 May 2023 05:35 )             40.5       05-12    138  |  107  |  30<H>  ----------------------------<  116<H>  5.2   |  29  |  1.05    Ca    9.2      12 May 2023 05:35  Phos  2.3         TPro  6.0  /  Alb  3.2<L>  /  TBili  1.9<H>  /  DBili  x   /  AST  34  /  ALT  31  /  AlkPhos  89      Lactate 1.0            @ 14:50      CARDIAC MARKERS ( 11 May 2023 17:22 )  x     / x     / 41 U/L / x     / x          PT/INR - ( 11 May 2023 14:50 )   PT: 28.0 sec;   INR: 2.39 ratio         PTT - ( 11 May 2023 14:50 )  PTT:30.3 sec  Urinalysis Basic - ( 11 May 2023 23:00 )    Color: Yellow / Appearance: Clear / S.020 / pH: x  Gluc: x / Ketone: Trace  / Bili: Small / Urobili: 1   Blood: x / Protein: Negative / Nitrite: Negative   Leuk Esterase: Trace / RBC: 0-2 /HPF / WBC 0-2 /HPF   Sq Epi: x / Non Sq Epi: x / Bacteria: Negative          Rapid RVP Result: Detected ( @ 20:00)

## 2023-05-12 NOTE — DIETITIAN INITIAL EVALUATION ADULT - ADD RECOMMEND
1) Advance diet to Low Sodium  2) Add ensure plus high protein BID to optimize PO intake (provides 350 kcal, 20g protein/ shake)   3) Please obtain daily weights  4) Obtain vitamin D 25OH level to assess nutriture  5) Consider adding thiamine 100 mg daily 2/2 poor PO intake/ malnutrition  6) MVI w/ minerals daily to ensure 100% RDA met  7) consider checking B6, B12, thiamine, folate, carnitine, and copper levels as malnutrition in cause these to be deficient  8) Encourage protein-rich foods, maximize food preferences  9) Monitor bowel movements, if no BM for >3 days, consider implementing bowel regimen.  10) Confirm goals of care regarding nutrition support  RD will continue to monitor PO intake, labs, hydration, and wt prn.

## 2023-05-12 NOTE — DIETITIAN INITIAL EVALUATION ADULT - PERTINENT LABORATORY DATA
05-12    138  |  107  |  30<H>  ----------------------------<  116<H>  5.2   |  29  |  1.05    Ca    9.2      12 May 2023 05:35  Phos  2.3     05-11    TPro  6.0  /  Alb  3.2<L>  /  TBili  1.9<H>  /  DBili  x   /  AST  34  /  ALT  31  /  AlkPhos  89  05-12

## 2023-05-12 NOTE — DIETITIAN INITIAL EVALUATION ADULT - ORAL INTAKE PTA/DIET HISTORY
States that he had a poor intake x 1 day - only consumed cereal yesterday. Reports an overall good appetite/ intake diet pta; normally consumes 3 meals/day. Does not follow any diet restrictions and occasionally drinks Ensure at home. Pt lives at home w/ dtr - helps with cooking/ shopping.

## 2023-05-12 NOTE — DIETITIAN INITIAL EVALUATION ADULT - PERTINENT MEDS FT
MEDICATIONS  (STANDING):  azithromycin  IVPB 500 milliGRAM(s) IV Intermittent every 24 hours  cefTRIAXone Injectable. 1000 milliGRAM(s) IV Push every 24 hours  chlorhexidine 4% Liquid 1 Application(s) Topical <User Schedule>  dorzolamide 2%/timolol 0.5% Ophthalmic Solution 1 Drop(s) Both EYES two times a day  guaiFENesin  milliGRAM(s) Oral every 12 hours  metoprolol tartrate 25 milliGRAM(s) Oral two times a day  warfarin 3 milliGRAM(s) Oral daily    Home Medications:  allopurinol 300 mg oral tablet: 1 tab(s) orally once a day (11 May 2023 16:44)  dorzolamide-timolol 2%-0.5% preservative-free ophthalmic solution: 1 drop(s) to each affected eye 2 times a day (11 May 2023 16:36)  lisinopril-hydrochlorothiazide 20 mg-25 mg oral tablet: 1 tab(s) orally every other day (11 May 2023 16:44)  metoprolol tartrate 25 mg oral tablet: 1 tab(s) orally 2 times a day (11 May 2023 16:36)  sodium chloride 0.65% nasal spray: 1 spray(s) in each nostril 4 times a day as needed  (11 May 2023 16:36)  warfarin 3 mg oral tablet: 1 tab(s) orally once a day (at bedtime) (11 May 2023 16:44)

## 2023-05-12 NOTE — PROGRESS NOTE ADULT - ASSESSMENT
87 y/o male with PMH CLL (no tx), CAD, CHF, s/p ICD, chronic A.fib on warfarin, previous H. flu pneumonia with resp failure and septic shock, now presents with cough and resp distress    Admitted for Acute hypoxic resp failure and severe sepsis (POA) from multifocal pneumonia     Required bipap       Plan:     Improving   Off bipap, tolerating NC O2   Continue azithro, ceftriaxone, f/u cx   WBC chronically elevated but improving  No fever   BP is ok   On metoprolol   Continue warfarin, check INR in am   OOB

## 2023-05-12 NOTE — DIETITIAN INITIAL EVALUATION ADULT - NSFNSGIIOFT_GEN_A_CORE
I&O's Detail    11 May 2023 07:01  -  12 May 2023 07:00  --------------------------------------------------------  IN:  Total IN: 0 mL    OUT:    Voided (mL): 430 mL  Total OUT: 430 mL    Total NET: -430 mL

## 2023-05-12 NOTE — DIETITIAN INITIAL EVALUATION ADULT - OTHER INFO
89 y/o M with a PMHx of CLL baseline WBC ~ 80k, Afib on coumadin, severe cardiomyopathy, CAD s/p MI, HTN, HLD, CHF, s/p AICD, RA presented to the ED c/o on and off SOB x 2 days. + Subjective fevers. Pt went to , found to be hypoxic with increasing SOB and sent to ED for eval. Reports Hx of PNA which required hospitalization, symptoms today feel the same. Admitted for acute respiratory failure with hypoxia; tx to CCU for BiPAP.    Pt NPO at time of visit. Reports his ubw is 210# which has been a stable wt for him. RD obtained bedscale wt on 5/12 - 205#. Weigh hx reviewed: 219# (taken by RD on 9/15/22); weight loss of 14# (6.4%) x 9 mons - not clinsig. NFPE reveals mild to moderate muscle/ fat wasting, pt meets criteria for PCM at this time. Encouraged high kcal/ high protein intake, pt receptive to trialing Ensure Plus High Protein BID in effort to optimize PO diet. Recommend to advance diet to low sodium as soon as medically feasible. Pt discussed in IDR this morning, plan to advance diet today. Please see additional recommendations below.

## 2023-05-13 LAB
ADD ON TEST-SPECIMEN IN LAB: SIGNIFICANT CHANGE UP
ANION GAP SERPL CALC-SCNC: 3 MMOL/L — LOW (ref 5–17)
ANION GAP SERPL CALC-SCNC: 4 MMOL/L — LOW (ref 5–17)
BUN SERPL-MCNC: 28 MG/DL — HIGH (ref 7–23)
BUN SERPL-MCNC: 35 MG/DL — HIGH (ref 7–23)
CALCIUM SERPL-MCNC: 9.6 MG/DL — SIGNIFICANT CHANGE UP (ref 8.5–10.1)
CALCIUM SERPL-MCNC: 9.6 MG/DL — SIGNIFICANT CHANGE UP (ref 8.5–10.1)
CHLORIDE SERPL-SCNC: 109 MMOL/L — HIGH (ref 96–108)
CHLORIDE SERPL-SCNC: 109 MMOL/L — HIGH (ref 96–108)
CO2 SERPL-SCNC: 27 MMOL/L — SIGNIFICANT CHANGE UP (ref 22–31)
CO2 SERPL-SCNC: 28 MMOL/L — SIGNIFICANT CHANGE UP (ref 22–31)
CREAT SERPL-MCNC: 0.86 MG/DL — SIGNIFICANT CHANGE UP (ref 0.5–1.3)
CREAT SERPL-MCNC: 1.23 MG/DL — SIGNIFICANT CHANGE UP (ref 0.5–1.3)
CULTURE RESULTS: NO GROWTH — SIGNIFICANT CHANGE UP
EGFR: 57 ML/MIN/1.73M2 — LOW
EGFR: 84 ML/MIN/1.73M2 — SIGNIFICANT CHANGE UP
GLUCOSE SERPL-MCNC: 115 MG/DL — HIGH (ref 70–99)
GLUCOSE SERPL-MCNC: 158 MG/DL — HIGH (ref 70–99)
HCT VFR BLD CALC: 39.7 % — SIGNIFICANT CHANGE UP (ref 39–50)
HGB BLD-MCNC: 12.2 G/DL — LOW (ref 13–17)
INR BLD: 2.25 RATIO — HIGH (ref 0.88–1.16)
MAGNESIUM SERPL-MCNC: 1.9 MG/DL — SIGNIFICANT CHANGE UP (ref 1.6–2.6)
MAGNESIUM SERPL-MCNC: 2 MG/DL — SIGNIFICANT CHANGE UP (ref 1.6–2.6)
MCHC RBC-ENTMCNC: 30.7 GM/DL — LOW (ref 32–36)
MCHC RBC-ENTMCNC: 31.4 PG — SIGNIFICANT CHANGE UP (ref 27–34)
MCV RBC AUTO: 102.1 FL — HIGH (ref 80–100)
PHOSPHATE SERPL-MCNC: 2 MG/DL — LOW (ref 2.5–4.5)
PHOSPHATE SERPL-MCNC: 2.4 MG/DL — LOW (ref 2.5–4.5)
PLATELET # BLD AUTO: 124 K/UL — LOW (ref 150–400)
POTASSIUM SERPL-MCNC: 4.6 MMOL/L — SIGNIFICANT CHANGE UP (ref 3.5–5.3)
POTASSIUM SERPL-MCNC: 5.4 MMOL/L — HIGH (ref 3.5–5.3)
POTASSIUM SERPL-SCNC: 4.6 MMOL/L — SIGNIFICANT CHANGE UP (ref 3.5–5.3)
POTASSIUM SERPL-SCNC: 5.4 MMOL/L — HIGH (ref 3.5–5.3)
PROTHROM AB SERPL-ACNC: 26.3 SEC — HIGH (ref 10.5–13.4)
RBC # BLD: 3.89 M/UL — LOW (ref 4.2–5.8)
RBC # FLD: 13.9 % — SIGNIFICANT CHANGE UP (ref 10.3–14.5)
SODIUM SERPL-SCNC: 139 MMOL/L — SIGNIFICANT CHANGE UP (ref 135–145)
SODIUM SERPL-SCNC: 141 MMOL/L — SIGNIFICANT CHANGE UP (ref 135–145)
SPECIMEN SOURCE: SIGNIFICANT CHANGE UP
WBC # BLD: 87.77 K/UL — CRITICAL HIGH (ref 3.8–10.5)
WBC # FLD AUTO: 87.77 K/UL — CRITICAL HIGH (ref 3.8–10.5)

## 2023-05-13 PROCEDURE — 71045 X-RAY EXAM CHEST 1 VIEW: CPT | Mod: 26

## 2023-05-13 PROCEDURE — 99291 CRITICAL CARE FIRST HOUR: CPT

## 2023-05-13 RX ORDER — MAGNESIUM SULFATE 500 MG/ML
2 VIAL (ML) INJECTION ONCE
Refills: 0 | Status: COMPLETED | OUTPATIENT
Start: 2023-05-13 | End: 2023-05-13

## 2023-05-13 RX ORDER — IPRATROPIUM/ALBUTEROL SULFATE 18-103MCG
3 AEROSOL WITH ADAPTER (GRAM) INHALATION EVERY 6 HOURS
Refills: 0 | Status: DISCONTINUED | OUTPATIENT
Start: 2023-05-13 | End: 2023-05-19

## 2023-05-13 RX ORDER — BUDESONIDE AND FORMOTEROL FUMARATE DIHYDRATE 160; 4.5 UG/1; UG/1
2 AEROSOL RESPIRATORY (INHALATION)
Refills: 0 | Status: DISCONTINUED | OUTPATIENT
Start: 2023-05-13 | End: 2023-05-19

## 2023-05-13 RX ORDER — FUROSEMIDE 40 MG
10 TABLET ORAL ONCE
Refills: 0 | Status: COMPLETED | OUTPATIENT
Start: 2023-05-14 | End: 2023-05-14

## 2023-05-13 RX ADMIN — Medication 25 MILLIGRAM(S): at 11:17

## 2023-05-13 RX ADMIN — Medication 25 GRAM(S): at 23:50

## 2023-05-13 RX ADMIN — Medication 40 MILLIGRAM(S): at 11:16

## 2023-05-13 RX ADMIN — Medication 25 MILLIGRAM(S): at 21:53

## 2023-05-13 RX ADMIN — WARFARIN SODIUM 3 MILLIGRAM(S): 2.5 TABLET ORAL at 21:54

## 2023-05-13 RX ADMIN — DORZOLAMIDE HYDROCHLORIDE TIMOLOL MALEATE 1 DROP(S): 20; 5 SOLUTION/ DROPS OPHTHALMIC at 11:18

## 2023-05-13 RX ADMIN — Medication 3 MILLILITER(S): at 20:20

## 2023-05-13 RX ADMIN — Medication 3 MILLILITER(S): at 15:45

## 2023-05-13 RX ADMIN — BUDESONIDE AND FORMOTEROL FUMARATE DIHYDRATE 2 PUFF(S): 160; 4.5 AEROSOL RESPIRATORY (INHALATION) at 20:20

## 2023-05-13 RX ADMIN — CHLORHEXIDINE GLUCONATE 1 APPLICATION(S): 213 SOLUTION TOPICAL at 11:35

## 2023-05-13 RX ADMIN — Medication 600 MILLIGRAM(S): at 11:17

## 2023-05-13 RX ADMIN — CEFTRIAXONE 1000 MILLIGRAM(S): 500 INJECTION, POWDER, FOR SOLUTION INTRAMUSCULAR; INTRAVENOUS at 11:17

## 2023-05-13 RX ADMIN — DORZOLAMIDE HYDROCHLORIDE TIMOLOL MALEATE 1 DROP(S): 20; 5 SOLUTION/ DROPS OPHTHALMIC at 21:54

## 2023-05-13 RX ADMIN — AZITHROMYCIN 255 MILLIGRAM(S): 500 TABLET, FILM COATED ORAL at 11:33

## 2023-05-13 RX ADMIN — Medication 600 MILLIGRAM(S): at 21:53

## 2023-05-13 NOTE — PROGRESS NOTE ADULT - ASSESSMENT
87 y/o male with PMH CLL (no tx), CAD, CHF, s/p ICD, chronic A.fib on warfarin, previous H. flu pneumonia with resp failure and septic shock, now presents with cough and resp distress    Admitted for Acute hypoxic resp failure and severe sepsis (POA) from multifocal pneumonia     Required bipap       Plan:     Resp status mostly stable but develops distress while talking, still c/o cough with blood streaked sputum, unable to completely expectorate  Will start BDs, Symbicort, chest PT   Also give 1 dose of Solumedrol   Didn't need bipap last night, will d/c   Continue azithro, ceftriaxone, blood and urine cx neg so far, f/u sputum cx   WBC chronically elevated but improving  Consult HemOnc for CLL, ?transformation  F/u Ig panel, may need IVIg   On metoprolol, warfarin   TTE reviewed, will give low dose lasix tomorrow (5/14)  OOB    Remains at high risk for decompensation

## 2023-05-13 NOTE — PROGRESS NOTE ADULT - SUBJECTIVE AND OBJECTIVE BOX
Patient is a 88y old  Male who presents with a chief complaint of Acute respiratory failure with hypoxia     (12 May 2023 12:45)    24 hour events:     Allergies    penicillins (Unknown)    Intolerances      REVIEW OF SYSTEMS: SEE BELOW       ICU Vital Signs Last 24 Hrs  T(C): 35.9 (13 May 2023 05:00), Max: 36.6 (12 May 2023 20:00)  T(F): 96.7 (13 May 2023 05:00), Max: 97.8 (12 May 2023 20:00)  HR: 70 (13 May 2023 11:00) (65 - 94)  BP: 125/50 (13 May 2023 09:00) (101/48 - 128/61)  BP(mean): 68 (13 May 2023 09:00) (54 - 87)  ABP: --  ABP(mean): --  RR: 24 (13 May 2023 11:00) (19 - 27)  SpO2: 99% (13 May 2023 09:00) (94% - 99%)    O2 Parameters below as of 12 May 2023 19:00  Patient On (Oxygen Delivery Method): nasal cannula  O2 Flow (L/min): 2          CAPILLARY BLOOD GLUCOSE          I&O's Summary    12 May 2023 07:01  -  13 May 2023 07:00  --------------------------------------------------------  IN: 0 mL / OUT: 500 mL / NET: -500 mL            MEDICATIONS  (STANDING):  albuterol/ipratropium for Nebulization 3 milliLiter(s) Nebulizer every 6 hours  azithromycin  IVPB 500 milliGRAM(s) IV Intermittent every 24 hours  budesonide  80 MICROgram(s)/formoterol 4.5 MICROgram(s) Inhaler 2 Puff(s) Inhalation two times a day  cefTRIAXone Injectable. 1000 milliGRAM(s) IV Push every 24 hours  chlorhexidine 4% Liquid 1 Application(s) Topical <User Schedule>  dorzolamide 2%/timolol 0.5% Ophthalmic Solution 1 Drop(s) Both EYES two times a day  guaiFENesin  milliGRAM(s) Oral every 12 hours  metoprolol tartrate 25 milliGRAM(s) Oral two times a day  warfarin 3 milliGRAM(s) Oral daily      MEDICATIONS  (PRN):      PHYSICAL EXAM: SEE BELOW                          12.2   87.77 )-----------( 124      ( 13 May 2023 05:52 )             39.7       05    141  |  109<H>  |  28<H>  ----------------------------<  115<H>  4.6   |  28  |  0.86    Ca    9.6      13 May 2023 05:52  Phos  2.0       Mg     2.0         TPro  6.0  /  Alb  3.2<L>  /  TBili  1.9<H>  /  DBili  x   /  AST  34  /  ALT  31  /  AlkPhos  89  12      CARDIAC MARKERS ( 11 May 2023 17:22 )  x     / x     / 41 U/L / x     / x          PT/INR - ( 13 May 2023 05:52 )   PT: 26.3 sec;   INR: 2.25 ratio         PTT - ( 11 May 2023 14:50 )  PTT:30.3 sec  Urinalysis Basic - ( 11 May 2023 23:00 )    Color: Yellow / Appearance: Clear / S.020 / pH: x  Gluc: x / Ketone: Trace  / Bili: Small / Urobili: 1   Blood: x / Protein: Negative / Nitrite: Negative   Leuk Esterase: Trace / RBC: 0-2 /HPF / WBC 0-2 /HPF   Sq Epi: x / Non Sq Epi: x / Bacteria: Negative      .Sputum Sputum --   Numerous polymorphonuclear leukocytes per low power field  Rare Squamous epithelial cells per low power field  Rare Gram positive cocci in pairs per oil power field  @ 10:07  Clean Catch Clean Catch (Midstream)   No growth --  @ 23:00  .Blood None   No growth to date. --  @ 14:50      Rapid RVP Result: Detected ( @ 20:00)

## 2023-05-13 NOTE — PROGRESS NOTE ADULT - RESPIRATORY
no wheezes/no respiratory distress/no use of accessory muscles/rales
no wheezes/no respiratory distress/no use of accessory muscles/rales

## 2023-05-14 LAB
ANION GAP SERPL CALC-SCNC: 4 MMOL/L — LOW (ref 5–17)
BUN SERPL-MCNC: 38 MG/DL — HIGH (ref 7–23)
CALCIUM SERPL-MCNC: 9.8 MG/DL — SIGNIFICANT CHANGE UP (ref 8.5–10.1)
CHLORIDE SERPL-SCNC: 108 MMOL/L — SIGNIFICANT CHANGE UP (ref 96–108)
CO2 SERPL-SCNC: 27 MMOL/L — SIGNIFICANT CHANGE UP (ref 22–31)
CREAT SERPL-MCNC: 1.05 MG/DL — SIGNIFICANT CHANGE UP (ref 0.5–1.3)
CULTURE RESULTS: SIGNIFICANT CHANGE UP
EGFR: 69 ML/MIN/1.73M2 — SIGNIFICANT CHANGE UP
GLUCOSE SERPL-MCNC: 194 MG/DL — HIGH (ref 70–99)
HCT VFR BLD CALC: 38.9 % — LOW (ref 39–50)
HGB BLD-MCNC: 12.1 G/DL — LOW (ref 13–17)
INR BLD: 1.55 RATIO — HIGH (ref 0.88–1.16)
INR BLD: 1.62 RATIO — HIGH (ref 0.88–1.16)
MAGNESIUM SERPL-MCNC: 2.5 MG/DL — SIGNIFICANT CHANGE UP (ref 1.6–2.6)
MCHC RBC-ENTMCNC: 31.1 GM/DL — LOW (ref 32–36)
MCHC RBC-ENTMCNC: 31.8 PG — SIGNIFICANT CHANGE UP (ref 27–34)
MCV RBC AUTO: 102.4 FL — HIGH (ref 80–100)
PHOSPHATE SERPL-MCNC: 2.9 MG/DL — SIGNIFICANT CHANGE UP (ref 2.5–4.5)
PLATELET # BLD AUTO: 151 K/UL — SIGNIFICANT CHANGE UP (ref 150–400)
POTASSIUM SERPL-MCNC: 4.9 MMOL/L — SIGNIFICANT CHANGE UP (ref 3.5–5.3)
POTASSIUM SERPL-SCNC: 4.9 MMOL/L — SIGNIFICANT CHANGE UP (ref 3.5–5.3)
PROTHROM AB SERPL-ACNC: 18.1 SEC — HIGH (ref 10.5–13.4)
PROTHROM AB SERPL-ACNC: 18.9 SEC — HIGH (ref 10.5–13.4)
RBC # BLD: 3.8 M/UL — LOW (ref 4.2–5.8)
RBC # FLD: 13.5 % — SIGNIFICANT CHANGE UP (ref 10.3–14.5)
SODIUM SERPL-SCNC: 139 MMOL/L — SIGNIFICANT CHANGE UP (ref 135–145)
SPECIMEN SOURCE: SIGNIFICANT CHANGE UP
WBC # BLD: 92.65 K/UL — CRITICAL HIGH (ref 3.8–10.5)
WBC # FLD AUTO: 92.65 K/UL — CRITICAL HIGH (ref 3.8–10.5)

## 2023-05-14 PROCEDURE — 99233 SBSQ HOSP IP/OBS HIGH 50: CPT

## 2023-05-14 RX ORDER — WARFARIN SODIUM 2.5 MG/1
4 TABLET ORAL AT BEDTIME
Refills: 0 | Status: COMPLETED | OUTPATIENT
Start: 2023-05-14 | End: 2023-05-14

## 2023-05-14 RX ADMIN — BUDESONIDE AND FORMOTEROL FUMARATE DIHYDRATE 2 PUFF(S): 160; 4.5 AEROSOL RESPIRATORY (INHALATION) at 21:17

## 2023-05-14 RX ADMIN — Medication 3 MILLILITER(S): at 02:11

## 2023-05-14 RX ADMIN — Medication 600 MILLIGRAM(S): at 21:59

## 2023-05-14 RX ADMIN — Medication 3 MILLILITER(S): at 09:25

## 2023-05-14 RX ADMIN — Medication 600 MILLIGRAM(S): at 09:36

## 2023-05-14 RX ADMIN — CEFTRIAXONE 1000 MILLIGRAM(S): 500 INJECTION, POWDER, FOR SOLUTION INTRAMUSCULAR; INTRAVENOUS at 09:44

## 2023-05-14 RX ADMIN — WARFARIN SODIUM 4 MILLIGRAM(S): 2.5 TABLET ORAL at 22:00

## 2023-05-14 RX ADMIN — AZITHROMYCIN 255 MILLIGRAM(S): 500 TABLET, FILM COATED ORAL at 12:02

## 2023-05-14 RX ADMIN — BUDESONIDE AND FORMOTEROL FUMARATE DIHYDRATE 2 PUFF(S): 160; 4.5 AEROSOL RESPIRATORY (INHALATION) at 09:26

## 2023-05-14 RX ADMIN — Medication 62.5 MILLIMOLE(S): at 00:06

## 2023-05-14 RX ADMIN — Medication 3 MILLILITER(S): at 21:17

## 2023-05-14 RX ADMIN — DORZOLAMIDE HYDROCHLORIDE TIMOLOL MALEATE 1 DROP(S): 20; 5 SOLUTION/ DROPS OPHTHALMIC at 09:37

## 2023-05-14 RX ADMIN — Medication 10 MILLIGRAM(S): at 05:59

## 2023-05-14 RX ADMIN — Medication 25 MILLIGRAM(S): at 09:36

## 2023-05-14 RX ADMIN — CHLORHEXIDINE GLUCONATE 1 APPLICATION(S): 213 SOLUTION TOPICAL at 08:38

## 2023-05-14 RX ADMIN — Medication 25 MILLIGRAM(S): at 21:59

## 2023-05-14 RX ADMIN — DORZOLAMIDE HYDROCHLORIDE TIMOLOL MALEATE 1 DROP(S): 20; 5 SOLUTION/ DROPS OPHTHALMIC at 22:00

## 2023-05-14 RX ADMIN — Medication 3 MILLILITER(S): at 14:58

## 2023-05-14 NOTE — PROGRESS NOTE ADULT - ASSESSMENT
88M PMH CLL not treated, CAD, CHF, s/p ICD, AFib on Coumadin who presented to  ED 5/11/23 with SOB, found with hypoxic respiratory failure requiring NIPPV, multifocal PNA, enterovirus positive, developed shock s/p pressors.    88M PMH CLL not treated, CAD, CHF, s/p ICD, AFib on Coumadin who presented to  ED 5/11/23 with SOB, found with hypoxic respiratory failure requiring NIPPV, multifocal PNA, enterovirus positive, developed shock s/p pressors.     Multifocal PNA  Hypoxic respiratory failure requiring BPAP - now on NC  - C/w CTX/Azithromycin  - BCx and sputum cx negative  - Needs repeat CT chest as outpatient in 4-6 weeks    CLL  Leukocytosis  - Heme/onc Dr. Abel Welsh to follow  - Trend CBC  - Monitor for any fevers    AFib on Coumadin  - Trend INR  - Coumadin 3mg daily    F/E/N/PPx/Lines  - Maintain euvolemia  - Replete lytes to maintain K>4 Mg>2  - DASH diet  - Coumadin  - PIV    Ethics/Dispo  - Full code  - Pt is stable for downgrade from the ICU      Abel Heck M.D.  , Pulmonary & Critical Care Medicine  [facility]  Pulmonary and Sleep Medicine at [place]  [address]  [place], [place] [address]  T: [phone]  F: [phone]

## 2023-05-14 NOTE — PROGRESS NOTE ADULT - SUBJECTIVE AND OBJECTIVE BOX
Patient is a 88y old  Male who presents with a chief complaint of Resp failure (13 May 2023 11:45)      BRIEF HOSPITAL COURSE:   88M PMH CLL not treated, CAD, CHF, s/p ICD, AFib on Coumadin who presented to  ED 5/11/23 with SOB, found with hypoxic respiratory failure requiring NIPPV, multifocal PNA, enterovirus positive, developed shock s/p pressors.         PAST MEDICAL & SURGICAL HISTORY:  Afib      HTN (hypertension)      Cholesterol serum elevated      BPH (Benign Prostatic Hyperplasia)      CA - skin cancer      History of ankle fracture      Intolerance, drug  INTOLERANCE  TO STATIN      Hearing loss      Myocardial infarction      Gout      Osteoarthritis      Glaucoma      Rheumatoid arthritis      Chronic CHF      Hx of appendectomy      Hx of tonsillectomy      S/P TKR (total knee replacement)  bilateral      H/O coronary angiogram  x 2      H/O hernia repair  x  4 surgeries      S/P laser cataract surgery  b/l      H/O shoulder surgery  right      S/P ORIF (open reduction internal fixation) fracture  left ankle            Medications:  azithromycin  IVPB 500 milliGRAM(s) IV Intermittent every 24 hours  cefTRIAXone Injectable. 1000 milliGRAM(s) IV Push every 24 hours    metoprolol tartrate 25 milliGRAM(s) Oral two times a day    albuterol/ipratropium for Nebulization 3 milliLiter(s) Nebulizer every 6 hours  budesonide  80 MICROgram(s)/formoterol 4.5 MICROgram(s) Inhaler 2 Puff(s) Inhalation two times a day  guaiFENesin  milliGRAM(s) Oral every 12 hours        warfarin 3 milliGRAM(s) Oral daily              chlorhexidine 4% Liquid 1 Application(s) Topical <User Schedule>  dorzolamide 2%/timolol 0.5% Ophthalmic Solution 1 Drop(s) Both EYES two times a day            ICU Vital Signs Last 24 Hrs  T(C): 36.1 (14 May 2023 09:35), Max: 37.3 (13 May 2023 16:47)  T(F): 97 (14 May 2023 09:35), Max: 99.1 (13 May 2023 16:47)  HR: 65 (14 May 2023 11:00) (65 - 88)  BP: 107/48 (14 May 2023 11:00) (96/44 - 131/59)  BP(mean): 63 (14 May 2023 11:00) (56 - 101)  ABP: --  ABP(mean): --  RR: 19 (14 May 2023 11:00) (16 - 30)  SpO2: 96% (14 May 2023 11:00) (86% - 98%)    O2 Parameters below as of 14 May 2023 11:00  Patient On (Oxygen Delivery Method): nasal cannula  O2 Flow (L/min): 5              I&O's Detail    13 May 2023 07:01  -  14 May 2023 07:00  --------------------------------------------------------  IN:    IV PiggyBack: 550 mL    Oral Fluid: 200 mL  Total IN: 750 mL    OUT:    Voided (mL): 775 mL  Total OUT: 775 mL    Total NET: -25 mL            LABS:                        12.1   92.65 )-----------( 151      ( 14 May 2023 05:52 )             38.9     05-14    139  |  108  |  38<H>  ----------------------------<  194<H>  4.9   |  27  |  1.05    Ca    9.8      14 May 2023 05:52  Phos  2.9     05-14  Mg     2.5     05-14            CAPILLARY BLOOD GLUCOSE        PT/INR - ( 13 May 2023 05:52 )   PT: 26.3 sec;   INR: 2.25 ratio             CULTURES:  Culture Results:   Normal Respiratory Maryanne present (05-12 @ 10:07)  Culture Results:   No growth (05-11 @ 23:00)  Rapid RVP Result: Detected (05-11 @ 20:00)  Culture Results:   No growth to date. (05-11 @ 14:50)  Culture Results:   No growth to date. (05-11 @ 14:50)      Physical Examination:  GENERAL: In NAD   HEENT: NC/AT  NECK: Supple, trachea midline  PULM: CTA anteriorly, not in respiratory distress  CVS: +S1, S2  ABD: Soft, non-tender  EXTREMITIES: No pedal edema B/L  SKIN: No open wounds  NEURO: Grossly non-focal    DEVICES:     RADIOLOGY:   < from: CT Chest No Cont (05.11.23 @ 16:53) >    ACC: [id number] EXAM:  CT CHEST   ORDERED BY: TAMIKO MICHEL     PROCEDURE DATE:  05/11/2023          INTERPRETATION:  Reason for Exam:  Evaluate for pneumonia    CT of the chest was performed from the thoracic inlet to the level of the   adrenal glandswithout contrast injection.    Comparison: 9/15/2022    Tubes/Lines: Biventricular pacemaker in place    Mediastinum/Vessels/Heart: Multichamber cardiac enlargement noted.   Pulmonary arteries are large indicative of underlying pulmonary   hypertension. Multiple lymph nodes within upper limits of normal for   size. No pericardial effusion. Coronary artery calcifications noted.    Lungs/Pleura/Airways: Consolidation within the right middle and lower   lobes with multiple bronchi containing endobronchial secretions.   Additional focal consolidation in the left lower lobe.    Visualized abdomen: Unremarkable noncontrast appearance of the upper   abdomen    Bones and soft tissues: No suspicious osseous lesions. Degenerative   changes noted throughout the spine.    IMPRESSION:    Multifocal pneumonia, most predominant in the right lower lobe. Follow-up   recommended in 4-6 weeks to ensure resolution.    --- End of Report ---            MIKE OLIVA MD; Attending Radiologist  This document has been electronically signed. May 11 2023  5:03PM    < end of copied text >      < from: TTE Echo Complete w/o Contrast w/ Doppler (05.12.23 @ 08:55) >   Impression     Summary     Mild concentric left ventricular hypertrophy is present.   Left ventricle systolic function appears mildly reduced in the presence   of   a cardiac arrhythmia. Left ventricle size and is within normal   limitations. Visual estimation of left ventricle ejection fraction is   mildly reduced at 50 %.   The left atrium is severely dilated.   The right atrium appears severely dilated.   A device wire is seen in the RV and RA.   The right ventricle appears mildly to moderately dilated in some views.   The aortic valve appears mildly calcified. Valve opening seemsto be   normal.   Mild (1+) aortic regurgitation is present.   The aortic root is mildly dilated .   The ascending aorta appears to be mildly dilated.   Mild mitral annular calcification is present.   The mitral valve leaflets appear mildly thickened.   The tricuspid valve leaflets are thin and pliable; valve motion is   normal.   Mild (1+) tricuspid valve regurgitation is present.   Mild pulmonary hypertension.   No evidence of pericardial effusion.   Pleural effusion - is present.   IVC is dilated and not collapsing with inspiration.     Signature     ----------------------------------------------------------------   Electronically signed by Cachorro STR[facility])   on 05/12/2023 05:40 PM   ----------------------------------------------------------------    < end of copied text >

## 2023-05-14 NOTE — CONSULT NOTE ADULT - ASSESSMENT
impression chronic lymphocytic leukemia   88-year-old male immunocompromised has not received any  recent vaccinations   now admitted with multifocal pneumonia     CBC stable and consistent with his known chronic lymphocytic leukemia     plan   continue treatment as per medicine/infectious disease     check immunoglobulins, specifically IgG   if this is low can consider prophylactic gammaglobulin     no role for further hematologic intervention at this time     can follow-up with me after discharge     above reviewed in detail with patient who is comfortable with strategy

## 2023-05-15 LAB
ANION GAP SERPL CALC-SCNC: 3 MMOL/L — LOW (ref 5–17)
BUN SERPL-MCNC: 50 MG/DL — HIGH (ref 7–23)
CALCIUM SERPL-MCNC: 9.8 MG/DL — SIGNIFICANT CHANGE UP (ref 8.5–10.1)
CHLORIDE SERPL-SCNC: 108 MMOL/L — SIGNIFICANT CHANGE UP (ref 96–108)
CO2 SERPL-SCNC: 30 MMOL/L — SIGNIFICANT CHANGE UP (ref 22–31)
CREAT SERPL-MCNC: 1.11 MG/DL — SIGNIFICANT CHANGE UP (ref 0.5–1.3)
EGFR: 64 ML/MIN/1.73M2 — SIGNIFICANT CHANGE UP
GLUCOSE SERPL-MCNC: 97 MG/DL — SIGNIFICANT CHANGE UP (ref 70–99)
HCT VFR BLD CALC: 41.4 % — SIGNIFICANT CHANGE UP (ref 39–50)
HGB BLD-MCNC: 12.7 G/DL — LOW (ref 13–17)
IGA FLD-MCNC: 69 MG/DL — LOW (ref 84–499)
IGA FLD-MCNC: 79 MG/DL — LOW (ref 84–499)
IGG FLD-MCNC: 589 MG/DL — LOW (ref 610–1660)
IGG FLD-MCNC: 614 MG/DL — SIGNIFICANT CHANGE UP (ref 610–1660)
IGM SERPL-MCNC: 21 MG/DL — LOW (ref 35–242)
IGM SERPL-MCNC: 24 MG/DL — LOW (ref 35–242)
INR BLD: 1.56 RATIO — HIGH (ref 0.88–1.16)
KAPPA LC SER QL IFE: 2.15 MG/DL — HIGH (ref 0.33–1.94)
KAPPA LC SER QL IFE: 2.26 MG/DL — HIGH (ref 0.33–1.94)
KAPPA/LAMBDA FREE LIGHT CHAIN RATIO, SERUM: 1.61 RATIO — SIGNIFICANT CHANGE UP (ref 0.26–1.65)
KAPPA/LAMBDA FREE LIGHT CHAIN RATIO, SERUM: 1.67 RATIO — HIGH (ref 0.26–1.65)
LAMBDA LC SER QL IFE: 1.29 MG/DL — SIGNIFICANT CHANGE UP (ref 0.57–2.63)
LAMBDA LC SER QL IFE: 1.4 MG/DL — SIGNIFICANT CHANGE UP (ref 0.57–2.63)
MAGNESIUM SERPL-MCNC: 2.2 MG/DL — SIGNIFICANT CHANGE UP (ref 1.6–2.6)
MCHC RBC-ENTMCNC: 30.7 GM/DL — LOW (ref 32–36)
MCHC RBC-ENTMCNC: 31.6 PG — SIGNIFICANT CHANGE UP (ref 27–34)
MCV RBC AUTO: 103 FL — HIGH (ref 80–100)
PHOSPHATE SERPL-MCNC: 3.2 MG/DL — SIGNIFICANT CHANGE UP (ref 2.5–4.5)
PLATELET # BLD AUTO: 155 K/UL — SIGNIFICANT CHANGE UP (ref 150–400)
POTASSIUM SERPL-MCNC: 5.1 MMOL/L — SIGNIFICANT CHANGE UP (ref 3.5–5.3)
POTASSIUM SERPL-SCNC: 5.1 MMOL/L — SIGNIFICANT CHANGE UP (ref 3.5–5.3)
PROTHROM AB SERPL-ACNC: 18.2 SEC — HIGH (ref 10.5–13.4)
RBC # BLD: 4.02 M/UL — LOW (ref 4.2–5.8)
RBC # FLD: 13.5 % — SIGNIFICANT CHANGE UP (ref 10.3–14.5)
SODIUM SERPL-SCNC: 141 MMOL/L — SIGNIFICANT CHANGE UP (ref 135–145)
WBC # BLD: 106.85 K/UL — CRITICAL HIGH (ref 3.8–10.5)
WBC # FLD AUTO: 106.85 K/UL — CRITICAL HIGH (ref 3.8–10.5)

## 2023-05-15 PROCEDURE — 99232 SBSQ HOSP IP/OBS MODERATE 35: CPT

## 2023-05-15 RX ORDER — WARFARIN SODIUM 2.5 MG/1
3 TABLET ORAL DAILY
Refills: 0 | Status: COMPLETED | OUTPATIENT
Start: 2023-05-15 | End: 2023-05-17

## 2023-05-15 RX ADMIN — WARFARIN SODIUM 3 MILLIGRAM(S): 2.5 TABLET ORAL at 21:37

## 2023-05-15 RX ADMIN — Medication 3 MILLILITER(S): at 20:48

## 2023-05-15 RX ADMIN — Medication 3 MILLILITER(S): at 15:02

## 2023-05-15 RX ADMIN — Medication 3 MILLILITER(S): at 09:28

## 2023-05-15 RX ADMIN — BUDESONIDE AND FORMOTEROL FUMARATE DIHYDRATE 2 PUFF(S): 160; 4.5 AEROSOL RESPIRATORY (INHALATION) at 09:29

## 2023-05-15 RX ADMIN — CHLORHEXIDINE GLUCONATE 1 APPLICATION(S): 213 SOLUTION TOPICAL at 10:21

## 2023-05-15 RX ADMIN — DORZOLAMIDE HYDROCHLORIDE TIMOLOL MALEATE 1 DROP(S): 20; 5 SOLUTION/ DROPS OPHTHALMIC at 21:37

## 2023-05-15 RX ADMIN — CEFTRIAXONE 1000 MILLIGRAM(S): 500 INJECTION, POWDER, FOR SOLUTION INTRAMUSCULAR; INTRAVENOUS at 10:20

## 2023-05-15 RX ADMIN — BUDESONIDE AND FORMOTEROL FUMARATE DIHYDRATE 2 PUFF(S): 160; 4.5 AEROSOL RESPIRATORY (INHALATION) at 20:48

## 2023-05-15 RX ADMIN — Medication 3 MILLILITER(S): at 02:27

## 2023-05-15 RX ADMIN — AZITHROMYCIN 255 MILLIGRAM(S): 500 TABLET, FILM COATED ORAL at 10:21

## 2023-05-15 RX ADMIN — DORZOLAMIDE HYDROCHLORIDE TIMOLOL MALEATE 1 DROP(S): 20; 5 SOLUTION/ DROPS OPHTHALMIC at 10:20

## 2023-05-15 RX ADMIN — Medication 25 MILLIGRAM(S): at 10:19

## 2023-05-15 RX ADMIN — Medication 25 MILLIGRAM(S): at 21:36

## 2023-05-15 NOTE — CONSULT NOTE ADULT - ASSESSMENT
88y old Male with PMHCLL baseline WBC ~ 80k, Afib on coumadin, severe cardiomyopathy, CAD s/p MI, HTN, HLD, CHF, s/p AICD, RA with pneumonia  1. pneumonia: continue ceftriaxone  -s/p azithromycin  -supplemental o2  -continue symbicort, duonbes  -sputum cultures, blood cultures  -ambulate as tolerated  -outpatient follow up ct  -now off bipap  2. cll: per heme onc. no role for hematologic intervention currently

## 2023-05-15 NOTE — PROVIDER CONTACT NOTE (OTHER) - SITUATION
Notified service. Spoke to Angelica. 
WBC count of 106.85. Provider readback value to RN. Pt has had high WBC Counts on this admission

## 2023-05-15 NOTE — CONSULT NOTE ADULT - SUBJECTIVE AND OBJECTIVE BOX
HPI: JEFFREY ORTIZ is a 88y old Male with PMH CLL baseline WBC ~ 80k, Afib on coumadin, severe cardiomyopathy, CAD s/p MI, HTN, HLD, CHF, s/p AICD, RA who presented the ED c/o on and off SOB x 2 days associated with fever. He to , found to be hypoxic with increasing SOB and sent to ED for eval. He was found to have pneumonia. he was briefly maintained on bipap. he is improved now    Past Medical History:   Afib    HTN (hypertension)    Cholesterol serum elevated    BPH (Benign Prostatic Hyperplasia)    CA - skin cancer    History of ankle fracture    Intolerance, drug    Hearing loss    Myocardial infarction    Gout    Osteoarthritis    Glaucoma    Rheumatoid arthritis    Chronic CHF      Past Surgical History:   Hx of appendectomy    Hx of tonsillectomy    S/P TKR (total knee replacement)    Bilateral recurrent inguinal hernia    H/O coronary angiogram    H/O hernia repair    S/P laser cataract surgery    H/O shoulder surgery    S/P ORIF (open reduction internal fixation) fracture      Family History:    Family history of other heart disease (Father)    Family history of cardiac arrhythmia (Child)       Social History: nonsmoker    Review of Systems:  All 10 systems reviewed in detailed and found to be negative with the exception of what has already been described above    Allergies:  penicillins (Unknown)    Meds  MEDICATIONS  (STANDING):  albuterol/ipratropium for Nebulization 3 milliLiter(s) Nebulizer every 6 hours  budesonide  80 MICROgram(s)/formoterol 4.5 MICROgram(s) Inhaler 2 Puff(s) Inhalation two times a day  cefTRIAXone Injectable. 1000 milliGRAM(s) IV Push every 24 hours  chlorhexidine 4% Liquid 1 Application(s) Topical <User Schedule>  dorzolamide 2%/timolol 0.5% Ophthalmic Solution 1 Drop(s) Both EYES two times a day  metoprolol tartrate 25 milliGRAM(s) Oral two times a day  warfarin 3 milliGRAM(s) Oral daily    MEDICATIONS  (PRN):    Physical Exam  T(C): 36.8 (05-15-23 @ 15:00), Max: 37.1 (05-14-23 @ 18:26)  HR: 66 (05-15-23 @ 15:04) (62 - 81)  BP: 114/88 (05-15-23 @ 15:00) (114/88 - 148/79)  RR: 18 (05-15-23 @ 15:00) (18 - 20)  SpO2: 96% (05-15-23 @ 15:04) (95% - 100%)  Gen: Alert, oriented, no distress  HEENT: Anicteric sclera, moist mucous membranes, no JVD, no lymphadenopathy, good dentition  Cardio: Regular rhythm and rate, normal S1S2, no murmurs  Resp: Clear to auscultation bilaterally, no wheezing or rhonchi  GI: Nontender, nondistended, normoactive bowel sounds  Ext: No cyanosis, clubbing or edema  Neuro: Nonfocal    Labs:                        12.7   106.85 )-----------( 155      ( 15 May 2023 05:13 )             41.4     05-15    141  |  108  |  50<H>  ----------------------------<  97  5.1   |  30  |  1.11    Ca    9.8      15 May 2023 05:13  Phos  3.2     05-15  Mg     2.2     05-15      PT/INR - ( 15 May 2023 14:24 )   PT: 18.2 sec;   INR: 1.56 ratio       < from: CT Chest No Cont (05.11.23 @ 16:53) >  PROCEDURE DATE:  05/11/2023          INTERPRETATION:  Reason for Exam:  Evaluate for pneumonia    CT of the chest was performed from the thoracic inlet to the level of the   adrenal glandswithout contrast injection.    Comparison: 9/15/2022    Tubes/Lines: Biventricular pacemaker in place    Mediastinum/Vessels/Heart: Multichamber cardiac enlargement noted.   Pulmonary arteries are large indicative of underlying pulmonary   hypertension. Multiple lymph nodes within upper limits of normal for   size. No pericardial effusion. Coronary artery calcifications noted.    Lungs/Pleura/Airways: Consolidation within the right middle and lower   lobes with multiple bronchi containing endobronchial secretions.   Additional focal consolidation in the left lower lobe.    Visualized abdomen: Unremarkable noncontrast appearance of the upper   abdomen    Bones and soft tissues: No suspicious osseous lesions. Degenerative   changes noted throughout the spine.    IMPRESSION:    Multifocal pneumonia, most predominant in the right lower lobe. Follow-up   recommended in 4-6 weeks to ensure resolution.      < from: Xray Chest 1 View- PORTABLE-Urgent (Xray Chest 1 View- PORTABLE-Urgent .) (05.13.23 @ 09:55) >  ACC: [id number] EXAM:  XR CHEST PORTABLE URGENT 1V   ORDERED BY: EDWIN STAPLETON     PROCEDURE DATE:  05/13/2023          INTERPRETATION:  Portable chest radiograph    CLINICAL INFORMATION: Pneumonia    TECHNIQUE:  Portable  AP chest radiograph.    COMPARISON: Chest CT 5/11/2023 .    FINDINGS:  CATHETERS AND TUBES: None    PULMONARY: Residual RIGHT lower lobe and RIGHT middle lobe airspace   consolidation and/or effusion. RIGHT apex and visualized LEFT lung   parenchyma clear. No pneumothorax.    HEART/VASCULAR: The  heart is enlarged in transverse diameter. Right   atrium and biventricular cardiac wire leads in place.     BONES: Visualized osseous thorax intact.    IMPRESSION:   Residual RIGHT lower lobe and RIGHT middle lobe airspace   consolidation and/or effusion.    < end of copied text >  
 88-year-old male   2019 diagnosed with chronic lymphocytic leukemia/macrocytosis     patient has had elevated WBC ranging from ,000   hemoglobin in the low normal range   mild thrombocytopenia, platelets 133   no B symptoms no bulky lymphadenopathy or splenomegaly   has not required treatment     patient with mild dementia   patient with mild underlying lung dysfunction   patient and family have  repeatedly told me they will not take any vaccines, including for influenza or COVID     patient now admitted with pneumonia respiratory insufficiency    ROS  denies fevers drenching night sweats anorexia or weight loss     physical examination   well-developed 88-year-old male alert oriented comfortable in bed with nasal cannula O2   neck supple   lungs poor inspiratory effort some wheezing   abdomen soft nontender no hepatosplenomegaly   lymph nodes nonpalpable neck axillary or inguinal areas     WBC 92,000   hemoglobin 12.1, hematocrit 38.9, , platelet 151        : CT Chest No Cont (05.11.23     CT of the chest was performed from the thoracic inlet to the level of the   adrenal glandswithout contrast injection.    Comparison: 9/15/2022    Tubes/Lines: Biventricular pacemaker in place    Mediastinum/Vessels/Heart: Multichamber cardiac enlargement noted.   Pulmonary arteries are large indicative of underlying pulmonary   hypertension. Multiple lymph nodes within upper limits of normal for   size. No pericardial effusion. Coronary artery calcifications noted.    Lungs/Pleura/Airways: Consolidation within the right middle and lower   lobes with multiple bronchi containing endobronchial secretions.   Additional focal consolidation in the left lower lobe.    Visualized abdomen: Unremarkable noncontrast appearance of the upper   abdomen    Bones and soft tissues: No suspicious osseous lesions. Degenerative   changes noted throughout the spine.    IMPRESSION:    Multifocal pneumonia, most predominant in the right lower lobe. Follow-up   recommended in 4-6 weeks to ensure resolution.      
Patient is a 88y old  Male who presents with a chief complaint of Resp failure     HPI:  89 y/o male with h/o CLL baseline WBC ~ 80k, A.fib on coumadin, severe cardiomyopathy, CAD s/p MI, HTN, HLD, CHF, s/p AICD, RA, prior pneumonia was admitted on 5/11 for worsening SOB x 2 days associated with subjective fevers. Has felt sick for ~ 1 week with dry cough; acute SOB, fever this AM with worsening in cough. Pt went to , found to be hypoxic with increasing SOB and sent to ED for eval. In ER he was placed on BiPAP and received ceftriaxone and azithromycin.     PMH: as above  PSH: as above  Meds: per reconciliation sheet, noted below  MEDICATIONS  (STANDING):  albuterol/ipratropium for Nebulization 3 milliLiter(s) Nebulizer every 6 hours  azithromycin  IVPB 500 milliGRAM(s) IV Intermittent every 24 hours  budesonide  80 MICROgram(s)/formoterol 4.5 MICROgram(s) Inhaler 2 Puff(s) Inhalation two times a day  cefTRIAXone Injectable. 1000 milliGRAM(s) IV Push every 24 hours  chlorhexidine 4% Liquid 1 Application(s) Topical <User Schedule>  dorzolamide 2%/timolol 0.5% Ophthalmic Solution 1 Drop(s) Both EYES two times a day  metoprolol tartrate 25 milliGRAM(s) Oral two times a day    MEDICATIONS  (PRN):    Allergies    penicillins (Unknown)    Intolerances      Social: no smoking, no alcohol, no illegal drugs; no recent travel, no exposure to TB  FAMILY HISTORY:  Family history of other heart disease (Father)  Family history of cardiac arrhythmia (Child)    ROS: the patient denies HA, no seizures, no dizziness, no sore throat, no nasal congestion, no blurry vision, no CP, no palpitations, has SOB, has cough, no abdominal pain, no diarrhea, no N/V, no dysuria, no leg pain, no claudication, no rash, no joint aches, no rectal pain or bleeding, no night sweats, has increased weakness  All other systems reviewed and are negative    Vital Signs Last 24 Hrs  T(C): 36.4 (15 May 2023 08:20), Max: 37.1 (14 May 2023 18:26)  T(F): 97.6 (15 May 2023 08:20), Max: 98.8 (14 May 2023 18:26)  HR: 67 (15 May 2023 09:28) (62 - 83)  BP: 148/79 (15 May 2023 08:20) (99/48 - 148/79)  BP(mean): 72 (14 May 2023 16:00) (61 - 98)  RR: 18 (15 May 2023 08:20) (16 - 22)  SpO2: 95% (15 May 2023 09:28) (95% - 100%)    Parameters below as of 15 May 2023 09:28  Patient On (Oxygen Delivery Method): nasal cannula,3l    PE:    Constitutional:  No acute distress  HEENT: NC/AT, EOMI, PERRLA, conjunctivae clear; ears and nose atraumatic; pharynx benign  Neck: supple; thyroid not palpable  Back: no tenderness  Respiratory: respiratory effort normal; crackles at bases  Cardiovascular: S1S2 regular, no murmurs  Abdomen: soft, not tender, not distended, positive BS; no liver or spleen organomegaly  Genitourinary: no suprapubic tenderness  Lymphatic: no LN palpable  Musculoskeletal: no muscle tenderness, no joint swelling or tenderness  Extremities: no pedal edema  Neurological/ Psychiatric: AxOx3, judgement and insight normal; moving all extremities  Skin: no rashes; no palpable lesions    Labs: all available labs reviewed                        12.7   106.85 )-----------( 155      ( 15 May 2023 05:13 )             41.4     05-15    141  |  108  |  50<H>  ----------------------------<  97  5.1   |  30  |  1.11    Ca    9.8      15 May 2023 05:13  Phos  3.2     05-15  Mg     2.2     05-15    Rhinoovirus (05-11 @ 20:00)  Detected    Culture - Sputum (collected 12 May 2023 10:07)  Source: .Sputum Sputum  Gram Stain (12 May 2023 23:05):    Numerous polymorphonuclear leukocytes per low power field    Rare Squamous epithelial cells per low power field    Rare Gram positive cocci in pairs per oil power field  Final Report (14 May 2023 17:31):    Normal Respiratory Maryanne present    Culture - Urine (collected 11 May 2023 23:00)  Source: Clean Catch Clean Catch (Midstream)  Final Report (13 May 2023 08:56):    No growth    Culture - Blood (collected 11 May 2023 14:50)  Source: .Blood None  Preliminary Report (12 May 2023 19:02):    No growth to date.    Culture - Blood (collected 11 May 2023 14:50)  Source: .Blood None  Preliminary Report (12 May 2023 19:02):    No growth to date.    Radiology: all available radiological tests reviewed    < from: CT Chest No Cont (05.11.23 @ 16:53) >  Multifocal pneumonia, most predominant in the right lower lobe. Follow-up   recommended in 4-6 weeks to ensure resolution.  < end of copied text >      Advanced directives addressed: full resuscitation

## 2023-05-15 NOTE — PROGRESS NOTE ADULT - ASSESSMENT
88M PMH CLL not treated, CAD, CHF, s/p ICD, AFib on Coumadin who presented to  ED 5/11/23 with SOB, found with hypoxic respiratory failure requiring NIPPV, multifocal PNA, enterovirus positive, developed shock s/p pressors.     Multifocal/GNR PNA  Hypoxic respiratory failure  - S/P BIPAP - now on NC  - C/w CTX/Azithromycin#4  - BCx and sputum cx negative  - Needs repeat CT chest as outpatient in 4-6 weeks  -ID consult appreciated  -Pulm consult  -Pulse ox on ambulation prior to D/C     CLL  Leukocytosis  - Heme/onc Dr. Abel Welsh - check immunoglobulins, specifically IgG if this is low can consider prophylactic gammaglobulin  - Trend CBC  - Monitor for any fevers    AFib on Coumadin  - Trend INR  - Coumadin 3mg daily    Advanced Directives  - Full code

## 2023-05-15 NOTE — CONSULT NOTE ADULT - ASSESSMENT
87 y/o male with h/o CLL baseline WBC ~ 80k, A.fib on coumadin, severe cardiomyopathy, CAD s/p MI, HTN, HLD, CHF, s/p AICD, RA, prior pneumonia was admitted on 5/11 for worsening SOB x 2 days associated with subjective fevers. Has felt sick for ~ 1 week with dry cough; acute SOB, fever this AM with worsening in cough. Pt went to , found to be hypoxic with increasing SOB and sent to ED for eval. In ER he was placed on BiPAP and received ceftriaxone and azithromycin.     1. Acute respiratory failure. Multifocal pneumonia. URI with rhinovirus. CLL with leukocytosis. Immunocompromised host. Allergy to PCN.  -obtain BC x 2, urine c/s  -agree with ceftriaxone 1 gm IV qd and azithromycin 500 mg IV qd  -reason for abx use and side effects reviewed with patient; monitor BMP  -respiratory care  -old chart reviewed to assess prior cultures  -monitor temps  -f/u CBC  -supportive care  2. Other issues:   -care per medicine     87 y/o male with h/o CLL baseline WBC ~ 80k, A.fib on coumadin, severe cardiomyopathy, CAD s/p MI, HTN, HLD, CHF, s/p AICD, RA, prior pneumonia was admitted on 5/11 for worsening SOB x 2 days associated with subjective fevers. Has felt sick for ~ 1 week with dry cough; acute SOB, fever this AM with worsening in cough. Pt went to , found to be hypoxic with increasing SOB and sent to ED for eval. In ER he was placed on BiPAP and received ceftriaxone and azithromycin.     1. Acute respiratory failure. Multifocal pneumonia. Right pleural effusion. URI with rhinovirus. CLL with leukocytosis. Immunocompromised host. Allergy to PCN.  -obtain BC x 2, urine c/s  -agree with ceftriaxone 1 gm IV qd # 4  -complete azithromycin 500 mg IV qd  -reason for abx use and side effects reviewed with patient; monitor BMP  -monitor closely in lorna of PCN allergy history  -respiratory care  -old chart reviewed to assess prior cultures  _continue abx coverage   -monitor temps  -f/u CBC  -supportive care  2. Other issues:   -care per medicine

## 2023-05-15 NOTE — PROGRESS NOTE ADULT - SUBJECTIVE AND OBJECTIVE BOX
HOSPITALIST PROGRESS NOTE:  SUBJECTIVE:  PCP:  Chief Complaint: Patient is a 88y old  Male who presents with a chief complaint of Resp failure (15 May 2023 10:14)      HPI:  89 y/o male with h/o CLL baseline WBC ~ 80k, A.fib on coumadin, severe cardiomyopathy, CAD s/p MI, HTN, HLD, CHF, s/p AICD, RA, prior pneumonia was admitted on 5/11 for worsening SOB x 2 days associated with subjective fevers. Has felt sick for ~ 1 week with dry cough; acute SOB, fever this AM with worsening in cough. Pt went to , found to be hypoxic with increasing SOB and sent to ED for eval. In ER he was placed on BiPAP and received ceftriaxone and azithromycin.     5/15: Above reviewed. patient states that he is getting better but still short of breath; His pulm is Dr Rizo    Allergies:  penicillins (Unknown)    REVIEW OF SYSTEMS:  See HPI. All other review of systems is negative unless indicated above.     OBJECTIVE  Physical Exam:  Vital Signs:    Vital Signs Last 24 Hrs  T(C): 36.4 (15 May 2023 08:20), Max: 37.1 (14 May 2023 18:26)  T(F): 97.6 (15 May 2023 08:20), Max: 98.8 (14 May 2023 18:26)  HR: 67 (15 May 2023 09:28) (62 - 83)  BP: 148/79 (15 May 2023 08:20) (109/95 - 148/79)  BP(mean): 72 (14 May 2023 16:00) (65 - 98)  RR: 18 (15 May 2023 08:20) (16 - 22)  SpO2: 95% (15 May 2023 09:28) (95% - 100%)    Parameters below as of 15 May 2023 09:28  Patient On (Oxygen Delivery Method): nasal cannula,3l      I&O's Summary    14 May 2023 07:01  -  15 May 2023 07:00  --------------------------------------------------------  IN: 0 mL / OUT: 750 mL / NET: -750 mL      Constitutional: NAD, awake and alert  Neurological: AAO x 3, no focal deficits  HEENT: PERRLA, EOMI, MMM  Neck: Soft and supple, No LAD, No JVD  Respiratory: Breath sounds are clear bilaterally, No wheezing, rales or rhonchi  Cardiovascular: S1 and S2, regular rate and rhythm; no Murmurs, gallops or rubs  Gastrointestinal: Bowel Sounds present, soft, nontender, nondistended, no guarding, no rebound tenderness  Back: No CVA tenderness   Extremities: No peripheral edema  Vascular: 2+ peripheral pulses  Musculoskeletal: 5/5 strength b/l upper and lower extremities  Skin: No rashes  Breast: Deferred  Rectal: Deferred    MEDICATIONS  (STANDING):  albuterol/ipratropium for Nebulization 3 milliLiter(s) Nebulizer every 6 hours  budesonide  80 MICROgram(s)/formoterol 4.5 MICROgram(s) Inhaler 2 Puff(s) Inhalation two times a day  cefTRIAXone Injectable. 1000 milliGRAM(s) IV Push every 24 hours  chlorhexidine 4% Liquid 1 Application(s) Topical <User Schedule>  dorzolamide 2%/timolol 0.5% Ophthalmic Solution 1 Drop(s) Both EYES two times a day  metoprolol tartrate 25 milliGRAM(s) Oral two times a day      LABS: All Labs Reviewed:                        12.7   106.85 )-----------( 155      ( 15 May 2023 05:13 )             41.4     05-15    141  |  108  |  50<H>  ----------------------------<  97  5.1   |  30  |  1.11    Ca    9.8      15 May 2023 05:13  Phos  3.2     05-15  Mg     2.2     05-15      PT/INR - ( 14 May 2023 20:55 )   PT: 18.9 sec;   INR: 1.62 ratio          Blood Culture:   05-12 @ 10:07  Organism --  Gram Stain Blood -- Gram Stain   Numerous polymorphonuclear leukocytes per low power field  Rare Squamous epithelial cells per low power field  Rare Gram positive cocci in pairs per oil power field  Specimen Source .Sputum Sputum  Culture-Blood --    05-11 @ 23:00  Organism --  Gram Stain Blood -- Gram Stain --  Specimen Source Clean Catch Clean Catch (Midstream)  Culture-Blood --    05-11 @ 14:50  Organism --  Gram Stain Blood -- Gram Stain --  Specimen Source .Blood None  Culture-Blood --        RADIOLOGY/EKG:    < from: Xray Chest 1 View- PORTABLE-Urgent (Xray Chest 1 View- PORTABLE-Urgent .) (05.13.23 @ 09:55) >    IMPRESSION:   Residual RIGHT lower lobe and RIGHT middle lobe airspace   consolidation and/or effusion.    < end of copied text >

## 2023-05-16 LAB
ANION GAP SERPL CALC-SCNC: 3 MMOL/L — LOW (ref 5–17)
BUN SERPL-MCNC: 44 MG/DL — HIGH (ref 7–23)
CALCIUM SERPL-MCNC: 9.5 MG/DL — SIGNIFICANT CHANGE UP (ref 8.5–10.1)
CHLORIDE SERPL-SCNC: 108 MMOL/L — SIGNIFICANT CHANGE UP (ref 96–108)
CO2 SERPL-SCNC: 29 MMOL/L — SIGNIFICANT CHANGE UP (ref 22–31)
CREAT SERPL-MCNC: 0.97 MG/DL — SIGNIFICANT CHANGE UP (ref 0.5–1.3)
CULTURE RESULTS: SIGNIFICANT CHANGE UP
CULTURE RESULTS: SIGNIFICANT CHANGE UP
EGFR: 76 ML/MIN/1.73M2 — SIGNIFICANT CHANGE UP
GLUCOSE SERPL-MCNC: 91 MG/DL — SIGNIFICANT CHANGE UP (ref 70–99)
HCT VFR BLD CALC: 40.7 % — SIGNIFICANT CHANGE UP (ref 39–50)
HGB BLD-MCNC: 12.6 G/DL — LOW (ref 13–17)
INR BLD: 1.6 RATIO — HIGH (ref 0.88–1.16)
MAGNESIUM SERPL-MCNC: 2.1 MG/DL — SIGNIFICANT CHANGE UP (ref 1.6–2.6)
MCHC RBC-ENTMCNC: 31 GM/DL — LOW (ref 32–36)
MCHC RBC-ENTMCNC: 31.7 PG — SIGNIFICANT CHANGE UP (ref 27–34)
MCV RBC AUTO: 102.3 FL — HIGH (ref 80–100)
PHOSPHATE SERPL-MCNC: 3 MG/DL — SIGNIFICANT CHANGE UP (ref 2.5–4.5)
PLATELET # BLD AUTO: 162 K/UL — SIGNIFICANT CHANGE UP (ref 150–400)
POTASSIUM SERPL-MCNC: 5.4 MMOL/L — HIGH (ref 3.5–5.3)
POTASSIUM SERPL-MCNC: 5.6 MMOL/L — HIGH (ref 3.5–5.3)
POTASSIUM SERPL-SCNC: 5.4 MMOL/L — HIGH (ref 3.5–5.3)
POTASSIUM SERPL-SCNC: 5.6 MMOL/L — HIGH (ref 3.5–5.3)
PROTHROM AB SERPL-ACNC: 18.6 SEC — HIGH (ref 10.5–13.4)
RBC # BLD: 3.98 M/UL — LOW (ref 4.2–5.8)
RBC # FLD: 13.5 % — SIGNIFICANT CHANGE UP (ref 10.3–14.5)
SODIUM SERPL-SCNC: 140 MMOL/L — SIGNIFICANT CHANGE UP (ref 135–145)
SPECIMEN SOURCE: SIGNIFICANT CHANGE UP
SPECIMEN SOURCE: SIGNIFICANT CHANGE UP
WBC # BLD: 102 K/UL — CRITICAL HIGH (ref 3.8–10.5)
WBC # FLD AUTO: 102 K/UL — CRITICAL HIGH (ref 3.8–10.5)

## 2023-05-16 PROCEDURE — 99232 SBSQ HOSP IP/OBS MODERATE 35: CPT | Mod: GC

## 2023-05-16 RX ADMIN — Medication 25 MILLIGRAM(S): at 21:26

## 2023-05-16 RX ADMIN — BUDESONIDE AND FORMOTEROL FUMARATE DIHYDRATE 2 PUFF(S): 160; 4.5 AEROSOL RESPIRATORY (INHALATION) at 21:50

## 2023-05-16 RX ADMIN — CEFTRIAXONE 1000 MILLIGRAM(S): 500 INJECTION, POWDER, FOR SOLUTION INTRAMUSCULAR; INTRAVENOUS at 10:15

## 2023-05-16 RX ADMIN — Medication 3 MILLILITER(S): at 21:48

## 2023-05-16 RX ADMIN — WARFARIN SODIUM 3 MILLIGRAM(S): 2.5 TABLET ORAL at 21:26

## 2023-05-16 RX ADMIN — DORZOLAMIDE HYDROCHLORIDE TIMOLOL MALEATE 1 DROP(S): 20; 5 SOLUTION/ DROPS OPHTHALMIC at 10:14

## 2023-05-16 RX ADMIN — Medication 3 MILLILITER(S): at 13:37

## 2023-05-16 RX ADMIN — BUDESONIDE AND FORMOTEROL FUMARATE DIHYDRATE 2 PUFF(S): 160; 4.5 AEROSOL RESPIRATORY (INHALATION) at 07:42

## 2023-05-16 RX ADMIN — CHLORHEXIDINE GLUCONATE 1 APPLICATION(S): 213 SOLUTION TOPICAL at 05:17

## 2023-05-16 RX ADMIN — Medication 25 MILLIGRAM(S): at 10:14

## 2023-05-16 RX ADMIN — DORZOLAMIDE HYDROCHLORIDE TIMOLOL MALEATE 1 DROP(S): 20; 5 SOLUTION/ DROPS OPHTHALMIC at 21:26

## 2023-05-16 RX ADMIN — Medication 3 MILLILITER(S): at 07:42

## 2023-05-16 NOTE — PROGRESS NOTE ADULT - ASSESSMENT
88M PMH CLL not treated, CAD, CHF, s/p ICD, AFib on Coumadin who presented to  ED 5/11/23 with SOB, found with hypoxic respiratory failure requiring NIPPV, multifocal PNA, enterovirus positive, developed shock s/p pressors.     Multifocal/GNR PNA  Hypoxic respiratory failure  - S/P BIPAP - now on NC  - C/w CTX#5  - S/P Azithromycin#3  - BCx and sputum cx negative  - Needs repeat CT chest as outpatient in 4-6 weeks  -ID consult appreciated  -Pulm consult appreciated   -Pulse ox on ambulation    CLL  Leukocytosis  - Heme/onc Dr. Abel Welsh - check immunoglobulins, specifically IgG if this is low can consider prophylactic gammaglobulin  - Trend CBC  - Monitor for any fevers    AFib on Coumadin  - Trend INR  - Coumadin 3mg daily    Advanced Directives  - Full code

## 2023-05-16 NOTE — PROGRESS NOTE ADULT - ASSESSMENT
89 y/o male with h/o CLL baseline WBC ~ 80k, A.fib on coumadin, severe cardiomyopathy, CAD s/p MI, HTN, HLD, CHF, s/p AICD, RA, prior pneumonia was admitted on 5/11 for worsening SOB x 2 days associated with subjective fevers. Has felt sick for ~ 1 week with dry cough; acute SOB, fever this AM with worsening in cough. Pt went to , found to be hypoxic with increasing SOB and sent to ED for eval. In ER he was placed on BiPAP and received ceftriaxone and azithromycin.     1. Acute respiratory failure improving. Multifocal pneumonia. Right pleural effusion. URI with rhinovirus. CLL with leukocytosis. Immunocompromised host. Allergy to PCN.  -respiratory improving  -BC x 2, urine c/s noted  -on ceftriaxone 1 gm IV qd # 5  -s/pazithromycin 500 mg IV qd  -tolerating abx well so far; no side effects noted  -monitor closely in lorna of PCN allergy history  -respiratory care  -continue abx coverage   -monitor temps  -f/u CBC  -supportive care  2. Other issues:   -care per medicine

## 2023-05-16 NOTE — PROGRESS NOTE ADULT - ASSESSMENT
88y old Male with PMHCLL baseline WBC ~ 80k, Afib on coumadin, severe cardiomyopathy, CAD s/p MI, HTN, HLD, CHF, s/p AICD, RA with pneumonia  1. pneumonia: finishing ceftriaxone  -s/p azithromycin  -supplemental o2  -continue symbicort, duonebs  -sputum cultures, blood cultures  -ambulate as tolerated  -outpatient follow up ct  -now off bipap  2. cll: per heme onc. no role for hematologic intervention currently

## 2023-05-16 NOTE — PROGRESS NOTE ADULT - SUBJECTIVE AND OBJECTIVE BOX
HOSPITALIST PROGRESS NOTE:  SUBJECTIVE:  PCP:  Chief Complaint: Patient is a 88y old  Male who presents with a chief complaint of Resp failure (15 May 2023 10:14)      HPI:  89 y/o male with h/o CLL baseline WBC ~ 80k, A.fib on coumadin, severe cardiomyopathy, CAD s/p MI, HTN, HLD, CHF, s/p AICD, RA, prior pneumonia was admitted on 5/11 for worsening SOB x 2 days associated with subjective fevers. Has felt sick for ~ 1 week with dry cough; acute SOB, fever this AM with worsening in cough. Pt went to , found to be hypoxic with increasing SOB and sent to ED for eval. In ER he was placed on BiPAP and received ceftriaxone and azithromycin.     5/15: Above reviewed. patient states that he is getting better but still short of breath; His pulm is Dr Rizo  5/16:  breathing improving C/o neck pain which is chronic     Allergies:  penicillins (Unknown)    REVIEW OF SYSTEMS:  See HPI. All other review of systems is negative unless indicated above.     OBJECTIVE  Physical Exam:  Vital Signs Last 24 Hrs  T(C): 37.1 (16 May 2023 08:08), Max: 37.1 (16 May 2023 08:08)  T(F): 98.8 (16 May 2023 08:08), Max: 98.8 (16 May 2023 08:08)  HR: 64 (16 May 2023 08:08) (63 - 70)  BP: 136/63 (16 May 2023 08:08) (114/88 - 136/63)  BP(mean): --  RR: 18 (16 May 2023 08:08) (18 - 20)  SpO2: 96% (16 May 2023 08:08) (94% - 96%)    Parameters below as of 16 May 2023 08:08  Patient On (Oxygen Delivery Method): nasal cannula  O2 Flow (L/min): 3      Constitutional: NAD, awake and alert  Neurological: AAO x 3, no focal deficits  HEENT: PERRLA, EOMI, MMM  Neck: Soft and supple, No LAD, No JVD  Respiratory: Breath sounds are clear bilaterally, No wheezing, rales or rhonchi  Cardiovascular: S1 and S2, regular rate and rhythm; no Murmurs, gallops or rubs  Gastrointestinal: Bowel Sounds present, soft, nontender, nondistended, no guarding, no rebound tenderness  Back: No CVA tenderness   Extremities: No peripheral edema  Vascular: 2+ peripheral pulses  Musculoskeletal: 5/5 strength b/l upper and lower extremities  Skin: No rashes  Breast: Deferred  Rectal: Deferred    MEDICATIONS  (STANDING):  albuterol/ipratropium for Nebulization 3 milliLiter(s) Nebulizer every 6 hours  budesonide  80 MICROgram(s)/formoterol 4.5 MICROgram(s) Inhaler 2 Puff(s) Inhalation two times a day  cefTRIAXone Injectable. 1000 milliGRAM(s) IV Push every 24 hours  chlorhexidine 4% Liquid 1 Application(s) Topical <User Schedule>  dorzolamide 2%/timolol 0.5% Ophthalmic Solution 1 Drop(s) Both EYES two times a day  metoprolol tartrate 25 milliGRAM(s) Oral two times a day      LABS: All Labs Reviewed:                        12.7   106.85 )-----------( 155      ( 15 May 2023 05:13 )             41.4     05-15    141  |  108  |  50<H>  ----------------------------<  97  5.1   |  30  |  1.11    Ca    9.8      15 May 2023 05:13  Phos  3.2     05-15  Mg     2.2     05-15      PT/INR - ( 14 May 2023 20:55 )   PT: 18.9 sec;   INR: 1.62 ratio          Blood Culture:   05-12 @ 10:07  Organism --  Gram Stain Blood -- Gram Stain   Numerous polymorphonuclear leukocytes per low power field  Rare Squamous epithelial cells per low power field  Rare Gram positive cocci in pairs per oil power field  Specimen Source .Sputum Sputum  Culture-Blood --    05-11 @ 23:00  Organism --  Gram Stain Blood -- Gram Stain --  Specimen Source Clean Catch Clean Catch (Midstream)  Culture-Blood --    05-11 @ 14:50  Organism --  Gram Stain Blood -- Gram Stain --  Specimen Source .Blood None  Culture-Blood --        RADIOLOGY/EKG:    < from: Xray Chest 1 View- PORTABLE-Urgent (Xray Chest 1 View- PORTABLE-Urgent .) (05.13.23 @ 09:55) >    IMPRESSION:   Residual RIGHT lower lobe and RIGHT middle lobe airspace   consolidation and/or effusion.    < end of copied text >

## 2023-05-16 NOTE — PROGRESS NOTE ADULT - SUBJECTIVE AND OBJECTIVE BOX
Date of service: 05-16-23 @ 09:35    Lying in bed in NAD  Has dry cough  SOB is improving    ROS: no fever or chills; poorly verbal    MEDICATIONS  (STANDING):  albuterol/ipratropium for Nebulization 3 milliLiter(s) Nebulizer every 6 hours  budesonide  80 MICROgram(s)/formoterol 4.5 MICROgram(s) Inhaler 2 Puff(s) Inhalation two times a day  cefTRIAXone Injectable. 1000 milliGRAM(s) IV Push every 24 hours  chlorhexidine 4% Liquid 1 Application(s) Topical <User Schedule>  dorzolamide 2%/timolol 0.5% Ophthalmic Solution 1 Drop(s) Both EYES two times a day  metoprolol tartrate 25 milliGRAM(s) Oral two times a day  warfarin 3 milliGRAM(s) Oral daily    Vital Signs Last 24 Hrs  T(C): 37.1 (16 May 2023 08:08), Max: 37.1 (16 May 2023 08:08)  T(F): 98.8 (16 May 2023 08:08), Max: 98.8 (16 May 2023 08:08)  HR: 64 (16 May 2023 08:08) (63 - 70)  BP: 136/63 (16 May 2023 08:08) (114/88 - 136/63)  BP(mean): --  RR: 18 (16 May 2023 08:08) (18 - 20)  SpO2: 96% (16 May 2023 08:08) (94% - 96%)    Parameters below as of 16 May 2023 08:08  Patient On (Oxygen Delivery Method): nasal cannula  O2 Flow (L/min): 3     Physical exam:    Constitutional:  No acute distress  HEENT: NC/AT, EOMI, PERRLA, conjunctivae clear; ears and nose atraumatic  Neck: supple; thyroid not palpable  Back: no tenderness  Respiratory: respiratory effort normal; crackles at bases  Cardiovascular: S1S2 regular, no murmurs  Abdomen: soft, not tender, not distended, positive BS  Genitourinary: no suprapubic tenderness  Lymphatic: no LN palpable  Musculoskeletal: no muscle tenderness, no joint swelling or tenderness  Extremities: no pedal edema  Neurological/ Psychiatric: AxOx3, moving all extremities  Skin: no rashes; no palpable lesions    Labs: reviewed                        12.6   102.00 )-----------( 162      ( 16 May 2023 06:33 )             40.7     05-16    140  |  108  |  44<H>  ----------------------------<  91  5.4<H>   |  29  |  0.97    Ca    9.5      16 May 2023 06:33  Phos  3.0     05-16  Mg     2.1     05-16                        12.7   106.85 )-----------( 155      ( 15 May 2023 05:13 )             41.4     05-15    141  |  108  |  50<H>  ----------------------------<  97  5.1   |  30  |  1.11    Ca    9.8      15 May 2023 05:13  Phos  3.2     05-15  Mg     2.2     05-15    Rhinoovirus (05-11 @ 20:00)  Detected    Culture - Sputum (collected 12 May 2023 10:07)  Source: .Sputum Sputum  Gram Stain (12 May 2023 23:05):    Numerous polymorphonuclear leukocytes per low power field    Rare Squamous epithelial cells per low power field    Rare Gram positive cocci in pairs per oil power field  Final Report (14 May 2023 17:31):    Normal Respiratory Maryanne present    Culture - Urine (collected 11 May 2023 23:00)  Source: Clean Catch Clean Catch (Midstream)  Final Report (13 May 2023 08:56):    No growth    Culture - Blood (collected 11 May 2023 14:50)  Source: .Blood None  Preliminary Report (12 May 2023 19:02):    No growth to date.    Culture - Blood (collected 11 May 2023 14:50)  Source: .Blood None  Preliminary Report (12 May 2023 19:02):    No growth to date.    Radiology: all available radiological tests reviewed    < from: CT Chest No Cont (05.11.23 @ 16:53) >  Multifocal pneumonia, most predominant in the right lower lobe. Follow-up   recommended in 4-6 weeks to ensure resolution.  < end of copied text >      Advanced directives addressed: full resuscitation

## 2023-05-16 NOTE — PROGRESS NOTE ADULT - SUBJECTIVE AND OBJECTIVE BOX
Subjective:   comfortable  denies sob    Review of Systems:  All 10 systems reviewed in detailed and found to be negative with the exception of what has already been described above    Allergies:  penicillins (Unknown)    Meds  MEDICATIONS  (STANDING):  albuterol/ipratropium for Nebulization 3 milliLiter(s) Nebulizer every 6 hours  budesonide  80 MICROgram(s)/formoterol 4.5 MICROgram(s) Inhaler 2 Puff(s) Inhalation two times a day  chlorhexidine 4% Liquid 1 Application(s) Topical <User Schedule>  dorzolamide 2%/timolol 0.5% Ophthalmic Solution 1 Drop(s) Both EYES two times a day  metoprolol tartrate 25 milliGRAM(s) Oral two times a day  warfarin 3 milliGRAM(s) Oral daily    MEDICATIONS  (PRN):    Physical Exam  T(C): 36.6 (05-16-23 @ 15:15), Max: 37.1 (05-16-23 @ 08:08)  HR: 70 (05-16-23 @ 15:15) (63 - 70)  BP: 122/60 (05-16-23 @ 15:15) (121/74 - 136/63)  RR: 18 (05-16-23 @ 15:15) (18 - 20)  SpO2: 96% (05-16-23 @ 15:15) (92% - 96%)  Gen: Alert, oriented, no distress  HEENT: Anicteric sclera, moist mucous membranes, no JVD  Cardio: Regular rhythm and rate, normal S1S2, no murmurs  Resp: crackles on right  GI: Nontender, nondistended, normoactive bowel sounds  Ext: No cyanosis, clubbing or edema  Neuro: Nonfocal    Labs:                        12.6   102.00 )-----------( 162      ( 16 May 2023 06:33 )             40.7     05-16    x   |  x   |  x   ----------------------------<  x   5.6<H>   |  x   |  x     Ca    9.5      16 May 2023 06:33  Phos  3.0     05-16  Mg     2.1     05-16      PT/INR - ( 16 May 2023 06:33 )   PT: 18.6 sec;   INR: 1.60 ratio        < from: CT Chest No Cont (05.11.23 @ 16:53) >  PROCEDURE DATE:  05/11/2023          INTERPRETATION:  Reason for Exam:  Evaluate for pneumonia    CT of the chest was performed from the thoracic inlet to the level of the   adrenal glandswithout contrast injection.    Comparison: 9/15/2022    Tubes/Lines: Biventricular pacemaker in place    Mediastinum/Vessels/Heart: Multichamber cardiac enlargement noted.   Pulmonary arteries are large indicative of underlying pulmonary   hypertension. Multiple lymph nodes within upper limits of normal for   size. No pericardial effusion. Coronary artery calcifications noted.    Lungs/Pleura/Airways: Consolidation within the right middle and lower   lobes with multiple bronchi containing endobronchial secretions.   Additional focal consolidation in the left lower lobe.    Visualized abdomen: Unremarkable noncontrast appearance of the upper   abdomen    Bones and soft tissues: No suspicious osseous lesions. Degenerative   changes noted throughout the spine.    IMPRESSION:    Multifocal pneumonia, most predominant in the right lower lobe. Follow-up   recommended in 4-6 weeks to ensure resolution.      < from: Xray Chest 1 View- PORTABLE-Urgent (Xray Chest 1 View- PORTABLE-Urgent .) (05.13.23 @ 09:55) >  ACC: [id number] EXAM:  XR CHEST PORTABLE URGENT 1V   ORDERED BY: EDWIN STAPLETON     PROCEDURE DATE:  05/13/2023          INTERPRETATION:  Portable chest radiograph    CLINICAL INFORMATION: Pneumonia    TECHNIQUE:  Portable  AP chest radiograph.    COMPARISON: Chest CT 5/11/2023 .    FINDINGS:  CATHETERS AND TUBES: None    PULMONARY: Residual RIGHT lower lobe and RIGHT middle lobe airspace   consolidation and/or effusion. RIGHT apex and visualized LEFT lung   parenchyma clear. No pneumothorax.    HEART/VASCULAR: The  heart is enlarged in transverse diameter. Right   atrium and biventricular cardiac wire leads in place.     BONES: Visualized osseous thorax intact.    IMPRESSION:   Residual RIGHT lower lobe and RIGHT middle lobe airspace   consolidation and/or effusion.

## 2023-05-17 ENCOUNTER — TRANSCRIPTION ENCOUNTER (OUTPATIENT)
Age: 88
End: 2023-05-17

## 2023-05-17 LAB
ANION GAP SERPL CALC-SCNC: 4 MMOL/L — LOW (ref 5–17)
BUN SERPL-MCNC: 38 MG/DL — HIGH (ref 7–23)
CALCIUM SERPL-MCNC: 9.4 MG/DL — SIGNIFICANT CHANGE UP (ref 8.5–10.1)
CHLORIDE SERPL-SCNC: 108 MMOL/L — SIGNIFICANT CHANGE UP (ref 96–108)
CO2 SERPL-SCNC: 29 MMOL/L — SIGNIFICANT CHANGE UP (ref 22–31)
CREAT SERPL-MCNC: 0.78 MG/DL — SIGNIFICANT CHANGE UP (ref 0.5–1.3)
EGFR: 86 ML/MIN/1.73M2 — SIGNIFICANT CHANGE UP
GLUCOSE SERPL-MCNC: 93 MG/DL — SIGNIFICANT CHANGE UP (ref 70–99)
INR BLD: 1.55 RATIO — HIGH (ref 0.88–1.16)
MAGNESIUM SERPL-MCNC: 2 MG/DL — SIGNIFICANT CHANGE UP (ref 1.6–2.6)
PHOSPHATE SERPL-MCNC: 2.8 MG/DL — SIGNIFICANT CHANGE UP (ref 2.5–4.5)
POTASSIUM SERPL-MCNC: 5.5 MMOL/L — HIGH (ref 3.5–5.3)
POTASSIUM SERPL-SCNC: 5.5 MMOL/L — HIGH (ref 3.5–5.3)
PROTHROM AB SERPL-ACNC: 18.1 SEC — HIGH (ref 10.5–13.4)
SODIUM SERPL-SCNC: 141 MMOL/L — SIGNIFICANT CHANGE UP (ref 135–145)

## 2023-05-17 PROCEDURE — 99232 SBSQ HOSP IP/OBS MODERATE 35: CPT | Mod: GC

## 2023-05-17 RX ORDER — ACETAMINOPHEN 500 MG
650 TABLET ORAL EVERY 6 HOURS
Refills: 0 | Status: DISCONTINUED | OUTPATIENT
Start: 2023-05-17 | End: 2023-05-19

## 2023-05-17 RX ADMIN — Medication 3 MILLILITER(S): at 16:03

## 2023-05-17 RX ADMIN — WARFARIN SODIUM 3 MILLIGRAM(S): 2.5 TABLET ORAL at 20:44

## 2023-05-17 RX ADMIN — BUDESONIDE AND FORMOTEROL FUMARATE DIHYDRATE 2 PUFF(S): 160; 4.5 AEROSOL RESPIRATORY (INHALATION) at 08:51

## 2023-05-17 RX ADMIN — Medication 3 MILLILITER(S): at 20:51

## 2023-05-17 RX ADMIN — Medication 3 MILLILITER(S): at 08:48

## 2023-05-17 RX ADMIN — DORZOLAMIDE HYDROCHLORIDE TIMOLOL MALEATE 1 DROP(S): 20; 5 SOLUTION/ DROPS OPHTHALMIC at 20:45

## 2023-05-17 RX ADMIN — Medication 200 MILLIGRAM(S): at 02:13

## 2023-05-17 RX ADMIN — BUDESONIDE AND FORMOTEROL FUMARATE DIHYDRATE 2 PUFF(S): 160; 4.5 AEROSOL RESPIRATORY (INHALATION) at 20:51

## 2023-05-17 RX ADMIN — CHLORHEXIDINE GLUCONATE 1 APPLICATION(S): 213 SOLUTION TOPICAL at 05:49

## 2023-05-17 RX ADMIN — DORZOLAMIDE HYDROCHLORIDE TIMOLOL MALEATE 1 DROP(S): 20; 5 SOLUTION/ DROPS OPHTHALMIC at 09:48

## 2023-05-17 RX ADMIN — Medication 25 MILLIGRAM(S): at 20:44

## 2023-05-17 RX ADMIN — Medication 100 MILLIGRAM(S): at 17:09

## 2023-05-17 RX ADMIN — Medication 650 MILLIGRAM(S): at 18:00

## 2023-05-17 RX ADMIN — Medication 650 MILLIGRAM(S): at 17:19

## 2023-05-17 RX ADMIN — Medication 25 MILLIGRAM(S): at 09:48

## 2023-05-17 NOTE — PROGRESS NOTE ADULT - SUBJECTIVE AND OBJECTIVE BOX
HOSPITALIST PROGRESS NOTE:  SUBJECTIVE:  PCP:  Chief Complaint: Patient is a 88y old  Male who presents with a chief complaint of Resp failure (15 May 2023 10:14)      HPI:  89 y/o male with h/o CLL baseline WBC ~ 80k, A.fib on coumadin, severe cardiomyopathy, CAD s/p MI, HTN, HLD, CHF, s/p AICD, RA, prior pneumonia was admitted on 5/11 for worsening SOB x 2 days associated with subjective fevers. Has felt sick for ~ 1 week with dry cough; acute SOB, fever this AM with worsening in cough. Pt went to , found to be hypoxic with increasing SOB and sent to ED for eval. In ER he was placed on BiPAP and received ceftriaxone and azithromycin.     5/15: Above reviewed. patient states that he is getting better but still short of breath; His pulm is Dr Rizo  5/16:  breathing improving C/o neck pain which is chronic   5/17:  Patient has no complaints; feeling better; pulse ox on RA was 83%    Allergies:  penicillins (Unknown)    REVIEW OF SYSTEMS:  See HPI. All other review of systems is negative unless indicated above.     OBJECTIVE  Physical Exam:  Vital Signs Last 24 Hrs  T(C): 36.6 (17 May 2023 15:15), Max: 36.6 (16 May 2023 21:19)  T(F): 97.9 (17 May 2023 15:15), Max: 97.9 (16 May 2023 21:19)  HR: 70 (17 May 2023 16:03) (64 - 82)  BP: 124/64 (17 May 2023 15:15) (124/64 - 144/68)  BP(mean): --  RR: 18 (17 May 2023 15:15) (14 - 18)  SpO2: 94% (17 May 2023 16:03) (83% - 96%)    Parameters below as of 17 May 2023 16:03  Patient On (Oxygen Delivery Method): room air    Constitutional: NAD, awake and alert  Neurological: AAO x 3, no focal deficits  HEENT: PERRLA, EOMI, MMM  Neck: Soft and supple, No LAD, No JVD  Respiratory: Breath sounds are clear bilaterally, No wheezing, rales or rhonchi  Cardiovascular: S1 and S2, regular rate and rhythm; no Murmurs, gallops or rubs  Gastrointestinal: Bowel Sounds present, soft, nontender, nondistended, no guarding, no rebound tenderness  Back: No CVA tenderness   Extremities: No peripheral edema  Vascular: 2+ peripheral pulses  Musculoskeletal: 5/5 strength b/l upper and lower extremities  Skin: No rashes  Breast: Deferred  Rectal: Deferred    MEDICATIONS  (STANDING):  albuterol/ipratropium for Nebulization 3 milliLiter(s) Nebulizer every 6 hours  budesonide  80 MICROgram(s)/formoterol 4.5 MICROgram(s) Inhaler 2 Puff(s) Inhalation two times a day  cefTRIAXone Injectable. 1000 milliGRAM(s) IV Push every 24 hours  chlorhexidine 4% Liquid 1 Application(s) Topical <User Schedule>  dorzolamide 2%/timolol 0.5% Ophthalmic Solution 1 Drop(s) Both EYES two times a day  metoprolol tartrate 25 milliGRAM(s) Oral two times a day    Lab Results:  CBC  CBC Full  -  ( 16 May 2023 06:33 )  WBC Count : 102.00 K/uL  RBC Count : 3.98 M/uL  Hemoglobin : 12.6 g/dL  Hematocrit : 40.7 %  Platelet Count - Automated : 162 K/uL  Mean Cell Volume : 102.3 fl  Mean Cell Hemoglobin : 31.7 pg  Mean Cell Hemoglobin Concentration : 31.0 gm/dL  Auto Neutrophil # : x  Auto Lymphocyte # : x  Auto Monocyte # : x  Auto Eosinophil # : x  Auto Basophil # : x  Auto Neutrophil % : x  Auto Lymphocyte % : x  Auto Monocyte % : x  Auto Eosinophil % : x  Auto Basophil % : x    .		Differential:	[] Automated		[] Manual  Chemistry                        12.6   102.00 )-----------( 162      ( 16 May 2023 06:33 )             40.7     05-17    141  |  108  |  38<H>  ----------------------------<  93  5.5<H>   |  29  |  0.78    Ca    9.4      17 May 2023 05:56  Phos  2.8     05-17  Mg     2.0     05-17        PT/INR - ( 17 May 2023 13:48 )   PT: 18.1 sec;   INR: 1.55 ratio         MICROBIOLOGY/CULTURES:  Culture Results:   Normal Respiratory Maryanne present (05-12 @ 10:07)  Culture Results:   No growth (05-11 @ 23:00)  Culture Results:   No Growth Final (05-11 @ 14:50)  Culture Results:   No Growth Final (05-11 @ 14:50)      RADIOLOGY RESULTS:        RADIOLOGY/EKG:    < from: Xray Chest 1 View- PORTABLE-Urgent (Xray Chest 1 View- PORTABLE-Urgent .) (05.13.23 @ 09:55) >    IMPRESSION:   Residual RIGHT lower lobe and RIGHT middle lobe airspace   consolidation and/or effusion.    < end of copied text >

## 2023-05-17 NOTE — PROGRESS NOTE ADULT - ASSESSMENT
88y old Male with PMHCLL baseline WBC ~ 80k, Afib on coumadin, severe cardiomyopathy, CAD s/p MI, HTN, HLD, CHF, s/p AICD, RA with pneumonia  1. pneumonia: finished ceftriaxone  -s/p azithromycin  -supplemental o2, will need supplemental o2 at home. re evaluate as outpatient  -continue symbicort, duonebs  -sputum cultures, blood cultures  -ambulate as tolerated  -outpatient follow up ct  -now off bipap  2. cll: per heme onc. no role for hematologic intervention currently

## 2023-05-17 NOTE — PROGRESS NOTE ADULT - SUBJECTIVE AND OBJECTIVE BOX
Subjective:  doing well  desatted to 88% on ambulation    Review of Systems:  All 10 systems reviewed in detailed and found to be negative with the exception of what has already been described above    Allergies:  penicillins (Unknown)    Meds  MEDICATIONS  (STANDING):  albuterol/ipratropium for Nebulization 3 milliLiter(s) Nebulizer every 6 hours  budesonide  80 MICROgram(s)/formoterol 4.5 MICROgram(s) Inhaler 2 Puff(s) Inhalation two times a day  chlorhexidine 4% Liquid 1 Application(s) Topical <User Schedule>  dorzolamide 2%/timolol 0.5% Ophthalmic Solution 1 Drop(s) Both EYES two times a day  metoprolol tartrate 25 milliGRAM(s) Oral two times a day  warfarin 3 milliGRAM(s) Oral daily    MEDICATIONS  (PRN):  guaiFENesin Oral Liquid (Sugar-Free) 100 milliGRAM(s) Oral every 6 hours PRN Cough    Physical Exam  T(C): 36.5 (05-17-23 @ 08:00), Max: 36.6 (05-16-23 @ 15:15)  HR: 64 (05-17-23 @ 08:51) (64 - 82)  BP: 143/67 (05-17-23 @ 08:00) (122/60 - 144/68)  RR: 17 (05-17-23 @ 08:00) (14 - 18)  SpO2: 83% (05-17-23 @ 12:00) (83% - 96%)  Gen: Alert, oriented, no distress  HEENT: Anicteric sclera, moist mucous membranes, no JVD  Cardio: Regular rhythm and rate, normal S1S2, no murmurs  Resp: crackles on right  GI: Nontender, nondistended, normoactive bowel sounds  Ext: No cyanosis, clubbing or edema  Neuro: Nonfocal    Labs:                        12.6   102.00 )-----------( 162      ( 16 May 2023 06:33 )             40.7     05-17    141  |  108  |  38<H>  ----------------------------<  93  5.5<H>   |  29  |  0.78    Ca    9.4      17 May 2023 05:56  Phos  2.8     05-17  Mg     2.0     05-17      PT/INR - ( 16 May 2023 06:33 )   PT: 18.6 sec;   INR: 1.60 ratio           < from: CT Chest No Cont (05.11.23 @ 16:53) >  PROCEDURE DATE:  05/11/2023          INTERPRETATION:  Reason for Exam:  Evaluate for pneumonia    CT of the chest was performed from the thoracic inlet to the level of the   adrenal glandswithout contrast injection.    Comparison: 9/15/2022    Tubes/Lines: Biventricular pacemaker in place    Mediastinum/Vessels/Heart: Multichamber cardiac enlargement noted.   Pulmonary arteries are large indicative of underlying pulmonary   hypertension. Multiple lymph nodes within upper limits of normal for   size. No pericardial effusion. Coronary artery calcifications noted.    Lungs/Pleura/Airways: Consolidation within the right middle and lower   lobes with multiple bronchi containing endobronchial secretions.   Additional focal consolidation in the left lower lobe.    Visualized abdomen: Unremarkable noncontrast appearance of the upper   abdomen    Bones and soft tissues: No suspicious osseous lesions. Degenerative   changes noted throughout the spine.    IMPRESSION:    Multifocal pneumonia, most predominant in the right lower lobe. Follow-up   recommended in 4-6 weeks to ensure resolution.      < from: Xray Chest 1 View- PORTABLE-Urgent (Xray Chest 1 View- PORTABLE-Urgent .) (05.13.23 @ 09:55) >  ACC: [id number] EXAM:  XR CHEST PORTABLE URGENT 1V   ORDERED BY: EDWIN STAPLETON     PROCEDURE DATE:  05/13/2023          INTERPRETATION:  Portable chest radiograph    CLINICAL INFORMATION: Pneumonia    TECHNIQUE:  Portable  AP chest radiograph.    COMPARISON: Chest CT 5/11/2023 .    FINDINGS:  CATHETERS AND TUBES: None    PULMONARY: Residual RIGHT lower lobe and RIGHT middle lobe airspace   consolidation and/or effusion. RIGHT apex and visualized LEFT lung   parenchyma clear. No pneumothorax.    HEART/VASCULAR: The  heart is enlarged in transverse diameter. Right   atrium and biventricular cardiac wire leads in place.     BONES: Visualized osseous thorax intact.    IMPRESSION:   Residual RIGHT lower lobe and RIGHT middle lobe airspace   consolidation and/or effusion.    Home Oxygen Evaluation:  Pulse ox (SpO2) on room air at rest:	92 %  Pulse ox (SpO2) on room air with exertion:	88 %  Pulse ox (SpO2) on	3 L/min  O2 with exertion:	96 %

## 2023-05-17 NOTE — PROGRESS NOTE ADULT - ASSESSMENT
88M PMH CLL not treated, CAD, CHF, s/p ICD, AFib on Coumadin who presented to  ED 5/11/23 with SOB, found with hypoxic respiratory failure requiring NIPPV, multifocal PNA, enterovirus positive, developed shock s/p pressors.     Multifocal/GNR PNA  Hypoxic respiratory failure  - S/P BIPAP - now on NC  - C/w CTX#6  - S/P Azithromycin#3  - BCx and sputum cx negative  - Needs repeat CT chest as outpatient in 4-6 weeks  -ID consult appreciated  -Pulm consult appreciated   -Pulse ox 83% RA  - patient qualifies for home oxygen  Patient is aware and agreeable to home O2.  Patient is in a chronic stable state.      CLL  Leukocytosis  - Heme/onc Dr. Abel Welsh - check immunoglobulins, specifically IgG if this is low can consider prophylactic gammaglobulin  - Trend CBC  - Monitor for any fevers    AFib on Coumadin  - Trend INR  - Coumadin 3mg daily    Advanced Directives  - Full code    Dispo - Discharge home dodie on Home O2; FU ID

## 2023-05-17 NOTE — PROGRESS NOTE ADULT - CONVERSATION DETAILS
GOC and advance care planning meeting done with the patient. He wishes to be fully resuscitated and mechanically ventilated if need arises. There are no limitations of any sort in his medical care and management. he is FULL CODE.

## 2023-05-17 NOTE — PROGRESS NOTE ADULT - ASSESSMENT
89 y/o male with h/o CLL baseline WBC ~ 80k, A.fib on coumadin, severe cardiomyopathy, CAD s/p MI, HTN, HLD, CHF, s/p AICD, RA, prior pneumonia was admitted on 5/11 for worsening SOB x 2 days associated with subjective fevers. Has felt sick for ~ 1 week with dry cough; acute SOB, fever this AM with worsening in cough. Pt went to , found to be hypoxic with increasing SOB and sent to ED for eval. In ER he was placed on BiPAP and received ceftriaxone and azithromycin.     1. Acute respiratory failure resolving. Multifocal pneumonia. Right pleural effusion. URI with rhinovirus. CLL with leukocytosis. Immunocompromised host. Allergy to PCN.  -respiratory improving  -BC x 2, urine c/s noted  -on ceftriaxone 1 gm IV qd # 6  -s/p azithromycin 500 mg IV qd  -tolerating abx well so far; no side effects noted  -monitor closely in lorna of PCN allergy history  -respiratory care  -continue abx coverage for now  -monitor temps  -f/u CBC  -supportive care  2. Other issues:   -care per medicine

## 2023-05-17 NOTE — DISCHARGE NOTE NURSING/CASE MANAGEMENT/SOCIAL WORK - PATIENT PORTAL LINK FT
You can access the FollowMyHealth Patient Portal offered by [facility] by registering at the following website: [email or web address]. By joining [facility]’s FollowMyHealth portal, you will also be able to view your health information using other applications (apps) compatible with our system.

## 2023-05-17 NOTE — PROGRESS NOTE ADULT - SUBJECTIVE AND OBJECTIVE BOX
Date of service: 05-17-23 @ 08:30    Lying in bed in NAD  Has dry cough  SOB is improving    ROS: no fever or chills; denies dizziness, no HA, no abdominal pain, no diarrhea or constipation; no dysuria, no legs pain, no rashes    MEDICATIONS  (STANDING):  albuterol/ipratropium for Nebulization 3 milliLiter(s) Nebulizer every 6 hours  budesonide  80 MICROgram(s)/formoterol 4.5 MICROgram(s) Inhaler 2 Puff(s) Inhalation two times a day  chlorhexidine 4% Liquid 1 Application(s) Topical <User Schedule>  dorzolamide 2%/timolol 0.5% Ophthalmic Solution 1 Drop(s) Both EYES two times a day  metoprolol tartrate 25 milliGRAM(s) Oral two times a day  warfarin 3 milliGRAM(s) Oral daily    Vital Signs Last 24 Hrs  T(C): 36.5 (17 May 2023 08:00), Max: 36.6 (16 May 2023 15:15)  T(F): 97.7 (17 May 2023 08:00), Max: 97.9 (16 May 2023 15:15)  HR: 64 (17 May 2023 08:00) (64 - 82)  BP: 143/67 (17 May 2023 08:00) (122/60 - 144/68)  BP(mean): --  RR: 17 (17 May 2023 08:00) (14 - 18)  SpO2: 95% (17 May 2023 08:00) (92% - 96%)    Parameters below as of 17 May 2023 08:00  Patient On (Oxygen Delivery Method): room air     Physical exam:    Constitutional:  No acute distress  HEENT: NC/AT, EOMI, PERRLA, conjunctivae clear; ears and nose atraumatic  Neck: supple; thyroid not palpable  Back: no tenderness  Respiratory: respiratory effort normal; crackles at bases  Cardiovascular: S1S2 regular, no murmurs  Abdomen: soft, not tender, not distended, positive BS  Genitourinary: no suprapubic tenderness  Lymphatic: no LN palpable  Musculoskeletal: no muscle tenderness, no joint swelling or tenderness  Extremities: no pedal edema  Neurological/ Psychiatric: AxOx3, moving all extremities  Skin: no rashes; no palpable lesions    Labs: reviewed                        12.6   102.00 )-----------( 162      ( 16 May 2023 06:33 )             40.7     05-16    140  |  108  |  44<H>  ----------------------------<  91  5.4<H>   |  29  |  0.97    Ca    9.5      16 May 2023 06:33  Phos  3.0     05-16  Mg     2.1     05-16                        12.7   106.85 )-----------( 155      ( 15 May 2023 05:13 )             41.4     05-15    141  |  108  |  50<H>  ----------------------------<  97  5.1   |  30  |  1.11    Ca    9.8      15 May 2023 05:13  Phos  3.2     05-15  Mg     2.2     05-15    Rhinoovirus (05-11 @ 20:00)  Detected    Culture - Sputum (collected 12 May 2023 10:07)  Source: .Sputum Sputum  Gram Stain (12 May 2023 23:05):    Numerous polymorphonuclear leukocytes per low power field    Rare Squamous epithelial cells per low power field    Rare Gram positive cocci in pairs per oil power field  Final Report (14 May 2023 17:31):    Normal Respiratory Maryanne present    Culture - Urine (collected 11 May 2023 23:00)  Source: Clean Catch Clean Catch (Midstream)  Final Report (13 May 2023 08:56):    No growth    Culture - Blood (collected 11 May 2023 14:50)  Source: .Blood None  Preliminary Report (12 May 2023 19:02):    No growth to date.    Culture - Blood (collected 11 May 2023 14:50)  Source: .Blood None  Preliminary Report (12 May 2023 19:02):    No growth to date.    Radiology: all available radiological tests reviewed    < from: CT Chest No Cont (05.11.23 @ 16:53) >  Multifocal pneumonia, most predominant in the right lower lobe. Follow-up   recommended in 4-6 weeks to ensure resolution.  < end of copied text >    Advanced directives addressed: full resuscitation

## 2023-05-18 LAB
ANION GAP SERPL CALC-SCNC: 4 MMOL/L — LOW (ref 5–17)
ANION GAP SERPL CALC-SCNC: 5 MMOL/L — SIGNIFICANT CHANGE UP (ref 5–17)
BUN SERPL-MCNC: 30 MG/DL — HIGH (ref 7–23)
BUN SERPL-MCNC: 36 MG/DL — HIGH (ref 7–23)
CALCIUM SERPL-MCNC: 7.5 MG/DL — LOW (ref 8.5–10.1)
CALCIUM SERPL-MCNC: 9.8 MG/DL — SIGNIFICANT CHANGE UP (ref 8.5–10.1)
CHLORIDE SERPL-SCNC: 107 MMOL/L — SIGNIFICANT CHANGE UP (ref 96–108)
CHLORIDE SERPL-SCNC: 109 MMOL/L — HIGH (ref 96–108)
CO2 SERPL-SCNC: 25 MMOL/L — SIGNIFICANT CHANGE UP (ref 22–31)
CO2 SERPL-SCNC: 30 MMOL/L — SIGNIFICANT CHANGE UP (ref 22–31)
CREAT SERPL-MCNC: 0.72 MG/DL — SIGNIFICANT CHANGE UP (ref 0.5–1.3)
CREAT SERPL-MCNC: 0.95 MG/DL — SIGNIFICANT CHANGE UP (ref 0.5–1.3)
EGFR: 77 ML/MIN/1.73M2 — SIGNIFICANT CHANGE UP
EGFR: 88 ML/MIN/1.73M2 — SIGNIFICANT CHANGE UP
GLUCOSE SERPL-MCNC: 125 MG/DL — HIGH (ref 70–99)
GLUCOSE SERPL-MCNC: 77 MG/DL — SIGNIFICANT CHANGE UP (ref 70–99)
HCT VFR BLD CALC: 41.6 % — SIGNIFICANT CHANGE UP (ref 39–50)
HGB BLD-MCNC: 12.9 G/DL — LOW (ref 13–17)
INR BLD: 1.51 RATIO — HIGH (ref 0.88–1.16)
MAGNESIUM SERPL-MCNC: 1.8 MG/DL — SIGNIFICANT CHANGE UP (ref 1.6–2.6)
MCHC RBC-ENTMCNC: 31 GM/DL — LOW (ref 32–36)
MCHC RBC-ENTMCNC: 31.9 PG — SIGNIFICANT CHANGE UP (ref 27–34)
MCV RBC AUTO: 102.7 FL — HIGH (ref 80–100)
PHOSPHATE SERPL-MCNC: 2.4 MG/DL — LOW (ref 2.5–4.5)
PLATELET # BLD AUTO: 164 K/UL — SIGNIFICANT CHANGE UP (ref 150–400)
POTASSIUM SERPL-MCNC: 5.2 MMOL/L — SIGNIFICANT CHANGE UP (ref 3.5–5.3)
POTASSIUM SERPL-MCNC: 5.9 MMOL/L — HIGH (ref 3.5–5.3)
POTASSIUM SERPL-SCNC: 5.2 MMOL/L — SIGNIFICANT CHANGE UP (ref 3.5–5.3)
POTASSIUM SERPL-SCNC: 5.9 MMOL/L — HIGH (ref 3.5–5.3)
PROTHROM AB SERPL-ACNC: 17.6 SEC — HIGH (ref 10.5–13.4)
RBC # BLD: 4.05 M/UL — LOW (ref 4.2–5.8)
RBC # FLD: 13.5 % — SIGNIFICANT CHANGE UP (ref 10.3–14.5)
SODIUM SERPL-SCNC: 138 MMOL/L — SIGNIFICANT CHANGE UP (ref 135–145)
SODIUM SERPL-SCNC: 142 MMOL/L — SIGNIFICANT CHANGE UP (ref 135–145)
WBC # BLD: 108.43 K/UL — CRITICAL HIGH (ref 3.8–10.5)
WBC # FLD AUTO: 108.43 K/UL — CRITICAL HIGH (ref 3.8–10.5)

## 2023-05-18 PROCEDURE — 93283 PRGRMG EVAL IMPLANTABLE DFB: CPT | Mod: 26

## 2023-05-18 PROCEDURE — 99232 SBSQ HOSP IP/OBS MODERATE 35: CPT

## 2023-05-18 RX ORDER — SODIUM POLYSTYRENE SULFONATE 4.1 MEQ/G
30 POWDER, FOR SUSPENSION ORAL ONCE
Refills: 0 | Status: COMPLETED | OUTPATIENT
Start: 2023-05-18 | End: 2023-05-18

## 2023-05-18 RX ORDER — WARFARIN SODIUM 2.5 MG/1
4 TABLET ORAL DAILY
Refills: 0 | Status: DISCONTINUED | OUTPATIENT
Start: 2023-05-18 | End: 2023-05-19

## 2023-05-18 RX ADMIN — Medication 25 MILLIGRAM(S): at 09:20

## 2023-05-18 RX ADMIN — BUDESONIDE AND FORMOTEROL FUMARATE DIHYDRATE 2 PUFF(S): 160; 4.5 AEROSOL RESPIRATORY (INHALATION) at 21:14

## 2023-05-18 RX ADMIN — DORZOLAMIDE HYDROCHLORIDE TIMOLOL MALEATE 1 DROP(S): 20; 5 SOLUTION/ DROPS OPHTHALMIC at 09:21

## 2023-05-18 RX ADMIN — SODIUM POLYSTYRENE SULFONATE 30 GRAM(S): 4.1 POWDER, FOR SUSPENSION ORAL at 09:21

## 2023-05-18 RX ADMIN — WARFARIN SODIUM 4 MILLIGRAM(S): 2.5 TABLET ORAL at 20:38

## 2023-05-18 RX ADMIN — Medication 3 MILLILITER(S): at 08:43

## 2023-05-18 RX ADMIN — CHLORHEXIDINE GLUCONATE 1 APPLICATION(S): 213 SOLUTION TOPICAL at 09:58

## 2023-05-18 RX ADMIN — DORZOLAMIDE HYDROCHLORIDE TIMOLOL MALEATE 1 DROP(S): 20; 5 SOLUTION/ DROPS OPHTHALMIC at 20:38

## 2023-05-18 RX ADMIN — Medication 25 MILLIGRAM(S): at 20:38

## 2023-05-18 RX ADMIN — BUDESONIDE AND FORMOTEROL FUMARATE DIHYDRATE 2 PUFF(S): 160; 4.5 AEROSOL RESPIRATORY (INHALATION) at 08:44

## 2023-05-18 RX ADMIN — Medication 3 MILLILITER(S): at 21:13

## 2023-05-18 RX ADMIN — Medication 3 MILLILITER(S): at 13:35

## 2023-05-18 RX ADMIN — Medication 650 MILLIGRAM(S): at 20:45

## 2023-05-18 NOTE — PROCEDURE NOTE - INTERROGATION NOTE: COMMENTS
Normal functioning CRT-D with battery longevity4.5 years. persistent AF with Bi-V pacing 74% c/w prior history. Stored data showed brief NSVT on 12/27/22 & 5/13/23.

## 2023-05-18 NOTE — PROCEDURE NOTE - ADDITIONAL PROCEDURE DETAILS
Since low Bi-V pacing, increased LRL to 70bpm to increase Bi-V pacing, overall rate controlled  Noted to have frequent PVC with brief NSVT c/w prior history  May consider to increase betablocker once pt recovers from pneumonia  continue remote monitoring, EP clinic f/u in 3 months.

## 2023-05-18 NOTE — PROCEDURE NOTE - INTERROGATION NOTE: ZONE I (BPM)
Pt requesting a referral to Dr. Mindi Wilson. Pt states she is due for her yearly hip check up after surgery.
Referral placed.
180

## 2023-05-18 NOTE — PROGRESS NOTE ADULT - SUBJECTIVE AND OBJECTIVE BOX
Subjective:  denies sob  no complaints  CRT-D interrogated today    Review of Systems:  All 10 systems reviewed in detailed and found to be negative with the exception of what has already been described above    Allergies:  penicillins (Unknown)    Meds  MEDICATIONS  (STANDING):  albuterol/ipratropium for Nebulization 3 milliLiter(s) Nebulizer every 6 hours  budesonide  80 MICROgram(s)/formoterol 4.5 MICROgram(s) Inhaler 2 Puff(s) Inhalation two times a day  chlorhexidine 4% Liquid 1 Application(s) Topical <User Schedule>  dorzolamide 2%/timolol 0.5% Ophthalmic Solution 1 Drop(s) Both EYES two times a day  metoprolol tartrate 25 milliGRAM(s) Oral two times a day  warfarin 4 milliGRAM(s) Oral daily    MEDICATIONS  (PRN):  acetaminophen     Tablet .. 650 milliGRAM(s) Oral every 6 hours PRN Moderate Pain (4 - 6)  guaiFENesin Oral Liquid (Sugar-Free) 100 milliGRAM(s) Oral every 6 hours PRN Cough    Physical Exam  T(C): 36.5 (05-18-23 @ 15:20), Max: 36.9 (05-18-23 @ 08:12)  HR: 61 (05-18-23 @ 15:20) (61 - 85)  BP: 129/65 (05-18-23 @ 15:20) (103/67 - 129/65)  RR: 18 (05-18-23 @ 15:20) (17 - 18)  SpO2: 94% (05-18-23 @ 15:20) (91% - 95%)  Gen: Alert, oriented, no distress  HEENT: Anicteric sclera, moist mucous membranes, no JVD  Cardio: Regular rhythm and rate, normal S1S2, no murmurs  Resp: crackles on right  GI: Nontender, nondistended, normoactive bowel sounds  Ext: No cyanosis, clubbing or edema  Neuro: Nonfocal    Labs:                        12.9   108.43 )-----------( 164      ( 18 May 2023 06:44 )             41.6     05-18    142  |  107  |  36<H>  ----------------------------<  125<H>  5.2   |  30  |  0.95    Ca    9.8      18 May 2023 15:47  Phos  2.4     05-18  Mg     1.8     05-18      PT/INR - ( 18 May 2023 06:44 )   PT: 17.6 sec;   INR: 1.51 ratio             < from: CT Chest No Cont (05.11.23 @ 16:53) >  PROCEDURE DATE:  05/11/2023          INTERPRETATION:  Reason for Exam:  Evaluate for pneumonia    CT of the chest was performed from the thoracic inlet to the level of the   adrenal glandswithout contrast injection.    Comparison: 9/15/2022    Tubes/Lines: Biventricular pacemaker in place    Mediastinum/Vessels/Heart: Multichamber cardiac enlargement noted.   Pulmonary arteries are large indicative of underlying pulmonary   hypertension. Multiple lymph nodes within upper limits of normal for   size. No pericardial effusion. Coronary artery calcifications noted.    Lungs/Pleura/Airways: Consolidation within the right middle and lower   lobes with multiple bronchi containing endobronchial secretions.   Additional focal consolidation in the left lower lobe.    Visualized abdomen: Unremarkable noncontrast appearance of the upper   abdomen    Bones and soft tissues: No suspicious osseous lesions. Degenerative   changes noted throughout the spine.    IMPRESSION:    Multifocal pneumonia, most predominant in the right lower lobe. Follow-up   recommended in 4-6 weeks to ensure resolution.      < from: Xray Chest 1 View- PORTABLE-Urgent (Xray Chest 1 View- PORTABLE-Urgent .) (05.13.23 @ 09:55) >  ACC: [id number] EXAM:  XR CHEST PORTABLE URGENT 1V   ORDERED BY: EDWIN STAPLETON     PROCEDURE DATE:  05/13/2023          INTERPRETATION:  Portable chest radiograph    CLINICAL INFORMATION: Pneumonia    TECHNIQUE:  Portable  AP chest radiograph.    COMPARISON: Chest CT 5/11/2023 .    FINDINGS:  CATHETERS AND TUBES: None    PULMONARY: Residual RIGHT lower lobe and RIGHT middle lobe airspace   consolidation and/or effusion. RIGHT apex and visualized LEFT lung   parenchyma clear. No pneumothorax.    HEART/VASCULAR: The  heart is enlarged in transverse diameter. Right   atrium and biventricular cardiac wire leads in place.     BONES: Visualized osseous thorax intact.    IMPRESSION:   Residual RIGHT lower lobe and RIGHT middle lobe airspace   consolidation and/or effusion.    Home Oxygen Evaluation:  Pulse ox (SpO2) on room air at rest:	92 %  Pulse ox (SpO2) on room air with exertion:	88 %  Pulse ox (SpO2) on	3 L/min  O2 with exertion:	96 %

## 2023-05-18 NOTE — PROGRESS NOTE ADULT - SUBJECTIVE AND OBJECTIVE BOX
HOSPITALIST PROGRESS NOTE:  SUBJECTIVE:  PCP:  Chief Complaint: Patient is a 88y old  Male who presents with a chief complaint of Resp failure (15 May 2023 10:14)      HPI:  89 y/o male with h/o CLL baseline WBC ~ 80k, A.fib on coumadin, severe cardiomyopathy, CAD s/p MI, HTN, HLD, CHF, s/p AICD, RA, prior pneumonia was admitted on 5/11 for worsening SOB x 2 days associated with subjective fevers. Has felt sick for ~ 1 week with dry cough; acute SOB, fever this AM with worsening in cough. Pt went to , found to be hypoxic with increasing SOB and sent to ED for eval. In ER he was placed on BiPAP and received ceftriaxone and azithromycin.     5/18: Pt seen and examined. chart reviewed. pt sitting up in chair. took kayexelate for hyperkalemia and had 2 BMs. no abd pain, SOB or CP. afebrile. eager to go home.    Allergies:  penicillins (Unknown)    REVIEW OF SYSTEMS:  See HPI. All other review of systems is negative unless indicated above.     OBJECTIVE  Physical Exam:  Vital Signs Last 24 Hrs  T(C): 36.9 (05-18-23 @ 08:12), Max: 36.9 (05-18-23 @ 08:12)  T(F): 98.4 (05-18-23 @ 08:12), Max: 98.4 (05-18-23 @ 08:12)  HR: 64 (05-18-23 @ 13:40) (64 - 85)  BP: 126/61 (05-18-23 @ 08:12) (103/67 - 126/61)  BP(mean): --  RR: 18 (05-18-23 @ 08:12) (17 - 18)  SpO2: 91% (05-18-23 @ 08:12) (91% - 95%)      Constitutional: NAD, awake and alert  Neurological: AAO x 3, no focal deficits  HEENT: PERRLA, EOMI, MMM  Neck: Soft and supple, No LAD, No JVD  Respiratory: Breath sounds are clear bilaterally, No wheezing, rales or rhonchi  Cardiovascular: S1 and S2, regular rate and rhythm; no Murmurs, gallops or rubs  Gastrointestinal: Bowel Sounds present, soft, nontender, nondistended, no guarding, no rebound tenderness  Back: No CVA tenderness   Extremities: No peripheral edema  Vascular: 2+ peripheral pulses  Musculoskeletal: 5/5 strength b/l upper and lower extremities  Skin: No rashes  Breast: Deferred  Rectal: Deferred    Labs                              12.9   108.43 )-----------( 164      ( 18 May 2023 06:44 )             41.6     18 May 2023 06:44    138    |  109    |  30     ----------------------------<  77     5.9     |  25     |  0.72     Ca    7.5        18 May 2023 06:44  Phos  2.4       18 May 2023 06:44  Mg     1.8       18 May 2023 06:44      PT/INR - ( 18 May 2023 06:44 )   PT: 17.6 sec;   INR: 1.51 ratio           CAPILLARY BLOOD GLUCOSE       MICROBIOLOGY/CULTURES:  Culture Results:   Normal Respiratory Maryanne present (05-12 @ 10:07)  Culture Results:   No growth (05-11 @ 23:00)  Culture Results:   No Growth Final (05-11 @ 14:50)  Culture Results:   No Growth Final (05-11 @ 14:50)      RADIOLOGY RESULTS:        RADIOLOGY/EKG:    < from: Xray Chest 1 View- PORTABLE-Urgent (Xray Chest 1 View- PORTABLE-Urgent .) (05.13.23 @ 09:55) >    IMPRESSION:   Residual RIGHT lower lobe and RIGHT middle lobe airspace   consolidation and/or effusion.    < end of copied text >    MEDICATIONS  (STANDING):  albuterol/ipratropium for Nebulization 3 milliLiter(s) Nebulizer every 6 hours  budesonide  80 MICROgram(s)/formoterol 4.5 MICROgram(s) Inhaler 2 Puff(s) Inhalation two times a day  chlorhexidine 4% Liquid 1 Application(s) Topical <User Schedule>  dorzolamide 2%/timolol 0.5% Ophthalmic Solution 1 Drop(s) Both EYES two times a day  metoprolol tartrate 25 milliGRAM(s) Oral two times a day    MEDICATIONS  (PRN):  acetaminophen     Tablet .. 650 milliGRAM(s) Oral every 6 hours PRN Moderate Pain (4 - 6)  guaiFENesin Oral Liquid (Sugar-Free) 100 milliGRAM(s) Oral every 6 hours PRN Cough

## 2023-05-18 NOTE — PROGRESS NOTE ADULT - ASSESSMENT
87 y/o male with h/o CLL baseline WBC ~ 80k, A.fib on coumadin, severe cardiomyopathy, CAD s/p MI, HTN, HLD, CHF, s/p AICD, RA, prior pneumonia was admitted on 5/11 for worsening SOB x 2 days associated with subjective fevers. Has felt sick for ~ 1 week with dry cough; acute SOB, fever this AM with worsening in cough. Pt went to , found to be hypoxic with increasing SOB and sent to ED for eval. In ER he was placed on BiPAP and received ceftriaxone and azithromycin.     1. Acute respiratory failure resolving. Multifocal pneumonia. Right pleural effusion. URI with rhinovirus. CLL with leukocytosis. Immunocompromised host. Allergy to PCN.  -respiratory improving slowly  -BC x 2, urine c/s noted  -on ceftriaxone 1 gm IV qd # 7  -s/p azithromycin 500 mg IV qd  -tolerating abx well so far; no side effects noted  -monitor closely in lorna of PCN allergy history  -respiratory care  -continue abx coverage for now, plan to change to oral regimen soon  -monitor temps  -f/u CBC  -supportive care  2. Other issues:   -care per medicine

## 2023-05-18 NOTE — PROGRESS NOTE ADULT - SUBJECTIVE AND OBJECTIVE BOX
Date of service: 05-18-23 @ 09:08    Lying in bed in NAD  Has dry cough  SOB with light exercise  No fever    ROS: denies dizziness, no HA, no abdominal pain, no diarrhea or constipation; no dysuria, no legs pain, no rashes    MEDICATIONS  (STANDING):  albuterol/ipratropium for Nebulization 3 milliLiter(s) Nebulizer every 6 hours  budesonide  80 MICROgram(s)/formoterol 4.5 MICROgram(s) Inhaler 2 Puff(s) Inhalation two times a day  chlorhexidine 4% Liquid 1 Application(s) Topical <User Schedule>  dorzolamide 2%/timolol 0.5% Ophthalmic Solution 1 Drop(s) Both EYES two times a day  metoprolol tartrate 25 milliGRAM(s) Oral two times a day  sodium polystyrene sulfonate Suspension 30 Gram(s) Oral once    Vital Signs Last 24 Hrs  T(C): 36.9 (18 May 2023 08:12), Max: 36.9 (18 May 2023 08:12)  T(F): 98.4 (18 May 2023 08:12), Max: 98.4 (18 May 2023 08:12)  HR: 70 (18 May 2023 08:12) (65 - 70)  BP: 126/61 (18 May 2023 08:12) (103/67 - 126/61)  BP(mean): --  RR: 18 (18 May 2023 08:12) (17 - 18)  SpO2: 91% (18 May 2023 08:12) (83% - 95%)    Parameters below as of 18 May 2023 08:12  Patient On (Oxygen Delivery Method): room air     Physical exam:    Constitutional:  No acute distress  HEENT: NC/AT, EOMI, PERRLA, conjunctivae clear; ears and nose atraumatic  Neck: supple; thyroid not palpable  Back: no tenderness  Respiratory: respiratory effort normal; crackles at bases  Cardiovascular: S1S2 regular, no murmurs  Abdomen: soft, not tender, not distended, positive BS  Genitourinary: no suprapubic tenderness  Lymphatic: no LN palpable  Musculoskeletal: no muscle tenderness, no joint swelling or tenderness  Extremities: no pedal edema  Neurological/ Psychiatric: AxOx3, moving all extremities  Skin: no rashes; no palpable lesions    Labs: reviewed                        12.9   108.43 )-----------( 164      ( 18 May 2023 06:44 )             41.6     05-18    138  |  109<H>  |  30<H>  ----------------------------<  77  5.9<H>   |  25  |  0.72    Ca    7.5<L>      18 May 2023 06:44  Phos  2.4     05-18  Mg     1.8     05-18                        12.6   102.00 )-----------( 162      ( 16 May 2023 06:33 )             40.7     05-16    140  |  108  |  44<H>  ----------------------------<  91  5.4<H>   |  29  |  0.97    Ca    9.5      16 May 2023 06:33  Phos  3.0     05-16  Mg     2.1     05-16                        12.7   106.85 )-----------( 155      ( 15 May 2023 05:13 )             41.4     05-15    141  |  108  |  50<H>  ----------------------------<  97  5.1   |  30  |  1.11    Ca    9.8      15 May 2023 05:13  Phos  3.2     05-15  Mg     2.2     05-15    Rhinoovirus (05-11 @ 20:00)  Detected    Culture - Sputum (collected 12 May 2023 10:07)  Source: .Sputum Sputum  Gram Stain (12 May 2023 23:05):    Numerous polymorphonuclear leukocytes per low power field    Rare Squamous epithelial cells per low power field    Rare Gram positive cocci in pairs per oil power field  Final Report (14 May 2023 17:31):    Normal Respiratory Maryanne present    Culture - Urine (collected 11 May 2023 23:00)  Source: Clean Catch Clean Catch (Midstream)  Final Report (13 May 2023 08:56):    No growth    Culture - Blood (collected 11 May 2023 14:50)  Source: .Blood None  Preliminary Report (12 May 2023 19:02):    No growth to date.    Culture - Blood (collected 11 May 2023 14:50)  Source: .Blood None  Preliminary Report (12 May 2023 19:02):    No growth to date.    Radiology: all available radiological tests reviewed    < from: CT Chest No Cont (05.11.23 @ 16:53) >  Multifocal pneumonia, most predominant in the right lower lobe. Follow-up   recommended in 4-6 weeks to ensure resolution.  < end of copied text >    Advanced directives addressed: full resuscitation

## 2023-05-18 NOTE — PROGRESS NOTE ADULT - ASSESSMENT
88M PMH CLL not treated, CAD, CHF, s/p ICD, AFib on Coumadin who presented to  ED 5/11/23 with SOB, found with hypoxic respiratory failure requiring NIPPV, multifocal PNA, enterovirus positive, developed shock s/p pressors.     Multifocal/GNR PNA  Hypoxic respiratory failure  - S/P BIPAP - now on NC  - C/w CTX  - S/P Azithromycin  - BCx and sputum cx negative  - Needs repeat CT chest as outpatient in 4-6 weeks  -ID consult appreciated  -Pulm consult appreciated   -Pulse ox 83% RA  - patient qualifies for home oxygen  Patient is aware and agreeable to home O2.  Patient is in a chronic stable state.      CLL  Leukocytosis  - Heme/onc Dr. Abel Welsh - check immunoglobulins, specifically IgG if this is low can consider prophylactic gammaglobulin  - Trend CBC  - Monitor for any fevers    AFib on Coumadin  - Trend INR  - will increase to 4mg daily for subtherapeutic INR    Advanced Directives  - Full code    Dispo - Discharge home dodie on Home O2; FU ID recs

## 2023-05-18 NOTE — PROGRESS NOTE ADULT - NUTRITIONAL ASSESSMENT
This patient has been assessed with a concern for Malnutrition and has been determined to have a diagnosis/diagnoses of Moderate protein-calorie malnutrition.    This patient is being managed with:   Diet DASH/TLC-  Sodium & Cholesterol Restricted  Entered: May 12 2023 10:42AM  

## 2023-05-18 NOTE — PROCEDURE NOTE - NSINFORMCONSENT_GEN_A_CORE
Private Vehicle
Benefits, risks, and possible complications of procedure explained to patient/caregiver who verbalized understanding and gave verbal consent.

## 2023-05-19 ENCOUNTER — TRANSCRIPTION ENCOUNTER (OUTPATIENT)
Age: 88
End: 2023-05-19

## 2023-05-19 VITALS — DIASTOLIC BLOOD PRESSURE: 72 MMHG | HEART RATE: 72 BPM | SYSTOLIC BLOOD PRESSURE: 134 MMHG

## 2023-05-19 LAB
ANION GAP SERPL CALC-SCNC: 3 MMOL/L — LOW (ref 5–17)
BUN SERPL-MCNC: 27 MG/DL — HIGH (ref 7–23)
CALCIUM SERPL-MCNC: 9.1 MG/DL — SIGNIFICANT CHANGE UP (ref 8.5–10.1)
CHLORIDE SERPL-SCNC: 109 MMOL/L — HIGH (ref 96–108)
CO2 SERPL-SCNC: 29 MMOL/L — SIGNIFICANT CHANGE UP (ref 22–31)
CREAT SERPL-MCNC: 0.87 MG/DL — SIGNIFICANT CHANGE UP (ref 0.5–1.3)
EGFR: 84 ML/MIN/1.73M2 — SIGNIFICANT CHANGE UP
GLUCOSE SERPL-MCNC: 91 MG/DL — SIGNIFICANT CHANGE UP (ref 70–99)
HCT VFR BLD CALC: 41 % — SIGNIFICANT CHANGE UP (ref 39–50)
HGB BLD-MCNC: 12.7 G/DL — LOW (ref 13–17)
INR BLD: 1.44 RATIO — HIGH (ref 0.88–1.16)
MAGNESIUM SERPL-MCNC: 2 MG/DL — SIGNIFICANT CHANGE UP (ref 1.6–2.6)
MCHC RBC-ENTMCNC: 31 GM/DL — LOW (ref 32–36)
MCHC RBC-ENTMCNC: 31.7 PG — SIGNIFICANT CHANGE UP (ref 27–34)
MCV RBC AUTO: 102.2 FL — HIGH (ref 80–100)
PHOSPHATE SERPL-MCNC: 2.6 MG/DL — SIGNIFICANT CHANGE UP (ref 2.5–4.5)
PLATELET # BLD AUTO: 177 K/UL — SIGNIFICANT CHANGE UP (ref 150–400)
POTASSIUM SERPL-MCNC: 5.5 MMOL/L — HIGH (ref 3.5–5.3)
POTASSIUM SERPL-SCNC: 5.5 MMOL/L — HIGH (ref 3.5–5.3)
PROTHROM AB SERPL-ACNC: 16.8 SEC — HIGH (ref 10.5–13.4)
RBC # BLD: 4.01 M/UL — LOW (ref 4.2–5.8)
RBC # FLD: 13.6 % — SIGNIFICANT CHANGE UP (ref 10.3–14.5)
SODIUM SERPL-SCNC: 141 MMOL/L — SIGNIFICANT CHANGE UP (ref 135–145)
WBC # BLD: 101.97 K/UL — CRITICAL HIGH (ref 3.8–10.5)
WBC # FLD AUTO: 101.97 K/UL — CRITICAL HIGH (ref 3.8–10.5)

## 2023-05-19 PROCEDURE — 99239 HOSP IP/OBS DSCHRG MGMT >30: CPT

## 2023-05-19 RX ORDER — ENOXAPARIN SODIUM 100 MG/ML
100 INJECTION SUBCUTANEOUS EVERY 12 HOURS
Refills: 0 | Status: DISCONTINUED | OUTPATIENT
Start: 2023-05-19 | End: 2023-05-19

## 2023-05-19 RX ORDER — WARFARIN SODIUM 2.5 MG/1
1 TABLET ORAL
Refills: 0 | DISCHARGE

## 2023-05-19 RX ORDER — BUDESONIDE AND FORMOTEROL FUMARATE DIHYDRATE 160; 4.5 UG/1; UG/1
2 AEROSOL RESPIRATORY (INHALATION)
Qty: 21 | Refills: 0
Start: 2023-05-19 | End: 2023-06-17

## 2023-05-19 RX ORDER — SODIUM POLYSTYRENE SULFONATE 4.1 MEQ/G
30 POWDER, FOR SUSPENSION ORAL ONCE
Refills: 0 | Status: COMPLETED | OUTPATIENT
Start: 2023-05-19 | End: 2023-05-19

## 2023-05-19 RX ORDER — WARFARIN SODIUM 2.5 MG/1
1 TABLET ORAL
Qty: 30 | Refills: 0
Start: 2023-05-19 | End: 2023-06-17

## 2023-05-19 RX ORDER — CEFUROXIME AXETIL 250 MG
500 TABLET ORAL EVERY 12 HOURS
Refills: 0 | Status: DISCONTINUED | OUTPATIENT
Start: 2023-05-19 | End: 2023-05-19

## 2023-05-19 RX ORDER — ENOXAPARIN SODIUM 100 MG/ML
100 INJECTION SUBCUTANEOUS
Qty: 8 | Refills: 0
Start: 2023-05-19 | End: 2023-05-22

## 2023-05-19 RX ORDER — CEFUROXIME AXETIL 250 MG
1 TABLET ORAL
Qty: 14 | Refills: 0
Start: 2023-05-19 | End: 2023-05-25

## 2023-05-19 RX ORDER — ALBUTEROL 90 UG/1
2 AEROSOL, METERED ORAL
Qty: 1 | Refills: 0
Start: 2023-05-19 | End: 2023-06-17

## 2023-05-19 RX ADMIN — BUDESONIDE AND FORMOTEROL FUMARATE DIHYDRATE 2 PUFF(S): 160; 4.5 AEROSOL RESPIRATORY (INHALATION) at 08:01

## 2023-05-19 RX ADMIN — Medication 25 MILLIGRAM(S): at 09:25

## 2023-05-19 RX ADMIN — Medication 3 MILLILITER(S): at 08:00

## 2023-05-19 RX ADMIN — Medication 3 MILLILITER(S): at 14:22

## 2023-05-19 RX ADMIN — DORZOLAMIDE HYDROCHLORIDE TIMOLOL MALEATE 1 DROP(S): 20; 5 SOLUTION/ DROPS OPHTHALMIC at 09:24

## 2023-05-19 RX ADMIN — ENOXAPARIN SODIUM 100 MILLIGRAM(S): 100 INJECTION SUBCUTANEOUS at 12:20

## 2023-05-19 RX ADMIN — Medication 500 MILLIGRAM(S): at 11:24

## 2023-05-19 RX ADMIN — SODIUM POLYSTYRENE SULFONATE 30 GRAM(S): 4.1 POWDER, FOR SUSPENSION ORAL at 16:13

## 2023-05-19 NOTE — DISCHARGE NOTE PROVIDER - NSDCFUSCHEDAPPT_GEN_ALL_CORE_FT
[facility] [address]  Scheduled Appointment: 06/14/2023    [facility] [address]  Scheduled Appointment: 08/17/2023

## 2023-05-19 NOTE — PHYSICAL THERAPY INITIAL EVALUATION ADULT - PERTINENT HX OF CURRENT PROBLEM, REHAB EVAL
89 yo M admitted for worsening SOB x 2 days associated with subjective fevers. Has felt sick for ~ 1 week with dry cough; acute SOB, fever this AM with worsening in cough. Pt went to , found to be hypoxic with increasing SOB and sent to ED for eval. In ER he was placed on BiPAP

## 2023-05-19 NOTE — DISCHARGE NOTE PROVIDER - DETAILS OF MALNUTRITION DIAGNOSIS/DIAGNOSES
This patient has been assessed with a concern for Malnutrition and was treated during this hospitalization for the following Nutrition diagnosis/diagnoses:     -  05/12/2023: Moderate protein-calorie malnutrition

## 2023-05-19 NOTE — PHYSICAL THERAPY INITIAL EVALUATION ADULT - DID THE PATIENT HAVE SURGERY?
3/6/19 @ 0853  PT CALLED REQUESTING MMG ORDER , PLEASE ORDER THANK YOU        ----- Message from Hailey Brown sent at 3/6/2019  8:49 AM CST -----  Contact: Self   Patient is requesting Mammogram orders before her appt. On 3/21. Please call patient at [phone].    
n/a

## 2023-05-19 NOTE — DISCHARGE NOTE PROVIDER - CARE PROVIDERS DIRECT ADDRESSES
[email or web address]

## 2023-05-19 NOTE — PHYSICAL THERAPY INITIAL EVALUATION ADULT - ACTIVE RANGE OF MOTION EXAMINATION, REHAB EVAL
R shoulder FF to 20- from fx 4 yo/bilateral upper extremity Active ROM was WFL (within functional limits)/bilateral  lower extremity Active ROM was WFL (within functional limits)/deficits as listed below

## 2023-05-19 NOTE — DISCHARGE NOTE PROVIDER - NSDCCPGOAL_GEN_ALL_CORE_FT
To get better and follow your care plan as instructed.
Include Location In Plan?: No
Detail Level: Detailed

## 2023-05-19 NOTE — DISCHARGE NOTE PROVIDER - NSDCCPCAREPLAN_GEN_ALL_CORE_FT
PRINCIPAL DISCHARGE DIAGNOSIS  Diagnosis: Acute respiratory failure with hypoxia  Assessment and Plan of Treatment: resolved      SECONDARY DISCHARGE DIAGNOSES  Diagnosis: PNA (pneumonia)  Assessment and Plan of Treatment: take cefuroxime for 7 days    Diagnosis: Leukemia  Assessment and Plan of Treatment:     Diagnosis: Supratherapeutic INR  Assessment and Plan of Treatment: take lovenox as prescribed with coumadin  checkINR on Tuesday with Dr. Combs    Diagnosis: Hyperkalemia  Assessment and Plan of Treatment: treated with kayexalate  check BMP with Dr. Combs on Tuesday  avoid high potassium foods

## 2023-05-19 NOTE — PROGRESS NOTE ADULT - SUBJECTIVE AND OBJECTIVE BOX
Subjective:    Review of Systems:  All 10 systems reviewed in detailed and found to be negative with the exception of what has already been described above    Allergies:  penicillins (Unknown)    Meds  MEDICATIONS  (STANDING):  albuterol/ipratropium for Nebulization 3 milliLiter(s) Nebulizer every 6 hours  budesonide  80 MICROgram(s)/formoterol 4.5 MICROgram(s) Inhaler 2 Puff(s) Inhalation two times a day  cefuroxime   Tablet 500 milliGRAM(s) Oral every 12 hours  chlorhexidine 4% Liquid 1 Application(s) Topical <User Schedule>  dorzolamide 2%/timolol 0.5% Ophthalmic Solution 1 Drop(s) Both EYES two times a day  metoprolol tartrate 25 milliGRAM(s) Oral two times a day  warfarin 4 milliGRAM(s) Oral daily    MEDICATIONS  (PRN):  acetaminophen     Tablet .. 650 milliGRAM(s) Oral every 6 hours PRN Moderate Pain (4 - 6)  guaiFENesin Oral Liquid (Sugar-Free) 100 milliGRAM(s) Oral every 6 hours PRN Cough    Physical Exam  T(C): 36.3 (05-19-23 @ 07:57), Max: 36.5 (05-18-23 @ 15:20)  HR: 73 (05-19-23 @ 08:03) (61 - 74)  BP: 129/73 (05-19-23 @ 07:57) (122/57 - 129/73)  RR: 18 (05-19-23 @ 07:57) (18 - 18)  SpO2: 93% (05-19-23 @ 07:57) (93% - 94%)  Gen: Alert, oriented, no distress  HEENT: Anicteric sclera, moist mucous membranes, no JVD  Cardio: Regular rhythm and rate, normal S1S2, no murmurs  Resp: crackles on right  GI: Nontender, nondistended, normoactive bowel sounds  Ext: No cyanosis, clubbing or edema  Neuro: Nonfocal    Labs:                        12.7   101.97 )-----------( 177      ( 19 May 2023 07:57 )             41.0     05-19    141  |  109<H>  |  27<H>  ----------------------------<  91  5.5<H>   |  29  |  0.87    Ca    9.1      19 May 2023 07:57  Phos  2.6     05-19  Mg     2.0     05-19      PT/INR - ( 19 May 2023 07:57 )   PT: 16.8 sec;   INR: 1.44 ratio           < from: CT Chest No Cont (05.11.23 @ 16:53) >  PROCEDURE DATE:  05/11/2023          INTERPRETATION:  Reason for Exam:  Evaluate for pneumonia    CT of the chest was performed from the thoracic inlet to the level of the   adrenal glandswithout contrast injection.    Comparison: 9/15/2022    Tubes/Lines: Biventricular pacemaker in place    Mediastinum/Vessels/Heart: Multichamber cardiac enlargement noted.   Pulmonary arteries are large indicative of underlying pulmonary   hypertension. Multiple lymph nodes within upper limits of normal for   size. No pericardial effusion. Coronary artery calcifications noted.    Lungs/Pleura/Airways: Consolidation within the right middle and lower   lobes with multiple bronchi containing endobronchial secretions.   Additional focal consolidation in the left lower lobe.    Visualized abdomen: Unremarkable noncontrast appearance of the upper   abdomen    Bones and soft tissues: No suspicious osseous lesions. Degenerative   changes noted throughout the spine.    IMPRESSION:    Multifocal pneumonia, most predominant in the right lower lobe. Follow-up   recommended in 4-6 weeks to ensure resolution.      < from: Xray Chest 1 View- PORTABLE-Urgent (Xray Chest 1 View- PORTABLE-Urgent .) (05.13.23 @ 09:55) >  ACC: [id number] EXAM:  XR CHEST PORTABLE URGENT 1V   ORDERED BY: EDWIN STAPLETON     PROCEDURE DATE:  05/13/2023          INTERPRETATION:  Portable chest radiograph    CLINICAL INFORMATION: Pneumonia    TECHNIQUE:  Portable  AP chest radiograph.    COMPARISON: Chest CT 5/11/2023 .    FINDINGS:  CATHETERS AND TUBES: None    PULMONARY: Residual RIGHT lower lobe and RIGHT middle lobe airspace   consolidation and/or effusion. RIGHT apex and visualized LEFT lung   parenchyma clear. No pneumothorax.    HEART/VASCULAR: The  heart is enlarged in transverse diameter. Right   atrium and biventricular cardiac wire leads in place.     BONES: Visualized osseous thorax intact.    IMPRESSION:   Residual RIGHT lower lobe and RIGHT middle lobe airspace   consolidation and/or effusion.    Home Oxygen Evaluation: 5/17/23  Pulse ox (SpO2) on room air at rest:	92 %  Pulse ox (SpO2) on room air with exertion:	88 %  Pulse ox (SpO2) on	3 L/min  O2 with exertion:	96 %    Home Oxygen Evaluation: 5/19  Pulse ox (SpO2) on room air at rest:	92 %  Pulse ox (SpO2) on room air with exertion:	93 %   Subjective:  doing well  awaiting d/c when seen  will be sent on po abx  did not qualify for supplemental o2 on repeat testing    Review of Systems:  All 10 systems reviewed in detailed and found to be negative with the exception of what has already been described above    Allergies:  penicillins (Unknown)    Meds  MEDICATIONS  (STANDING):  albuterol/ipratropium for Nebulization 3 milliLiter(s) Nebulizer every 6 hours  budesonide  80 MICROgram(s)/formoterol 4.5 MICROgram(s) Inhaler 2 Puff(s) Inhalation two times a day  cefuroxime   Tablet 500 milliGRAM(s) Oral every 12 hours  chlorhexidine 4% Liquid 1 Application(s) Topical <User Schedule>  dorzolamide 2%/timolol 0.5% Ophthalmic Solution 1 Drop(s) Both EYES two times a day  metoprolol tartrate 25 milliGRAM(s) Oral two times a day  warfarin 4 milliGRAM(s) Oral daily    MEDICATIONS  (PRN):  acetaminophen     Tablet .. 650 milliGRAM(s) Oral every 6 hours PRN Moderate Pain (4 - 6)  guaiFENesin Oral Liquid (Sugar-Free) 100 milliGRAM(s) Oral every 6 hours PRN Cough    Physical Exam  T(C): 36.3 (05-19-23 @ 07:57), Max: 36.5 (05-18-23 @ 15:20)  HR: 73 (05-19-23 @ 08:03) (61 - 74)  BP: 129/73 (05-19-23 @ 07:57) (122/57 - 129/73)  RR: 18 (05-19-23 @ 07:57) (18 - 18)  SpO2: 93% (05-19-23 @ 07:57) (93% - 94%)  Gen: Alert, oriented, no distress  HEENT: Anicteric sclera, moist mucous membranes, no JVD  Cardio: Regular rhythm and rate, normal S1S2, no murmurs  Resp: crackles on right  GI: Nontender, nondistended, normoactive bowel sounds  Ext: No cyanosis, clubbing or edema  Neuro: Nonfocal    Labs:                        12.7   101.97 )-----------( 177      ( 19 May 2023 07:57 )             41.0     05-19    141  |  109<H>  |  27<H>  ----------------------------<  91  5.5<H>   |  29  |  0.87    Ca    9.1      19 May 2023 07:57  Phos  2.6     05-19  Mg     2.0     05-19      PT/INR - ( 19 May 2023 07:57 )   PT: 16.8 sec;   INR: 1.44 ratio           < from: CT Chest No Cont (05.11.23 @ 16:53) >  PROCEDURE DATE:  05/11/2023          INTERPRETATION:  Reason for Exam:  Evaluate for pneumonia    CT of the chest was performed from the thoracic inlet to the level of the   adrenal glandswithout contrast injection.    Comparison: 9/15/2022    Tubes/Lines: Biventricular pacemaker in place    Mediastinum/Vessels/Heart: Multichamber cardiac enlargement noted.   Pulmonary arteries are large indicative of underlying pulmonary   hypertension. Multiple lymph nodes within upper limits of normal for   size. No pericardial effusion. Coronary artery calcifications noted.    Lungs/Pleura/Airways: Consolidation within the right middle and lower   lobes with multiple bronchi containing endobronchial secretions.   Additional focal consolidation in the left lower lobe.    Visualized abdomen: Unremarkable noncontrast appearance of the upper   abdomen    Bones and soft tissues: No suspicious osseous lesions. Degenerative   changes noted throughout the spine.    IMPRESSION:    Multifocal pneumonia, most predominant in the right lower lobe. Follow-up   recommended in 4-6 weeks to ensure resolution.      < from: Xray Chest 1 View- PORTABLE-Urgent (Xray Chest 1 View- PORTABLE-Urgent .) (05.13.23 @ 09:55) >  ACC: [id number] EXAM:  XR CHEST PORTABLE URGENT 1V   ORDERED BY: EDWIN STAPLETON     PROCEDURE DATE:  05/13/2023          INTERPRETATION:  Portable chest radiograph    CLINICAL INFORMATION: Pneumonia    TECHNIQUE:  Portable  AP chest radiograph.    COMPARISON: Chest CT 5/11/2023 .    FINDINGS:  CATHETERS AND TUBES: None    PULMONARY: Residual RIGHT lower lobe and RIGHT middle lobe airspace   consolidation and/or effusion. RIGHT apex and visualized LEFT lung   parenchyma clear. No pneumothorax.    HEART/VASCULAR: The  heart is enlarged in transverse diameter. Right   atrium and biventricular cardiac wire leads in place.     BONES: Visualized osseous thorax intact.    IMPRESSION:   Residual RIGHT lower lobe and RIGHT middle lobe airspace   consolidation and/or effusion.    Home Oxygen Evaluation: 5/17/23  Pulse ox (SpO2) on room air at rest:	92 %  Pulse ox (SpO2) on room air with exertion:	88 %  Pulse ox (SpO2) on	3 L/min  O2 with exertion:	96 %    Home Oxygen Evaluation: 5/19  Pulse ox (SpO2) on room air at rest:	92 %  Pulse ox (SpO2) on room air with exertion:	93 %

## 2023-05-19 NOTE — PROGRESS NOTE ADULT - ASSESSMENT
88y old Male with PMHCLL baseline WBC ~ 80k, Afib on coumadin, severe cardiomyopathy, CAD s/p MI, HTN, HLD, CHF, s/p AICD, RA with pneumonia  1. pneumonia: finished ceftriaxone  -s/p azithromycin  -supplemental o2, will need supplemental o2 at home. re evaluate as outpatient  -continue symbicort, duonebs  -sputum cultures, blood cultures  -ambulate as tolerated  -outpatient follow up ct  -now off bipap  2. cll: per heme onc. no role for hematologic intervention currently 88y old Male with PMHCLL baseline WBC ~ 80k, Afib on coumadin, severe cardiomyopathy, CAD s/p MI, HTN, HLD, CHF, s/p AICD, RA with pneumonia  1. pneumonia: finished ceftriaxone  -s/p azithromycin  -supplemental o2, will need supplemental o2 at home. re evaluate as outpatient  -continue symbicort, duonebs  -sputum cultures, blood cultures  -ambulate as tolerated  -outpatient follow up ct  -now off bipap  2. cll: per heme onc. no role for hematologic intervention currently    f/u with dr peñaloza as outpatient

## 2023-05-19 NOTE — PHYSICAL THERAPY INITIAL EVALUATION ADULT - MODALITIES TREATMENT COMMENTS
No SOB post ambulation. Patient  left in chair with CBIR and chair alarm active. Patient appears to be at baseline level of function. No skilled need for PT in this setting. Will discharge from services to nursing care. RN, CM informed.

## 2023-05-19 NOTE — DISCHARGE NOTE PROVIDER - NSDCMRMEDTOKEN_GEN_ALL_CORE_FT
allopurinol 300 mg oral tablet: 1 tab(s) orally once a day  dorzolamide-timolol 2%-0.5% preservative-free ophthalmic solution: 1 drop(s) to each affected eye 2 times a day  lisinopril-hydrochlorothiazide 20 mg-25 mg oral tablet: 1 tab(s) orally every other day  metoprolol tartrate 25 mg oral tablet: 1 tab(s) orally 2 times a day  sodium chloride 0.65% nasal spray: 1 spray(s) in each nostril 4 times a day as needed   warfarin 3 mg oral tablet: 1 tab(s) orally once a day (at bedtime)   Albuterol (Eqv-ProAir HFA) 90 mcg/inh inhalation aerosol: 2 puff(s) inhaled 4 times a day as needed for  bronchospasm  allopurinol 300 mg oral tablet: 1 tab(s) orally once a day  budesonide-formoterol 80 mcg-4.5 mcg/inh inhalation aerosol: 2 puff(s) inhaled 2 times a day  cefuroxime 500 mg oral tablet: 1 tab(s) orally every 12 hours  dorzolamide-timolol 2%-0.5% preservative-free ophthalmic solution: 1 drop(s) to each affected eye 2 times a day  enoxaparin 100 mg/mL injectable solution: 100 milligram(s) subcutaneously every 12 hours  lisinopril-hydrochlorothiazide 20 mg-25 mg oral tablet: 1 tab(s) orally every other day  metoprolol tartrate 25 mg oral tablet: 1 tab(s) orally 2 times a day  sodium chloride 0.65% nasal spray: 1 spray(s) in each nostril 4 times a day as needed   warfarin 4 mg oral tablet: 1 tab(s) orally once a day (at bedtime)   Albuterol (Eqv-ProAir HFA) 90 mcg/inh inhalation aerosol: 2 puff(s) inhaled 4 times a day as needed for  bronchospasm  allopurinol 300 mg oral tablet: 1 tab(s) orally once a day  budesonide-formoterol 80 mcg-4.5 mcg/inh inhalation aerosol: 2 puff(s) inhaled 2 times a day  cefuroxime 500 mg oral tablet: 1 tab(s) orally every 12 hours  dorzolamide-timolol 2%-0.5% preservative-free ophthalmic solution: 1 drop(s) to each affected eye 2 times a day  lisinopril-hydrochlorothiazide 20 mg-25 mg oral tablet: 1 tab(s) orally every other day  Lovenox 100 mg/mL injectable solution: 100 milligram(s) subcutaneously every 12 hours  metoprolol tartrate 25 mg oral tablet: 1 tab(s) orally 2 times a day  sodium chloride 0.65% nasal spray: 1 spray(s) in each nostril 4 times a day as needed   warfarin 4 mg oral tablet: 1 tab(s) orally once a day (at bedtime)

## 2023-05-19 NOTE — PROGRESS NOTE ADULT - PROVIDER SPECIALTY LIST ADULT
Hospitalist
Infectious Disease
Pulmonology
Pulmonology
Hospitalist
Infectious Disease
Pulmonology
Infectious Disease
Infectious Disease
Pulmonology
Hospitalist
Hospitalist
Critical Care

## 2023-05-19 NOTE — DISCHARGE NOTE PROVIDER - CARE PROVIDER_API CALL
Federico Combs)  Internal Medicine  [address], [address]  [place] [address]  Phone: [phone]  Fax: [phone]  Follow Up Time:     Abel Lazo)  Critical Care Medicine; Internal Medicine; Pulmonary Disease  [address] [place] [address]  Phone: [phone]  Fax: [phone]  Follow Up Time:

## 2023-05-19 NOTE — PHYSICAL THERAPY INITIAL EVALUATION ADULT - PATIENT PROFILE REVIEW, REHAB EVAL
PMH:  CLL baseline WBC ~ 80k, A.fib on coumadin, severe cardiomyopathy, CAD s/p MI, HTN, HLD, CHF, s/p AICD, RA, prior pneumonia/yes

## 2023-05-19 NOTE — DISCHARGE NOTE PROVIDER - HOSPITAL COURSE
PHYSICAL EXAM:    Daily     Daily Weight in k.1 (19 May 2023 05:53)    Vital Signs Last 24 Hrs  T(C): 36.3 (19 May 2023 07:57), Max: 36.5 (18 May 2023 15:20)  T(F): 97.3 (19 May 2023 07:57), Max: 97.7 (18 May 2023 15:20)  HR: 76 (19 May 2023 14:23) (61 - 76)  BP: 129/73 (19 May 2023 07:57) (122/57 - 129/73)  BP(mean): --  RR: 18 (19 May 2023 07:57) (18 - 18)  SpO2: 93% (19 May 2023 07:57) (93% - 94%)    Constitutional: Weak  appearing  HEENT: Atraumatic, CANDELARIO, Normal, No congestion  Respiratory: Breath Sounds normal, no rhonchi/wheeze  Cardiovascular: N S1S2;   Gastrointestinal: Abdomen soft, non tender, Bowel Sounds present  Extremities: No edema, peripheral pulses present  Neurological: AAO x 3, no gross focal motor deficits  Skin: Non cellulitic, no rash, ulcers  Lymph Nodes: No lymphadenopathy noted  Back: No CVA tenderness   Musculoskeletal: non tender  Breasts: Deferred  Genitourinary: deferred  Rectal: Deferred    88M PMH CLL not treated, CAD, CHF, s/p ICD, AFib on Coumadin who presented to  ED 23 with SOB, found with hypoxic respiratory failure requiring NIPPV, multifocal PNA, enterovirus positive, developed shock s/p pressors.     Multifocal/GNR PNA  Hypoxic respiratory failure  - S/P BIPAP - now on room air  - S/P Azithromycin/rocephin ; d/c on oral ceftin x 7 days per ID  - BCx and sputum cx negative  - Needs repeat CT chest as outpatient in 4-6 weeks  -ID consult appreciated  -Pulm consult appreciated   does not meet criteria for home O2   Home Oxygen Evaluation:  Pulse ox (SpO2) on room air at rest:	92 %  Pulse ox (SpO2) on room air with exertion:	93 %       CLL  Leukocytosis , chronic  - Heme/onc Dr. Abel Welsh - check immunoglobulins, specifically IgG if this is low can consider prophylactic gammaglobulin      AFib on Coumadin:   INR subtherapeutic, 1.44  pt at home on coumadin 3 mg daily  here on 4 mg since yesterday  INR went down further today  lovenoc 100 mg s/q q12 hrs added from   pt eager to go home  I spoke with pt's PCP Dr. Federico Combs; he would go home on coumadin + lovenox and INR to be checked in his office on  and coumadin dose to be adjusted accordingly    Hyperkalemia : mild, recurrent  give Kayexalate again today prior to discharge  avoid high potassium foods  check BMP with Dr. Fletcher     Advanced Directives  - Full code    poc discussed with pt, team, Dr. Combs    d/c home time spent 50 min   PHYSICAL EXAM:    Daily     Daily Weight in k.1 (19 May 2023 05:53)    Vital Signs Last 24 Hrs  T(C): 36.3 (19 May 2023 07:57), Max: 36.5 (18 May 2023 15:20)  T(F): 97.3 (19 May 2023 07:57), Max: 97.7 (18 May 2023 15:20)  HR: 76 (19 May 2023 14:23) (61 - 76)  BP: 129/73 (19 May 2023 07:57) (122/57 - 129/73)  BP(mean): --  RR: 18 (19 May 2023 07:57) (18 - 18)  SpO2: 93% (19 May 2023 07:57) (93% - 94%)    Constitutional: Weak  appearing  HEENT: Atraumatic, CANDELARIO, Normal, No congestion  Respiratory: Breath Sounds normal, no rhonchi/wheeze  Cardiovascular: N S1S2;   Gastrointestinal: Abdomen soft, non tender, Bowel Sounds present  Extremities: No edema, peripheral pulses present  Neurological: AAO x 3, no gross focal motor deficits  Skin: Non cellulitic, no rash, ulcers  Lymph Nodes: No lymphadenopathy noted  Back: No CVA tenderness   Musculoskeletal: non tender  Breasts: Deferred  Genitourinary: deferred  Rectal: Deferred    88M PMH CLL not treated, CAD, CHF, s/p ICD, AFib on Coumadin who presented to  ED 23 with SOB, found with hypoxic respiratory failure requiring NIPPV, multifocal PNA, enterovirus positive, developed shock s/p pressors.     Multifocal/GNR PNA  Hypoxic respiratory failure  - S/P BIPAP - now on room air  - S/P Azithromycin/rocephin ; d/c on oral ceftin x 7 days per ID  - BCx and sputum cx negative  - Needs repeat CT chest as outpatient in 4-6 weeks  -ID consult appreciated  -Pulm consult appreciated   does not meet criteria for home O2   Home Oxygen Evaluation:  Pulse ox (SpO2) on room air at rest:	92 %  Pulse ox (SpO2) on room air with exertion:	93 %       CLL  Leukocytosis , chronic  - Heme/onc Dr. Abel Welsh - check immunoglobulins, specifically IgG if this is low can consider prophylactic gammaglobulin      AFib on Coumadin:   INR subtherapeutic, 1.44  pt at home on coumadin 3 mg daily  here on 4 mg since yesterday  INR went down further today  Lovenox 100 mg s/q q12 hrs added from   pt eager to go home  I spoke with pt's PCP, Dr. Federico Combs; he would go home on coumadin + Lovenox and INR to be checked in his office on  and coumadin dose to be adjusted accordingly    Hyperkalemia : mild, recurrent  give Kayexalate again today prior to discharge  avoid high potassium foods  check BMP with Dr. Fletcher     Advanced Directives  - Full code    poc discussed with pt, team, Dr. Combs    d/c home time spent 50 min

## 2023-05-19 NOTE — PHYSICAL THERAPY INITIAL EVALUATION ADULT - GENERAL OBSERVATIONS, REHAB EVAL
Patient received out of bed in chair on 2SW, son at bedside.  Patient just finishing a breathing treatment. Denied pain.

## 2023-05-19 NOTE — PROGRESS NOTE ADULT - ASSESSMENT
89 y/o male with h/o CLL baseline WBC ~ 80k, A.fib on coumadin, severe cardiomyopathy, CAD s/p MI, HTN, HLD, CHF, s/p AICD, RA, prior pneumonia was admitted on 5/11 for worsening SOB x 2 days associated with subjective fevers. Has felt sick for ~ 1 week with dry cough; acute SOB, fever this AM with worsening in cough. Pt went to , found to be hypoxic with increasing SOB and sent to ED for eval. In ER he was placed on BiPAP and received ceftriaxone and azithromycin.     1. Acute respiratory failure resolving. Multifocal pneumonia. Right pleural effusion. URI with rhinovirus. CLL with leukocytosis. Immunocompromised host. Allergy to PCN.  -respiratory improving slowly  -BC x 2, urine c/s noted  -on ceftriaxone 1 gm IV qd # 7  -s/p azithromycin 500 mg IV qd  -tolerating abx well so far; no side effects noted  -monitor closely in lorna of PCN allergy history  -respiratory care  -change abx to ceftin 500mg PO q12h for 7 more days  -monitor temps  -f/u CBC  -supportive care  2. Other issues:   -care per medicine

## 2023-05-20 ENCOUNTER — TRANSCRIPTION ENCOUNTER (OUTPATIENT)
Age: 88
End: 2023-05-20

## 2023-05-22 ENCOUNTER — TRANSCRIPTION ENCOUNTER (OUTPATIENT)
Age: 88
End: 2023-05-22

## 2023-05-25 ENCOUNTER — APPOINTMENT (OUTPATIENT)
Dept: CARE COORDINATION | Facility: HOME HEALTH | Age: 88
End: 2023-05-25
Payer: MEDICARE

## 2023-05-25 VITALS
WEIGHT: 209.13 LBS | DIASTOLIC BLOOD PRESSURE: 62 MMHG | SYSTOLIC BLOOD PRESSURE: 108 MMHG | HEART RATE: 70 BPM | OXYGEN SATURATION: 95 % | RESPIRATION RATE: 17 BRPM | BODY MASS INDEX: 31.8 KG/M2

## 2023-05-25 DIAGNOSIS — I48.0 PAROXYSMAL ATRIAL FIBRILLATION: ICD-10-CM

## 2023-05-25 DIAGNOSIS — J18.9 PNEUMONIA, UNSPECIFIED ORGANISM: ICD-10-CM

## 2023-05-25 DIAGNOSIS — C95.90 LEUKEMIA, UNSPECIFIED NOT HAVING ACHIEVED REMISSION: ICD-10-CM

## 2023-05-25 PROCEDURE — 99495 TRANSJ CARE MGMT MOD F2F 14D: CPT

## 2023-05-26 ENCOUNTER — TRANSCRIPTION ENCOUNTER (OUTPATIENT)
Age: 88
End: 2023-05-26

## 2023-05-26 PROBLEM — J18.9 PNEUMONIA: Status: ACTIVE | Noted: 2023-05-25

## 2023-05-26 PROBLEM — C95.90 LEUKEMIA: Status: ACTIVE | Noted: 2022-02-24

## 2023-05-26 NOTE — HISTORY OF PRESENT ILLNESS
[Post-hospitalization from ___ Hospital] : Post-hospitalization from [unfilled] Hospital [Admitted on: ___] : The patient was admitted on [unfilled] [Discharged on ___] : discharged on [unfilled] [Discharge Summary] : discharge summary [Discharge Med List] : discharge medication list [Med Reconciliation] : medication reconciliation has been completed [Patient Contacted By: ____] : and contacted by [unfilled] [FreeTextEntry3] : Patient was contacted by TCM RN on 5/20/23 for 24/48 hour call and documentation is present in the Clinical Viewer  [FreeTextEntry2] : 88M PMH CLL not treated, CAD, CHF, s/p ICD, AFib on Coumadin who presented to  ED 5/11/23 with SOB, found with hypoxic respiratory failure requiring NIPPV, multifocal PNA, enterovirus positive, developed shock s/p pressors. \par \par Since dc, he denies cp/sob/pnd/orthopnea.  Has AWAN at times.  VSS.  Has chronic neck pain and has a right arm bruise/muscle tear that is healing, signs of bleeding discussed with family and pt, verbalized understanding.  Has +1 BLLE edema, chronic, but +3lbs since dc.  Lasix 20mg sent to pharmacy with instructions to take x 3 days and then prn +2 lb weight gain.  Grand daughter will call me next week to report if weight came down.  TCM RN aware.\par   Refusing HC/PT at this time.  Appt with PMD Dr. Combs on 5/30, pulm Dr. Vides on 6/1, EP 6/14.  TCM numbers provided for any questions/concerns.

## 2023-05-26 NOTE — CDI QUERY NOTE - NSCDIOTHERTXTBX3_GEN_ALL_CORE_FT
Clinical documentation indicates that this patient has CHF.  Please include more specific documentation of the acuity and, type  of Heart Failure in your Progress Note and/or Discharge Summary.    -Acute ___ CHF _HFpEF _ Diastolic  _ HFrEF  _ Sysytolic  -Acute on chronic ___ CHF _ _HFpEF _ Diastolic  _ HFrEF  _ Sysytolic  -Chronic ___ CHF _  _HFpEF _ Diastolic  _ HFrEF  _ Sysytolic  -Other (please specify)  -Not clinically significant    SUPPORTING DOCUMENTATION AND/OR CLINICAL EVIDENCE:    ECHO RESULTS:< from: TTE Echo Complete w/o Contrast w/ Doppler (05.12.23 @ 08:55) > Summary Mild concentric left ventricular hypertrophy is present. Left ventricle systolic function appears mildly reduced in the presence of a cardiac arrhythmia. Left ventricle size and is within normal limitations. Visual estimation of left ventricle ejection fraction is mildly reduced at 50 %.   The left atrium is severely dilated. The right atrium appears severely dilated. A device wire is seen in the RV and RA. The right ventricle appears mildly to moderately dilated in some views. The aortic valveappears mildly calcified. Valve opening seemsto be normal. Mild (1+) aortic regurgitation is present. The aortic root is mildly dilated . The ascending aorta appears to be mildly dilated.   Mild mitral annular calcification is present.The mitral valve leaflets appear mildly thickened.The tricuspid valve leaflets are thin and pliable; valve motion is normal. Mild (1+) tricuspid valve regurgitation is present. Mild pulmonary hypertension. No evidence of pericardial effusion. Pleural effusion - is present.   IVC is dilated and not collapsing with inspiration.    Xray Chest 1 View-PORTABLE IMMEDIATE (05.11.23 @ 15:21) >1. There is consolidation in the right lower lobe and right middle lobe, suspicious for pneumonia, also seen on the prior exam and associated with a right pleural effusion.2. Cardiomegaly, pacemaker leads but no pulmonary edema.     Xray Chest 1 View- PORTABLE-Urgent (Xray Chest 1 View- PORTABLE-Urgent .) (05.13.23 @ 09:55) >IMPRESSION:   Residual RIGHT lower lobe and RIGHT middle lobe airspace consolidation and/or effusion.    CT Chest No Cont (05.11.23 @ 16:53) >Multifocal pneumonia, most predominant in the right lower lobe. Follow-up recommended in 4-6 weeks to ensure resolution.    BNP:Pro-Brain Natriuretic Peptide: 3793 pg/mL (05.11.23 @ 14:50)     MEDICATIONS:furosemide   Injectable 10 milliGRAM(s) IV Push (05-14-23)    DC summary-88M PMH CLL not treated, CAD, CHF, s/p ICD, AFib on Coumadin who presented to  ED 5/11/23 with SOB, found with hypoxic respiratory failure requiring NIPPV, multifocal PNA, enterovirus positive, developed shock s/p pressors.   Multifocal/GNR PNAHypoxic respiratory failure- S/P BIPAP - now on room air- S/P Azithromycin/rocephin ; d/c on oral ceftin x 7 days per ID- BCx and sputum cx negative- Needs repeat CT chest as outpatient in 4-6 weeks-ID consult appreciated

## 2023-05-26 NOTE — CDI QUERY NOTE - NSCDINOTEQUERY3_GEN_A_CORE
Congestive Heart Failure/Congestive Heart Failure
Has two sons, One lives in [facility] and another in [place]

## 2023-05-26 NOTE — REVIEW OF SYSTEMS
[Lower Ext Edema] : lower extremity edema [Cough] : cough [Dyspnea on Exertion] : dyspnea on exertion [Joint Stiffness] : joint stiffness [Muscle Pain] : muscle pain [Negative] : Heme/Lymph [de-identified] : right upper arm ecchymosis

## 2023-05-26 NOTE — CDI QUERY NOTE - NSCDIOTHERTXTBX2_GEN_ALL_CORE_FT
Clinical documentation indicates that this patient has a history of atrial fibrillation.  Please further specify:    -Paroxysmal AFIB:   -Chronic AFIB  -Other: please specify      SUPPORTING DOCUMENTATION AND/OR CLINICAL EVIDENCE:    ECG (05.11.23 @ 14:40) >Diagnosis Line Ventricular-paced rhythm with occasional Premature ventricular complexesBiventricular pacemaker detected  ECG (09.15.22 @ 02:16) >Diagnosis Line Ventricular-paced rhythmBiventricular pacemaker detected  ECG (12.08.17 @ 05:00) >Diagnosis Line Electronic ventricular pacemaker  ECG (12.07.17 @ 14:51) >Diagnosis Line Ventricular-paced rhythmBiventricular pacemaker detected  ECG (05.24.12 @ 07:38) >Diagnosis Line ATRIAL FIBRILLATION WITH PREMATURE VENTRICULAR OR ABERRANTLY CONDUCTED COMPLEXES      DC summary- AFib on Coumadin: INR subtherapeutic, 1.44pt at home on coumadin 3 mg dailyhere on 4 mg since yesterdayINR went down further todayLovenox 100 mg s/q q12 hrs added from 5/19  pt eager to go homeI spoke with pt's PCP, Dr. Federico Combs; he would go home on coumadin + Lovenox and INR to be checked in his office on 5/23 and coumadin dose to be adjusted accordingly            < end of copied text >                EPS:    Cardiology Consult:      Medications (Cardiovascular):  	    MAR: Clinical documentation indicates that this patient has a history of atrial fibrillation.  Please further specify:    -Paroxysmal AFIB:   -Chronic AFIB  -Other: please specify      SUPPORTING DOCUMENTATION AND/OR CLINICAL EVIDENCE:    ECG (05.11.23 @ 14:40) >Diagnosis Line Ventricular-paced rhythm with occasional Premature ventricular complexesBiventricular pacemaker detected  ECG (09.15.22 @ 02:16) >Diagnosis Line Ventricular-paced rhythmBiventricular pacemaker detected  ECG (12.08.17 @ 05:00) >Diagnosis Line Electronic ventricular pacemaker  ECG (12.07.17 @ 14:51) >Diagnosis Line Ventricular-paced rhythmBiventricular pacemaker detected  ECG (05.24.12 @ 07:38) >Diagnosis Line ATRIAL FIBRILLATION WITH PREMATURE VENTRICULAR OR ABERRANTLY CONDUCTED COMPLEXES      DC summary- AFib on Coumadin: INR subtherapeutic, 1.44pt at home on coumadin 3 mg dailyhere on 4 mg since yesterdayINR went down further todayLovenox 100 mg s/q q12 hrs added from 5/19  pt eager to go homeI spoke with pt's PCP, Dr. Federico Combs; he would go home on coumadin + Lovenox and INR to be checked in his office on 5/23 and coumadin dose to be adjusted accordingly

## 2023-05-26 NOTE — PHYSICAL EXAM
[No Acute Distress] : no acute distress [Normal Sclera/Conjunctiva] : normal sclera/conjunctiva [Normal Outer Ear/Nose] : the outer ears and nose were normal in appearance [No JVD] : no jugular venous distention [No Respiratory Distress] : no respiratory distress  [Normal Rate] : normal rate  [Non Tender] : non-tender [No Joint Swelling] : no joint swelling [de-identified] : +1 BLLE edema  [de-identified] : firm [de-identified] : right uper arm ecchymosis- resolving

## 2023-05-26 NOTE — CDI QUERY NOTE - NSCDIOTHERTXTBX_GEN_ALL_CORE_HH
This patient is documented to have Sepsis.      This patient does  appear to meet [place]well's sepsis criteria using SIRS (Temp. >101F or <96.8F, HR>90bpm, RR>20/min or PaCO2<32mmHg, WBC>12,000 or <4000 or Bands>10% Unexplained altered mental status).    Can you please clarify the status of the sepsis, after review?    -Sepsis POA ruled in  -Sepsis ruled out   -Other (please specify)    Chart Documentation/Evidence: VS POA T 102.9 F HR 64-73 RR 16-22     ED -  SEPSIS YES    Critical Care  5/12 and 5/13 Admitted for Acute hypoxic resp failure and severe sepsis (POA) from multifocal pneumonia Zacarias Briceno)  (Signed 12-May-2023 12:20)Zacarias Briceno)  (Signed 13-May-2023 11:52)    Procalcitonin, Serum: 5.38: Note: Concentrations <0.5 ng/mL do not exclude an infection, on account     DC summary-88M PMH CLL not treated, CAD, CHF, s/p ICD, AFib on Coumadin who presented to  ED 5/11/23 with SOB, found with hypoxic respiratory failure requiring NIPPV, multifocal PNA, enterovirus positive, developed shock s/p pressors.   Multifocal/GNR PNAHypoxic respiratory failure- S/P BIPAP - now on room air- S/P Azithromycin/rocephin ; d/c on oral ceftin x 7 days per ID- BCx and sputum cx negative- Needs repeat CT chest as outpatient in 4-6 weeks-ID consult appreciated

## 2023-06-06 ENCOUNTER — TRANSCRIPTION ENCOUNTER (OUTPATIENT)
Age: 88
End: 2023-06-06

## 2023-06-14 ENCOUNTER — APPOINTMENT (OUTPATIENT)
Dept: ELECTROPHYSIOLOGY | Facility: CLINIC | Age: 88
End: 2023-06-14
Payer: MEDICARE

## 2023-06-14 ENCOUNTER — NON-APPOINTMENT (OUTPATIENT)
Age: 88
End: 2023-06-14

## 2023-06-14 PROCEDURE — 93296 REM INTERROG EVL PM/IDS: CPT

## 2023-06-14 PROCEDURE — 93295 DEV INTERROG REMOTE 1/2/MLT: CPT

## 2023-08-17 ENCOUNTER — NON-APPOINTMENT (OUTPATIENT)
Age: 88
End: 2023-08-17

## 2023-08-17 ENCOUNTER — APPOINTMENT (OUTPATIENT)
Dept: ELECTROPHYSIOLOGY | Facility: CLINIC | Age: 88
End: 2023-08-17
Payer: MEDICARE

## 2023-08-17 VITALS
HEIGHT: 68 IN | DIASTOLIC BLOOD PRESSURE: 65 MMHG | SYSTOLIC BLOOD PRESSURE: 126 MMHG | BODY MASS INDEX: 32.43 KG/M2 | OXYGEN SATURATION: 99 % | HEART RATE: 44 BPM | WEIGHT: 214 LBS

## 2023-08-17 PROCEDURE — 93284 PRGRMG EVAL IMPLANTABLE DFB: CPT

## 2023-09-08 ENCOUNTER — APPOINTMENT (OUTPATIENT)
Dept: DERMATOLOGY | Facility: CLINIC | Age: 88
End: 2023-09-08
Payer: MEDICARE

## 2023-09-08 PROCEDURE — 17000 DESTRUCT PREMALG LESION: CPT

## 2023-09-08 PROCEDURE — 17003 DESTRUCT PREMALG LES 2-14: CPT | Mod: 59

## 2023-09-08 PROCEDURE — 99213 OFFICE O/P EST LOW 20 MIN: CPT | Mod: 25

## 2023-09-08 NOTE — HISTORY OF PRESENT ILLNESS
[de-identified] : Pt. 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

## 2023-09-08 NOTE — ASSESSMENT
[FreeTextEntry1] : Complete skin examination is negative for malignancy; Multiple new concerns were addressed and discussed. Therapeutic options and their risks and benefits; along with multiple diagnostic possibilities were discussed at length; risks and benefits of skin biopsy and/or other further study were discussed;   Continue regular exams; Follow up for TBSE in 6 months   cryo to [facility]

## 2023-09-08 NOTE — PHYSICAL EXAM
[Full Body Skin Exam Performed] : performed [FreeTextEntry3] : Skin examination performed of the face, neck, trunk, arms, legs;  The patient is well, alert and oriented, pleasant and cooperative. Eyelids, conjunctivae, oral mucosa, digits and nails all normal.   No cervical adenopathy.  Normal findings include:  Seborrheic keratoses- multiple on trunk, neck Angiomas Lentigines Healed D&C sites;  nose; L cheek;  Left scalp crown healed surgical scar; L anterior shoulder  scaling erythematous papules; hyperkeratotic: L post shoulder, mid forehead, R ear anterior helix  No lesions suspicious for malignancy.

## 2023-11-15 ENCOUNTER — APPOINTMENT (OUTPATIENT)
Dept: ELECTROPHYSIOLOGY | Facility: CLINIC | Age: 88
End: 2023-11-15
Payer: MEDICARE

## 2023-11-16 ENCOUNTER — NON-APPOINTMENT (OUTPATIENT)
Age: 88
End: 2023-11-16

## 2023-11-16 PROCEDURE — 93295 DEV INTERROG REMOTE 1/2/MLT: CPT

## 2023-11-16 PROCEDURE — 93296 REM INTERROG EVL PM/IDS: CPT

## 2023-11-26 ENCOUNTER — NON-APPOINTMENT (OUTPATIENT)
Age: 88
End: 2023-11-26

## 2023-11-29 NOTE — H&P ADULT - NSHPPOADEEPVENOUSTHROMB_GEN_A_CORE
RAPID3 (0-30) Cumulative Score  2.5          RAPID3 Weighted Score (divide #4 by 3 and that is the weighted score)  0.8         
no

## 2024-01-03 NOTE — DISCHARGE NOTE NURSING/CASE MANAGEMENT/SOCIAL WORK - NSDCPEPTCOWAR_GEN_ALL_CORE
Warfarin/Coumadin - Compliance/Warfarin/Coumadin - Dietary Advice/Warfarin/Coumadin - Follow up monitoring/Warfarin/Coumadin - Potential for adverse drug reactions and interactions
fair minus

## 2024-01-22 ENCOUNTER — APPOINTMENT (OUTPATIENT)
Dept: RHEUMATOLOGY | Facility: CLINIC | Age: 89
End: 2024-01-22
Payer: MEDICARE

## 2024-01-22 VITALS
BODY MASS INDEX: 32.13 KG/M2 | HEART RATE: 88 BPM | SYSTOLIC BLOOD PRESSURE: 128 MMHG | DIASTOLIC BLOOD PRESSURE: 78 MMHG | WEIGHT: 212 LBS | TEMPERATURE: 98 F | OXYGEN SATURATION: 95 % | HEIGHT: 68 IN

## 2024-01-22 DIAGNOSIS — M1A.9XX1 CHRONIC GOUT, UNSPECIFIED, WITH TOPHUS (TOPHI): ICD-10-CM

## 2024-01-22 DIAGNOSIS — M15.9 POLYOSTEOARTHRITIS, UNSPECIFIED: ICD-10-CM

## 2024-01-22 DIAGNOSIS — M75.81 OTHER SHOULDER LESIONS, RIGHT SHOULDER: ICD-10-CM

## 2024-01-22 DIAGNOSIS — M54.2 CERVICALGIA: ICD-10-CM

## 2024-01-22 PROCEDURE — 99214 OFFICE O/P EST MOD 30 MIN: CPT

## 2024-01-22 RX ORDER — WARFARIN 3 MG/1
3 TABLET ORAL
Refills: 0 | Status: ACTIVE | COMMUNITY

## 2024-01-22 RX ORDER — DORZOLAMIDE HYDROCHLORIDE AND TIMOLOL MALEATE 20; 5 MG/ML; MG/ML
2-0.5 SOLUTION/ DROPS OPHTHALMIC TWICE DAILY
Refills: 0 | Status: DISCONTINUED | COMMUNITY
End: 2024-01-22

## 2024-01-22 RX ORDER — DICLOFENAC SODIUM 10 MG/G
1 GEL TOPICAL
Refills: 0 | Status: ACTIVE | COMMUNITY

## 2024-01-22 RX ORDER — COLCHICINE 0.6 MG/1
0.6 CAPSULE ORAL
Refills: 0 | Status: DISCONTINUED | COMMUNITY
End: 2024-01-22

## 2024-01-22 RX ORDER — ASCORBIC ACID 500 MG
500-400 TABLET,CHEWABLE ORAL
Refills: 0 | Status: DISCONTINUED | COMMUNITY
End: 2024-01-22

## 2024-01-22 RX ORDER — HYDROCODONE BITARTRATE AND ACETAMINOPHEN 7.5; 3 MG/1; MG/1
7.5-3 TABLET ORAL
Qty: 30 | Refills: 0 | Status: DISCONTINUED | COMMUNITY
Start: 2021-01-19 | End: 2024-01-22

## 2024-01-22 RX ORDER — FUROSEMIDE 20 MG/1
20 TABLET ORAL DAILY
Qty: 30 | Refills: 0 | Status: DISCONTINUED | COMMUNITY
Start: 2023-05-25 | End: 2024-01-22

## 2024-01-22 RX ORDER — DORZOLAMIDE HYDROCHLORIDE AND TIMOLOL MALEATE 20; 5 MG/ML; MG/ML
22.3-6.8 SOLUTION/ DROPS OPHTHALMIC
Refills: 0 | Status: ACTIVE | COMMUNITY

## 2024-01-22 NOTE — HISTORY OF PRESENT ILLNESS
[FreeTextEntry1] : 88-year-old male with past medical history of gout, CLL not treated following with heme/onc, b/l TKR ,CAD, CHF status post ICD, A-fib on Coumadin,  squamous cell carcinoma, right rotator cuff tendinitis , former smoker  Patient coming in for evaluation of cervical pain mainly in the left side. 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 A few months ago he saw another spinal ortho /vs pain management who said the same thing and told him to do PT. At that time, he reports having imaging of the neck. He also has chronic right shoulder issues since tendon injury after tripping a few years ago which is better with  voltaren  gel; has no pain however his ROM has been limited since then per patient.  As per Dr. Asif Flowers's note from 2021,she was concerned for a tear in the rotator cuff, he did not have great response to steroid injection, however patient had denied MRI due to pacemaker Denies numbness and tingling. Denies back pain. NO other joint pain. No rashes. No blurry vision/double vision. No hip or shoulder pain No  nasopharyngeal ulcers, chest pain, abdominal pain,  SOB, ,rash, dry eyes, dry mouth, myalgias, muscle weakness, fever, chills, jaw claudication Regarding, gout is on allopurinol 300mg and has not had flares in years    PMHx: As above Family Hx: Denies family history of rheumatologic conditions including RA, SLE, Social Hx:  Smoking Hx: former, quit 40 years ago EtOH Hx: one glass wine daily  Drug use: denies Occupation: , has 2 children

## 2024-01-22 NOTE — ASSESSMENT
[FreeTextEntry1] : 88-year-old male with past medical history of gout, CLL not treated following with heme/onc, b/l TKR ,CAD, CHF status post ICD, A-fib on Coumadin,  squamous cell carcinoma, right rotator cuff tendinitis , former smoker  Patient coming in for evaluation of cervical pain mainly in the left side. 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 A few months ago he saw another spinal ortho /vs pain management who said the same thing and told him to do PT. At that time, he reports having imaging of the neck. He also has chronic right shoulder issues since tendon injury after tripping a few years ago which is better with  voltaren  gel; has no pain however his ROM has been limited since then per patient.  As per Dr. Asif Flowers's note from 2021,she was concerned for a tear in the rotator cuff, he did not have great response to steroid injection, however patient had denied MRI due to pacemaker Denies numbness and tingling. Denies back pain. NO other joint pain. No rashes. No blurry vision/double vision. No hip or shoulder pain No  nasopharyngeal ulcers, chest pain, abdominal pain,  SOB, ,rash, dry eyes, dry mouth, myalgias, muscle weakness, fever, chills, jaw claudication Regarding, gout is on allopurinol 300mg and has not had flares in years  Patient does not have signs of underlying connective tissue diseases (CTDs) including inflammatory joint pain, malar rash, photosensitivity, sicca symptoms, Raynauds. Exam without synovitis, rashes, changes of Raynauds.   - Advised to go back to spinal orthopedics or his pain management for no improvement in neck pain despite PT - Regarding right shoulder limited ROM, reports has been there since an injury a few years ago. Would obtain xray of shoulder to evaluate for arthritis, consider ortho eval after xrays - Regarding gout, he is on allopurinol 300mg per primary and has not had gout flares for a few years. Low purine diet recommended. -Will also obtain blood work as below to evaluate further  Call back patient with results, he will let us know when he does xray   Total time spent in review of patient history, clinical exam, management, counseling, and plan of care: 50 min

## 2024-01-22 NOTE — PHYSICAL EXAM
[TextEntry] :   GENERAL: Appears in no acute distress HEENT: EOMI. No conjunctival erythema. Moist mucous membranes. No nasopharyngeal ulcers No temple or scalp tenderness NECK: Supple, no cervical lymphadenopathy CARDIOVASCULAR: RRR. S1, S2 auscultated.  PULMONARY: Clear to auscultation b/l, MSK: No active synovitis, swelling, erythema, or warmth. No joint tenderness to palpation. ROM of neck limtied in all ROM,  Limited ROM of, b/l upper extremities (right side shoulder ROM limited in flexion, abduction, also with limitation in left shoulder but not as much as right ) NEURO: No focal deficits. Motor strength 5/5 in major muscle groups of b/l UE and LE. Sensation to soft touch intact in major dermatomes of b/l UE and LE. PSYCH: AAOx3. Normal affect and thought process.

## 2024-01-23 ENCOUNTER — APPOINTMENT (OUTPATIENT)
Dept: RADIOLOGY | Facility: CLINIC | Age: 89
End: 2024-01-23
Payer: MEDICARE

## 2024-01-23 ENCOUNTER — OUTPATIENT (OUTPATIENT)
Dept: OUTPATIENT SERVICES | Facility: HOSPITAL | Age: 89
LOS: 1 days | End: 2024-01-23
Payer: MEDICARE

## 2024-01-23 DIAGNOSIS — Z98.890 OTHER SPECIFIED POSTPROCEDURAL STATES: Chronic | ICD-10-CM

## 2024-01-23 DIAGNOSIS — M15.9 POLYOSTEOARTHRITIS, UNSPECIFIED: ICD-10-CM

## 2024-01-23 DIAGNOSIS — Z96.7 PRESENCE OF OTHER BONE AND TENDON IMPLANTS: Chronic | ICD-10-CM

## 2024-01-23 DIAGNOSIS — M75.81 OTHER SHOULDER LESIONS, RIGHT SHOULDER: ICD-10-CM

## 2024-01-23 DIAGNOSIS — M54.2 CERVICALGIA: ICD-10-CM

## 2024-01-23 DIAGNOSIS — M1A.9XX1 CHRONIC GOUT, UNSPECIFIED, WITH TOPHUS (TOPHI): ICD-10-CM

## 2024-01-23 LAB
ALBUMIN SERPL ELPH-MCNC: 4.6 G/DL
ALP BLD-CCNC: 122 U/L
ALT SERPL-CCNC: 18 U/L
ANION GAP SERPL CALC-SCNC: 11 MMOL/L
AST SERPL-CCNC: 27 U/L
BILIRUB SERPL-MCNC: 1.3 MG/DL
BUN SERPL-MCNC: 28 MG/DL
CALCIUM SERPL-MCNC: 9.8 MG/DL
CHLORIDE SERPL-SCNC: 103 MMOL/L
CO2 SERPL-SCNC: 26 MMOL/L
CREAT SERPL-MCNC: 1.2 MG/DL
CRP SERPL-MCNC: 60 MG/L
EGFR: 59 ML/MIN/1.73M2
ERYTHROCYTE [SEDIMENTATION RATE] IN BLOOD BY WESTERGREN METHOD: 7 MM/HR
GLUCOSE SERPL-MCNC: 86 MG/DL
POTASSIUM SERPL-SCNC: 5.4 MMOL/L
PROT SERPL-MCNC: 6.6 G/DL
SODIUM SERPL-SCNC: 140 MMOL/L
URATE SERPL-MCNC: 4.4 MG/DL

## 2024-01-23 PROCEDURE — 73030 X-RAY EXAM OF SHOULDER: CPT | Mod: 26,50

## 2024-01-23 PROCEDURE — 73030 X-RAY EXAM OF SHOULDER: CPT

## 2024-01-24 LAB
HCT VFR BLD CALC: 46 %
HGB BLD-MCNC: 13.4 G/DL
MCHC RBC-ENTMCNC: 29.1 GM/DL
MCHC RBC-ENTMCNC: 30.5 PG
MCV RBC AUTO: 104.5 FL
PLATELET # BLD AUTO: 135 K/UL
RBC # BLD: 4.4 M/UL
RBC # FLD: 14.3 %
WBC # FLD AUTO: 115.7 K/UL

## 2024-02-05 NOTE — DISCHARGE NOTE NURSING/CASE MANAGEMENT/SOCIAL WORK - NSSCCARECORD_GEN_ALL_CORE
Returned call to the patient, patient was given the number ot the surgery center regarding his arrival time for 02/08/2024.  Endo unit sent a message on teams of the patient's call.  Patient verbalized understanding of the number to the surgery center appropriately.   Home Care Agency/Durable Medical Equipment Agency

## 2024-02-07 ENCOUNTER — APPOINTMENT (OUTPATIENT)
Dept: ORTHOPEDIC SURGERY | Facility: CLINIC | Age: 89
End: 2024-02-07
Payer: MEDICARE

## 2024-02-07 VITALS
BODY MASS INDEX: 32.13 KG/M2 | HEART RATE: 75 BPM | DIASTOLIC BLOOD PRESSURE: 74 MMHG | SYSTOLIC BLOOD PRESSURE: 135 MMHG | HEIGHT: 68 IN | WEIGHT: 212 LBS

## 2024-02-07 DIAGNOSIS — M12.811 OTHER SPECIFIC ARTHROPATHIES, NOT ELSEWHERE CLASSIFIED, RIGHT SHOULDER: ICD-10-CM

## 2024-02-07 PROCEDURE — 99203 OFFICE O/P NEW LOW 30 MIN: CPT

## 2024-02-07 PROCEDURE — 73030 X-RAY EXAM OF SHOULDER: CPT | Mod: RT

## 2024-02-12 DIAGNOSIS — H91.90 UNSPECIFIED HEARING LOSS, UNSPECIFIED EAR: ICD-10-CM

## 2024-02-12 DIAGNOSIS — C44.90 UNSPECIFIED MALIGNANT NEOPLASM OF SKIN, UNSPECIFIED: ICD-10-CM

## 2024-02-12 DIAGNOSIS — M19.90 UNSPECIFIED OSTEOARTHRITIS, UNSPECIFIED SITE: ICD-10-CM

## 2024-02-12 PROBLEM — M12.811 ROTATOR CUFF ARTHROPATHY OF RIGHT SHOULDER: Status: ACTIVE | Noted: 2024-02-07

## 2024-02-12 NOTE — HISTORY OF PRESENT ILLNESS
[6] : a current pain level of 6/10 [de-identified] : The patient comes in today with a history of many years ago of injuring his right shoulder.  He states he felt popping, and he believes he twisted some tendons.  He states he never had any treatment.  He comes in today with not too much pain, but inability to raise his shoulder.  The patient states the onset/injury occurred on 03/02/20.  This injury is not work related or due to an automobile accident.  The patient states the pain is localized.    [de-identified] : Voltaren ointment and arthritis pills

## 2024-02-12 NOTE — ADDENDUM
[FreeTextEntry1] : This note was written by Eusebio Isaacs on 02/12/2024, acting as a scribe for Jakob Corey III, MD

## 2024-02-12 NOTE — PHYSICAL EXAM
[Normal] : Gait: normal [de-identified] : Left Shoulder: Shoulder: Range of Motion in Degrees:                                 Claimant:   Normal:    Abduction (Active)   180   180 degrees    Abduction (Passive)   180   180 degrees    Forward elevation (Active):   180   180 degrees    Forward elevation (Passive):  180   180 degrees    External rotation (Active):   45   45 degrees    External rotation (Passive):   45   45 degrees    Internal rotation (Active):   L-1   L-1    Internal rotation (Passive):   L-1   L-1      No motor weakness to internal rotation, external rotation or abduction in the scapular plane.  Negative crank test.  Negative Byhalia's test.  Negative Speeds test. Negative Yergason's test.  Negative cross arm test.  No tenderness to palpation at the AC joint. Negative Roe sign.  Negative Neers sign.  Negative apprehension. Negative sulcus sign.  No gross neurological or vascular deficits distally.  Skin is intact.  No rashes, scars or lesions.  2+ radial and ulnar pulses. No extra-articular swelling or tenderness.   Right Shoulder:  Limited forward elevation and abduction.  He has 20 external rotation and internal to his side.    [de-identified] : Appearance:  Well-developed, well-nourished male in no acute distress.   [de-identified] : Radiographs, two views of the right shoulder taken in the office today, show significant proximal humeral migration with some mild degenerative changes.

## 2024-02-12 NOTE — CONSULT LETTER
[Dear  ___] : Dear  [unfilled], [Consult Letter:] : I had the pleasure of evaluating your patient, [unfilled]. [Please see my note below.] : Please see my note below. [Consult Closing:] : Thank you very much for allowing me to participate in the care of this patient.  If you have any questions, please do not hesitate to contact me. [Sincerely,] : Sincerely, [FreeTextEntry3] : Jakob Corey, III, MD [facility]/anshul

## 2024-02-12 NOTE — REVIEW OF SYSTEMS
[Joint Pain] : joint pain [Joint Stiffness] : joint stiffness [Chills] : chills [Nasal Discharge] : nasal discharge [Cough] : cough [Urinary Frequency] : urinary frequency [Dizziness] : dizziness [Easy Bruising] : a tendency for easy bruising [Negative] : Endocrine

## 2024-02-12 NOTE — DISCUSSION/SUMMARY
[de-identified] : At this time, due to right shoulder rotator cuff arthropathy, we had a long discussion.  At this point, because his pain is not too bad, I am going to start him on a course of physical therapy and he will be reassessed in 4-6 weeks.

## 2024-02-14 ENCOUNTER — APPOINTMENT (OUTPATIENT)
Dept: ELECTROPHYSIOLOGY | Facility: CLINIC | Age: 89
End: 2024-02-14
Payer: MEDICARE

## 2024-02-15 ENCOUNTER — NON-APPOINTMENT (OUTPATIENT)
Age: 89
End: 2024-02-15

## 2024-02-15 PROCEDURE — 93296 REM INTERROG EVL PM/IDS: CPT

## 2024-02-15 PROCEDURE — 93295 DEV INTERROG REMOTE 1/2/MLT: CPT

## 2024-02-22 ENCOUNTER — NON-APPOINTMENT (OUTPATIENT)
Age: 89
End: 2024-02-22

## 2024-03-15 ENCOUNTER — APPOINTMENT (OUTPATIENT)
Dept: DERMATOLOGY | Facility: CLINIC | Age: 89
End: 2024-03-15
Payer: MEDICARE

## 2024-03-15 VITALS — BODY MASS INDEX: 31.98 KG/M2 | WEIGHT: 211 LBS | HEIGHT: 68 IN

## 2024-03-15 DIAGNOSIS — C44.320 SQUAMOUS CELL CARCINOMA OF SKIN OF UNSPECIFIED PARTS OF FACE: ICD-10-CM

## 2024-03-15 DIAGNOSIS — C44.529 SQUAMOUS CELL CARCINOMA OF SKIN OF OTHER PART OF TRUNK: ICD-10-CM

## 2024-03-15 DIAGNOSIS — L81.4 OTHER MELANIN HYPERPIGMENTATION: ICD-10-CM

## 2024-03-15 DIAGNOSIS — L82.1 OTHER SEBORRHEIC KERATOSIS: ICD-10-CM

## 2024-03-15 DIAGNOSIS — L57.0 ACTINIC KERATOSIS: ICD-10-CM

## 2024-03-15 PROCEDURE — 17003 DESTRUCT PREMALG LES 2-14: CPT | Mod: 59

## 2024-03-15 PROCEDURE — 99213 OFFICE O/P EST LOW 20 MIN: CPT | Mod: 25

## 2024-03-15 PROCEDURE — 17000 DESTRUCT PREMALG LESION: CPT

## 2024-03-15 NOTE — ASSESSMENT
[FreeTextEntry1] : Complete skin examination is negative for malignancy; Multiple new concerns were addressed and discussed. Therapeutic options and their risks and benefits; along with multiple diagnostic possibilities were discussed at length; risks and benefits of skin biopsy and/or other further study were discussed;   Continue regular exams; Follow up for TBSE in 6 months   cryo to AKs- face, scalp

## 2024-03-15 NOTE — HISTORY OF PRESENT ILLNESS
[de-identified] : Pt. 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

## 2024-03-15 NOTE — PHYSICAL EXAM
[Full Body Skin Exam Performed] : performed [FreeTextEntry3] : Skin examination performed of the face, neck, trunk, arms, legs;  The patient is well, alert and oriented, pleasant and cooperative. Eyelids, conjunctivae, oral mucosa, digits and nails all normal.   No cervical adenopathy.  Normal findings include:  Seborrheic keratoses- multiple on trunk, neck Angiomas Lentigines Healed D&C sites;  nose; L cheek;  Left scalp crown healed surgical scar; L anterior shoulder  scaling erythematous papules; hyperkeratotic: scalp crown, forehead, R preauricular R nose bridge  No lesions suspicious for malignancy.

## 2024-03-22 ENCOUNTER — NON-APPOINTMENT (OUTPATIENT)
Age: 89
End: 2024-03-22

## 2024-03-28 ENCOUNTER — INPATIENT (INPATIENT)
Facility: HOSPITAL | Age: 89
LOS: 3 days | Discharge: HOME | DRG: 193 | End: 2024-04-01
Attending: FAMILY MEDICINE | Admitting: INTERNAL MEDICINE
Payer: MEDICARE

## 2024-03-28 VITALS
DIASTOLIC BLOOD PRESSURE: 72 MMHG | HEART RATE: 68 BPM | RESPIRATION RATE: 16 BRPM | TEMPERATURE: 98 F | WEIGHT: 209 LBS | SYSTOLIC BLOOD PRESSURE: 145 MMHG | HEIGHT: 68 IN | OXYGEN SATURATION: 94 %

## 2024-03-28 DIAGNOSIS — Z98.890 OTHER SPECIFIED POSTPROCEDURAL STATES: Chronic | ICD-10-CM

## 2024-03-28 DIAGNOSIS — Z98.49 CATARACT EXTRACTION STATUS, UNSPECIFIED EYE: Chronic | ICD-10-CM

## 2024-03-28 DIAGNOSIS — Z96.7 PRESENCE OF OTHER BONE AND TENDON IMPLANTS: Chronic | ICD-10-CM

## 2024-03-28 DIAGNOSIS — R06.00 DYSPNEA, UNSPECIFIED: ICD-10-CM

## 2024-03-28 LAB
ANION GAP SERPL CALC-SCNC: 1 MMOL/L — LOW (ref 5–17)
ANION GAP SERPL CALC-SCNC: 5 MMOL/L — SIGNIFICANT CHANGE UP (ref 5–17)
ANISOCYTOSIS BLD QL: SLIGHT — SIGNIFICANT CHANGE UP
APTT BLD: 48.1 SEC — HIGH (ref 24.5–35.6)
BASOPHILS # BLD AUTO: 0 K/UL — SIGNIFICANT CHANGE UP (ref 0–0.2)
BASOPHILS NFR BLD AUTO: 0 % — SIGNIFICANT CHANGE UP (ref 0–2)
BUN SERPL-MCNC: 22 MG/DL — SIGNIFICANT CHANGE UP (ref 7–23)
BUN SERPL-MCNC: 24 MG/DL — HIGH (ref 7–23)
CALCIUM SERPL-MCNC: 10.2 MG/DL — HIGH (ref 8.5–10.1)
CALCIUM SERPL-MCNC: 9.5 MG/DL — SIGNIFICANT CHANGE UP (ref 8.5–10.1)
CHLORIDE SERPL-SCNC: 104 MMOL/L — SIGNIFICANT CHANGE UP (ref 96–108)
CHLORIDE SERPL-SCNC: 105 MMOL/L — SIGNIFICANT CHANGE UP (ref 96–108)
CO2 SERPL-SCNC: 28 MMOL/L — SIGNIFICANT CHANGE UP (ref 22–31)
CO2 SERPL-SCNC: 28 MMOL/L — SIGNIFICANT CHANGE UP (ref 22–31)
CREAT SERPL-MCNC: 1.01 MG/DL — SIGNIFICANT CHANGE UP (ref 0.5–1.3)
CREAT SERPL-MCNC: 1.02 MG/DL — SIGNIFICANT CHANGE UP (ref 0.5–1.3)
EGFR: 71 ML/MIN/1.73M2 — SIGNIFICANT CHANGE UP
EGFR: 72 ML/MIN/1.73M2 — SIGNIFICANT CHANGE UP
EOSINOPHIL # BLD AUTO: 0 K/UL — SIGNIFICANT CHANGE UP (ref 0–0.5)
EOSINOPHIL NFR BLD AUTO: 0 % — SIGNIFICANT CHANGE UP (ref 0–6)
FLUAV AG NPH QL: SIGNIFICANT CHANGE UP
FLUBV AG NPH QL: SIGNIFICANT CHANGE UP
GLUCOSE SERPL-MCNC: 105 MG/DL — HIGH (ref 70–99)
GLUCOSE SERPL-MCNC: 121 MG/DL — HIGH (ref 70–99)
HCT VFR BLD CALC: 41.7 % — SIGNIFICANT CHANGE UP (ref 39–50)
HGB BLD-MCNC: 12.5 G/DL — LOW (ref 13–17)
INR BLD: 3.34 RATIO — HIGH (ref 0.85–1.18)
LYMPHOCYTES # BLD AUTO: 118.22 K/UL — HIGH (ref 1–3.3)
LYMPHOCYTES # BLD AUTO: 96 % — HIGH (ref 13–44)
MACROCYTES BLD QL: SLIGHT — SIGNIFICANT CHANGE UP
MAGNESIUM SERPL-MCNC: 1.6 MG/DL — SIGNIFICANT CHANGE UP (ref 1.6–2.6)
MANUAL SMEAR VERIFICATION: SIGNIFICANT CHANGE UP
MCHC RBC-ENTMCNC: 30 GM/DL — LOW (ref 32–36)
MCHC RBC-ENTMCNC: 30.3 PG — SIGNIFICANT CHANGE UP (ref 27–34)
MCV RBC AUTO: 101 FL — HIGH (ref 80–100)
MONOCYTES # BLD AUTO: 1.23 K/UL — HIGH (ref 0–0.9)
MONOCYTES NFR BLD AUTO: 1 % — LOW (ref 2–14)
NEUTROPHILS # BLD AUTO: 3.69 K/UL — SIGNIFICANT CHANGE UP (ref 1.8–7.4)
NEUTROPHILS NFR BLD AUTO: 3 % — LOW (ref 43–77)
NRBC # BLD: 0 /100 WBCS — SIGNIFICANT CHANGE UP (ref 0–0)
NRBC # BLD: SIGNIFICANT CHANGE UP /100 WBCS (ref 0–0)
NT-PROBNP SERPL-SCNC: 3474 PG/ML — HIGH (ref 0–450)
PLAT MORPH BLD: NORMAL — SIGNIFICANT CHANGE UP
PLATELET # BLD AUTO: 157 K/UL — SIGNIFICANT CHANGE UP (ref 150–400)
POTASSIUM SERPL-MCNC: 3.9 MMOL/L — SIGNIFICANT CHANGE UP (ref 3.5–5.3)
POTASSIUM SERPL-MCNC: 5.3 MMOL/L — SIGNIFICANT CHANGE UP (ref 3.5–5.3)
POTASSIUM SERPL-SCNC: 3.9 MMOL/L — SIGNIFICANT CHANGE UP (ref 3.5–5.3)
POTASSIUM SERPL-SCNC: 5.3 MMOL/L — SIGNIFICANT CHANGE UP (ref 3.5–5.3)
PROTHROM AB SERPL-ACNC: 36.4 SEC — HIGH (ref 9.5–13)
RBC # BLD: 4.13 M/UL — LOW (ref 4.2–5.8)
RBC # FLD: 14.3 % — SIGNIFICANT CHANGE UP (ref 10.3–14.5)
RBC BLD AUTO: ABNORMAL
RSV RNA NPH QL NAA+NON-PROBE: SIGNIFICANT CHANGE UP
SARS-COV-2 RNA SPEC QL NAA+PROBE: SIGNIFICANT CHANGE UP
SMUDGE CELLS # BLD: PRESENT — SIGNIFICANT CHANGE UP
SODIUM SERPL-SCNC: 134 MMOL/L — LOW (ref 135–145)
SODIUM SERPL-SCNC: 137 MMOL/L — SIGNIFICANT CHANGE UP (ref 135–145)
TROPONIN I, HIGH SENSITIVITY RESULT: 20.29 NG/L — SIGNIFICANT CHANGE UP
TROPONIN I, HIGH SENSITIVITY RESULT: 29.33 NG/L — SIGNIFICANT CHANGE UP
WBC # BLD: 123.15 K/UL — CRITICAL HIGH (ref 3.8–10.5)
WBC # FLD AUTO: 123.15 K/UL — CRITICAL HIGH (ref 3.8–10.5)

## 2024-03-28 PROCEDURE — 80048 BASIC METABOLIC PNL TOTAL CA: CPT

## 2024-03-28 PROCEDURE — 84443 ASSAY THYROID STIM HORMONE: CPT

## 2024-03-28 PROCEDURE — 71045 X-RAY EXAM CHEST 1 VIEW: CPT | Mod: 26

## 2024-03-28 PROCEDURE — 83880 ASSAY OF NATRIURETIC PEPTIDE: CPT

## 2024-03-28 PROCEDURE — 93306 TTE W/DOPPLER COMPLETE: CPT

## 2024-03-28 PROCEDURE — 80053 COMPREHEN METABOLIC PANEL: CPT

## 2024-03-28 PROCEDURE — 85730 THROMBOPLASTIN TIME PARTIAL: CPT

## 2024-03-28 PROCEDURE — 87899 AGENT NOS ASSAY W/OPTIC: CPT

## 2024-03-28 PROCEDURE — 99285 EMERGENCY DEPT VISIT HI MDM: CPT

## 2024-03-28 PROCEDURE — 85027 COMPLETE CBC AUTOMATED: CPT

## 2024-03-28 PROCEDURE — 87449 NOS EACH ORGANISM AG IA: CPT

## 2024-03-28 PROCEDURE — 85610 PROTHROMBIN TIME: CPT

## 2024-03-28 PROCEDURE — 84484 ASSAY OF TROPONIN QUANT: CPT

## 2024-03-28 PROCEDURE — 83735 ASSAY OF MAGNESIUM: CPT

## 2024-03-28 PROCEDURE — 93005 ELECTROCARDIOGRAM TRACING: CPT

## 2024-03-28 PROCEDURE — 99223 1ST HOSP IP/OBS HIGH 75: CPT

## 2024-03-28 PROCEDURE — 71275 CT ANGIOGRAPHY CHEST: CPT | Mod: 26,MC

## 2024-03-28 PROCEDURE — 36415 COLL VENOUS BLD VENIPUNCTURE: CPT

## 2024-03-28 PROCEDURE — 84100 ASSAY OF PHOSPHORUS: CPT

## 2024-03-28 PROCEDURE — 94640 AIRWAY INHALATION TREATMENT: CPT

## 2024-03-28 RX ORDER — ALLOPURINOL 300 MG
1 TABLET ORAL
Refills: 0 | DISCHARGE

## 2024-03-28 RX ORDER — LANOLIN ALCOHOL/MO/W.PET/CERES
3 CREAM (GRAM) TOPICAL AT BEDTIME
Refills: 0 | Status: DISCONTINUED | OUTPATIENT
Start: 2024-03-28 | End: 2024-04-01

## 2024-03-28 RX ORDER — DORZOLAMIDE HYDROCHLORIDE TIMOLOL MALEATE 20; 5 MG/ML; MG/ML
1 SOLUTION/ DROPS OPHTHALMIC
Refills: 0 | DISCHARGE

## 2024-03-28 RX ORDER — VANCOMYCIN HCL 1 G
1500 VIAL (EA) INTRAVENOUS ONCE
Refills: 0 | Status: COMPLETED | OUTPATIENT
Start: 2024-03-28 | End: 2024-03-28

## 2024-03-28 RX ORDER — CEFEPIME 1 G/1
1000 INJECTION, POWDER, FOR SOLUTION INTRAMUSCULAR; INTRAVENOUS ONCE
Refills: 0 | Status: COMPLETED | OUTPATIENT
Start: 2024-03-28 | End: 2024-03-28

## 2024-03-28 RX ORDER — AZITHROMYCIN 500 MG/1
500 TABLET, FILM COATED ORAL EVERY 24 HOURS
Refills: 0 | Status: DISCONTINUED | OUTPATIENT
Start: 2024-03-29 | End: 2024-03-29

## 2024-03-28 RX ORDER — DICLOFENAC SODIUM 30 MG/G
2 GEL TOPICAL
Refills: 0 | DISCHARGE

## 2024-03-28 RX ORDER — LISINOPRIL 2.5 MG/1
20 TABLET ORAL DAILY
Refills: 0 | Status: DISCONTINUED | OUTPATIENT
Start: 2024-03-29 | End: 2024-04-01

## 2024-03-28 RX ORDER — DORZOLAMIDE HYDROCHLORIDE TIMOLOL MALEATE 20; 5 MG/ML; MG/ML
1 SOLUTION/ DROPS OPHTHALMIC
Refills: 0 | Status: DISCONTINUED | OUTPATIENT
Start: 2024-03-28 | End: 2024-04-01

## 2024-03-28 RX ORDER — FUROSEMIDE 40 MG
40 TABLET ORAL
Refills: 0 | Status: DISCONTINUED | OUTPATIENT
Start: 2024-03-28 | End: 2024-03-30

## 2024-03-28 RX ORDER — LISINOPRIL/HYDROCHLOROTHIAZIDE 10-12.5 MG
1 TABLET ORAL
Refills: 0 | DISCHARGE

## 2024-03-28 RX ORDER — ALBUTEROL 90 UG/1
2 AEROSOL, METERED ORAL EVERY 6 HOURS
Refills: 0 | Status: DISCONTINUED | OUTPATIENT
Start: 2024-03-28 | End: 2024-04-01

## 2024-03-28 RX ORDER — ACETAMINOPHEN 500 MG
2 TABLET ORAL
Refills: 0 | DISCHARGE

## 2024-03-28 RX ORDER — METOPROLOL TARTRATE 50 MG
25 TABLET ORAL
Refills: 0 | Status: DISCONTINUED | OUTPATIENT
Start: 2024-03-28 | End: 2024-04-01

## 2024-03-28 RX ORDER — ONDANSETRON 8 MG/1
4 TABLET, FILM COATED ORAL EVERY 6 HOURS
Refills: 0 | Status: DISCONTINUED | OUTPATIENT
Start: 2024-03-28 | End: 2024-04-01

## 2024-03-28 RX ORDER — ACETAMINOPHEN 500 MG
650 TABLET ORAL EVERY 6 HOURS
Refills: 0 | Status: DISCONTINUED | OUTPATIENT
Start: 2024-03-28 | End: 2024-04-01

## 2024-03-28 RX ORDER — CEFEPIME 1 G/1
1000 INJECTION, POWDER, FOR SOLUTION INTRAMUSCULAR; INTRAVENOUS ONCE
Refills: 0 | Status: DISCONTINUED | OUTPATIENT
Start: 2024-03-28 | End: 2024-03-28

## 2024-03-28 RX ORDER — CEFTRIAXONE 500 MG/1
1000 INJECTION, POWDER, FOR SOLUTION INTRAMUSCULAR; INTRAVENOUS EVERY 24 HOURS
Refills: 0 | Status: DISCONTINUED | OUTPATIENT
Start: 2024-03-29 | End: 2024-04-01

## 2024-03-28 RX ORDER — ALLOPURINOL 300 MG
300 TABLET ORAL DAILY
Refills: 0 | Status: DISCONTINUED | OUTPATIENT
Start: 2024-03-29 | End: 2024-04-01

## 2024-03-28 RX ORDER — WARFARIN SODIUM 2.5 MG/1
1 TABLET ORAL
Refills: 0 | DISCHARGE

## 2024-03-28 RX ADMIN — Medication 40 MILLIGRAM(S): at 20:07

## 2024-03-28 RX ADMIN — CEFEPIME 1000 MILLIGRAM(S): 1 INJECTION, POWDER, FOR SOLUTION INTRAMUSCULAR; INTRAVENOUS at 17:13

## 2024-03-28 RX ADMIN — Medication 250 MILLIGRAM(S): at 17:12

## 2024-03-28 NOTE — ED PROVIDER NOTE - OBJECTIVE STATEMENT
Patient presents to ED with daughter whom he lives with describing 3 to 4 days of nonproductive cough shortness of breath went to urgent care 2 days prior is on doxycycline and cough medicine not getting better staying in the bed more.  He denies fevers.  No chest pain.  Positive shortness of breath.  Positive cough.  No abdominal pain.  No nausea vomiting diarrhea.  No motor or sensory deficits.  No leg swelling.   Patient has history of leukemia

## 2024-03-28 NOTE — H&P ADULT - NSHPLABSRESULTS_GEN_ALL_CORE
LABS:                        12.5   123.15 )-----------( 157      ( 28 Mar 2024 12:29 )             41.7     03-28    137  |  104  |  22  ----------------------------<  105<H>  3.9   |  28  |  1.01    Ca    10.2<H>      28 Mar 2024 22:26  Mg     1.6     03-28      PT/INR - ( 28 Mar 2024 12:29 )   PT: 36.4 sec;   INR: 3.34 ratio         PTT - ( 28 Mar 2024 12:29 )  PTT:48.1 sec    < from: Xray Chest 1 View AP/PA. (03.28.24 @ 12:38) >      IMPRESSION:  Enlarged cardiac silhouette.    Continued elevation of right hemidiaphragm.    New right basilar opacity which could be due to atelectasis or infection.    Left basilar linear atelectasis.    A CTA of the chest was performed. Please see that report.    --- End of Report ---    < end of copied text >      < from: CT Angio Chest PE Protocol w/ IV Cont (03.28.24 @ 15:19) >      IMPRESSION: No pulmonary embolus is noted.    Small patchy opacities are noted within both lungs. Exact etiology is   unclear. One in the differential diagnostic consideration includes   pulmonary edema.    --- End of Report ---    < end of copied text >

## 2024-03-28 NOTE — ED ADULT NURSE REASSESSMENT NOTE - NS ED NURSE REASSESS COMMENT FT1
Pt received A+Ox4. VSS. Pt has moist nonproductive cough. Sat on 2L 96%. Pt does become AWAN. LE's with +2 edema. Voiding without difficulty in urinal. Vpaced on monitor. Pt and family instructed in plan of care. Monitored closely. Call bell in reach.

## 2024-03-28 NOTE — ED ADULT NURSE NOTE - NSFALLRISKINTERV_ED_ALL_ED

## 2024-03-28 NOTE — ED ADULT TRIAGE NOTE - CHIEF COMPLAINT QUOTE
pt BIBEMS form home c/o cough, difficulty breathing when lying flat x 2 weeks. pt was seen in UC and prescribed abx with no relief. pt reports hx PNA in past with similar symptoms. denies fevers.

## 2024-03-28 NOTE — ED PROVIDER NOTE - NSICDXPASTMEDICALHX_GEN_ALL_CORE_FT
PAST MEDICAL HISTORY:  Afib     BPH (Benign Prostatic Hyperplasia)     CA - skin cancer     Cholesterol serum elevated     Chronic CHF     Glaucoma     Gout     Hearing loss     History of ankle fracture     HTN (hypertension)     Intolerance, drug INTOLERANCE  TO STATIN    Myocardial infarction     Osteoarthritis     Rheumatoid arthritis

## 2024-03-28 NOTE — ED ADULT NURSE NOTE - OBJECTIVE STATEMENT
pt c/o worsening cough &sob r/t pneumonia. Pt states he was diagnosed with pneumonia on abx since saturday with no relief of symptoms. pt o2 91% RA, put on 2L stating 95%, daughter at bedside states o2 was 98% last night at home. daughter also endorses pt difficulty breathing while laying flat. Pt denies CP, HA, dizziness, n/v/d, fevers at this time.

## 2024-03-28 NOTE — H&P ADULT - NSHPPHYSICALEXAM_GEN_ALL_CORE
PHYSICAL EXAM:  GENERAL: NAD, lying in bed comfortably  HEAD:  Atraumatic, Normocephalic  EYES: EOMI, PERRLA, conjunctiva and sclera clear  ENT: Moist mucous membranes  NECK: Supple, No JVD  CHEST/LUNG: B/L few crepitation present. Unlabored respirations  HEART: Irregularly irregular rate and rhythm; No murmurs, rubs, or gallops  ABDOMEN: Bowel sounds present; Soft, Nontender, Nondistended. No hepatomegaly  EXTREMITIES:  2+ Peripheral Pulses, brisk capillary refill. No clubbing, cyanosis, B/L 2+ pitting edema present.  NERVOUS SYSTEM:  Alert & Oriented X3, speech clear. No deficits   MSK: FROM all 4 extremities, full and equal strength  SKIN: No rashes or lesions

## 2024-03-28 NOTE — PHARMACOTHERAPY INTERVENTION NOTE - COMMENTS
Med history complete, reviewed medications and allergies with patient and patients family at bedside confirmed medication list with doctor first med profile, all medication related questions answered

## 2024-03-28 NOTE — ED ADULT NURSE REASSESSMENT NOTE - NS ED NURSE REASSESS COMMENT FT1
Pt received from [facility] 19:00. Pt offers no complaints at this time, VS as charted in MAR, 94% on 2L. Admitted, awaiting a bed. Safety and comfort measures maintained.

## 2024-03-28 NOTE — ED PROVIDER NOTE - CLINICAL SUMMARY MEDICAL DECISION MAKING FREE TEXT BOX
patient sat off oxygen goes down to 86% probably combination of fluid overload perhaps pneumonia needs to be ruled out for PE will require admission to the hospital leukocytosis secondary to chronic leukemia I just updated patient and patient's daughter that he will be staying in the hospital pending CTA for treatment of undifferentiated hypoxia there is no increased work of breathing at this time

## 2024-03-28 NOTE — H&P ADULT - HISTORY OF PRESENT ILLNESS
Chief Complaint: cough.    · Chief Complaint: The patient is a 89y Male complaining of cough.  · HPI Objective Statement: Patient presents to ED with daughter whom he lives with describing 3 to 4 days of nonproductive cough shortness of breath went to urgent care 2 days prior is on doxycycline and cough medicine not getting better staying in the bed more.  He denies fevers.  No chest pain.  Positive shortness of breath.  Positive cough.  No abdominal pain.  No nausea vomiting diarrhea.  No motor or sensory deficits.  No leg swelling.   Patient has history of leukemia   Chief Complaint: cough.    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 fib on coumadin, cardiomyopathy with AICD in place, HTN, systolic CHF, HTN, HPL, Gout, BPH and others presented in ED from home with family with c/o productive cough with mucous like sputum and SOB for last 2-3 weeks. He went to  clinic and was started on Doxy for last 2 days, and he was not getting better.  He says that he was having more chest congestion and wheezing today morning prompting him to come to ED.  He also has noted some swelling to his both legs. He also feels cold and chills.  Upon arrival in ED, he was hypoxic with pulse ox of 80%, and he is requiring 4 L oxygen via NC to keep sats above 94%. He denies chest pain, palpitation, N/V, abdominal pain, diarrhea or dysuria.  Sig labs: WBC 123k with N 3% and L 96%, Hb 12.3, Platelets 157k, CMP wnl except Na 134.    INR 3.34, Troponin 20, Pro-BNP elevated 3474.  RVP negative.    CXR:  Enlarged cardiac silhouette. Continued elevation of right hemidiaphragm. New right basilar opacity which could be due to atelectasis or infection. Left basilar linear atelectasis.    CT Angio Chest PE Protocol:  No pulmonary embolus is noted. Small patchy opacities are noted within both lungs. Exact etiology is unclear. One in the differential diagnostic consideration includes   pulmonary edema.    Vancomycin and Cefepime IV given after blood culture draw.

## 2024-03-28 NOTE — H&P ADULT - NSICDXPASTMEDICALHX_GEN_ALL_CORE_FT
PAST MEDICAL HISTORY:  Afib     AICD present, double chamber     BPH (Benign Prostatic Hyperplasia)     CA - skin cancer     Cholesterol serum elevated     Chronic systolic heart failure     CLL (chronic lymphocytic leukemia)     Glaucoma     Gout     Hearing loss     History of ankle fracture     HTN (hypertension)     Intolerance, drug INTOLERANCE  TO STATIN    Myocardial infarction     Osteoarthritis     Rheumatoid arthritis     
Single

## 2024-03-28 NOTE — H&P ADULT - ASSESSMENT
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 fib on coumadin, cardiomyopathy with AICD in place, HTN, systolic CHF, HTN, HPL, Gout, BPH and others presented in ED from home with family with c/o productive cough with mucous like sputum and SOB for last 2-3 weeks. He went to  clinic and was started on Doxy for last 2 days, and he was not getting better.  He says that he was having more chest congestion and wheezing today morning prompting him to come to ED.  He also has noted some swelling to his both legs. He also feels cold and chills.  Upon arrival in ED, he was hypoxic with pulse ox of 80%, and he is requiring 4 L oxygen via NC to keep sats above 94%. He denies chest pain, palpitation, N/V, abdominal pain, diarrhea or dysuria.      # B/L lower lobes CAP pneumonia with hypoxia.  # Acute respiratory failure with hypoxia.  - CT and CXR showed patchy infiltrates both lower lobes (R>L).  PE ruled out.  -Admit to medical floor.  -Follow cultures.  Urinary strep and Legionella antigens.  -Rocephin and Zithromax empirically.  -Oxygen supplementation via NC to keep sats above 90%.    # Acute on chronic systolic CHF.  -BNP elevated 3474. 2D Echo on 05/12/2023: LVEF 50%  -Daily weight, Fluid restriction and strict I&O.  -Lasix 40 mg IV bid, and monitor electrolytes.  -2D Echo in am.  -Keep legs elevated.    # HTN and A. fib.  -Stable and controlled.  -Continue his Metoprolol, Lisinopril and HCTZ.  -Will hold his coumadin tonight, as INR is 3.34.  -Check INR in am, and dose coumadin if INR <3.00.    # Gout.  -On Allopurinol.    # CLL.  -WBC 123k with N 3% and L 96%.  -F/u with PCP and Oncology as outpatient.    # DVT prophylaxis: On coumadin.

## 2024-03-28 NOTE — PATIENT PROFILE ADULT - DO YOU FEEL UNSAFE AT HOME, WORK, OR SCHOOL?
Chief Complaint   Patient presents with   • esophageal diverticulum   • Anemia   • liver lesion   • Abdominal Pain       Subjective     HPI    Edy Rodriguez is a 65 y.o. female with a past medical history noted below who presents for follow-up of iron deficiency anemia, esophageal diverticulum, liver lesion.    She weaned off ppi in March.  She was started on citalopram by her pcp.  Late May/early June noticed she was coughing and tightness in her throat, some chest discomfort.  It has settled down for the past couple of weeks.  She doesn't know if it was caused by any of the medication changes    Getting the pricking sensation in her RLQ again.  No evidence of a shingles rash.      She has been off iron    Denies fatigue         Today's visit was in the office.  Both the patient and I were wearing face masks and proper hand hygiene was performed before and after the physical exam.           Current Outpatient Medications:   •  amLODIPine (NORVASC) 5 MG tablet, TAKE 1 TABLET BY MOUTH DAILY, Disp: 90 tablet, Rfl: 0  •  citalopram (CeleXA) 10 MG tablet, Take 1 tablet by mouth Daily., Disp: 90 tablet, Rfl: 2  •  irbesartan (AVAPRO) 150 MG tablet, TAKE 1 TABLET BY MOUTH EVERY NIGHT, Disp: 90 tablet, Rfl: 0  •  capsaicin (ZOSTRIX) 0.075 % topical cream, Apply 1 application topically to the appropriate area as directed 3 (Three) Times a Day As Needed (pain, tingling). Apply with glove and wash hands afterwards, Disp: 57 g, Rfl: 1      Objective     Vitals:    07/14/22 0953   BP: 126/79   Pulse: 75   Temp: 96.2 °F (35.7 °C)         07/14/22  0953   Weight: 82.2 kg (181 lb 3.2 oz)     Body mass index is 28.38 kg/m².    Physical Exam  Constitutional:       General: She is not in acute distress.  Pulmonary:      Effort: Pulmonary effort is normal.   Neurological:      Mental Status: She is alert and oriented to person, place, and time.   Psychiatric:         Mood and Affect: Mood normal.         Behavior: Behavior normal.          Thought Content: Thought content normal.         Judgment: Judgment normal.             WBC   Date Value Ref Range Status   04/01/2022 7.42 4.5 - 11.0 10*3/uL Final     RBC   Date Value Ref Range Status   04/01/2022 4.87 4.0 - 5.2 10*6/uL Final     Hemoglobin   Date Value Ref Range Status   04/01/2022 12.3 12.0 - 16.0 g/dL Final     Hematocrit   Date Value Ref Range Status   04/01/2022 39.3 36.0 - 46.0 % Final     MCV   Date Value Ref Range Status   04/01/2022 80.7 80.0 - 100.0 fL Final     MCH   Date Value Ref Range Status   04/01/2022 25.3 (L) 26.0 - 34.0 pg Final     MCHC   Date Value Ref Range Status   04/01/2022 31.3 31.0 - 37.0 g/dL Final     RDW   Date Value Ref Range Status   04/01/2022 14.3 12.0 - 16.8 % Final     RDW-SD   Date Value Ref Range Status   10/07/2019 44.6 37.0 - 54.0 fl Final     MPV   Date Value Ref Range Status   04/01/2022 9.8 8.4 - 12.4 fL Final     Platelets   Date Value Ref Range Status   04/01/2022 314 140 - 440 10*3/uL Final     Neutrophil Rel %   Date Value Ref Range Status   04/01/2022 68.6 45 - 80 % Final     Lymphocyte Rel %   Date Value Ref Range Status   04/01/2022 19.1 15 - 50 % Final     Monocyte Rel %   Date Value Ref Range Status   04/01/2022 10.4 0 - 15 % Final     Eosinophil %   Date Value Ref Range Status   04/01/2022 1.1 0 - 7 % Final     Basophil Rel %   Date Value Ref Range Status   04/01/2022 0.4 0 - 2 % Final     Immature Grans %   Date Value Ref Range Status   04/01/2022 0.4 0.0 - 1.0 % Final     Neutrophils Absolute   Date Value Ref Range Status   04/01/2022 5.09 2.0 - 8.8 10*3/uL Final     Lymphocytes Absolute   Date Value Ref Range Status   04/01/2022 1.42 0.7 - 5.5 10*3/uL Final     Monocytes Absolute   Date Value Ref Range Status   04/01/2022 0.77 0.0 - 1.7 10*3/uL Final     Eosinophils Absolute   Date Value Ref Range Status   04/01/2022 0.08 0.0 - 0.8 10*3/uL Final     Basophils Absolute   Date Value Ref Range Status   04/01/2022 0.03 0.0 - 0.2 10*3/uL  Final     Immature Grans, Absolute   Date Value Ref Range Status   04/01/2022 0.03 0.00 - 0.10 10*3/uL Final     nRBC   Date Value Ref Range Status   04/01/2022 0 0 /100(WBC) Final       Lab Results   Component Value Date    GLUCOSE 110 (H) 12/21/2021    BUN 8 12/21/2021    CREATININE 0.76 12/21/2021    EGFRIFNONA 84 12/21/2021    EGFRIFAFRI 97 12/21/2021    BCR 11 (L) 12/21/2021    CO2 24 12/21/2021    CALCIUM 9.2 12/21/2021    PROTENTOTREF 7.4 12/21/2021    ALBUMIN 4.4 12/21/2021    LABIL2 1.5 12/21/2021    AST 14 12/21/2021    ALT 15 12/21/2021         Imaging Results (Last 7 Days)     ** No results found for the last 168 hours. **          I personally reviewed data as detailed below:     The cbc listed above.    Office notes from: 4/29/22 pcp    Assessment and Plan  1.  Iron deficiency anemia: She has had reassuring endoscopy.  She has been off oral iron    2.  Esophageal diverticulum: Asymptomatic.  Monitoring by imaging    3.  Right lower quadrant pain: Suspected postherpetic neuralgia.  No current shingles type lesions    4.  Liver lesion: Stable looking back to 2019    Plan  Iron studies today along with CBC  Esophagram to assess the diverticulum  Liver ultrasound to follow her liver lesion  Trial of capsaicin cream for right lower quadrant pain       Diagnoses and all orders for this visit:    1. Iron deficiency anemia, unspecified iron deficiency anemia type (Primary)  -     Ferritin  -     Iron Profile  -     CBC & Differential    2. Esophageal diverticulum  -     FL Esophagram Complete Double-Contrast; Future    3. RLQ abdominal pain    4. Liver lesion  -     US Liver; Future    Other orders  -     capsaicin (ZOSTRIX) 0.075 % topical cream; Apply 1 application topically to the appropriate area as directed 3 (Three) Times a Day As Needed (pain, tingling). Apply with glove and wash hands afterwards  Dispense: 57 g; Refill: 1          I have discussed the above plan with the patient.  They verbalize  understanding and are in agreement with the plan.  They have been advised to contact the office for any questions, concerns, or changes related to their health.    Dictated utilizing Dragon dictation   no

## 2024-03-28 NOTE — PATIENT PROFILE ADULT - NSPRESCRUSEDDRG_GEN_A_NUR
No Headache Monitoring: I recommended monitoring the headaches for now. There is no evidence of increased intracranial pressure. They were instructed to call if the headaches are worsening.

## 2024-03-28 NOTE — PATIENT PROFILE ADULT - FUNCTIONAL ASSESSMENT - BASIC MOBILITY 5.
History & Physical    Name: Nathan Smith MRN: [id number]  SSN: [id number]    YOB: 1992  Age: 30 y.o. Sex: female        Subjective:   CC: Presents for eIOL  Estimated Date of Delivery: 22  OB History    Para Term  AB Living   1             SAB IAB Ectopic Molar Multiple Live Births                    # Outcome Date GA Lbr Calixto/2nd Weight Sex Delivery Anes PTL Lv   1 Current                Ms. Devin Anand is admitted with pregnancy at 39w6d for  eIOL . Patient denies vaginal bleeding, ROM, decreased FM, contractions. Prenatal course was complicated by  Factor V Leiden . Please see prenatal records for details. Past Medical History:   Diagnosis Date    Disease of blood and blood forming organ     factor V leiden     Past Surgical History:   Procedure Laterality Date    HX OTHER SURGICAL      wisdom teeth removal     Social History     Occupational History    Not on file   Tobacco Use    Smoking status: Never    Smokeless tobacco: Never   Substance and Sexual Activity    Alcohol use: Not Currently    Drug use: Never    Sexual activity: Yes     Partners: Male     Birth control/protection: Pill     No family history on file. Fm Hx: Non contributory  No Known Allergies  Prior to Admission medications    Medication Sig Start Date End Date Taking? Authorizing Provider   aspirin 81 mg chewable tablet Take 81 mg by mouth in the morning. Yes Provider, Historical   PNV85-iron-folic acid-dha-fish [id number] mg cap Take 2 Capsules by mouth. Yes Provider, Historical        Review of Systems   Constitutional:  Negative for chills and fever. HENT:  Negative for mouth sores and sore throat. Eyes:  Negative for visual disturbance. Respiratory:  Negative for cough, shortness of breath and wheezing. Cardiovascular:  Negative for chest pain, palpitations and leg swelling. Gastrointestinal:  Negative for abdominal pain, constipation, diarrhea, nausea and vomiting.    Endocrine: Negative for cold intolerance and heat intolerance. Genitourinary:  Negative for dysuria, flank pain, genital sores, hematuria, vaginal bleeding and vaginal discharge. Musculoskeletal:  Negative for arthralgias and myalgias. Skin:  Negative for rash. Neurological:  Negative for headaches. Hematological:  Negative for adenopathy. Psychiatric/Behavioral:  Negative for agitation, behavioral problems, confusion and decreased concentration. Objective:     Vitals:  Vitals:    07/24/22 1647 07/24/22 1715   BP: 131/85    Pulse: 100    Resp: 14    Temp: 98.5 °F (36.9 °C)    SpO2: 100%    Weight:  68 kg (150 lb)   Height:  5' 3\" (1.6 m)        Physical Exam  Vitals and nursing note reviewed. Exam conducted with a chaperone present. Constitutional:       General: She is not in acute distress. Appearance: Normal appearance. She is normal weight. She is not ill-appearing, toxic-appearing or diaphoretic. HENT:      Head: Normocephalic and atraumatic. Right Ear: External ear normal.      Left Ear: External ear normal.   Eyes:      General: No scleral icterus. Extraocular Movements: Extraocular movements intact. Cardiovascular:      Rate and Rhythm: Normal rate and regular rhythm. Heart sounds: Normal heart sounds. No murmur heard. No friction rub. No gallop. Pulmonary:      Effort: Pulmonary effort is normal. No respiratory distress. Breath sounds: Normal breath sounds. No stridor. No wheezing, rhonchi or rales. Abdominal:      General: There is no distension. Palpations: There is no mass. Tenderness: There is no abdominal tenderness. There is no right CVA tenderness, left CVA tenderness, guarding or rebound. Hernia: No hernia is present. Genitourinary:     General: Normal vulva. Musculoskeletal:         General: No swelling, tenderness, deformity or signs of injury. Normal range of motion. Cervical back: Normal range of motion and neck supple.  No rigidity or tenderness. Right lower leg: No edema. Left lower leg: No edema. Lymphadenopathy:      Cervical: No cervical adenopathy. Skin:     General: Skin is warm and dry. Capillary Refill: Capillary refill takes less than 2 seconds. Coloration: Skin is not jaundiced or pale. Findings: No bruising, erythema, lesion or rash. Neurological:      Mental Status: She is alert and oriented to person, place, and time. Deep Tendon Reflexes: Reflexes normal.   Psychiatric:         Mood and Affect: Mood normal.         Behavior: Behavior normal.         Thought Content: Thought content normal.         Judgment: Judgment normal.       Cervical Exam: 1 cm dilated    70% effaced    -2 station    Presenting Part: Vtx by U/S  Cervical Position: mid position  Uterine Activity: None  Membranes: Intact  Fetal Heart Rate: Reactive     Prenatal Labs:   No results found for: ABORH, RUBELLAEXT, GRBSEXT, HBSAGEXT, HIVEXT, RPREXT, GONNOEXT, CHLAMEXT, ABORHEXT, RUBELLAEXT, GRBSEXT, HBSAGEXT, HIVEXT, RPREXT, GONNOEXT, CHLAMEXT       Impression/Plan:     Active Problems:    Heterozygous factor V Leiden affecting pregnancy in third trimester, antepartum ([facility] [address] (7/24/2022)         Plan: Admit for eIOL. Group B Strep was positive, will treat prophylactically with penicillin. Patient defers IC Balloon,; she desires Cytotec. 3 = A little assistance

## 2024-03-28 NOTE — ED ADULT TRIAGE NOTE - GLASGOW COMA SCALE: BEST VERBAL RESPONSE, MLM
Writer went into pt room to give morning medications. Pt got upset and said \"I will not take my medications now. I cannot take them all they will get stuck. I am upset.\" When writer asked Pt why she was upset pt said \"you are not as smart as you think, in fact you are pretty dumb if you cannot here my upset stomach from there. If one of these pills gives me any diarrhea I swear to God.\" Writer explained that the antibiotics the pt was concerned about giving her diarrhea was discontinued and they instead ordered antifungal medications d/t her labs showing a yeast infection. Pt got more agitated and yelled \"I know what a yeast infection is I heard about it when I was in high school. I won't take half of those pills. I am over this I just want to rest I do not want anyone to come bother me. You are all arrogant and rude. Tell PT and OT to leave me alone.\"     Writer explained that while in bed pt is a Q2 hour turn d/t her deteriorating skin. Pt also explained that we are keeping her  from her and that we stole her crutches that he brought her the other day. Pt belongings does not have crutches documented on admission and PT Will said there were no crutches in room previously when he worked with her. Writer will continue to monitor.    (V5) oriented

## 2024-03-29 LAB
ANION GAP SERPL CALC-SCNC: 5 MMOL/L — SIGNIFICANT CHANGE UP (ref 5–17)
ANION GAP SERPL CALC-SCNC: 6 MMOL/L — SIGNIFICANT CHANGE UP (ref 5–17)
APTT BLD: 42.3 SEC — HIGH (ref 24.5–35.6)
BUN SERPL-MCNC: 20 MG/DL — SIGNIFICANT CHANGE UP (ref 7–23)
BUN SERPL-MCNC: 21 MG/DL — SIGNIFICANT CHANGE UP (ref 7–23)
CALCIUM SERPL-MCNC: 10.1 MG/DL — SIGNIFICANT CHANGE UP (ref 8.5–10.1)
CALCIUM SERPL-MCNC: 9.4 MG/DL — SIGNIFICANT CHANGE UP (ref 8.5–10.1)
CHLORIDE SERPL-SCNC: 103 MMOL/L — SIGNIFICANT CHANGE UP (ref 96–108)
CHLORIDE SERPL-SCNC: 104 MMOL/L — SIGNIFICANT CHANGE UP (ref 96–108)
CO2 SERPL-SCNC: 26 MMOL/L — SIGNIFICANT CHANGE UP (ref 22–31)
CO2 SERPL-SCNC: 30 MMOL/L — SIGNIFICANT CHANGE UP (ref 22–31)
CREAT SERPL-MCNC: 0.96 MG/DL — SIGNIFICANT CHANGE UP (ref 0.5–1.3)
CREAT SERPL-MCNC: 0.99 MG/DL — SIGNIFICANT CHANGE UP (ref 0.5–1.3)
EGFR: 73 ML/MIN/1.73M2 — SIGNIFICANT CHANGE UP
EGFR: 76 ML/MIN/1.73M2 — SIGNIFICANT CHANGE UP
GLUCOSE SERPL-MCNC: 104 MG/DL — HIGH (ref 70–99)
GLUCOSE SERPL-MCNC: 90 MG/DL — SIGNIFICANT CHANGE UP (ref 70–99)
HCT VFR BLD CALC: 40.3 % — SIGNIFICANT CHANGE UP (ref 39–50)
HGB BLD-MCNC: 12.2 G/DL — LOW (ref 13–17)
INR BLD: 3.69 RATIO — HIGH (ref 0.85–1.18)
LEGIONELLA AG UR QL: NEGATIVE — SIGNIFICANT CHANGE UP
MAGNESIUM SERPL-MCNC: 1.4 MG/DL — LOW (ref 1.6–2.6)
MAGNESIUM SERPL-MCNC: 2.1 MG/DL — SIGNIFICANT CHANGE UP (ref 1.6–2.6)
MCHC RBC-ENTMCNC: 30.3 GM/DL — LOW (ref 32–36)
MCHC RBC-ENTMCNC: 30.8 PG — SIGNIFICANT CHANGE UP (ref 27–34)
MCV RBC AUTO: 101.8 FL — HIGH (ref 80–100)
PHOSPHATE SERPL-MCNC: 3.1 MG/DL — SIGNIFICANT CHANGE UP (ref 2.5–4.5)
PLATELET # BLD AUTO: 156 K/UL — SIGNIFICANT CHANGE UP (ref 150–400)
POTASSIUM SERPL-MCNC: 4.4 MMOL/L — SIGNIFICANT CHANGE UP (ref 3.5–5.3)
POTASSIUM SERPL-MCNC: 5.4 MMOL/L — HIGH (ref 3.5–5.3)
POTASSIUM SERPL-SCNC: 4.4 MMOL/L — SIGNIFICANT CHANGE UP (ref 3.5–5.3)
POTASSIUM SERPL-SCNC: 5.4 MMOL/L — HIGH (ref 3.5–5.3)
PROTHROM AB SERPL-ACNC: 40.1 SEC — HIGH (ref 9.5–13)
RBC # BLD: 3.96 M/UL — LOW (ref 4.2–5.8)
RBC # FLD: 14.1 % — SIGNIFICANT CHANGE UP (ref 10.3–14.5)
S PNEUM AG UR QL: NEGATIVE — SIGNIFICANT CHANGE UP
SODIUM SERPL-SCNC: 135 MMOL/L — SIGNIFICANT CHANGE UP (ref 135–145)
SODIUM SERPL-SCNC: 139 MMOL/L — SIGNIFICANT CHANGE UP (ref 135–145)
TROPONIN I, HIGH SENSITIVITY RESULT: 30.73 NG/L — SIGNIFICANT CHANGE UP
TSH SERPL-MCNC: 0.44 UU/ML — SIGNIFICANT CHANGE UP (ref 0.34–4.82)
WBC # BLD: 111.04 K/UL — CRITICAL HIGH (ref 3.8–10.5)
WBC # FLD AUTO: 111.04 K/UL — CRITICAL HIGH (ref 3.8–10.5)

## 2024-03-29 PROCEDURE — 93010 ELECTROCARDIOGRAM REPORT: CPT

## 2024-03-29 PROCEDURE — 99232 SBSQ HOSP IP/OBS MODERATE 35: CPT

## 2024-03-29 PROCEDURE — 93306 TTE W/DOPPLER COMPLETE: CPT | Mod: 26

## 2024-03-29 RX ORDER — BUDESONIDE AND FORMOTEROL FUMARATE DIHYDRATE 160; 4.5 UG/1; UG/1
2 AEROSOL RESPIRATORY (INHALATION)
Refills: 0 | Status: DISCONTINUED | OUTPATIENT
Start: 2024-03-29 | End: 2024-04-01

## 2024-03-29 RX ORDER — MAGNESIUM SULFATE 500 MG/ML
2 VIAL (ML) INJECTION ONCE
Refills: 0 | Status: COMPLETED | OUTPATIENT
Start: 2024-03-29 | End: 2024-03-29

## 2024-03-29 RX ORDER — SODIUM ZIRCONIUM CYCLOSILICATE 10 G/10G
10 POWDER, FOR SUSPENSION ORAL ONCE
Refills: 0 | Status: COMPLETED | OUTPATIENT
Start: 2024-03-29 | End: 2024-03-29

## 2024-03-29 RX ADMIN — Medication 3 MILLIGRAM(S): at 21:23

## 2024-03-29 RX ADMIN — Medication 40 MILLIGRAM(S): at 06:04

## 2024-03-29 RX ADMIN — Medication 300 MILLIGRAM(S): at 10:25

## 2024-03-29 RX ADMIN — Medication 25 MILLIGRAM(S): at 10:24

## 2024-03-29 RX ADMIN — Medication 25 GRAM(S): at 05:18

## 2024-03-29 RX ADMIN — DORZOLAMIDE HYDROCHLORIDE TIMOLOL MALEATE 1 DROP(S): 20; 5 SOLUTION/ DROPS OPHTHALMIC at 10:26

## 2024-03-29 RX ADMIN — SODIUM ZIRCONIUM CYCLOSILICATE 10 GRAM(S): 10 POWDER, FOR SUSPENSION ORAL at 05:18

## 2024-03-29 RX ADMIN — Medication 110 MILLIGRAM(S): at 10:25

## 2024-03-29 RX ADMIN — Medication 110 MILLIGRAM(S): at 21:24

## 2024-03-29 RX ADMIN — Medication 40 MILLIGRAM(S): at 13:56

## 2024-03-29 RX ADMIN — AZITHROMYCIN 255 MILLIGRAM(S): 500 TABLET, FILM COATED ORAL at 05:27

## 2024-03-29 RX ADMIN — DORZOLAMIDE HYDROCHLORIDE TIMOLOL MALEATE 1 DROP(S): 20; 5 SOLUTION/ DROPS OPHTHALMIC at 21:24

## 2024-03-29 RX ADMIN — BUDESONIDE AND FORMOTEROL FUMARATE DIHYDRATE 2 PUFF(S): 160; 4.5 AEROSOL RESPIRATORY (INHALATION) at 20:30

## 2024-03-29 RX ADMIN — LISINOPRIL 20 MILLIGRAM(S): 2.5 TABLET ORAL at 10:25

## 2024-03-29 RX ADMIN — CEFTRIAXONE 1000 MILLIGRAM(S): 500 INJECTION, POWDER, FOR SOLUTION INTRAMUSCULAR; INTRAVENOUS at 05:29

## 2024-03-29 NOTE — DIETITIAN INITIAL EVALUATION ADULT - NSFNSGIIOFT_GEN_A_CORE
I&O's Detail    28 Mar 2024 07:01  -  29 Mar 2024 07:00  --------------------------------------------------------  IN:  Total IN: 0 mL    OUT:    Voided (mL): 475 mL  Total OUT: 475 mL    Total NET: -475 mL      29 Mar 2024 07:01  -  29 Mar 2024 11:27  --------------------------------------------------------  IN:  Total IN: 0 mL    OUT:    Voided (mL): 500 mL  Total OUT: 500 mL    Total NET: -500 mL

## 2024-03-29 NOTE — CONSULT NOTE ADULT - SUBJECTIVE AND OBJECTIVE BOX
HPI: JEFFREY ORTIZ is a 89y old Male with PMH CLL with chronic leukocytosis on surveillance, A. fib on coumadin, cardiomyopathy with AICD in place, HTN, systolic CHF, HTN, HPL, Gout, BPH who presented in ED from home with family with c/o productive cough with mucous like sputum and SOB for last 2-3 weeks. He went to  clinic and was started on Doxy for last 2 days, and he was not getting better.  He says that he was having more chest congestion and wheezing today morning prompting him to come to ED.  He also has noted some swelling to his both legs. He also feels cold and chills.  Upon arrival in ED, he was hypoxic with pulse ox of 80%, and he is requiring 4 L oxygen via NC to keep sats above 94%. He denies chest pain, palpitation, N/V, abdominal pain, diarrhea or dysuria.  CXR revealed an Enlarged cardiac silhouette, continued elevation of right hemidiaphragm and a New right basilar opacity which could be due to atelectasis or infection. Left basilar linear atelectasis.  He was started on Vancomycin and Cefepime IV given after blood culture draw.  This morning,     Past Medical History:   Afib    HTN (hypertension)    Cholesterol serum elevated    BPH (Benign Prostatic Hyperplasia)    CA - skin cancer    History of ankle fracture    Intolerance, drug    Hearing loss    Myocardial infarction    Gout    Osteoarthritis    Glaucoma    Rheumatoid arthritis    Chronic CHF    CLL (chronic lymphocytic leukemia)    Chronic systolic heart failure    AICD present, double chamber      Past Surgical History:   Hx of appendectomy    Hx of tonsillectomy    S/P TKR (total knee replacement)    Bilateral recurrent inguinal hernia    H/O coronary angiogram    H/O hernia repair    S/P laser cataract surgery    H/O shoulder surgery    S/P ORIF (open reduction internal fixation) fracture      Family History:    Family history of other heart disease (Father)    Family history of cardiac arrhythmia (Child)       Social History:     Review of Systems:  All 10 systems reviewed in detailed and found to be negative with the exception of what has already been described above    Allergies:  penicillins (Unknown)    Meds  MEDICATIONS  (STANDING):  allopurinol 300 milliGRAM(s) Oral daily  budesonide  80 MICROgram(s)/formoterol 4.5 MICROgram(s) Inhaler 2 Puff(s) Inhalation two times a day  cefTRIAXone Injectable. 1000 milliGRAM(s) IV Push every 24 hours  dorzolamide 2%/timolol 0.5% Ophthalmic Solution 1 Drop(s) Both EYES two times a day  doxycycline IVPB 100 milliGRAM(s) IV Intermittent every 12 hours  furosemide   Injectable 40 milliGRAM(s) IV Push two times a day  hydrochlorothiazide 25 milliGRAM(s) Oral daily  lisinopril 20 milliGRAM(s) Oral daily  metoprolol tartrate 25 milliGRAM(s) Oral two times a day    MEDICATIONS  (PRN):  acetaminophen     Tablet .. 650 milliGRAM(s) Oral every 6 hours PRN Mild Pain (1 - 3)  albuterol    90 MICROgram(s) HFA Inhaler 2 Puff(s) Inhalation every 6 hours PRN Shortness of Breath and/or Wheezing  aluminum hydroxide/magnesium hydroxide/simethicone Suspension 30 milliLiter(s) Oral every 4 hours PRN Dyspepsia  melatonin 3 milliGRAM(s) Oral at bedtime PRN Insomnia  ondansetron Injectable 4 milliGRAM(s) IV Push every 6 hours PRN Nausea and/or Vomiting    Physical Exam  T(C): 36.3 (03-29-24 @ 08:03), Max: 36.8 (03-28-24 @ 17:06)  HR: 62 (03-29-24 @ 08:03) (62 - 80)  BP: 122/67 (03-29-24 @ 08:03) (122/67 - 144/80)  RR: 19 (03-29-24 @ 08:03) (19 - 24)  SpO2: 94% (03-29-24 @ 08:03) (87% - 95%)  Gen: Alert, oriented, no distress  HEENT: Anicteric sclera, moist mucous membranes, no JVD, no lymphadenopathy, good dentition  Cardio: Regular rhythm and rate, normal S1S2, no murmurs  Resp: Clear to auscultation bilaterally, no wheezing or rhonchi  GI: Nontender, nondistended, normoactive bowel sounds  Ext: No cyanosis, clubbing or edema  Neuro: Nonfocal    Labs:                        12.2   111.04 )-----------( 156      ( 29 Mar 2024 07:23 )             40.3     03-29    139  |  103  |  20  ----------------------------<  104<H>  4.4   |  30  |  0.96    Ca    10.1      29 Mar 2024 07:23  Phos  3.1     03-29  Mg     2.1     03-29      PT/INR - ( 29 Mar 2024 07:23 )   PT: 40.1 sec;   INR: 3.69 ratio         PTT - ( 29 Mar 2024 07:23 )  PTT:42.3 sec  Urinalysis Basic - ( 29 Mar 2024 07:23 )    Color: x / Appearance: x / SG: x / pH: x  Gluc: 104 mg/dL / Ketone: x  / Bili: x / Urobili: x   Blood: x / Protein: x / Nitrite: x   Leuk Esterase: x / RBC: x / WBC x   Sq Epi: x / Non Sq Epi: x / Bacteria: x    < from: CT Angio Chest PE Protocol w/ IV Cont (03.28.24 @ 15:19) >  PROCEDURE DATE:  03/28/2024          INTERPRETATION:  Clinical information: Evaluate for pulmonary embolus.   Exam is compared to previous study of 5/11/2023.    CT angiogram of the chest was obtained following administration of   intravenous contrast. Approximately 90 cc of Omnipaque 350 was   administered. Coronal, sagittal and MIP images were submitted for review.    Few lymph nodes are present in the mediastinum.    Heart is enlarged in size. Calcification of the aortic valve and coronary   arteries is noted. No pericardial effusion is noted. An AICD is noted in   place. Pulmonary arteries are normal in caliber. No filling defects are   noted.    No endobronchial lesions are noted. Few small patchy opacities are noted   in both lungs. Minimal atelectasis is also noted involving portion of the   right lower lobe. No pleural effusions are noted. Elevation of the right   hemidiaphragm is noted.    Below the diaphragm, visualized portions of the abdomen demonstrate   patient to be status post cholecystectomy. Pneumobilia is noted.   Low-attenuation lesions in the right kidney are unchanged when compared   to previous exam.    Degenerative changes of the spine are noted.    IMPRESSION: No pulmonary embolus is noted.    Small patchy opacities are noted within both lungs. Exact etiology is   unclear. One in the differential diagnostic consideration includes   pulmonary edema.      < from: Xray Chest 1 View AP/PA. (03.28.24 @ 12:38) >  PROCEDURE DATE:  03/28/2024          INTERPRETATION:  TIME OF EXAM: March 28, 2024 at 12:24 PM.    CLINICAL INFORMATION: Cough x 4 days.    COMPARISON:  March 23, 2024.    TECHNIQUE:  AP Portable chest x-ray.    INTERPRETATION:    The cardiac silhouette is enlarged.  There is a left chest wall biventricular pacemaker. The leads are not   seen in their entirety due to technique. The generator obscures part of   the left lung.  There is continued elevation of the right hemidiaphragm.  There is new right basilar opacity.  There is linear atelectasis at the left base.  No pleural effusion or pneumothorax is seen.  There is no acute bony abnormality.      IMPRESSION:  Enlarged cardiac silhouette.    Continued elevation of right hemidiaphragm.    New right basilar opacity which could be due to atelectasis or infection.    Left basilar linear atelectasis.    A CTA of the chest was performed. Please see that report.    < end of copied text >     HPI: JEFFREY ORTIZ is a 89y old Male with PMH CLL with chronic leukocytosis on surveillance, A. fib on coumadin, cardiomyopathy with AICD in place, HTN, systolic CHF, HTN, HPL, Gout, BPH who presented in ED from home with family with c/o productive cough with mucous like sputum and SOB for last 2-3 weeks. He went to  clinic and was started on Doxy for last 2 days, and he was not getting better.  He says that he was having more chest congestion and wheezing today morning prompting him to come to ED.  He also has noted some swelling to his both legs. He also feels cold and chills.  Upon arrival in ED, he was hypoxic with pulse ox of 80%, and he is requiring 4 L oxygen via NC to keep sats above 94%. He denies chest pain, palpitation, N/V, abdominal pain, diarrhea or dysuria.  CXR revealed an Enlarged cardiac silhouette, continued elevation of right hemidiaphragm and a New right basilar opacity which could be due to atelectasis or infection. Left basilar linear atelectasis.  He was started on Vancomycin and Cefepime IV given after blood culture draw.  This morning, he feels much improved    Past Medical History:   Afib    HTN (hypertension)    Cholesterol serum elevated    BPH (Benign Prostatic Hyperplasia)    CA - skin cancer    History of ankle fracture    Intolerance, drug    Hearing loss    Myocardial infarction    Gout    Osteoarthritis    Glaucoma    Rheumatoid arthritis    Chronic CHF    CLL (chronic lymphocytic leukemia)    Chronic systolic heart failure    AICD present, double chamber      Past Surgical History:   Hx of appendectomy    Hx of tonsillectomy    S/P TKR (total knee replacement)    Bilateral recurrent inguinal hernia    H/O coronary angiogram    H/O hernia repair    S/P laser cataract surgery    H/O shoulder surgery    S/P ORIF (open reduction internal fixation) fracture      Family History:    Family history of other heart disease (Father)    Family history of cardiac arrhythmia (Child)       Social History:     Review of Systems:  All 10 systems reviewed in detailed and found to be negative with the exception of what has already been described above    Allergies:  penicillins (Unknown)    Meds  MEDICATIONS  (STANDING):  allopurinol 300 milliGRAM(s) Oral daily  budesonide  80 MICROgram(s)/formoterol 4.5 MICROgram(s) Inhaler 2 Puff(s) Inhalation two times a day  cefTRIAXone Injectable. 1000 milliGRAM(s) IV Push every 24 hours  dorzolamide 2%/timolol 0.5% Ophthalmic Solution 1 Drop(s) Both EYES two times a day  doxycycline IVPB 100 milliGRAM(s) IV Intermittent every 12 hours  furosemide   Injectable 40 milliGRAM(s) IV Push two times a day  hydrochlorothiazide 25 milliGRAM(s) Oral daily  lisinopril 20 milliGRAM(s) Oral daily  metoprolol tartrate 25 milliGRAM(s) Oral two times a day    MEDICATIONS  (PRN):  acetaminophen     Tablet .. 650 milliGRAM(s) Oral every 6 hours PRN Mild Pain (1 - 3)  albuterol    90 MICROgram(s) HFA Inhaler 2 Puff(s) Inhalation every 6 hours PRN Shortness of Breath and/or Wheezing  aluminum hydroxide/magnesium hydroxide/simethicone Suspension 30 milliLiter(s) Oral every 4 hours PRN Dyspepsia  melatonin 3 milliGRAM(s) Oral at bedtime PRN Insomnia  ondansetron Injectable 4 milliGRAM(s) IV Push every 6 hours PRN Nausea and/or Vomiting    Physical Exam  T(C): 36.3 (03-29-24 @ 08:03), Max: 36.8 (03-28-24 @ 17:06)  HR: 62 (03-29-24 @ 08:03) (62 - 80)  BP: 122/67 (03-29-24 @ 08:03) (122/67 - 144/80)  RR: 19 (03-29-24 @ 08:03) (19 - 24)  SpO2: 94% (03-29-24 @ 08:03) (87% - 95%)  Gen: Alert, oriented, no distress  HEENT: Anicteric sclera, moist mucous membranes, no JVD, no lymphadenopathy  Cardio: Regular rhythm and rate, normal S1S2, no murmurs  Resp: Clear to auscultation bilaterally, no wheezing or rhonchi  GI: Nontender, nondistended, normoactive bowel sounds  Ext: No cyanosis, clubbing or edema  Neuro: Nonfocal    Labs:                        12.2   111.04 )-----------( 156      ( 29 Mar 2024 07:23 )             40.3     03-29    139  |  103  |  20  ----------------------------<  104<H>  4.4   |  30  |  0.96    Ca    10.1      29 Mar 2024 07:23  Phos  3.1     03-29  Mg     2.1     03-29      PT/INR - ( 29 Mar 2024 07:23 )   PT: 40.1 sec;   INR: 3.69 ratio         PTT - ( 29 Mar 2024 07:23 )  PTT:42.3 sec  Urinalysis Basic - ( 29 Mar 2024 07:23 )    Color: x / Appearance: x / SG: x / pH: x  Gluc: 104 mg/dL / Ketone: x  / Bili: x / Urobili: x   Blood: x / Protein: x / Nitrite: x   Leuk Esterase: x / RBC: x / WBC x   Sq Epi: x / Non Sq Epi: x / Bacteria: x    < from: CT Angio Chest PE Protocol w/ IV Cont (03.28.24 @ 15:19) >  PROCEDURE DATE:  03/28/2024          INTERPRETATION:  Clinical information: Evaluate for pulmonary embolus.   Exam is compared to previous study of 5/11/2023.    CT angiogram of the chest was obtained following administration of   intravenous contrast. Approximately 90 cc of Omnipaque 350 was   administered. Coronal, sagittal and MIP images were submitted for review.    Few lymph nodes are present in the mediastinum.    Heart is enlarged in size. Calcification of the aortic valve and coronary   arteries is noted. No pericardial effusion is noted. An AICD is noted in   place. Pulmonary arteries are normal in caliber. No filling defects are   noted.    No endobronchial lesions are noted. Few small patchy opacities are noted   in both lungs. Minimal atelectasis is also noted involving portion of the   right lower lobe. No pleural effusions are noted. Elevation of the right   hemidiaphragm is noted.    Below the diaphragm, visualized portions of the abdomen demonstrate   patient to be status post cholecystectomy. Pneumobilia is noted.   Low-attenuation lesions in the right kidney are unchanged when compared   to previous exam.    Degenerative changes of the spine are noted.    IMPRESSION: No pulmonary embolus is noted.    Small patchy opacities are noted within both lungs. Exact etiology is   unclear. One in the differential diagnostic consideration includes   pulmonary edema.      < from: Xray Chest 1 View AP/PA. (03.28.24 @ 12:38) >  PROCEDURE DATE:  03/28/2024          INTERPRETATION:  TIME OF EXAM: March 28, 2024 at 12:24 PM.    CLINICAL INFORMATION: Cough x 4 days.    COMPARISON:  March 23, 2024.    TECHNIQUE:  AP Portable chest x-ray.    INTERPRETATION:    The cardiac silhouette is enlarged.  There is a left chest wall biventricular pacemaker. The leads are not   seen in their entirety due to technique. The generator obscures part of   the left lung.  There is continued elevation of the right hemidiaphragm.  There is new right basilar opacity.  There is linear atelectasis at the left base.  No pleural effusion or pneumothorax is seen.  There is no acute bony abnormality.      IMPRESSION:  Enlarged cardiac silhouette.    Continued elevation of right hemidiaphragm.    New right basilar opacity which could be due to atelectasis or infection.    Left basilar linear atelectasis.    A CTA of the chest was performed. Please see that report.    < end of copied text >

## 2024-03-29 NOTE — DIETITIAN INITIAL EVALUATION ADULT - ADD RECOMMEND
none 1. Recommend to change diet to low-sodium ONLY to maximize caloric and protein intake    2. Encourage protein-rich foods, maximize food preferences   3. Add ensure plus high protein once daily to optimize PO intake (provides 350 kcal, 20g protein/ shake)   4. Daily wt to track/trend changes   5. Consider adding appetite stimulant such as Remeron or Marinol 2/2 chronically poor appetite/ PO intake   6. Consider obtaining vitamin D 25OH level to assess nutriture   7. Monitor bowel movements, if no BM for >3 days, consider implementing bowel regimen.   8. Consider adding thiamine 100 mg daily 2/2 poor PO intake/ malnutrition and MVI w/ minerals daily to ensure 100% RDA met   9. Confirm goals of care regarding nutrition support   RD will continue to monitor PO intake, labs, hydration, and wt prn.

## 2024-03-29 NOTE — DIETITIAN INITIAL EVALUATION ADULT - ETIOLOGY
r/t decreased ability to meet increased nutrient needs 2/2 acute CHF exacerbation on advanced age w/ decreased PO intake

## 2024-03-29 NOTE — DIETITIAN INITIAL EVALUATION ADULT - ORAL INTAKE PTA/DIET HISTORY
Lives at home w/ wife who has dementia and daughter and granddaughter, who help w/ grocery shopping/ cooking. Reports appetite has "not been great" x last 2 yrs and states that he "can eat, just is not hungry," typically consumes 3 meals/day but noted to be small or only 50% consumed, consuming <75% ENN x2 yrs. C/o persistent swelling in abdomen and chronic neck pain.

## 2024-03-29 NOTE — DIETITIAN INITIAL EVALUATION ADULT - PERTINENT MEDS FT
MEDICATIONS  (STANDING):  allopurinol 300 milliGRAM(s) Oral daily  budesonide  80 MICROgram(s)/formoterol 4.5 MICROgram(s) Inhaler 2 Puff(s) Inhalation two times a day  cefTRIAXone Injectable. 1000 milliGRAM(s) IV Push every 24 hours  dorzolamide 2%/timolol 0.5% Ophthalmic Solution 1 Drop(s) Both EYES two times a day  doxycycline IVPB 100 milliGRAM(s) IV Intermittent every 12 hours  furosemide   Injectable 40 milliGRAM(s) IV Push two times a day  hydrochlorothiazide 25 milliGRAM(s) Oral daily  lisinopril 20 milliGRAM(s) Oral daily  metoprolol tartrate 25 milliGRAM(s) Oral two times a day    MEDICATIONS  (PRN):  acetaminophen     Tablet .. 650 milliGRAM(s) Oral every 6 hours PRN Mild Pain (1 - 3)  albuterol    90 MICROgram(s) HFA Inhaler 2 Puff(s) Inhalation every 6 hours PRN Shortness of Breath and/or Wheezing  aluminum hydroxide/magnesium hydroxide/simethicone Suspension 30 milliLiter(s) Oral every 4 hours PRN Dyspepsia  melatonin 3 milliGRAM(s) Oral at bedtime PRN Insomnia  ondansetron Injectable 4 milliGRAM(s) IV Push every 6 hours PRN Nausea and/or Vomiting

## 2024-03-29 NOTE — DIETITIAN INITIAL EVALUATION ADULT - PERTINENT LABORATORY DATA
03-29    139  |  103  |  20  ----------------------------<  104<H>  4.4   |  30  |  0.96    Ca    10.1      29 Mar 2024 07:23  Phos  3.1     03-29  Mg     2.1     03-29

## 2024-03-29 NOTE — CHART NOTE - NSCHARTNOTEFT_GEN_A_CORE
Patient noted to have frequent tripples and couplets of non-sustained vTach on the monitor. Evaluated patient. Denies current worsening Chest pain. Reports some SOB and cough.   States that he has known cardiac disease.     T(C): 36.8 (03-28-24 @ 19:41), Max: 36.8 (03-28-24 @ 17:06)  HR: 80 (03-28-24 @ 19:41) (68 - 80)  BP: 126/54 (03-28-24 @ 19:41) (126/54 - 145/72)  RR: 22 (03-28-24 @ 19:41) (16 - 24)  SpO2: 94% (03-28-24 @ 19:41) (87% - 96%)    CONSTITUTIONAL: Well groomed, no apparent distress  RESP: No respiratory distress, no use of accessory muscles; CTA b/l, no WRR  CV: RRR, +S1S2, no MRG; no JVD; no peripheral edema  GI: Soft, NT, ND, no rebound, no guarding; no palpable masses; no hepatosplenomegaly; no hernia palpated    Plan  Arrythmia  - EKG  - Trop, BMP, Mag, Phos, TSH     Advised Dr. Snider Patient noted to have frequent episodes of 2 and 3 beats of non-sustained vTach on the monitor. Evaluated patient. Denies current worsening Chest pain. Reports some SOB and cough.   States that he has known cardiac disease.     T(C): 36.8 (03-28-24 @ 19:41), Max: 36.8 (03-28-24 @ 17:06)  HR: 80 (03-28-24 @ 19:41) (68 - 80)  BP: 126/54 (03-28-24 @ 19:41) (126/54 - 145/72)  RR: 22 (03-28-24 @ 19:41) (16 - 24)  SpO2: 94% (03-28-24 @ 19:41) (87% - 96%)    CONSTITUTIONAL: Well groomed, no apparent distress  RESP: No respiratory distress, no use of accessory muscles; CTA b/l, no WRR  CV: RRR, +S1S2, no MRG; no JVD; no peripheral edema  GI: Soft, NT, ND, no rebound, no guarding; no palpable masses; no hepatosplenomegaly; no hernia palpated    Plan  Arrythmia  - EKG  - Trop, BMP, Mag, Phos, TSH     Advised Dr. Snider

## 2024-03-29 NOTE — DIETITIAN NUTRITION RISK NOTIFICATION - ADDITIONAL COMMENTS/DIETITIAN RECOMMENDATIONS
1. Recommend to change diet to low-sodium ONLY to maximize caloric and protein intake    2. Encourage protein-rich foods, maximize food preferences   3. Add ensure plus high protein once daily to optimize PO intake (provides 350 kcal, 20g protein/ shake)   4. Daily wt to track/trend changes   5. Consider adding appetite stimulant such as Remeron or Marinol 2/2 chronically poor appetite/ PO intake   6. Consider obtaining vitamin D 25OH level to assess nutriture   7. Monitor bowel movements, if no BM for >3 days, consider implementing bowel regimen.   8. Consider adding thiamine 100 mg daily 2/2 poor PO intake/ malnutrition and MVI w/ minerals daily to ensure 100% RDA met   9. Confirm goals of care regarding nutrition support   RD will continue to monitor PO intake, labs, hydration, and wt prn.

## 2024-03-29 NOTE — DIETITIAN INITIAL EVALUATION ADULT - ENTER FROM (ML/KG)
"Pt called, reports severe neck pain that started early yesterday AM. Pain is to L side of neck and started when pt turned her head. Currently rates pain at 10/10. Pain radiates up into her head. No weakness or numbness. No fever. Pt has tried heat, ice, tylenol, ibuprofen with no noted improvement. Scheduled for telephone visit with covering provider.     Reason for Disposition    [1] SEVERE neck pain (e.g., excruciating, unable to do any normal activities) AND [2] not improved after 2 hours of pain medicine    Additional Information    Negative: Shock suspected (e.g., cold/pale/clammy skin, too weak to stand, low BP, rapid pulse)    Negative: Difficult to awaken or acting confused (e.g., disoriented, slurred speech)    Negative: [1] Similar pain previously AND [2] it was from \"heart attack\"    Negative: [1] Similar pain previously AND [2] it was from \"angina\" AND [3] not relieved by nitroglycerin    Negative: Sounds like a life-threatening emergency to the triager    Negative: Followed a neck injury (e.g., MVA, sports, impact or collision)    Negative: Chest pain    Negative: Lymph node in the neck is swollen or painful to the touch    Negative: Sore throat is main symptom    Negative: Difficulty breathing or unusual sweating (e.g., sweating without exertion)    Negative: [1] Stiff neck (can't put chin to chest) AND [2] headache    Negative: [1] Stiff neck (can't put chin to chest) AND [2] fever    Negative: Weakness of an arm or hand    Negative: Problems with bowel or bladder control    Negative: Head is twisting to one side (or ask \"is it turning against your will?\")    Negative: Patient sounds very sick or weak to the triager    Answer Assessment - Initial Assessment Questions  1. ONSET: \"When did the pain begin?\"       Early yesterday morning  2. LOCATION: \"Where does it hurt?\"       L side of neck  3. PATTERN \"Does the pain come and go, or has it been constant since it started?\"       Constant, worse with " "movement of head  4. SEVERITY: \"How bad is the pain?\"  (Scale 1-10; or mild, moderate, severe)    - MILD (1-3): doesn't interfere with normal activities     - MODERATE (4-7): interferes with normal activities or awakens from sleep     - SEVERE (8-10):  excruciating pain, unable to do any normal activities       10/10  5. RADIATION: \"Does the pain go anywhere else, shoot into your arms?\"      Shoots up into head  6. CORD SYMPTOMS: \"Any weakness or numbness of the arms or legs?\"      no  7. CAUSE: \"What do you think is causing the neck pain?\"      Not sure  8. NECK OVERUSE: \"Any recent activities that involved turning or twisting the neck?\"      no  9. OTHER SYMPTOMS: \"Do you have any other symptoms?\" (e.g., headache, fever, chest pain, difficulty breathing, neck swelling)      No fever  10. PREGNANCY: \"Is there any chance you are pregnant?\" \"When was your last menstrual period?\"        NA    Protocols used: NECK PAIN OR [id number]      " 20

## 2024-03-29 NOTE — DIETITIAN INITIAL EVALUATION ADULT - NSICDXPASTMEDICALHX_GEN_ALL_CORE_FT
PAST MEDICAL HISTORY:  Afib     AICD present, double chamber     BPH (Benign Prostatic Hyperplasia)     CA - skin cancer     Cholesterol serum elevated     Chronic systolic heart failure     CLL (chronic lymphocytic leukemia)     Glaucoma     Gout     Hearing loss     History of ankle fracture     HTN (hypertension)     Intolerance, drug INTOLERANCE  TO STATIN    Myocardial infarction     Osteoarthritis     Rheumatoid arthritis

## 2024-03-29 NOTE — CHART NOTE - NSCHARTNOTEFT_GEN_A_CORE
Reviewed results of labs as below:     Basic Metabolic Panel - STAT (03.29.24 @ 01:57)    Sodium: 135 mmol/L    Potassium: 5.4 mmol/L    Chloride: 104 mmol/L    Carbon Dioxide: 26 mmol/L    Anion Gap: 5 mmol/L    Blood Urea Nitrogen: 21 mg/dL    Creatinine: 0.99 mg/dL    Glucose: 90 mg/dL    Calcium: 9.4 mg/dL    eGFR: 73: The estimated glomerular filtration rate (eGFR) is calculated using the  2021 CKD-EPI creatinine equation, which does not have a coefficient for  race and is validated in individuals 18 years of age and older (N Engl J  Med 2021; 385:8370-5599). Creatinine-based eGFR may be inaccurate in  various situations including but not limited to extremes of muscle mass,  altered dietary protein intake, or medications that affect renal tubular  creatinine secretion. mL/min/1.73m2    Magnesium: 1.4 mg/dL (03.29.24 @ 01:57)      Plan  Hyperkalemia  - 10g Lokelma     Hypomagnesemia   - 2g magnesium    Discussed with Dr. Snider

## 2024-03-29 NOTE — PROGRESS NOTE ADULT - ASSESSMENT
89y Male with significant PMH of CLL with chronic leukocytosis on surveillance, A. fib on coumadin, cardiomyopathy with AICD in place, HTN, systolic CHF, HTN, HPL, Gout, BPH and others presented in ED from home with family with c/o productive cough with mucous like sputum and SOB for last 2-3 weeks. He went to  clinic and was started on Doxy for last 2 days, and he was not getting better.  He says that he was having more chest congestion and wheezing today morning prompting him to come to ED.  He also has noted some swelling to his both legs. He also feels cold and chills.  Upon arrival in ED, he was hypoxic with pulse ox of 80%, and he is requiring 4 L oxygen via NC to keep sats above 94%. He denies chest pain, palpitation, N/V, abdominal pain, diarrhea or dysuria.    # Acute respiratory failure with hypoxia secondary to multifocal PNA  - CT and CXR showed patchy infiltrates both lower lobes   -  PE ruled out.  - Follow cultures  - Rocephin and Zithromax   - Oxygen supplementation via NC to keep sats above 90%.    # Acute on chronic systolic CHF.  -BNP elevated 3474.   - 2D Echo on 05/12/2023: LVEF 50%  -Daily weight, Fluid restriction and strict I&O.  -Lasix 40 mg IV bid  -Recheck echo  - Cardio eval     # NSVT with PACs/PVC  - Has had in the past  - Has Biventricular PM  - Cardiology consult    # P A. fib.  -Stable and controlled.  - Metoprolol  - Coumadin  -Check INR in am, and dose coumadin if INR <3.00    # HTN  - Continue lisinopril    # Gout.  -Allopurinol.    # CLL.  -WBC 123k with N 3% and L 96%.  -F/u with PCP and Oncology as outpatient.    # DVT prophylaxis:   On coumadin.

## 2024-03-29 NOTE — CONSULT NOTE ADULT - ASSESSMENT
88y old Male with PMH CLL baseline WBC ~ 80k, Afib on coumadin, severe cardiomyopathy, CAD s/p MI, HTN, HLD, CHF, s/p AICD, RA with pneumonia  1. pneumonia:   -supplemental o2  -continue symbicort, duonebs  -sputum cultures, blood cultures  2. cll: per heme onc.            Meds  MEDICATIONS  (STANDING):  allopurinol 300 milliGRAM(s) Oral daily  budesonide  80 MICROgram(s)/formoterol 4.5 MICROgram(s) Inhaler 2 Puff(s) Inhalation two times a day  cefTRIAXone Injectable. 1000 milliGRAM(s) IV Push every 24 hours  dorzolamide 2%/timolol 0.5% Ophthalmic Solution 1 Drop(s) Both EYES two times a day  doxycycline IVPB 100 milliGRAM(s) IV Intermittent every 12 hours  furosemide   Injectable 40 milliGRAM(s) IV Push two times a day  hydrochlorothiazide 25 milliGRAM(s) Oral daily  lisinopril 20 milliGRAM(s) Oral daily  metoprolol tartrate 25 milliGRAM(s) Oral two times a day    MEDICATIONS  (PRN):  acetaminophen     Tablet .. 650 milliGRAM(s) Oral every 6 hours PRN Mild Pain (1 - 3)  albuterol    90 MICROgram(s) HFA Inhaler 2 Puff(s) Inhalation every 6 hours PRN Shortness of Breath and/or Wheezing  aluminum hydroxide/magnesium hydroxide/simethicone Suspension 30 milliLiter(s) Oral every 4 hours PRN Dyspepsia  melatonin 3 milliGRAM(s) Oral at bedtime PRN Insomnia  ondansetron Injectable 4 milliGRAM(s) IV Push every 6 hours PRN Nausea 88y old Male with PMH CLL baseline WBC ~ 80k, Afib on coumadin, severe cardiomyopathy, CAD s/p MI, HTN, HLD, CHF, s/p AICD, RA with pneumonia  1. pneumonia: improving  -supplemental o2  -continue symbicort, albuterol prn  -continue doycycline, ceftriaxone  -sputum cultures, blood cultures  2. cll: per heme onc  3. cardiomyopathy: continue hctz, lasix

## 2024-03-29 NOTE — DIETITIAN INITIAL EVALUATION ADULT - ORAL NUTRITION SUPPLEMENTS
Add ensure plus high protein once daily to optimize PO intake (provides 350 kcal, 20g protein/ shake)

## 2024-03-29 NOTE — DIETITIAN INITIAL EVALUATION ADULT - OTHER INFO
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 fib on coumadin, cardiomyopathy with AICD in place, HTN, systolic CHF, HTN, HPL, Gout, BPH and others presented in ED from home with family with c/o productive cough with mucous like sputum and SOB for last 2-3 weeks. He went to  clinic and was started on Doxy for last 2 days, and he was not getting better.  He says that he was having more chest congestion and wheezing today morning prompting him to come to ED.  He also has noted some swelling to his both legs. He also feels cold and chills.  Upon arrival in ED, he was hypoxic with pulse ox of 80%, and he is requiring 4 L oxygen via NC to keep sats above 94%. Admit for dyspnea and acute CHF exacerbation.     Known to nutr services w/ dx moderate PCM on 5/12/23. Upon RD visit this morning, reports he was awaiting breakfast (first meal since admit) of french toast and ginger ale, currently on DASH diet, likely consuming 50-75% ENN while in HH. Reports # w/ ~40# wt loss x2 yrs. Wt hx as per previous RD note: 205# on 5/12/23 (bed scale wt). RD unable to obtain bed scale wt 2/2 bed not in required position upon RD visit. EMR wt doc'd 209# on 3/28, appears accurate, no edema doc'd, however, abdomen appears swollen upon observation. No significant wt changes noted x10 mo, however, unintentional wt loss 41# / 16.4% noted x2 yrs - not clin sig. Appears overweight w/ NFPE revealing moderate-severe muscle/fat wasting. Recommend to change diet to low-sodium ONLY 2/2 pt reported swelling. Will add ensure plus high protein once daily to optimize PO intake (provides 350 kcal, 20g protein/ shake). Consider adding appetite stimulant such as Remeron or Marinol 2/2 chronically poor appetite/ PO intake. See below for other recs.

## 2024-03-29 NOTE — PROGRESS NOTE ADULT - SUBJECTIVE AND OBJECTIVE BOX
HOSPITALIST ATTENDING PROGRESS NOTE    Chart and meds reviewed.  Patient seen and examined.    CC: Fever    Subjective: Coughing with SOB still. Feels a little better. Productive cough.     All other systems reviewed and found to be negative with the exception of what has been described above.    MEDICATIONS  (STANDING):  allopurinol 300 milliGRAM(s) Oral daily  budesonide  80 MICROgram(s)/formoterol 4.5 MICROgram(s) Inhaler 2 Puff(s) Inhalation two times a day  cefTRIAXone Injectable. 1000 milliGRAM(s) IV Push every 24 hours  dorzolamide 2%/timolol 0.5% Ophthalmic Solution 1 Drop(s) Both EYES two times a day  doxycycline IVPB 100 milliGRAM(s) IV Intermittent every 12 hours  furosemide   Injectable 40 milliGRAM(s) IV Push two times a day  hydrochlorothiazide 25 milliGRAM(s) Oral daily  lisinopril 20 milliGRAM(s) Oral daily  metoprolol tartrate 25 milliGRAM(s) Oral two times a day    MEDICATIONS  (PRN):  acetaminophen     Tablet .. 650 milliGRAM(s) Oral every 6 hours PRN Mild Pain (1 - 3)  albuterol    90 MICROgram(s) HFA Inhaler 2 Puff(s) Inhalation every 6 hours PRN Shortness of Breath and/or Wheezing  aluminum hydroxide/magnesium hydroxide/simethicone Suspension 30 milliLiter(s) Oral every 4 hours PRN Dyspepsia  melatonin 3 milliGRAM(s) Oral at bedtime PRN Insomnia  ondansetron Injectable 4 milliGRAM(s) IV Push every 6 hours PRN Nausea and/or Vomiting      Vital Signs Last 24 Hrs  T(C): 36.3 (29 Mar 2024 08:03), Max: 36.8 (28 Mar 2024 17:06)  T(F): 97.3 (29 Mar 2024 08:03), Max: 98.3 (28 Mar 2024 19:41)  HR: 62 (29 Mar 2024 08:03) (62 - 80)  BP: 122/67 (29 Mar 2024 08:03) (122/67 - 144/80)  BP(mean): 74 (28 Mar 2024 19:41) (74 - 74)  RR: 19 (29 Mar 2024 08:03) (19 - 24)  SpO2: 94% (29 Mar 2024 08:03) (87% - 95%)    GEN: NAD  HEENT:  pupils equal and reactive, EOMI, no oropharyngeal lesions, erythema, exudates, oral thrush  NECK:   supple, no carotid bruits, no palpable lymph nodes, no thyromegaly  CV:  +S1, +S2, regular, no murmurs or rubs  RESP:   lungs clear to auscultation bilaterally, no wheezing, rales, rhonchi, good air entry bilaterally  GI:  abdomen soft, non-tender, non-distended, normal BS, no bruits, no abdominal masses, no palpable masses  EXT:  no clubbing, no cyanosis, no edema, no calf pain, swelling or erythema  NEURO:  AAOX3, no focal neurological deficits, follows all commands, able to move extremities spontaneously  SKIN:  no ulcers, lesions or rashes    LABS:                          12.2   111.04 )-----------( 156      ( 29 Mar 2024 07:23 )             40.3     03-29    139  |  103  |  20  ----------------------------<  104<H>  4.4   |  30  |  0.96    Ca    10.1      29 Mar 2024 07:23  Phos  3.1     03-29  Mg     2.1     03-29    PT/INR - ( 29 Mar 2024 07:23 )   PT: 40.1 sec;   INR: 3.69 ratio    PTT - ( 29 Mar 2024 07:23 )  PTT:42.3 sec    Urinalysis Basic - ( 29 Mar 2024 07:23 )< from: CT Angio Chest PE Protocol w/ IV Cont (03.28.24 @ 15:19) >  IMPRESSION: No pulmonary embolus is noted.    Small patchy opacities are noted within both lungs. Exact etiology is   unclear. One in the differential diagnostic consideration includes   pulmonary edema.    --- End of Report ---    < end of copied text >    Color: x / Appearance: x / SG: x / pH: x  Gluc: 104 mg/dL / Ketone: x  / Bili: x / Urobili: x   Blood: x / Protein: x / Nitrite: x   Leuk Esterase: x / RBC: x / WBC x   Sq Epi: x / Non Sq Epi: x / Bacteria: x

## 2024-03-30 LAB
ALBUMIN SERPL ELPH-MCNC: 2.8 G/DL — LOW (ref 3.3–5)
ALP SERPL-CCNC: 132 U/L — HIGH (ref 40–120)
ALT FLD-CCNC: 23 U/L — SIGNIFICANT CHANGE UP (ref 12–78)
ANION GAP SERPL CALC-SCNC: 4 MMOL/L — LOW (ref 5–17)
AST SERPL-CCNC: 24 U/L — SIGNIFICANT CHANGE UP (ref 15–37)
BILIRUB SERPL-MCNC: 0.7 MG/DL — SIGNIFICANT CHANGE UP (ref 0.2–1.2)
BUN SERPL-MCNC: 27 MG/DL — HIGH (ref 7–23)
CALCIUM SERPL-MCNC: 9.3 MG/DL — SIGNIFICANT CHANGE UP (ref 8.5–10.1)
CHLORIDE SERPL-SCNC: 100 MMOL/L — SIGNIFICANT CHANGE UP (ref 96–108)
CO2 SERPL-SCNC: 32 MMOL/L — HIGH (ref 22–31)
CREAT SERPL-MCNC: 1.31 MG/DL — HIGH (ref 0.5–1.3)
EGFR: 52 ML/MIN/1.73M2 — LOW
GLUCOSE SERPL-MCNC: 106 MG/DL — HIGH (ref 70–99)
HCT VFR BLD CALC: 39.7 % — SIGNIFICANT CHANGE UP (ref 39–50)
HGB BLD-MCNC: 11.9 G/DL — LOW (ref 13–17)
INR BLD: 6.53 RATIO — CRITICAL HIGH (ref 0.85–1.18)
MAGNESIUM SERPL-MCNC: 1.9 MG/DL — SIGNIFICANT CHANGE UP (ref 1.6–2.6)
MCHC RBC-ENTMCNC: 30 GM/DL — LOW (ref 32–36)
MCHC RBC-ENTMCNC: 30.5 PG — SIGNIFICANT CHANGE UP (ref 27–34)
MCV RBC AUTO: 101.8 FL — HIGH (ref 80–100)
NT-PROBNP SERPL-SCNC: 2514 PG/ML — HIGH (ref 0–450)
PLATELET # BLD AUTO: 152 K/UL — SIGNIFICANT CHANGE UP (ref 150–400)
POTASSIUM SERPL-MCNC: 4.4 MMOL/L — SIGNIFICANT CHANGE UP (ref 3.5–5.3)
POTASSIUM SERPL-SCNC: 4.4 MMOL/L — SIGNIFICANT CHANGE UP (ref 3.5–5.3)
PROT SERPL-MCNC: 5.7 GM/DL — LOW (ref 6–8.3)
PROTHROM AB SERPL-ACNC: 69.9 SEC — HIGH (ref 9.5–13)
RBC # BLD: 3.9 M/UL — LOW (ref 4.2–5.8)
RBC # FLD: 14.2 % — SIGNIFICANT CHANGE UP (ref 10.3–14.5)
SODIUM SERPL-SCNC: 136 MMOL/L — SIGNIFICANT CHANGE UP (ref 135–145)
WBC # BLD: 113.01 K/UL — CRITICAL HIGH (ref 3.8–10.5)
WBC # FLD AUTO: 113.01 K/UL — CRITICAL HIGH (ref 3.8–10.5)

## 2024-03-30 PROCEDURE — 99232 SBSQ HOSP IP/OBS MODERATE 35: CPT

## 2024-03-30 RX ADMIN — Medication 300 MILLIGRAM(S): at 10:25

## 2024-03-30 RX ADMIN — Medication 110 MILLIGRAM(S): at 21:46

## 2024-03-30 RX ADMIN — DORZOLAMIDE HYDROCHLORIDE TIMOLOL MALEATE 1 DROP(S): 20; 5 SOLUTION/ DROPS OPHTHALMIC at 10:25

## 2024-03-30 RX ADMIN — Medication 650 MILLIGRAM(S): at 23:27

## 2024-03-30 RX ADMIN — Medication 40 MILLIGRAM(S): at 06:14

## 2024-03-30 RX ADMIN — Medication 110 MILLIGRAM(S): at 10:25

## 2024-03-30 RX ADMIN — DORZOLAMIDE HYDROCHLORIDE TIMOLOL MALEATE 1 DROP(S): 20; 5 SOLUTION/ DROPS OPHTHALMIC at 21:46

## 2024-03-30 RX ADMIN — Medication 650 MILLIGRAM(S): at 22:27

## 2024-03-30 RX ADMIN — Medication 3 MILLIGRAM(S): at 21:47

## 2024-03-30 RX ADMIN — BUDESONIDE AND FORMOTEROL FUMARATE DIHYDRATE 2 PUFF(S): 160; 4.5 AEROSOL RESPIRATORY (INHALATION) at 20:40

## 2024-03-30 RX ADMIN — CEFTRIAXONE 1000 MILLIGRAM(S): 500 INJECTION, POWDER, FOR SOLUTION INTRAMUSCULAR; INTRAVENOUS at 06:12

## 2024-03-30 RX ADMIN — Medication 25 MILLIGRAM(S): at 21:47

## 2024-03-30 RX ADMIN — BUDESONIDE AND FORMOTEROL FUMARATE DIHYDRATE 2 PUFF(S): 160; 4.5 AEROSOL RESPIRATORY (INHALATION) at 08:18

## 2024-03-30 NOTE — PROGRESS NOTE ADULT - SUBJECTIVE AND OBJECTIVE BOX
Subjective:  continues to improve  denies sob    Review of Systems:  All 10 systems reviewed in detailed and found to be negative with the exception of what has already been described above    Allergies:  penicillins (Unknown)    Meds  MEDICATIONS  (STANDING):  allopurinol 300 milliGRAM(s) Oral daily  budesonide  80 MICROgram(s)/formoterol 4.5 MICROgram(s) Inhaler 2 Puff(s) Inhalation two times a day  cefTRIAXone Injectable. 1000 milliGRAM(s) IV Push every 24 hours  dorzolamide 2%/timolol 0.5% Ophthalmic Solution 1 Drop(s) Both EYES two times a day  doxycycline IVPB 100 milliGRAM(s) IV Intermittent every 12 hours  furosemide   Injectable 40 milliGRAM(s) IV Push two times a day  hydrochlorothiazide 25 milliGRAM(s) Oral daily  lisinopril 20 milliGRAM(s) Oral daily  metoprolol tartrate 25 milliGRAM(s) Oral two times a day    MEDICATIONS  (PRN):  acetaminophen     Tablet .. 650 milliGRAM(s) Oral every 6 hours PRN Mild Pain (1 - 3)  albuterol    90 MICROgram(s) HFA Inhaler 2 Puff(s) Inhalation every 6 hours PRN Shortness of Breath and/or Wheezing  aluminum hydroxide/magnesium hydroxide/simethicone Suspension 30 milliLiter(s) Oral every 4 hours PRN Dyspepsia  melatonin 3 milliGRAM(s) Oral at bedtime PRN Insomnia  ondansetron Injectable 4 milliGRAM(s) IV Push every 6 hours PRN Nausea and/or Vomiting    Physical Exam  T(C): 36.9 (03-30-24 @ 07:40), Max: 36.9 (03-30-24 @ 07:40)  HR: 74 (03-30-24 @ 08:19) (64 - 74)  BP: 114/58 (03-30-24 @ 07:40) (100/60 - 114/58)  RR: 17 (03-30-24 @ 07:40) (17 - 18)  SpO2: 95% (03-30-24 @ 07:40) (93% - 97%)  Gen: Alert, oriented, no distress  HEENT: Anicteric sclera, moist mucous membranes, no JVD, no lymphadenopathy  Cardio: Regular rhythm and rate, normal S1S2, no murmurs  Resp: Clear to auscultation bilaterally, no wheezing or rhonchi  GI: Nontender, nondistended, normoactive bowel sounds  Ext: No cyanosis, clubbing or edema  Neuro: Nonfocal    Labs:                        11.9   113.01 )-----------( 152      ( 30 Mar 2024 07:31 )             39.7     03-30    136  |  100  |  27<H>  ----------------------------<  106<H>  4.4   |  32<H>  |  1.31<H>    Ca    9.3      30 Mar 2024 07:31  Phos  3.1     03-29  Mg     1.9     03-30    TPro  5.7<L>  /  Alb  2.8<L>  /  TBili  0.7  /  DBili  x   /  AST  24  /  ALT  23  /  AlkPhos  132<H>  03-30    PT/INR - ( 29 Mar 2024 07:23 )   PT: 40.1 sec;   INR: 3.69 ratio         PTT - ( 29 Mar 2024 07:23 )  PTT:42.3 sec  Urinalysis Basic - ( 30 Mar 2024 07:31 )    Color: x / Appearance: x / SG: x / pH: x  Gluc: 106 mg/dL / Ketone: x  / Bili: x / Urobili: x   Blood: x / Protein: x / Nitrite: x   Leuk Esterase: x / RBC: x / WBC x   Sq Epi: x / Non Sq Epi: x / Bacteria: x    < from: CT Angio Chest PE Protocol w/ IV Cont (03.28.24 @ 15:19) >  PROCEDURE DATE:  03/28/2024          INTERPRETATION:  Clinical information: Evaluate for pulmonary embolus.   Exam is compared to previous study of 5/11/2023.    CT angiogram of the chest was obtained following administration of   intravenous contrast. Approximately 90 cc of Omnipaque 350 was   administered. Coronal, sagittal and MIP images were submitted for review.    Few lymph nodes are present in the mediastinum.    Heart is enlarged in size. Calcification of the aortic valve and coronary   arteries is noted. No pericardial effusion is noted. An AICD is noted in   place. Pulmonary arteries are normal in caliber. No filling defects are   noted.    No endobronchial lesions are noted. Few small patchy opacities are noted   in both lungs. Minimal atelectasis is also noted involving portion of the   right lower lobe. No pleural effusions are noted. Elevation of the right   hemidiaphragm is noted.    Below the diaphragm, visualized portions of the abdomen demonstrate   patient to be status post cholecystectomy. Pneumobilia is noted.   Low-attenuation lesions in the right kidney are unchanged when compared   to previous exam.    Degenerative changes of the spine are noted.    IMPRESSION: No pulmonary embolus is noted.    Small patchy opacities are noted within both lungs. Exact etiology is   unclear. One in the differential diagnostic consideration includes   pulmonary edema.      < from: Xray Chest 1 View AP/PA. (03.28.24 @ 12:38) >  PROCEDURE DATE:  03/28/2024          INTERPRETATION:  TIME OF EXAM: March 28, 2024 at 12:24 PM.    CLINICAL INFORMATION: Cough x 4 days.    COMPARISON:  March 23, 2024.    TECHNIQUE:  AP Portable chest x-ray.    INTERPRETATION:    The cardiac silhouette is enlarged.  There is a left chest wall biventricular pacemaker. The leads are not   seen in their entirety due to technique. The generator obscures part of   the left lung.  There is continued elevation of the right hemidiaphragm.  There is new right basilar opacity.  There is linear atelectasis at the left base.  No pleural effusion or pneumothorax is seen.  There is no acute bony abnormality.      IMPRESSION:  Enlarged cardiac silhouette.    Continued elevation of right hemidiaphragm.    New right basilar opacity which could be due to atelectasis or infection.    Left basilar linear atelectasis.    A CTA of the chest was performed. Please see that report. Subjective:  feels like he is continuing to improve  denies sob  new flavia    Review of Systems:  All 10 systems reviewed in detailed and found to be negative with the exception of what has already been described above    Allergies:  penicillins (Unknown)    Meds  MEDICATIONS  (STANDING):  allopurinol 300 milliGRAM(s) Oral daily  budesonide  80 MICROgram(s)/formoterol 4.5 MICROgram(s) Inhaler 2 Puff(s) Inhalation two times a day  cefTRIAXone Injectable. 1000 milliGRAM(s) IV Push every 24 hours  dorzolamide 2%/timolol 0.5% Ophthalmic Solution 1 Drop(s) Both EYES two times a day  doxycycline IVPB 100 milliGRAM(s) IV Intermittent every 12 hours  furosemide   Injectable 40 milliGRAM(s) IV Push two times a day  hydrochlorothiazide 25 milliGRAM(s) Oral daily  lisinopril 20 milliGRAM(s) Oral daily  metoprolol tartrate 25 milliGRAM(s) Oral two times a day    MEDICATIONS  (PRN):  acetaminophen     Tablet .. 650 milliGRAM(s) Oral every 6 hours PRN Mild Pain (1 - 3)  albuterol    90 MICROgram(s) HFA Inhaler 2 Puff(s) Inhalation every 6 hours PRN Shortness of Breath and/or Wheezing  aluminum hydroxide/magnesium hydroxide/simethicone Suspension 30 milliLiter(s) Oral every 4 hours PRN Dyspepsia  melatonin 3 milliGRAM(s) Oral at bedtime PRN Insomnia  ondansetron Injectable 4 milliGRAM(s) IV Push every 6 hours PRN Nausea and/or Vomiting    Physical Exam  T(C): 36.9 (03-30-24 @ 07:40), Max: 36.9 (03-30-24 @ 07:40)  HR: 74 (03-30-24 @ 08:19) (64 - 74)  BP: 114/58 (03-30-24 @ 07:40) (100/60 - 114/58)  RR: 17 (03-30-24 @ 07:40) (17 - 18)  SpO2: 95% (03-30-24 @ 07:40) (93% - 97%)  Gen: Alert, oriented, no distress  HEENT: Anicteric sclera, moist mucous membranes, no JVD, no lymphadenopathy  Cardio: Regular rhythm and rate, normal S1S2, no murmurs  Resp: Clear to auscultation bilaterally, no wheezing or rhonchi  GI: Nontender, nondistended, normoactive bowel sounds  Ext: No cyanosis, clubbing or edema  Neuro: Nonfocal    Labs:                        11.9   113.01 )-----------( 152      ( 30 Mar 2024 07:31 )             39.7     03-30    136  |  100  |  27<H>  ----------------------------<  106<H>  4.4   |  32<H>  |  1.31<H>    Ca    9.3      30 Mar 2024 07:31  Phos  3.1     03-29  Mg     1.9     03-30    TPro  5.7<L>  /  Alb  2.8<L>  /  TBili  0.7  /  DBili  x   /  AST  24  /  ALT  23  /  AlkPhos  132<H>  03-30    PT/INR - ( 29 Mar 2024 07:23 )   PT: 40.1 sec;   INR: 3.69 ratio         PTT - ( 29 Mar 2024 07:23 )  PTT:42.3 sec  Urinalysis Basic - ( 30 Mar 2024 07:31 )    Color: x / Appearance: x / SG: x / pH: x  Gluc: 106 mg/dL / Ketone: x  / Bili: x / Urobili: x   Blood: x / Protein: x / Nitrite: x   Leuk Esterase: x / RBC: x / WBC x   Sq Epi: x / Non Sq Epi: x / Bacteria: x    < from: CT Angio Chest PE Protocol w/ IV Cont (03.28.24 @ 15:19) >  PROCEDURE DATE:  03/28/2024          INTERPRETATION:  Clinical information: Evaluate for pulmonary embolus.   Exam is compared to previous study of 5/11/2023.    CT angiogram of the chest was obtained following administration of   intravenous contrast. Approximately 90 cc of Omnipaque 350 was   administered. Coronal, sagittal and MIP images were submitted for review.    Few lymph nodes are present in the mediastinum.    Heart is enlarged in size. Calcification of the aortic valve and coronary   arteries is noted. No pericardial effusion is noted. An AICD is noted in   place. Pulmonary arteries are normal in caliber. No filling defects are   noted.    No endobronchial lesions are noted. Few small patchy opacities are noted   in both lungs. Minimal atelectasis is also noted involving portion of the   right lower lobe. No pleural effusions are noted. Elevation of the right   hemidiaphragm is noted.    Below the diaphragm, visualized portions of the abdomen demonstrate   patient to be status post cholecystectomy. Pneumobilia is noted.   Low-attenuation lesions in the right kidney are unchanged when compared   to previous exam.    Degenerative changes of the spine are noted.    IMPRESSION: No pulmonary embolus is noted.    Small patchy opacities are noted within both lungs. Exact etiology is   unclear. One in the differential diagnostic consideration includes   pulmonary edema.      < from: Xray Chest 1 View AP/PA. (03.28.24 @ 12:38) >  PROCEDURE DATE:  03/28/2024          INTERPRETATION:  TIME OF EXAM: March 28, 2024 at 12:24 PM.    CLINICAL INFORMATION: Cough x 4 days.    COMPARISON:  March 23, 2024.    TECHNIQUE:  AP Portable chest x-ray.    INTERPRETATION:    The cardiac silhouette is enlarged.  There is a left chest wall biventricular pacemaker. The leads are not   seen in their entirety due to technique. The generator obscures part of   the left lung.  There is continued elevation of the right hemidiaphragm.  There is new right basilar opacity.  There is linear atelectasis at the left base.  No pleural effusion or pneumothorax is seen.  There is no acute bony abnormality.      IMPRESSION:  Enlarged cardiac silhouette.    Continued elevation of right hemidiaphragm.    New right basilar opacity which could be due to atelectasis or infection.    Left basilar linear atelectasis.    A CTA of the chest was performed. Please see that report.

## 2024-03-30 NOTE — CONSULT NOTE ADULT - SUBJECTIVE AND OBJECTIVE BOX
Chief Complaint: cough.    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 fib on coumadin, cardiomyopathy with AICD in place, HTN, systolic CHF, HTN, HPL, Gout, BPH and others presented in ED from home with family with c/o productive cough with mucous like sputum and SOB for last 2-3 weeks. He went to  clinic and was started on Doxy for last 2 days, and he was not getting better.  He says that he was having more chest congestion and wheezing today morning prompting him to come to ED.  He also has noted some swelling to his both legs. He also feels cold and chills.  Upon arrival in ED, he was hypoxic with pulse ox of 80%, and he is requiring 4 L oxygen via NC to keep sats above 94%. He denies chest pain, palpitation, N/V, abdominal pain, diarrhea or dysuria.  Sig labs: WBC 123k with N 3% and L 96%, Hb 12.3, Platelets 157k, CMP wnl except Na 134.    INR 3.34, Troponin 20, Pro-BNP elevated 3474.  RVP negative.  CXR:  Enlarged cardiac silhouette. Continued elevation of right hemidiaphragm. New right basilar opacity which could be due to atelectasis or infection. Left basilar linear atelectasis.  CT Angio Chest PE Protocol:  No pulmonary embolus is noted. Small patchy opacities are noted within both lungs. Exact etiology is unclear. One in the differential diagnostic consideration includes   pulmonary edema.  Vancomycin and Cefepime IV given after blood culture draw.    3/30/2024  Tells me he has been coughing for a few weeks.   Since coming to the hospital, feeling better. Less cough and less shortness of breath.   This am, his blood pressure was low.       CARDIOLOGY TESTING:  < from: TTE Echo Complete w/o Contrast w/ Doppler (03.29.24 @ 08:49) >     The left ventricle is normal in size, wall motion and contractility.   Mild concentric left ventricular hypertrophy is present.   Normal left ventricular systolic function.   Estimated left ventricular ejection fraction is 55-60 %.   The left atrium is severely dilated.   The right atrium appears severely dilated.   A device wire is seen in the RV and RA.   The right ventricle appears mildly dilated in some views.   The aortic valve appears mildly calcified. Valve opening seems to be   normal.   Mild (1+) aortic regurgitation is present.   The aortic root is mildly dilated .   The ascending aorta appears to be mildly dilated.   Mild mitral annular calcification is present.   The mitral valve leaflets appear mildly thickened.   Trace mitral regurgitation is present.   The tricuspid valve leaflets appear normal.   Mild to Moderate Tricuspid regurgitation is present.   Moderate pulmonary hypertension.   No evidence of pericardial effusion.   Pleural effusion -is present.   IVC is dilated and not collapsing with inspiration.      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      Hearing loss      Myocardial infarction      Gout      Osteoarthritis      Glaucoma      Rheumatoid arthritis      CLL (chronic lymphocytic leukemia)      Chronic systolic heart failure      AICD present, double chamber      Hx of appendectomy      Hx of tonsillectomy      S/P TKR (total knee replacement)  bilateral      H/O coronary angiogram  x 2      H/O hernia repair  x  4 surgeries      S/P laser cataract surgery  b/l      H/O shoulder surgery  right      S/P ORIF (open reduction internal fixation) fracture  left ankle        MEDICATIONS  (STANDING):  allopurinol 300 milliGRAM(s) Oral daily  budesonide  80 MICROgram(s)/formoterol 4.5 MICROgram(s) Inhaler 2 Puff(s) Inhalation two times a day  cefTRIAXone Injectable. 1000 milliGRAM(s) IV Push every 24 hours  dorzolamide 2%/timolol 0.5% Ophthalmic Solution 1 Drop(s) Both EYES two times a day  doxycycline IVPB 100 milliGRAM(s) IV Intermittent every 12 hours  furosemide   Injectable 40 milliGRAM(s) IV Push two times a day  hydrochlorothiazide 25 milliGRAM(s) Oral daily  lisinopril 20 milliGRAM(s) Oral daily  metoprolol tartrate 25 milliGRAM(s) Oral two times a day    MEDICATIONS  (PRN):  acetaminophen     Tablet .. 650 milliGRAM(s) Oral every 6 hours PRN Mild Pain (1 - 3)  albuterol    90 MICROgram(s) HFA Inhaler 2 Puff(s) Inhalation every 6 hours PRN Shortness of Breath and/or Wheezing  aluminum hydroxide/magnesium hydroxide/simethicone Suspension 30 milliLiter(s) Oral every 4 hours PRN Dyspepsia  melatonin 3 milliGRAM(s) Oral at bedtime PRN Insomnia  ondansetron Injectable 4 milliGRAM(s) IV Push every 6 hours PRN Nausea and/or Vomiting    Allergies    penicillins (Unknown)    Intolerances      FAMILY HISTORY:  Family history of other heart disease (Father)    Family history of cardiac arrhythmia (Child)          REVIEW OF SYSTEMS:    CONSTITUTIONAL: No weakness, fevers or chills  EYES/ENT: No visual changes;  No vertigo or throat pain   NECK: No pain or stiffness  RESPIRATORY: No cough, wheezing, hemoptysis; No shortness of breath  CARDIOVASCULAR: No chest pain or palpitations  GASTROINTESTINAL: No abdominal or epigastric pain. No nausea, vomiting, or hematemesis; No diarrhea or constipation. No melena or hematochezia.  GENITOURINARY: No dysuria, frequency or hematuria  NEUROLOGICAL: No numbness or weakness  SKIN: No itching, burning, rashes, or lesions   All other review of systems is negative unless indicated above      PHYSICAL EXAM:  Daily     Daily   Vital Signs Last 24 Hrs  T(C): 36.9 (30 Mar 2024 07:40), Max: 36.9 (30 Mar 2024 07:40)  T(F): 98.4 (30 Mar 2024 07:40), Max: 98.4 (30 Mar 2024 07:40)  HR: 70 (30 Mar 2024 10:17) (64 - 74)  BP: 97/43 (30 Mar 2024 10:17) (97/43 - 114/58)  BP(mean): --  RR: 17 (30 Mar 2024 07:40) (17 - 18)  SpO2: 95% (30 Mar 2024 07:40) (93% - 97%)    Parameters below as of 30 Mar 2024 07:40  Patient On (Oxygen Delivery Method): room air  O2 Flow (L/min): 2      Constitutional: NAD, awake and alert, well-developed  HEENT: PERR, EOMI, Normal Hearing, MMM  Neck: Soft and supple, No LAD, No JVD  Respiratory: Breath sounds are clear bilaterally, No wheezing, rales or rhonchi  Cardiovascular: S1 and S2, regular rate and rhythm, no Murmurs, gallops or rubs  Gastrointestinal: Bowel Sounds present, soft, nontender, nondistended, no guarding, no rebound  Extremities: No peripheral edema  Vascular: 2+ peripheral pulses  Neurological: A/O x 3, no focal deficits  Musculoskeletal: 5/5 strength b/l upper and lower extremities  Skin: No rashes    LABS: All Labs Reviewed:                        11.9   113.01 )-----------( 152      ( 30 Mar 2024 07:31 )             39.7     03-30    136  |  100  |  27<H>  ----------------------------<  106<H>  4.4   |  32<H>  |  1.31<H>    Ca    9.3      30 Mar 2024 07:31  Phos  3.1     03-29  Mg     1.9     03-30    TPro  5.7<L>  /  Alb  2.8<L>  /  TBili  0.7  /  DBili  x   /  AST  24  /  ALT  23  /  AlkPhos  132<H>  03-30    PT/INR - ( 29 Mar 2024 07:23 )   PT: 40.1 sec;   INR: 3.69 ratio         PTT - ( 29 Mar 2024 07:23 )  PTT:42.3 sec      Blood Culture: Organism --  Gram Stain Blood -- Gram Stain --  Specimen Source .Blood None  Culture-Blood --        RADIOLOGY: < from: 12 Lead ECG (03.29.24 @ 02:22) >    < from: CT Angio Chest PE Protocol w/ IV Cont (03.28.24 @ 15:19) >    ACC: [id number] EXAM:  CT ANGIO CHEST PULM [facility]   ORDERED BY: OPAL MALDONADO     PROCEDURE DATE:  03/28/2024          INTERPRETATION:  Clinical information: Evaluate for pulmonary embolus.   Exam is compared to previous study of 5/11/2023.    CT angiogram of the chest was obtained following administration of   intravenous contrast. Approximately 90 cc of Omnipaque 350 was   administered. Coronal, sagittal and MIP images were submitted for review.    Few lymph nodes are present in the mediastinum.    Heart is enlarged in size. Calcification of the aortic valve and coronary   arteries is noted. No pericardial effusion is noted. An AICD is noted in   place. Pulmonary arteries are normal in caliber. No filling defects are   noted.    No endobronchial lesions are noted. Few small patchy opacities are noted   in both lungs. Minimal atelectasis is also noted involving portion of the   right lower lobe. No pleural effusions are noted. Elevation of the right   hemidiaphragm is noted.    Below the diaphragm, visualized portions of the abdomen demonstrate   patient to be status post cholecystectomy. Pneumobilia is noted.   Low-attenuation lesions in the right kidney are unchanged when compared   to previous exam.    Degenerative changes of the spine are noted.    IMPRESSION: No pulmonary embolus is noted.    Small patchy opacities are noted within both lungs. Exact etiology is   unclear. One in the differential diagnostic consideration includes   pulmonary edema.    < end of copied text >    EKG:  Ventricular Rate 73 BPM    Atrial Rate 51 BPM    QRS Duration 160 ms    Q-T Interval 444 ms    QTC Calculation(Bazett) 489 ms    R Axis 256 degrees    T Axis 95 degrees    Diagnosis Line Ventricular-paced rhythm  Biventricular pacemaker detected    < end of copied text >

## 2024-03-30 NOTE — CONSULT NOTE ADULT - ASSESSMENT
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 fib on coumadin, cardiomyopathy with AICD in place, HTN, systolic CHF, HTN, HPL, Gout, BPH and others presented in ED from home with family with c/o productive cough with mucous like sputum and SOB for last 2-3 weeks. Admitted with URI and CHF.    3/30/2024  This am, the blood pressure is running low. Would hold the Lasix and encourage oral hydration. If the blood pressure remains low, will need to adjust his other medications, starting with HCTZ.   Continue antibiotics.

## 2024-03-30 NOTE — PROGRESS NOTE ADULT - ASSESSMENT
89y Male with significant PMH of CLL with chronic leukocytosis on surveillance, A. fib on coumadin, cardiomyopathy with AICD in place, HTN, systolic CHF, HTN, HPL, Gout, BPH and others presented in ED from home with family with c/o productive cough with mucous like sputum and SOB for last 2-3 weeks. He went to  clinic and was started on Doxy for last 2 days, and he was not getting better.  He says that he was having more chest congestion and wheezing today morning prompting him to come to ED.  He also has noted some swelling to his both legs. He also feels cold and chills.  Upon arrival in ED, he was hypoxic with pulse ox of 80%, and he is requiring 4 L oxygen via NC to keep sats above 94%. He denies chest pain, palpitation, N/V, abdominal pain, diarrhea or dysuria.    # Acute respiratory failure with hypoxia secondary to multifocal PNA  - CT and CXR showed patchy infiltrates both lower lobes   - PE ruled out.  - Follow cultures  - Rocephin and Doxy  - Symbicort  - Duonebs  - Oxygen supplementation via NC to keep sats above 90%.    # Acute on chronic systolic CHF.  -BNP elevated 3474.   - 2D Echo on 05/12/2023: LVEF 50%  -Daily weight, Fluid restriction and strict I&O.  - Start lasix 40 po daily  - echo stable  - Cardio eval     # NSVT with PACs/PVC  - Has had in the past  - Has Biventricular PM    # P A. fib.  -Stable and controlled.  - Metoprolol  - Coumadin  -Check INR in am, and dose coumadin if INR <3.00    # HTN  - Continue lisinopril    # Gout.  -Allopurinol.    # CLL.  -WBC 123k with N 3% and L 96%.  -F/u with PCP and Oncology as outpatient.    # DVT prophylaxis:   On coumadin

## 2024-03-30 NOTE — PROGRESS NOTE ADULT - ASSESSMENT
88y old Male with PMH CLL baseline WBC ~ 80k, Afib on coumadin, severe cardiomyopathy, CAD s/p MI, HTN, HLD, CHF, s/p AICD, RA with pneumonia  1. pneumonia: improving  -supplemental o2  -continue symbicort, albuterol prn  -continue doycycline, ceftriaxone  -sputum cultures, blood cultures  2. cll: per heme onc  3. cardiomyopathy: continue hctz, lasix 88y old Male with PMH CLL baseline WBC ~ 80k, Afib on coumadin, severe cardiomyopathy, CAD s/p MI, HTN, HLD, CHF, s/p AICD, RA with pneumonia  1. pneumonia: improving  -supplemental o2  -continue symbicort, albuterol prn  -continue doycycline, ceftriaxone  -sputum cultures, blood cultures  2. cll: per heme onc  3. cardiomyopathy: continue hctz, lasix  4. new flavia: per primary team

## 2024-03-30 NOTE — PROGRESS NOTE ADULT - SUBJECTIVE AND OBJECTIVE BOX
HOSPITALIST ATTENDING PROGRESS NOTE    Chart and meds reviewed.  Patient seen and examined.    CC: PNA/CHF    Subjective: Feeling better, less sob and cough. No fever    All other systems reviewed and found to be negative with the exception of what has been described above.    MEDICATIONS  (STANDING):  allopurinol 300 milliGRAM(s) Oral daily  budesonide  80 MICROgram(s)/formoterol 4.5 MICROgram(s) Inhaler 2 Puff(s) Inhalation two times a day  cefTRIAXone Injectable. 1000 milliGRAM(s) IV Push every 24 hours  dorzolamide 2%/timolol 0.5% Ophthalmic Solution 1 Drop(s) Both EYES two times a day  doxycycline IVPB 100 milliGRAM(s) IV Intermittent every 12 hours  furosemide   Injectable 40 milliGRAM(s) IV Push two times a day  hydrochlorothiazide 25 milliGRAM(s) Oral daily  lisinopril 20 milliGRAM(s) Oral daily  metoprolol tartrate 25 milliGRAM(s) Oral two times a day    MEDICATIONS  (PRN):  acetaminophen     Tablet .. 650 milliGRAM(s) Oral every 6 hours PRN Mild Pain (1 - 3)  albuterol    90 MICROgram(s) HFA Inhaler 2 Puff(s) Inhalation every 6 hours PRN Shortness of Breath and/or Wheezing  aluminum hydroxide/magnesium hydroxide/simethicone Suspension 30 milliLiter(s) Oral every 4 hours PRN Dyspepsia  melatonin 3 milliGRAM(s) Oral at bedtime PRN Insomnia  ondansetron Injectable 4 milliGRAM(s) IV Push every 6 hours PRN Nausea and/or Vomiting    Vital Signs Last 24 Hrs  T(C): 36.9 (30 Mar 2024 07:40), Max: 36.9 (30 Mar 2024 07:40)  T(F): 98.4 (30 Mar 2024 07:40), Max: 98.4 (30 Mar 2024 07:40)  HR: 70 (30 Mar 2024 10:17) (64 - 74)  BP: 97/43 (30 Mar 2024 10:17) (97/43 - 114/58)  RR: 17 (30 Mar 2024 07:40) (17 - 18)  SpO2: 95% (30 Mar 2024 07:40) (93% - 97%)    GEN: NAD  HEENT:  pupils equal and reactive, EOMI, no oropharyngeal lesions, erythema, exudates, oral thrush  NECK:   supple, no carotid bruits, no palpable lymph nodes, no thyromegaly  CV:  +S1, +S2, regular, no murmurs or rubs  RESP:  Bilateral ronchi, improved  GI:  abdomen soft, non-tender, non-distended, normal BS, no bruits, no abdominal masses, no palpable masses  EXT:  no clubbing, no cyanosis, no edema, no calf pain, swelling or erythema  NEURO:  AAOX3, no focal neurological deficits, follows all commands, able to move extremities spontaneously  SKIN:  no ulcers, lesions or rashes    LABS:                          11.9   113.01 )-----------( 152      ( 30 Mar 2024 07:31 )             39.7     03-30    136  |  100  |  27<H>  ----------------------------<  106<H>  4.4   |  32<H>  |  1.31<H>    Ca    9.3      30 Mar 2024 07:31  Phos  3.1     03-29  Mg     1.9     03-30    TPro  5.7<L>  /  Alb  2.8<L>  /  TBili  0.7  /  DBili  x   /  AST  24  /  ALT  23  /  AlkPhos  132<H>  03-30        LIVER FUNCTIONS - ( 30 Mar 2024 07:31 )  Alb: 2.8 g/dL / Pro: 5.7 gm/dL / ALK PHOS: 132 U/L / ALT: 23 U/L / AST: 24 U/L / GGT: x           PT/INR - ( 29 Mar 2024 07:23 )   PT: 40.1 sec;   INR: 3.69 ratio    PTT - ( 29 Mar 2024 07:23 )  PTT:42.3 sec    Urinalysis Basic - ( 30 Mar 2024 07:31 )  Color: x / Appearance: x / SG: x / pH: x  Gluc: 106 mg/dL / Ketone: x  / Bili: x / Urobili: x   Blood: x / Protein: x / Nitrite: x   Leuk Esterase: x / RBC: x / WBC x   Sq Epi: x / Non Sq Epi: x / Bacteria: x        CT Angio Chest PE Protocol w/ IV Cont (03.28.24 @ 15:19) >  IMPRESSION: No pulmonary embolus is noted.    Small patchy opacities are noted within both lungs. Exact etiology is   unclear. One in the differential diagnostic consideration includes   pulmonary edema.    --- End of Report ---    < end of copied text >

## 2024-03-31 LAB
ANION GAP SERPL CALC-SCNC: 4 MMOL/L — LOW (ref 5–17)
BUN SERPL-MCNC: 35 MG/DL — HIGH (ref 7–23)
CALCIUM SERPL-MCNC: 9.2 MG/DL — SIGNIFICANT CHANGE UP (ref 8.5–10.1)
CHLORIDE SERPL-SCNC: 103 MMOL/L — SIGNIFICANT CHANGE UP (ref 96–108)
CO2 SERPL-SCNC: 29 MMOL/L — SIGNIFICANT CHANGE UP (ref 22–31)
CREAT SERPL-MCNC: 1.24 MG/DL — SIGNIFICANT CHANGE UP (ref 0.5–1.3)
EGFR: 56 ML/MIN/1.73M2 — LOW
GLUCOSE SERPL-MCNC: 101 MG/DL — HIGH (ref 70–99)
HCT VFR BLD CALC: 39.1 % — SIGNIFICANT CHANGE UP (ref 39–50)
HGB BLD-MCNC: 11.7 G/DL — LOW (ref 13–17)
INR BLD: 2.05 RATIO — HIGH (ref 0.85–1.18)
MAGNESIUM SERPL-MCNC: 1.8 MG/DL — SIGNIFICANT CHANGE UP (ref 1.6–2.6)
MCHC RBC-ENTMCNC: 29.9 GM/DL — LOW (ref 32–36)
MCHC RBC-ENTMCNC: 30.5 PG — SIGNIFICANT CHANGE UP (ref 27–34)
MCV RBC AUTO: 101.8 FL — HIGH (ref 80–100)
PLATELET # BLD AUTO: 146 K/UL — LOW (ref 150–400)
POTASSIUM SERPL-MCNC: 4.9 MMOL/L — SIGNIFICANT CHANGE UP (ref 3.5–5.3)
POTASSIUM SERPL-SCNC: 4.9 MMOL/L — SIGNIFICANT CHANGE UP (ref 3.5–5.3)
PROTHROM AB SERPL-ACNC: 22.7 SEC — HIGH (ref 9.5–13)
RBC # BLD: 3.84 M/UL — LOW (ref 4.2–5.8)
RBC # FLD: 13.9 % — SIGNIFICANT CHANGE UP (ref 10.3–14.5)
SODIUM SERPL-SCNC: 136 MMOL/L — SIGNIFICANT CHANGE UP (ref 135–145)
WBC # BLD: 112.63 K/UL — CRITICAL HIGH (ref 3.8–10.5)
WBC # FLD AUTO: 112.63 K/UL — CRITICAL HIGH (ref 3.8–10.5)

## 2024-03-31 PROCEDURE — 99232 SBSQ HOSP IP/OBS MODERATE 35: CPT

## 2024-03-31 RX ORDER — FUROSEMIDE 40 MG
40 TABLET ORAL DAILY
Refills: 0 | Status: DISCONTINUED | OUTPATIENT
Start: 2024-03-31 | End: 2024-04-01

## 2024-03-31 RX ORDER — POLYETHYLENE GLYCOL 3350 17 G/17G
17 POWDER, FOR SOLUTION ORAL DAILY
Refills: 0 | Status: DISCONTINUED | OUTPATIENT
Start: 2024-03-31 | End: 2024-04-01

## 2024-03-31 RX ORDER — WARFARIN SODIUM 2.5 MG/1
3 TABLET ORAL ONCE
Refills: 0 | Status: COMPLETED | OUTPATIENT
Start: 2024-03-31 | End: 2024-03-31

## 2024-03-31 RX ADMIN — BUDESONIDE AND FORMOTEROL FUMARATE DIHYDRATE 2 PUFF(S): 160; 4.5 AEROSOL RESPIRATORY (INHALATION) at 20:35

## 2024-03-31 RX ADMIN — CEFTRIAXONE 1000 MILLIGRAM(S): 500 INJECTION, POWDER, FOR SOLUTION INTRAMUSCULAR; INTRAVENOUS at 05:44

## 2024-03-31 RX ADMIN — Medication 25 MILLIGRAM(S): at 22:13

## 2024-03-31 RX ADMIN — Medication 110 MILLIGRAM(S): at 22:14

## 2024-03-31 RX ADMIN — Medication 650 MILLIGRAM(S): at 22:17

## 2024-03-31 RX ADMIN — Medication 25 MILLIGRAM(S): at 09:16

## 2024-03-31 RX ADMIN — LISINOPRIL 20 MILLIGRAM(S): 2.5 TABLET ORAL at 09:16

## 2024-03-31 RX ADMIN — Medication 3 MILLIGRAM(S): at 22:13

## 2024-03-31 RX ADMIN — POLYETHYLENE GLYCOL 3350 17 GRAM(S): 17 POWDER, FOR SOLUTION ORAL at 13:51

## 2024-03-31 RX ADMIN — DORZOLAMIDE HYDROCHLORIDE TIMOLOL MALEATE 1 DROP(S): 20; 5 SOLUTION/ DROPS OPHTHALMIC at 09:17

## 2024-03-31 RX ADMIN — Medication 110 MILLIGRAM(S): at 09:16

## 2024-03-31 RX ADMIN — BUDESONIDE AND FORMOTEROL FUMARATE DIHYDRATE 2 PUFF(S): 160; 4.5 AEROSOL RESPIRATORY (INHALATION) at 08:12

## 2024-03-31 RX ADMIN — Medication 650 MILLIGRAM(S): at 23:17

## 2024-03-31 RX ADMIN — Medication 300 MILLIGRAM(S): at 09:16

## 2024-03-31 RX ADMIN — DORZOLAMIDE HYDROCHLORIDE TIMOLOL MALEATE 1 DROP(S): 20; 5 SOLUTION/ DROPS OPHTHALMIC at 22:13

## 2024-03-31 RX ADMIN — WARFARIN SODIUM 3 MILLIGRAM(S): 2.5 TABLET ORAL at 22:13

## 2024-03-31 NOTE — PROGRESS NOTE ADULT - SUBJECTIVE AND OBJECTIVE BOX
HOSPITALIST ATTENDING PROGRESS NOTE    Chart and meds reviewed.  Patient seen and examined.    CC: SOB    Subjective: Feeling much better, less sob and cough. No fever.     All other systems reviewed and found to be negative with the exception of what has been described above.    MEDICATIONS  (STANDING):  allopurinol 300 milliGRAM(s) Oral daily  budesonide  80 MICROgram(s)/formoterol 4.5 MICROgram(s) Inhaler 2 Puff(s) Inhalation two times a day  cefTRIAXone Injectable. 1000 milliGRAM(s) IV Push every 24 hours  dorzolamide 2%/timolol 0.5% Ophthalmic Solution 1 Drop(s) Both EYES two times a day  doxycycline IVPB 100 milliGRAM(s) IV Intermittent every 12 hours  hydrochlorothiazide 25 milliGRAM(s) Oral daily  lisinopril 20 milliGRAM(s) Oral daily  metoprolol tartrate 25 milliGRAM(s) Oral two times a day  warfarin 3 milliGRAM(s) Oral once    MEDICATIONS  (PRN):  acetaminophen     Tablet .. 650 milliGRAM(s) Oral every 6 hours PRN Mild Pain (1 - 3)  albuterol    90 MICROgram(s) HFA Inhaler 2 Puff(s) Inhalation every 6 hours PRN Shortness of Breath and/or Wheezing  aluminum hydroxide/magnesium hydroxide/simethicone Suspension 30 milliLiter(s) Oral every 4 hours PRN Dyspepsia  melatonin 3 milliGRAM(s) Oral at bedtime PRN Insomnia  ondansetron Injectable 4 milliGRAM(s) IV Push every 6 hours PRN Nausea and/or Vomiting    Vital Signs Last 24 Hrs  T(C): 36.4 (31 Mar 2024 09:12), Max: 36.9 (30 Mar 2024 21:40)  T(F): 97.5 (31 Mar 2024 09:12), Max: 98.4 (30 Mar 2024 21:40)  HR: 70 (31 Mar 2024 09:12) (70 - 77)  BP: 113/57 (31 Mar 2024 09:12) (97/43 - 122/74)  BP(mean): 90 (30 Mar 2024 16:19) (90 - 90)  RR: 18 (31 Mar 2024 09:12) (17 - 18)  SpO2: 97% (31 Mar 2024 09:12) (96% - 100%)    GEN: NAD  HEENT:  pupils equal and reactive, EOMI, no oropharyngeal lesions, erythema, exudates, oral thrush  NECK:   supple, no carotid bruits, no palpable lymph nodes, no thyromegaly  CV:  +S1, +S2, regular, no murmurs or rubs  RESP:   lungs clear to auscultation bilaterally, no wheezing, rales, rhonchi, good air entry bilaterally  GI:  abdomen soft, non-tender, non-distended, normal BS, no bruits, no abdominal masses, no palpable masses  EXT:  no clubbing, no cyanosis, no edema, no calf pain, swelling or erythema  NEURO:  AAOX3, no focal neurological deficits, follows all commands, able to move extremities spontaneously  SKIN:  no ulcers, lesions or rashes    LABS:                          11.7   112.63 )-----------( 146      ( 31 Mar 2024 07:29 )             39.1     03-31    136  |  103  |  35<H>  ----------------------------<  101<H>  4.9   |  29  |  1.24    Ca    9.2      31 Mar 2024 07:29  Mg     1.8     03-31    TPro  5.7<L>  /  Alb  2.8<L>  /  TBili  0.7  /  DBili  x   /  AST  24  /  ALT  23  /  AlkPhos  132<H>  03-30        LIVER FUNCTIONS - ( 30 Mar 2024 07:31 )  Alb: 2.8 g/dL / Pro: 5.7 gm/dL / ALK PHOS: 132 U/L / ALT: 23 U/L / AST: 24 U/L / GGT: x           PT/INR - ( 31 Mar 2024 07:29 )   PT: 22.7 sec;   INR: 2.05 ratio       Urinalysis Basic - ( 31 Mar 2024 07:29 )  Color: x / Appearance: x / SG: x / pH: x  Gluc: 101 mg/dL / Ketone: x  / Bili: x / Urobili: x   Blood: x / Protein: x / Nitrite: x   Leuk Esterase: x / RBC: x / WBC x   Sq Epi: x / Non Sq Epi: x / Bacteria: x

## 2024-03-31 NOTE — PROGRESS NOTE ADULT - SUBJECTIVE AND OBJECTIVE BOX
Patient is a 89y old  Male who presents with a chief complaint of Pneumonia and CHF exacerbation. 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 fib on coumadin, cardiomyopathy with AICD in place, HTN, systolic CHF, HTN, HPL, Gout, BPH and others presented in ED from home with family with c/o productive cough with mucous like sputum and SOB for last 2-3 weeks. He went to  clinic and was started on Doxy for last 2 days, and he was not getting better.  He says that he was having more chest congestion and wheezing today morning prompting him to come to ED.  He also has noted some swelling to his both legs. He also feels cold and chills.  Upon arrival in ED, he was hypoxic with pulse ox of 80%, and he is requiring 4 L oxygen via NC to keep sats above 94%. He denies chest pain, palpitation, N/V, abdominal pain, diarrhea or dysuria.  Sig labs: WBC 123k with N 3% and L 96%, Hb 12.3, Platelets 157k, CMP wnl except Na 134.    INR 3.34, Troponin 20, Pro-BNP elevated 3474.  RVP negative.  CXR:  Enlarged cardiac silhouette. Continued elevation of right hemidiaphragm. New right basilar opacity which could be due to atelectasis or infection. Left basilar linear atelectasis.  CT Angio Chest PE Protocol:  No pulmonary embolus is noted. Small patchy opacities are noted within both lungs. Exact etiology is unclear. One in the differential diagnostic consideration includes   pulmonary edema.  Vancomycin and Cefepime IV given after blood culture draw.    3/30/2024  Tells me he has been coughing for a few weeks.   Since coming to the hospital, feeling better. Less cough and less shortness of breath.   This am, his blood pressure was low.     3/31/2024  feeling better today. was told he can discontinue the oxygen.   eating.   breathing better      CARDIOLOGY TESTING:  < from: TTE Echo Complete w/o Contrast w/ Doppler (03.29.24 @ 08:49) >     The left ventricle is normal in size, wall motion and contractility.   Mild concentric left ventricular hypertrophy is present.   Normal left ventricular systolic function.   Estimated left ventricular ejection fraction is 55-60 %.   The left atrium is severely dilated.   The right atrium appears severely dilated.   A device wire is seen in the RV and RA.   The right ventricle appears mildly dilated in some views.   The aortic valve appears mildly calcified. Valve opening seems to be   normal.   Mild (1+) aortic regurgitation is present.   The aortic root is mildly dilated .   The ascending aorta appears to be mildly dilated.   Mild mitral annular calcification is present.   The mitral valve leaflets appear mildly thickened.   Trace mitral regurgitation is present.   The tricuspid valve leaflets appear normal.   Mild to Moderate Tricuspid regurgitation is present.   Moderate pulmonary hypertension.   No evidence of pericardial effusion.   Pleural effusion -is present.   IVC is dilated and not collapsing with inspiration.  Allergies    penicillins (Unknown)    Intolerances        MEDICATIONS  (STANDING):  allopurinol 300 milliGRAM(s) Oral daily  budesonide  80 MICROgram(s)/formoterol 4.5 MICROgram(s) Inhaler 2 Puff(s) Inhalation two times a day  cefTRIAXone Injectable. 1000 milliGRAM(s) IV Push every 24 hours  dorzolamide 2%/timolol 0.5% Ophthalmic Solution 1 Drop(s) Both EYES two times a day  doxycycline IVPB 100 milliGRAM(s) IV Intermittent every 12 hours  hydrochlorothiazide 25 milliGRAM(s) Oral daily  lisinopril 20 milliGRAM(s) Oral daily  metoprolol tartrate 25 milliGRAM(s) Oral two times a day  warfarin 3 milliGRAM(s) Oral once    MEDICATIONS  (PRN):  acetaminophen     Tablet .. 650 milliGRAM(s) Oral every 6 hours PRN Mild Pain (1 - 3)  albuterol    90 MICROgram(s) HFA Inhaler 2 Puff(s) Inhalation every 6 hours PRN Shortness of Breath and/or Wheezing  aluminum hydroxide/magnesium hydroxide/simethicone Suspension 30 milliLiter(s) Oral every 4 hours PRN Dyspepsia  melatonin 3 milliGRAM(s) Oral at bedtime PRN Insomnia  ondansetron Injectable 4 milliGRAM(s) IV Push every 6 hours PRN Nausea and/or Vomiting    REVIEW OF SYSTEMS:    RESPIRATORY: No cough, wheezing, hemoptysis; No shortness of breath  CARDIOVASCULAR: No chest pain or palpitations  All other review of systems is negative unless indicated above      PHYSICAL EXAM:  Daily     Daily   Vital Signs Last 24 Hrs  T(C): 36.4 (31 Mar 2024 09:12), Max: 36.9 (30 Mar 2024 21:40)  T(F): 97.5 (31 Mar 2024 09:12), Max: 98.4 (30 Mar 2024 21:40)  HR: 70 (31 Mar 2024 09:12) (70 - 77)  BP: 113/57 (31 Mar 2024 09:12) (97/43 - 122/74)  BP(mean): 90 (30 Mar 2024 16:19) (90 - 90)  RR: 18 (31 Mar 2024 09:12) (17 - 18)  SpO2: 97% (31 Mar 2024 09:12) (96% - 100%)    Parameters below as of 31 Mar 2024 09:12  Patient On (Oxygen Delivery Method): room air        Constitutional: NAD, awake and alert, well-developed  HEENT: PERR, EOMI, Normal Hearing, MMM  Neck: Soft and supple, No LAD, No JVD  Respiratory: Breath sounds are clear bilaterally, No wheezing, rales or rhonchi  Cardiovascular: S1 and S2, regular rate and rhythm, no Murmurs, gallops or rubs  Gastrointestinal: Bowel Sounds present, soft, nontender, nondistended, no guarding, no rebound  Extremities: No peripheral edema  Vascular: 2+ peripheral pulses  Neurological: A/O x 3, no focal deficits  Musculoskeletal: 5/5 strength b/l upper and lower extremities  Skin: No rashes    LABS: All Labs Reviewed:                        11.7   112.63 )-----------( 146      ( 31 Mar 2024 07:29 )             39.1     03-31    136  |  103  |  35<H>  ----------------------------<  101<H>  4.9   |  29  |  1.24    Ca    9.2      31 Mar 2024 07:29  Mg     1.8     03-31    TPro  5.7<L>  /  Alb  2.8<L>  /  TBili  0.7  /  DBili  x   /  AST  24  /  ALT  23  /  AlkPhos  132<H>  03-30    PT/INR - ( 31 Mar 2024 07:29 )   PT: 22.7 sec;   INR: 2.05 ratio

## 2024-03-31 NOTE — PROGRESS NOTE ADULT - REASON FOR ADMISSION
Pneumonia and CHF exacerbation.

## 2024-03-31 NOTE — PROGRESS NOTE ADULT - ASSESSMENT
88y old Male with PMH CLL baseline WBC ~ 80k, Afib on coumadin, severe cardiomyopathy, CAD s/p MI, HTN, HLD, CHF, s/p AICD, RA with pneumonia  1. pneumonia: improving  -supplemental o2  -continue symbicort, albuterol prn  -continue doycycline, ceftriaxone  -sputum cultures, blood cultures  2. cll: per heme onc  3. cardiomyopathy: continue hctz, lasix  4. new flavia: per primary team

## 2024-03-31 NOTE — PROGRESS NOTE ADULT - ASSESSMENT
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 fib on coumadin, cardiomyopathy with AICD in place, HTN, systolic CHF, HTN, HPL, Gout, BPH and others presented in ED from home with family with c/o productive cough with mucous like sputum and SOB for last 2-3 weeks. Admitted with URI and CHF.    3/30/2024  This am, the blood pressure is running low. Would hold the Lasix and encourage oral hydration. If the blood pressure remains low, will need to adjust his other medications, starting with HCTZ.   Continue antibiotics.     3/31/2024  blood pressure is better today.   Would continue to hold the Lasix and reassess his pulmonary status tomorrow. Continue the other medications.   Dr Anne and Dr Sims will be returning tomorrow to resume care.

## 2024-03-31 NOTE — PROGRESS NOTE ADULT - NUTRITIONAL ASSESSMENT
This patient has been assessed with a concern for Malnutrition and has been determined to have a diagnosis/diagnoses of Severe protein-calorie malnutrition.    This patient is being managed with:   Diet DASH/TLC-  Sodium & Cholesterol Restricted  Entered: Mar 28 2024  7:19PM    The following pending diet order is being considered for treatment of Severe protein-calorie malnutrition:  Diet Regular-  Low Sodium  Supplement Feeding Modality:  Oral  Ensure Plus High Protein Cans or Servings Per Day:  1       Frequency:  Daily  Entered: Mar 29 2024 11:35AM  

## 2024-03-31 NOTE — PROGRESS NOTE ADULT - SUBJECTIVE AND OBJECTIVE BOX
Subjective:  continues to improve  denies sob  sitting in chair  had cough productive of sputum this morning    Review of Systems:  All 10 systems reviewed in detailed and found to be negative with the exception of what has already been described above    Allergies:  penicillins (Unknown)    Meds  MEDICATIONS  (STANDING):  allopurinol 300 milliGRAM(s) Oral daily  budesonide  80 MICROgram(s)/formoterol 4.5 MICROgram(s) Inhaler 2 Puff(s) Inhalation two times a day  cefTRIAXone Injectable. 1000 milliGRAM(s) IV Push every 24 hours  dorzolamide 2%/timolol 0.5% Ophthalmic Solution 1 Drop(s) Both EYES two times a day  doxycycline IVPB 100 milliGRAM(s) IV Intermittent every 12 hours  hydrochlorothiazide 25 milliGRAM(s) Oral daily  lisinopril 20 milliGRAM(s) Oral daily  metoprolol tartrate 25 milliGRAM(s) Oral two times a day    MEDICATIONS  (PRN):  acetaminophen     Tablet .. 650 milliGRAM(s) Oral every 6 hours PRN Mild Pain (1 - 3)  albuterol    90 MICROgram(s) HFA Inhaler 2 Puff(s) Inhalation every 6 hours PRN Shortness of Breath and/or Wheezing  aluminum hydroxide/magnesium hydroxide/simethicone Suspension 30 milliLiter(s) Oral every 4 hours PRN Dyspepsia  melatonin 3 milliGRAM(s) Oral at bedtime PRN Insomnia  ondansetron Injectable 4 milliGRAM(s) IV Push every 6 hours PRN Nausea and/or Vomiting    Physical Exam  T(C): 36.9 (03-30-24 @ 21:40), Max: 36.9 (03-30-24 @ 21:40)  HR: 71 (03-31-24 @ 08:12) (70 - 77)  BP: 110/62 (03-30-24 @ 21:40) (97/43 - 122/74)  RR: 17 (03-30-24 @ 21:40) (17 - 17)  SpO2: 100% (03-30-24 @ 21:40) (96% - 100%)  Gen: Alert, oriented, no distress  HEENT: Anicteric sclera, moist mucous membranes, no JVD, no lymphadenopathy  Cardio: Regular rhythm and rate, normal S1S2, no murmurs  Resp: Clear to auscultation bilaterally, no wheezing or rhonchi  GI: Nontender, nondistended, normoactive bowel sounds  Ext: No cyanosis, clubbing or edema  Neuro: Nonfocal    Labs:                        11.7   112.63 )-----------( 146      ( 31 Mar 2024 07:29 )             39.1     03-31    136  |  103  |  35<H>  ----------------------------<  101<H>  4.9   |  29  |  1.24    Ca    9.2      31 Mar 2024 07:29  Mg     1.8     03-31    TPro  5.7<L>  /  Alb  2.8<L>  /  TBili  0.7  /  DBili  x   /  AST  24  /  ALT  23  /  AlkPhos  132<H>  03-30    PT/INR - ( 31 Mar 2024 07:29 )   PT: 22.7 sec;   INR: 2.05 ratio           Urinalysis Basic - ( 31 Mar 2024 07:29 )    Color: x / Appearance: x / SG: x / pH: x  Gluc: 101 mg/dL / Ketone: x  / Bili: x / Urobili: x   Blood: x / Protein: x / Nitrite: x   Leuk Esterase: x / RBC: x / WBC x   Sq Epi: x / Non Sq Epi: x / Bacteria: x      < from: CT Angio Chest PE Protocol w/ IV Cont (03.28.24 @ 15:19) >  PROCEDURE DATE:  03/28/2024          INTERPRETATION:  Clinical information: Evaluate for pulmonary embolus.   Exam is compared to previous study of 5/11/2023.    CT angiogram of the chest was obtained following administration of   intravenous contrast. Approximately 90 cc of Omnipaque 350 was   administered. Coronal, sagittal and MIP images were submitted for review.    Few lymph nodes are present in the mediastinum.    Heart is enlarged in size. Calcification of the aortic valve and coronary   arteries is noted. No pericardial effusion is noted. An AICD is noted in   place. Pulmonary arteries are normal in caliber. No filling defects are   noted.    No endobronchial lesions are noted. Few small patchy opacities are noted   in both lungs. Minimal atelectasis is also noted involving portion of the   right lower lobe. No pleural effusions are noted. Elevation of the right   hemidiaphragm is noted.    Below the diaphragm, visualized portions of the abdomen demonstrate   patient to be status post cholecystectomy. Pneumobilia is noted.   Low-attenuation lesions in the right kidney are unchanged when compared   to previous exam.    Degenerative changes of the spine are noted.    IMPRESSION: No pulmonary embolus is noted.    Small patchy opacities are noted within both lungs. Exact etiology is   unclear. One in the differential diagnostic consideration includes   pulmonary edema.      < from: Xray Chest 1 View AP/PA. (03.28.24 @ 12:38) >  PROCEDURE DATE:  03/28/2024          INTERPRETATION:  TIME OF EXAM: March 28, 2024 at 12:24 PM.    CLINICAL INFORMATION: Cough x 4 days.    COMPARISON:  March 23, 2024.    TECHNIQUE:  AP Portable chest x-ray.    INTERPRETATION:    The cardiac silhouette is enlarged.  There is a left chest wall biventricular pacemaker. The leads are not   seen in their entirety due to technique. The generator obscures part of   the left lung.  There is continued elevation of the right hemidiaphragm.  There is new right basilar opacity.  There is linear atelectasis at the left base.  No pleural effusion or pneumothorax is seen.  There is no acute bony abnormality.      IMPRESSION:  Enlarged cardiac silhouette.    Continued elevation of right hemidiaphragm.    New right basilar opacity which could be due to atelectasis or infection.    Left basilar linear atelectasis.    A CTA of the chest was performed. Please see that report.

## 2024-04-01 ENCOUNTER — TRANSCRIPTION ENCOUNTER (OUTPATIENT)
Age: 89
End: 2024-04-01

## 2024-04-01 VITALS
DIASTOLIC BLOOD PRESSURE: 65 MMHG | HEART RATE: 57 BPM | TEMPERATURE: 98 F | RESPIRATION RATE: 18 BRPM | SYSTOLIC BLOOD PRESSURE: 122 MMHG | OXYGEN SATURATION: 99 %

## 2024-04-01 LAB
ALBUMIN SERPL ELPH-MCNC: 2.6 G/DL — LOW (ref 3.3–5)
ALP SERPL-CCNC: 122 U/L — HIGH (ref 40–120)
ALT FLD-CCNC: 23 U/L — SIGNIFICANT CHANGE UP (ref 12–78)
ANION GAP SERPL CALC-SCNC: 4 MMOL/L — LOW (ref 5–17)
AST SERPL-CCNC: 24 U/L — SIGNIFICANT CHANGE UP (ref 15–37)
BILIRUB SERPL-MCNC: 0.7 MG/DL — SIGNIFICANT CHANGE UP (ref 0.2–1.2)
BUN SERPL-MCNC: 33 MG/DL — HIGH (ref 7–23)
CALCIUM SERPL-MCNC: 9.3 MG/DL — SIGNIFICANT CHANGE UP (ref 8.5–10.1)
CHLORIDE SERPL-SCNC: 105 MMOL/L — SIGNIFICANT CHANGE UP (ref 96–108)
CO2 SERPL-SCNC: 29 MMOL/L — SIGNIFICANT CHANGE UP (ref 22–31)
CREAT SERPL-MCNC: 0.98 MG/DL — SIGNIFICANT CHANGE UP (ref 0.5–1.3)
EGFR: 74 ML/MIN/1.73M2 — SIGNIFICANT CHANGE UP
GLUCOSE SERPL-MCNC: 96 MG/DL — SIGNIFICANT CHANGE UP (ref 70–99)
HCT VFR BLD CALC: 39.2 % — SIGNIFICANT CHANGE UP (ref 39–50)
HGB BLD-MCNC: 11.9 G/DL — LOW (ref 13–17)
INR BLD: 1.62 RATIO — HIGH (ref 0.85–1.18)
MAGNESIUM SERPL-MCNC: 1.8 MG/DL — SIGNIFICANT CHANGE UP (ref 1.6–2.6)
MCHC RBC-ENTMCNC: 30.3 PG — SIGNIFICANT CHANGE UP (ref 27–34)
MCHC RBC-ENTMCNC: 30.4 GM/DL — LOW (ref 32–36)
MCV RBC AUTO: 99.7 FL — SIGNIFICANT CHANGE UP (ref 80–100)
NT-PROBNP SERPL-SCNC: 1569 PG/ML — HIGH (ref 0–450)
PLATELET # BLD AUTO: 167 K/UL — SIGNIFICANT CHANGE UP (ref 150–400)
POTASSIUM SERPL-MCNC: 4.8 MMOL/L — SIGNIFICANT CHANGE UP (ref 3.5–5.3)
POTASSIUM SERPL-SCNC: 4.8 MMOL/L — SIGNIFICANT CHANGE UP (ref 3.5–5.3)
PROT SERPL-MCNC: 5.7 GM/DL — LOW (ref 6–8.3)
PROTHROM AB SERPL-ACNC: 18.1 SEC — HIGH (ref 9.5–13)
RBC # BLD: 3.93 M/UL — LOW (ref 4.2–5.8)
RBC # FLD: 13.9 % — SIGNIFICANT CHANGE UP (ref 10.3–14.5)
SODIUM SERPL-SCNC: 138 MMOL/L — SIGNIFICANT CHANGE UP (ref 135–145)
WBC # BLD: 114.09 K/UL — CRITICAL HIGH (ref 3.8–10.5)
WBC # FLD AUTO: 114.09 K/UL — CRITICAL HIGH (ref 3.8–10.5)

## 2024-04-01 PROCEDURE — 99232 SBSQ HOSP IP/OBS MODERATE 35: CPT

## 2024-04-01 RX ORDER — CEFUROXIME AXETIL 250 MG
1 TABLET ORAL
Qty: 14 | Refills: 0
Start: 2024-04-01 | End: 2024-04-07

## 2024-04-01 RX ORDER — FUROSEMIDE 40 MG
1 TABLET ORAL
Qty: 26 | Refills: 0
Start: 2024-04-01 | End: 2024-04-26

## 2024-04-01 RX ADMIN — CEFTRIAXONE 1000 MILLIGRAM(S): 500 INJECTION, POWDER, FOR SOLUTION INTRAMUSCULAR; INTRAVENOUS at 05:21

## 2024-04-01 RX ADMIN — BUDESONIDE AND FORMOTEROL FUMARATE DIHYDRATE 2 PUFF(S): 160; 4.5 AEROSOL RESPIRATORY (INHALATION) at 09:01

## 2024-04-01 RX ADMIN — Medication 300 MILLIGRAM(S): at 11:31

## 2024-04-01 RX ADMIN — Medication 40 MILLIGRAM(S): at 11:32

## 2024-04-01 RX ADMIN — DORZOLAMIDE HYDROCHLORIDE TIMOLOL MALEATE 1 DROP(S): 20; 5 SOLUTION/ DROPS OPHTHALMIC at 11:34

## 2024-04-01 RX ADMIN — Medication 110 MILLIGRAM(S): at 11:31

## 2024-04-01 RX ADMIN — Medication 25 MILLIGRAM(S): at 11:41

## 2024-04-01 RX ADMIN — LISINOPRIL 20 MILLIGRAM(S): 2.5 TABLET ORAL at 11:31

## 2024-04-01 NOTE — DISCHARGE NOTE NURSING/CASE MANAGEMENT/SOCIAL WORK - PATIENT PORTAL LINK FT
You can access the FollowMyHealth Patient Portal offered by [facility] by registering at the following website: [email or web address]. By joining [facility]’s FollowMyHealth portal, you will also be able to view your health information using other applications (apps) compatible with our system.

## 2024-04-01 NOTE — PROVIDER CONTACT NOTE (CRITICAL VALUE NOTIFICATION) - TEST AND RESULT REPORTED:
increased frequency of PVCs, 4 beat run of NSVT, bigeminy in pairs
.01
.09
.15
WBC: 112.63
.04
INR: 6.53

## 2024-04-01 NOTE — DISCHARGE NOTE NURSING/CASE MANAGEMENT/SOCIAL WORK - NSDCPEFALRISK_GEN_ALL_CORE
For information on Fall & Injury Prevention, visit: [email or web address] OR  [email or web address] OR  https://www.cdc.gov/steadi/patient.html

## 2024-04-01 NOTE — DISCHARGE NOTE PROVIDER - NSDCMRMEDTOKEN_GEN_ALL_CORE_FT
allopurinol 300 mg oral tablet: 1 tab(s) orally once a day  cefuroxime 500 mg oral tablet: 1 tab(s) orally 2 times a day  diclofenac 1% topical gel: Apply topically to affected area 2 times a day as needed for right shoulder pain  dorzolamide-timolol 2%-0.5% preservative-free ophthalmic solution: 1 drop(s) to each affected eye 2 times a day  dorzolamide-timolol 2.23%-0.68% (2%-0.5% base) ophthalmic solution: 1 drop(s) in each eye 2 times a day  Lasix 40 mg oral tablet: 1 tab(s) orally once a day  lisinopril-hydrochlorothiazide 20 mg-25 mg oral tablet: 1 tab(s) orally every other day  metoprolol tartrate 25 mg oral tablet: 1 tab(s) orally 2 times a day  Tylenol Extra Strength 500 mg oral tablet: 2 tab(s) orally once a day as needed for  moderate pain  warfarin 3 mg oral tablet: 1 tab(s) orally once a day

## 2024-04-01 NOTE — PROVIDER CONTACT NOTE (CRITICAL VALUE NOTIFICATION) - ACTION/TREATMENT ORDERED:
No further intervention
MD isn't concerned about the WBC it's always high as per MD
no further orders at this time
pending cardiology consult
No further intervention
no further intervention at this time

## 2024-04-01 NOTE — PROVIDER CONTACT NOTE (CRITICAL VALUE NOTIFICATION) - PERSON GIVING RESULT:
Juan Luis Dutton
Marsha Marks [facility]
Lab Claudia Macedo
Claudia Fischer
Tele tech
lab
[facility]

## 2024-04-01 NOTE — DISCHARGE NOTE PROVIDER - NSDCCPCAREPLAN_GEN_ALL_CORE_FT
PRINCIPAL DISCHARGE DIAGNOSIS  Diagnosis: Dyspnea  Assessment and Plan of Treatment: You had pneumonia and heart failure. You are doing better. Please continue medications as prescribed and fu with Dr. Mckeon.

## 2024-04-01 NOTE — PROGRESS NOTE ADULT - SUBJECTIVE AND OBJECTIVE BOX
Subjective:    Review of Systems:  All 10 systems reviewed in detailed and found to be negative with the exception of what has already been described above    Allergies:  penicillins (Unknown)    Meds  MEDICATIONS  (STANDING):  allopurinol 300 milliGRAM(s) Oral daily  budesonide  80 MICROgram(s)/formoterol 4.5 MICROgram(s) Inhaler 2 Puff(s) Inhalation two times a day  cefTRIAXone Injectable. 1000 milliGRAM(s) IV Push every 24 hours  dorzolamide 2%/timolol 0.5% Ophthalmic Solution 1 Drop(s) Both EYES two times a day  doxycycline IVPB 100 milliGRAM(s) IV Intermittent every 12 hours  furosemide    Tablet 40 milliGRAM(s) Oral daily  hydrochlorothiazide 25 milliGRAM(s) Oral daily  lisinopril 20 milliGRAM(s) Oral daily  metoprolol tartrate 25 milliGRAM(s) Oral two times a day    MEDICATIONS  (PRN):  acetaminophen     Tablet .. 650 milliGRAM(s) Oral every 6 hours PRN Mild Pain (1 - 3)  albuterol    90 MICROgram(s) HFA Inhaler 2 Puff(s) Inhalation every 6 hours PRN Shortness of Breath and/or Wheezing  aluminum hydroxide/magnesium hydroxide/simethicone Suspension 30 milliLiter(s) Oral every 4 hours PRN Dyspepsia  melatonin 3 milliGRAM(s) Oral at bedtime PRN Insomnia  ondansetron Injectable 4 milliGRAM(s) IV Push every 6 hours PRN Nausea and/or Vomiting  polyethylene glycol 3350 17 Gram(s) Oral daily PRN Constipation    Physical Exam  T(C): 36.4 (04-01-24 @ 08:21), Max: 36.7 (03-31-24 @ 22:11)  HR: 57 (04-01-24 @ 08:21) (57 - 72)  BP: 122/65 (04-01-24 @ 08:21) (102/50 - 127/62)  RR: 18 (04-01-24 @ 08:21) (18 - 18)  SpO2: 99% (04-01-24 @ 08:21) (95% - 99%)  Gen: Alert, oriented, no distress  HEENT: Anicteric sclera, moist mucous membranes, no JVD, no lymphadenopathy  Cardio: Regular rhythm and rate, normal S1S2, no murmurs  Resp: Clear to auscultation bilaterally, no wheezing or rhonchi  GI: Nontender, nondistended, normoactive bowel sounds  Ext: No cyanosis, clubbing or edema  Neuro: Nonfocal    Labs:                        11.9   114.09 )-----------( 167      ( 01 Apr 2024 06:38 )             39.2     04-01    138  |  105  |  33<H>  ----------------------------<  96  4.8   |  29  |  0.98    Ca    9.3      01 Apr 2024 06:38  Mg     1.8     04-01    TPro  5.7<L>  /  Alb  2.6<L>  /  TBili  0.7  /  DBili  x   /  AST  24  /  ALT  23  /  AlkPhos  122<H>  04-01    PT/INR - ( 01 Apr 2024 06:38 )   PT: 18.1 sec;   INR: 1.62 ratio       < from: CT Angio Chest PE Protocol w/ IV Cont (03.28.24 @ 15:19) >  PROCEDURE DATE:  03/28/2024          INTERPRETATION:  Clinical information: Evaluate for pulmonary embolus.   Exam is compared to previous study of 5/11/2023.    CT angiogram of the chest was obtained following administration of   intravenous contrast. Approximately 90 cc of Omnipaque 350 was   administered. Coronal, sagittal and MIP images were submitted for review.    Few lymph nodes are present in the mediastinum.    Heart is enlarged in size. Calcification of the aortic valve and coronary   arteries is noted. No pericardial effusion is noted. An AICD is noted in   place. Pulmonary arteries are normal in caliber. No filling defects are   noted.    No endobronchial lesions are noted. Few small patchy opacities are noted   in both lungs. Minimal atelectasis is also noted involving portion of the   right lower lobe. No pleural effusions are noted. Elevation of the right   hemidiaphragm is noted.    Below the diaphragm, visualized portions of the abdomen demonstrate   patient to be status post cholecystectomy. Pneumobilia is noted.   Low-attenuation lesions in the right kidney are unchanged when compared   to previous exam.    Degenerative changes of the spine are noted.    IMPRESSION: No pulmonary embolus is noted.    Small patchy opacities are noted within both lungs. Exact etiology is   unclear. One in the differential diagnostic consideration includes   pulmonary edema.      < from: Xray Chest 1 View AP/PA. (03.28.24 @ 12:38) >  PROCEDURE DATE:  03/28/2024          INTERPRETATION:  TIME OF EXAM: March 28, 2024 at 12:24 PM.    CLINICAL INFORMATION: Cough x 4 days.    COMPARISON:  March 23, 2024.    TECHNIQUE:  AP Portable chest x-ray.    INTERPRETATION:    The cardiac silhouette is enlarged.  There is a left chest wall biventricular pacemaker. The leads are not   seen in their entirety due to technique. The generator obscures part of   the left lung.  There is continued elevation of the right hemidiaphragm.  There is new right basilar opacity.  There is linear atelectasis at the left base.  No pleural effusion or pneumothorax is seen.  There is no acute bony abnormality.      IMPRESSION:  Enlarged cardiac silhouette.    Continued elevation of right hemidiaphragm.    New right basilar opacity which could be due to atelectasis or infection.    Left basilar linear atelectasis.    A CTA of the chest was performed. Please see that report.    Urinalysis Basic - ( 01 Apr 2024 06:38 )    Color: x / Appearance: x / SG: x / pH: x  Gluc: 96 mg/dL / Ketone: x  / Bili: x / Urobili: x   Blood: x / Protein: x / Nitrite: x   Leuk Esterase: x / RBC: x / WBC x   Sq Epi: x / Non Sq Epi: x / Bacteria: x     Subjective:  doing well  denies sob  will be discharged home    Review of Systems:  All 10 systems reviewed in detailed and found to be negative with the exception of what has already been described above    Allergies:  penicillins (Unknown)    Meds  MEDICATIONS  (STANDING):  allopurinol 300 milliGRAM(s) Oral daily  budesonide  80 MICROgram(s)/formoterol 4.5 MICROgram(s) Inhaler 2 Puff(s) Inhalation two times a day  cefTRIAXone Injectable. 1000 milliGRAM(s) IV Push every 24 hours  dorzolamide 2%/timolol 0.5% Ophthalmic Solution 1 Drop(s) Both EYES two times a day  doxycycline IVPB 100 milliGRAM(s) IV Intermittent every 12 hours  furosemide    Tablet 40 milliGRAM(s) Oral daily  hydrochlorothiazide 25 milliGRAM(s) Oral daily  lisinopril 20 milliGRAM(s) Oral daily  metoprolol tartrate 25 milliGRAM(s) Oral two times a day    MEDICATIONS  (PRN):  acetaminophen     Tablet .. 650 milliGRAM(s) Oral every 6 hours PRN Mild Pain (1 - 3)  albuterol    90 MICROgram(s) HFA Inhaler 2 Puff(s) Inhalation every 6 hours PRN Shortness of Breath and/or Wheezing  aluminum hydroxide/magnesium hydroxide/simethicone Suspension 30 milliLiter(s) Oral every 4 hours PRN Dyspepsia  melatonin 3 milliGRAM(s) Oral at bedtime PRN Insomnia  ondansetron Injectable 4 milliGRAM(s) IV Push every 6 hours PRN Nausea and/or Vomiting  polyethylene glycol 3350 17 Gram(s) Oral daily PRN Constipation    Physical Exam  T(C): 36.4 (04-01-24 @ 08:21), Max: 36.7 (03-31-24 @ 22:11)  HR: 57 (04-01-24 @ 08:21) (57 - 72)  BP: 122/65 (04-01-24 @ 08:21) (102/50 - 127/62)  RR: 18 (04-01-24 @ 08:21) (18 - 18)  SpO2: 99% (04-01-24 @ 08:21) (95% - 99%)  Gen: Alert, oriented, no distress  HEENT: Anicteric sclera, moist mucous membranes, no JVD, no lymphadenopathy  Cardio: Regular rhythm and rate, normal S1S2, no murmurs  Resp: Clear to auscultation bilaterally, no wheezing or rhonchi  GI: Nontender, nondistended, normoactive bowel sounds  Ext: No cyanosis, clubbing or edema  Neuro: Nonfocal    Labs:                        11.9   114.09 )-----------( 167      ( 01 Apr 2024 06:38 )             39.2     04-01    138  |  105  |  33<H>  ----------------------------<  96  4.8   |  29  |  0.98    Ca    9.3      01 Apr 2024 06:38  Mg     1.8     04-01    TPro  5.7<L>  /  Alb  2.6<L>  /  TBili  0.7  /  DBili  x   /  AST  24  /  ALT  23  /  AlkPhos  122<H>  04-01    PT/INR - ( 01 Apr 2024 06:38 )   PT: 18.1 sec;   INR: 1.62 ratio       < from: CT Angio Chest PE Protocol w/ IV Cont (03.28.24 @ 15:19) >  PROCEDURE DATE:  03/28/2024          INTERPRETATION:  Clinical information: Evaluate for pulmonary embolus.   Exam is compared to previous study of 5/11/2023.    CT angiogram of the chest was obtained following administration of   intravenous contrast. Approximately 90 cc of Omnipaque 350 was   administered. Coronal, sagittal and MIP images were submitted for review.    Few lymph nodes are present in the mediastinum.    Heart is enlarged in size. Calcification of the aortic valve and coronary   arteries is noted. No pericardial effusion is noted. An AICD is noted in   place. Pulmonary arteries are normal in caliber. No filling defects are   noted.    No endobronchial lesions are noted. Few small patchy opacities are noted   in both lungs. Minimal atelectasis is also noted involving portion of the   right lower lobe. No pleural effusions are noted. Elevation of the right   hemidiaphragm is noted.    Below the diaphragm, visualized portions of the abdomen demonstrate   patient to be status post cholecystectomy. Pneumobilia is noted.   Low-attenuation lesions in the right kidney are unchanged when compared   to previous exam.    Degenerative changes of the spine are noted.    IMPRESSION: No pulmonary embolus is noted.    Small patchy opacities are noted within both lungs. Exact etiology is   unclear. One in the differential diagnostic consideration includes   pulmonary edema.      < from: Xray Chest 1 View AP/PA. (03.28.24 @ 12:38) >  PROCEDURE DATE:  03/28/2024          INTERPRETATION:  TIME OF EXAM: March 28, 2024 at 12:24 PM.    CLINICAL INFORMATION: Cough x 4 days.    COMPARISON:  March 23, 2024.    TECHNIQUE:  AP Portable chest x-ray.    INTERPRETATION:    The cardiac silhouette is enlarged.  There is a left chest wall biventricular pacemaker. The leads are not   seen in their entirety due to technique. The generator obscures part of   the left lung.  There is continued elevation of the right hemidiaphragm.  There is new right basilar opacity.  There is linear atelectasis at the left base.  No pleural effusion or pneumothorax is seen.  There is no acute bony abnormality.      IMPRESSION:  Enlarged cardiac silhouette.    Continued elevation of right hemidiaphragm.    New right basilar opacity which could be due to atelectasis or infection.    Left basilar linear atelectasis.    A CTA of the chest was performed. Please see that report.    Urinalysis Basic - ( 01 Apr 2024 06:38 )    Color: x / Appearance: x / SG: x / pH: x  Gluc: 96 mg/dL / Ketone: x  / Bili: x / Urobili: x   Blood: x / Protein: x / Nitrite: x   Leuk Esterase: x / RBC: x / WBC x   Sq Epi: x / Non Sq Epi: x / Bacteria: x

## 2024-04-01 NOTE — PROGRESS NOTE ADULT - ASSESSMENT
88y old Male with PMH CLL baseline WBC ~ 80k, Afib on coumadin, severe cardiomyopathy, CAD s/p MI, HTN, HLD, CHF, s/p AICD, RA with pneumonia  1. pneumonia: improving  -supplemental o2  -continue symbicort, albuterol prn  -continue doycycline, ceftriaxone  -sputum cultures, blood cultures  2. cll: per heme onc  3. cardiomyopathy: continue hctz, lasix  4. new flavia: per primary team 88y old Male with PMH CLL baseline WBC ~ 80k, Afib on coumadin, severe cardiomyopathy, CAD s/p MI, HTN, HLD, CHF, s/p AICD, RA with pneumonia  1. pneumonia: improving  -supplemental o2  -continue symbicort, albuterol prn  -s/p  doycycline, ceftriaxone. d/c on po abx, cefuroxime  -sputum cultures, blood cultures  2. cll: per heme onc  3. cardiomyopathy: continue hctz, lasix  4. new flavia: per primary team    f/u with dr peñaloza

## 2024-04-01 NOTE — DISCHARGE NOTE PROVIDER - ATTENDING DISCHARGE PHYSICAL EXAMINATION:
Gen nad  head ncat  ent perr neck  neck supple  chest cta  cvs s1s2  abd soft  ext no edema  neuro aaox3

## 2024-04-01 NOTE — DISCHARGE NOTE PROVIDER - PROVIDER TOKENS
Check blood work today  Schedule eye exam     Personalized Preventive Plan for Jennifer Perez - 3/12/2021  Medicare offers a range of preventive health benefits. Some of the tests and screenings are paid in full while other may be subject to a deductible, co-insurance, and/or copay. Some of these benefits include a comprehensive review of your medical history including lifestyle, illnesses that may run in your family, and various assessments and screenings as appropriate. After reviewing your medical record and screening and assessments performed today your provider may have ordered immunizations, labs, imaging, and/or referrals for you. A list of these orders (if applicable) as well as your Preventive Care list are included within your After Visit Summary for your review. Other Preventive Recommendations:    · A preventive eye exam performed by an eye specialist is recommended every 1-2 years to screen for glaucoma; cataracts, macular degeneration, and other eye disorders. · A preventive dental visit is recommended every 6 months. · Try to get at least 150 minutes of exercise per week or 10,000 steps per day on a pedometer . · Order or download the FREE \"Exercise & Physical Activity: Your Everyday Guide\" from [employer]. Call 9-380.938.3539 or search [employer] online. · You need 4728-0989 mg of calcium and 5433-9771 IU of vitamin D per day. It is possible to meet your calcium requirement with diet alone, but a vitamin D supplement is usually necessary to meet this goal.  · When exposed to the sun, use a sunscreen that protects against both UVA and UVB radiation with an SPF of 30 or greater. Reapply every 2 to 3 hours or after sweating, drying off with a towel, or swimming. · Always wear a seat belt when traveling in a car. Always wear a helmet when riding a bicycle or motorcycle.
PROVIDER:[TOKEN:[[id number]]]

## 2024-04-01 NOTE — DISCHARGE NOTE PROVIDER - CARE PROVIDER_API CALL
Federico Mckeon  Cardiovascular Disease  [address] [place] [id number]  Phone: [phone]  Fax: [phone]  Follow Up Time:

## 2024-04-01 NOTE — DISCHARGE NOTE PROVIDER - DETAILS OF MALNUTRITION DIAGNOSIS/DIAGNOSES
This patient has been assessed with a concern for Malnutrition and was treated during this hospitalization for the following Nutrition diagnosis/diagnoses:     -  03/29/2024: Severe protein-calorie malnutrition

## 2024-04-03 LAB
CULTURE RESULTS: SIGNIFICANT CHANGE UP
CULTURE RESULTS: SIGNIFICANT CHANGE UP
SPECIMEN SOURCE: SIGNIFICANT CHANGE UP
SPECIMEN SOURCE: SIGNIFICANT CHANGE UP

## 2024-04-07 DIAGNOSIS — J96.01 ACUTE RESPIRATORY FAILURE WITH HYPOXIA: ICD-10-CM

## 2024-04-07 DIAGNOSIS — I50.23 ACUTE ON CHRONIC SYSTOLIC (CONGESTIVE) HEART FAILURE: ICD-10-CM

## 2024-04-07 DIAGNOSIS — I48.0 PAROXYSMAL ATRIAL FIBRILLATION: ICD-10-CM

## 2024-04-07 DIAGNOSIS — Z88.0 ALLERGY STATUS TO PENICILLIN: ICD-10-CM

## 2024-04-07 DIAGNOSIS — I49.3 VENTRICULAR PREMATURE DEPOLARIZATION: ICD-10-CM

## 2024-04-07 DIAGNOSIS — E43 UNSPECIFIED SEVERE PROTEIN-CALORIE MALNUTRITION: ICD-10-CM

## 2024-04-07 DIAGNOSIS — Z98.41 CATARACT EXTRACTION STATUS, RIGHT EYE: ICD-10-CM

## 2024-04-07 DIAGNOSIS — Z98.42 CATARACT EXTRACTION STATUS, LEFT EYE: ICD-10-CM

## 2024-04-07 DIAGNOSIS — Z79.01 LONG TERM (CURRENT) USE OF ANTICOAGULANTS: ICD-10-CM

## 2024-04-07 DIAGNOSIS — Z11.52 ENCOUNTER FOR SCREENING FOR COVID-19: ICD-10-CM

## 2024-04-07 DIAGNOSIS — I42.9 CARDIOMYOPATHY, UNSPECIFIED: ICD-10-CM

## 2024-04-07 DIAGNOSIS — E87.5 HYPERKALEMIA: ICD-10-CM

## 2024-04-07 DIAGNOSIS — M06.9 RHEUMATOID ARTHRITIS, UNSPECIFIED: ICD-10-CM

## 2024-04-07 DIAGNOSIS — G47.00 INSOMNIA, UNSPECIFIED: ICD-10-CM

## 2024-04-07 DIAGNOSIS — E83.42 HYPOMAGNESEMIA: ICD-10-CM

## 2024-04-07 DIAGNOSIS — I47.20 VENTRICULAR TACHYCARDIA, UNSPECIFIED: ICD-10-CM

## 2024-04-07 DIAGNOSIS — Z96.662 PRESENCE OF LEFT ARTIFICIAL ANKLE JOINT: ICD-10-CM

## 2024-04-07 DIAGNOSIS — Z85.828 PERSONAL HISTORY OF OTHER MALIGNANT NEOPLASM OF SKIN: ICD-10-CM

## 2024-04-07 DIAGNOSIS — I25.2 OLD MYOCARDIAL INFARCTION: ICD-10-CM

## 2024-04-07 DIAGNOSIS — M10.9 GOUT, UNSPECIFIED: ICD-10-CM

## 2024-04-07 DIAGNOSIS — I11.0 HYPERTENSIVE HEART DISEASE WITH HEART FAILURE: ICD-10-CM

## 2024-04-07 DIAGNOSIS — H91.90 UNSPECIFIED HEARING LOSS, UNSPECIFIED EAR: ICD-10-CM

## 2024-04-07 DIAGNOSIS — N40.0 BENIGN PROSTATIC HYPERPLASIA WITHOUT LOWER URINARY TRACT SYMPTOMS: ICD-10-CM

## 2024-04-07 DIAGNOSIS — Z95.810 PRESENCE OF AUTOMATIC (IMPLANTABLE) CARDIAC DEFIBRILLATOR: ICD-10-CM

## 2024-04-07 DIAGNOSIS — C91.10 CHRONIC LYMPHOCYTIC LEUKEMIA OF B-CELL TYPE NOT HAVING ACHIEVED REMISSION: ICD-10-CM

## 2024-04-07 DIAGNOSIS — I49.1 ATRIAL PREMATURE DEPOLARIZATION: ICD-10-CM

## 2024-04-07 DIAGNOSIS — Z96.653 PRESENCE OF ARTIFICIAL KNEE JOINT, BILATERAL: ICD-10-CM

## 2024-04-07 DIAGNOSIS — J18.9 PNEUMONIA, UNSPECIFIED ORGANISM: ICD-10-CM

## 2024-05-16 ENCOUNTER — APPOINTMENT (OUTPATIENT)
Dept: ELECTROPHYSIOLOGY | Facility: CLINIC | Age: 89
End: 2024-05-16
Payer: MEDICARE

## 2024-05-16 ENCOUNTER — NON-APPOINTMENT (OUTPATIENT)
Age: 89
End: 2024-05-16

## 2024-05-16 VITALS
OXYGEN SATURATION: 98 % | BODY MASS INDEX: 30.62 KG/M2 | WEIGHT: 202 LBS | HEART RATE: 71 BPM | DIASTOLIC BLOOD PRESSURE: 57 MMHG | HEIGHT: 68 IN | SYSTOLIC BLOOD PRESSURE: 117 MMHG

## 2024-05-16 DIAGNOSIS — I48.91 UNSPECIFIED ATRIAL FIBRILLATION: ICD-10-CM

## 2024-05-16 DIAGNOSIS — Z95.810 PRESENCE OF AUTOMATIC (IMPLANTABLE) CARDIAC DEFIBRILLATOR: ICD-10-CM

## 2024-05-16 DIAGNOSIS — I50.22 CHRONIC SYSTOLIC (CONGESTIVE) HEART FAILURE: ICD-10-CM

## 2024-05-16 PROBLEM — C91.10 CHRONIC LYMPHOCYTIC LEUKEMIA OF B-CELL TYPE NOT HAVING ACHIEVED REMISSION: Chronic | Status: ACTIVE | Noted: 2024-03-29

## 2024-05-16 PROCEDURE — 93284 PRGRMG EVAL IMPLANTABLE DFB: CPT

## 2024-05-16 RX ORDER — FUROSEMIDE 40 MG/1
40 TABLET ORAL DAILY
Refills: 0 | Status: ACTIVE | COMMUNITY

## 2024-08-19 ENCOUNTER — APPOINTMENT (OUTPATIENT)
Dept: ELECTROPHYSIOLOGY | Facility: CLINIC | Age: 89
End: 2024-08-19

## 2024-08-27 ENCOUNTER — NON-APPOINTMENT (OUTPATIENT)
Age: 89
End: 2024-08-27

## 2024-08-27 ENCOUNTER — APPOINTMENT (OUTPATIENT)
Dept: ELECTROPHYSIOLOGY | Facility: CLINIC | Age: 89
End: 2024-08-27
Payer: MEDICARE

## 2024-08-27 PROCEDURE — 93295 DEV INTERROG REMOTE 1/2/MLT: CPT

## 2024-08-27 PROCEDURE — 93296 REM INTERROG EVL PM/IDS: CPT

## 2024-10-09 ENCOUNTER — APPOINTMENT (OUTPATIENT)
Dept: DERMATOLOGY | Facility: CLINIC | Age: 89
End: 2024-10-09
Payer: MEDICARE

## 2024-10-09 DIAGNOSIS — L57.0 ACTINIC KERATOSIS: ICD-10-CM

## 2024-10-09 DIAGNOSIS — Z85.828 PERSONAL HISTORY OF OTHER MALIGNANT NEOPLASM OF SKIN: ICD-10-CM

## 2024-10-09 DIAGNOSIS — L82.1 OTHER SEBORRHEIC KERATOSIS: ICD-10-CM

## 2024-10-09 DIAGNOSIS — C44.529 SQUAMOUS CELL CARCINOMA OF SKIN OF OTHER PART OF TRUNK: ICD-10-CM

## 2024-10-09 DIAGNOSIS — L81.4 OTHER MELANIN HYPERPIGMENTATION: ICD-10-CM

## 2024-10-09 DIAGNOSIS — C44.320 SQUAMOUS CELL CARCINOMA OF SKIN OF UNSPECIFIED PARTS OF FACE: ICD-10-CM

## 2024-10-09 PROCEDURE — 99214 OFFICE O/P EST MOD 30 MIN: CPT

## 2024-10-15 RX ORDER — FLUOROURACIL 50 MG/G
5 CREAM TOPICAL
Qty: 1 | Refills: 1 | Status: ACTIVE | COMMUNITY
Start: 2024-10-15 | End: 1900-01-01

## 2024-11-27 ENCOUNTER — APPOINTMENT (OUTPATIENT)
Dept: ELECTROPHYSIOLOGY | Facility: CLINIC | Age: 89
End: 2024-11-27
Payer: MEDICARE

## 2024-11-27 ENCOUNTER — NON-APPOINTMENT (OUTPATIENT)
Age: 89
End: 2024-11-27

## 2024-11-27 PROCEDURE — 93296 REM INTERROG EVL PM/IDS: CPT

## 2024-11-27 PROCEDURE — 93295 DEV INTERROG REMOTE 1/2/MLT: CPT

## 2025-02-13 ENCOUNTER — APPOINTMENT (OUTPATIENT)
Dept: ELECTROPHYSIOLOGY | Facility: CLINIC | Age: 89
End: 2025-02-13
Payer: MEDICARE

## 2025-02-13 ENCOUNTER — NON-APPOINTMENT (OUTPATIENT)
Age: 89
End: 2025-02-13

## 2025-02-13 VITALS
BODY MASS INDEX: 30.62 KG/M2 | WEIGHT: 202 LBS | SYSTOLIC BLOOD PRESSURE: 145 MMHG | HEART RATE: 74 BPM | OXYGEN SATURATION: 100 % | DIASTOLIC BLOOD PRESSURE: 71 MMHG | HEIGHT: 68 IN

## 2025-02-13 PROCEDURE — 93284 PRGRMG EVAL IMPLANTABLE DFB: CPT

## 2025-04-11 ENCOUNTER — APPOINTMENT (OUTPATIENT)
Dept: DERMATOLOGY | Facility: CLINIC | Age: 89
End: 2025-04-11

## 2025-05-19 ENCOUNTER — NON-APPOINTMENT (OUTPATIENT)
Age: 89
End: 2025-05-19

## 2025-05-19 ENCOUNTER — APPOINTMENT (OUTPATIENT)
Dept: ELECTROPHYSIOLOGY | Facility: CLINIC | Age: 89
End: 2025-05-19
Payer: MEDICARE

## 2025-05-19 PROCEDURE — 93295 DEV INTERROG REMOTE 1/2/MLT: CPT

## 2025-05-19 PROCEDURE — 93296 REM INTERROG EVL PM/IDS: CPT

## 2025-07-08 RX ORDER — FUROSEMIDE 10 MG/ML
2 INJECTION INTRAMUSCULAR; INTRAVENOUS
Qty: 26 | Refills: 0 | DISCHARGE
Start: 2025-07-08 | End: 2024-04-26

## 2025-07-14 ENCOUNTER — INPATIENT (INPATIENT)
Facility: HOSPITAL | Age: 89
LOS: 3 days | Discharge: HOME | DRG: 195 | End: 2025-07-18
Attending: HOSPITALIST | Admitting: INTERNAL MEDICINE
Payer: MEDICARE

## 2025-07-14 VITALS
HEART RATE: 58 BPM | DIASTOLIC BLOOD PRESSURE: 50 MMHG | SYSTOLIC BLOOD PRESSURE: 124 MMHG | OXYGEN SATURATION: 89 % | RESPIRATION RATE: 26 BRPM | TEMPERATURE: 99 F | WEIGHT: 192.02 LBS

## 2025-07-14 DIAGNOSIS — Z98.49 CATARACT EXTRACTION STATUS, UNSPECIFIED EYE: Chronic | ICD-10-CM

## 2025-07-14 DIAGNOSIS — Z98.890 OTHER SPECIFIED POSTPROCEDURAL STATES: Chronic | ICD-10-CM

## 2025-07-14 DIAGNOSIS — Z96.7 PRESENCE OF OTHER BONE AND TENDON IMPLANTS: Chronic | ICD-10-CM

## 2025-07-14 DIAGNOSIS — J18.9 PNEUMONIA, UNSPECIFIED ORGANISM: ICD-10-CM

## 2025-07-14 LAB
ALBUMIN SERPL ELPH-MCNC: 3.6 G/DL — SIGNIFICANT CHANGE UP (ref 3.3–5)
ALP SERPL-CCNC: 139 U/L — HIGH (ref 40–120)
ALT FLD-CCNC: 16 U/L — SIGNIFICANT CHANGE UP (ref 12–78)
ANION GAP SERPL CALC-SCNC: 7 MMOL/L — SIGNIFICANT CHANGE UP (ref 5–17)
ANISOCYTOSIS BLD QL: SLIGHT — SIGNIFICANT CHANGE UP
APPEARANCE UR: CLEAR — SIGNIFICANT CHANGE UP
APTT BLD: 42.6 SEC — HIGH (ref 26.1–36.8)
AST SERPL-CCNC: 26 U/L — SIGNIFICANT CHANGE UP (ref 15–37)
BASOPHILS # BLD AUTO: 1.57 K/UL — HIGH (ref 0–0.2)
BASOPHILS # BLD MANUAL: 0 K/UL — SIGNIFICANT CHANGE UP (ref 0–0.2)
BASOPHILS NFR BLD AUTO: 0.9 % — SIGNIFICANT CHANGE UP (ref 0–2)
BASOPHILS NFR BLD MANUAL: 0 % — SIGNIFICANT CHANGE UP (ref 0–2)
BILIRUB SERPL-MCNC: 1.7 MG/DL — HIGH (ref 0.2–1.2)
BILIRUB UR-MCNC: NEGATIVE — SIGNIFICANT CHANGE UP
BUN SERPL-MCNC: 51 MG/DL — HIGH (ref 7–23)
CALCIUM SERPL-MCNC: 9.3 MG/DL — SIGNIFICANT CHANGE UP (ref 8.5–10.1)
CHLORIDE SERPL-SCNC: 93 MMOL/L — LOW (ref 96–108)
CO2 SERPL-SCNC: 34 MMOL/L — HIGH (ref 22–31)
COLOR SPEC: YELLOW — SIGNIFICANT CHANGE UP
CREAT SERPL-MCNC: 1.35 MG/DL — HIGH (ref 0.5–1.3)
DIFF PNL FLD: NEGATIVE — SIGNIFICANT CHANGE UP
EGFR: 50 ML/MIN/1.73M2 — LOW
EGFR: 50 ML/MIN/1.73M2 — LOW
EOSINOPHIL # BLD AUTO: 0.45 K/UL — SIGNIFICANT CHANGE UP (ref 0–0.5)
EOSINOPHIL # BLD MANUAL: 0 K/UL — SIGNIFICANT CHANGE UP (ref 0–0.5)
EOSINOPHIL NFR BLD AUTO: 0.3 % — SIGNIFICANT CHANGE UP (ref 0–6)
EOSINOPHIL NFR BLD MANUAL: 0 % — SIGNIFICANT CHANGE UP (ref 0–6)
FLUAV AG NPH QL: SIGNIFICANT CHANGE UP
FLUBV AG NPH QL: SIGNIFICANT CHANGE UP
GLUCOSE SERPL-MCNC: 120 MG/DL — HIGH (ref 70–99)
GLUCOSE UR QL: NEGATIVE MG/DL — SIGNIFICANT CHANGE UP
HCT VFR BLD CALC: 44.4 % — SIGNIFICANT CHANGE UP (ref 39–50)
HGB BLD-MCNC: 13.3 G/DL — SIGNIFICANT CHANGE UP (ref 13–17)
IMM GRANULOCYTES # BLD AUTO: 0.82 K/UL — HIGH (ref 0–0.07)
IMM GRANULOCYTES NFR BLD AUTO: 0.5 % — SIGNIFICANT CHANGE UP (ref 0–0.9)
INR BLD: 2.89 RATIO — HIGH (ref 0.85–1.16)
KETONES UR QL: NEGATIVE MG/DL — SIGNIFICANT CHANGE UP
LACTATE SERPL-SCNC: 0.8 MMOL/L — SIGNIFICANT CHANGE UP (ref 0.7–2)
LEUKOCYTE ESTERASE UR-ACNC: NEGATIVE — SIGNIFICANT CHANGE UP
LYMPHOCYTES # BLD AUTO: 160.69 K/UL — HIGH (ref 1–3.3)
LYMPHOCYTES # BLD MANUAL: 163.59 K/UL — HIGH (ref 1–3.3)
LYMPHOCYTES NFR BLD AUTO: 90.4 % — HIGH (ref 13–44)
LYMPHOCYTES NFR BLD MANUAL: 92 % — HIGH (ref 13–44)
MACROCYTES BLD QL: SLIGHT — SIGNIFICANT CHANGE UP
MANUAL SMEAR VERIFICATION: SIGNIFICANT CHANGE UP
MCHC RBC-ENTMCNC: 30 G/DL — LOW (ref 32–36)
MCHC RBC-ENTMCNC: 30 PG — SIGNIFICANT CHANGE UP (ref 27–34)
MCV RBC AUTO: 100.2 FL — HIGH (ref 80–100)
MONOCYTES # BLD AUTO: 5.24 K/UL — HIGH (ref 0–0.9)
MONOCYTES # BLD MANUAL: 3.56 K/UL — HIGH (ref 0–0.9)
MONOCYTES NFR BLD AUTO: 2.9 % — SIGNIFICANT CHANGE UP (ref 2–14)
MONOCYTES NFR BLD MANUAL: 2 % — SIGNIFICANT CHANGE UP (ref 2–14)
NEUTROPHILS # BLD AUTO: 9.05 K/UL — HIGH (ref 1.8–7.4)
NEUTROPHILS # BLD MANUAL: 10.67 K/UL — HIGH (ref 1.8–7.4)
NEUTROPHILS NFR BLD AUTO: 5 % — LOW (ref 43–77)
NEUTROPHILS NFR BLD MANUAL: 6 % — LOW (ref 43–77)
NITRITE UR-MCNC: NEGATIVE — SIGNIFICANT CHANGE UP
NRBC # BLD AUTO: 0 K/UL — SIGNIFICANT CHANGE UP (ref 0–0)
NRBC # FLD: 0 K/UL — SIGNIFICANT CHANGE UP (ref 0–0)
NRBC BLD AUTO-RTO: 0 /100 WBCS — SIGNIFICANT CHANGE UP (ref 0–0)
NT-PROBNP SERPL-SCNC: 3390 PG/ML — HIGH (ref 0–450)
OVALOCYTES BLD QL SMEAR: SLIGHT — SIGNIFICANT CHANGE UP
PH UR: 5 — SIGNIFICANT CHANGE UP (ref 5–8)
PLAT MORPH BLD: NORMAL — SIGNIFICANT CHANGE UP
PLATELET # BLD AUTO: 138 K/UL — LOW (ref 150–400)
PMV BLD: 9.4 FL — SIGNIFICANT CHANGE UP (ref 7–13)
POIKILOCYTOSIS BLD QL AUTO: SLIGHT — SIGNIFICANT CHANGE UP
POTASSIUM SERPL-MCNC: 4.1 MMOL/L — SIGNIFICANT CHANGE UP (ref 3.5–5.3)
POTASSIUM SERPL-SCNC: 4.1 MMOL/L — SIGNIFICANT CHANGE UP (ref 3.5–5.3)
PROT SERPL-MCNC: 6.5 GM/DL — SIGNIFICANT CHANGE UP (ref 6–8.3)
PROT UR-MCNC: NEGATIVE MG/DL — SIGNIFICANT CHANGE UP
PROTHROM AB SERPL-ACNC: 33.8 SEC — HIGH (ref 9.9–13.4)
RBC # BLD: 4.43 M/UL — SIGNIFICANT CHANGE UP (ref 4.2–5.8)
RBC # FLD: 14 % — SIGNIFICANT CHANGE UP (ref 10.3–14.5)
RBC BLD AUTO: ABNORMAL
RSV RNA NPH QL NAA+NON-PROBE: SIGNIFICANT CHANGE UP
SARS-COV-2 RNA SPEC QL NAA+PROBE: SIGNIFICANT CHANGE UP
SMUDGE CELLS # BLD: PRESENT
SODIUM SERPL-SCNC: 134 MMOL/L — LOW (ref 135–145)
SOURCE RESPIRATORY: SIGNIFICANT CHANGE UP
SP GR SPEC: 1.01 — SIGNIFICANT CHANGE UP (ref 1–1.03)
STOMATOCYTES BLD QL SMEAR: SLIGHT — SIGNIFICANT CHANGE UP
TROPONIN I, HIGH SENSITIVITY RESULT: 22.32 NG/L — SIGNIFICANT CHANGE UP
UROBILINOGEN FLD QL: 1 MG/DL — SIGNIFICANT CHANGE UP (ref 0.2–1)
WBC # BLD: 177.82 K/UL — CRITICAL HIGH (ref 3.8–10.5)
WBC # FLD AUTO: 177.82 K/UL — CRITICAL HIGH (ref 3.8–10.5)
WBC MORPHOLOGY: ABNORMAL

## 2025-07-14 PROCEDURE — 97162 PT EVAL MOD COMPLEX 30 MIN: CPT | Mod: GP

## 2025-07-14 PROCEDURE — 85027 COMPLETE CBC AUTOMATED: CPT

## 2025-07-14 PROCEDURE — 80048 BASIC METABOLIC PNL TOTAL CA: CPT

## 2025-07-14 PROCEDURE — 97530 THERAPEUTIC ACTIVITIES: CPT | Mod: GP

## 2025-07-14 PROCEDURE — 93010 ELECTROCARDIOGRAM REPORT: CPT

## 2025-07-14 PROCEDURE — 80053 COMPREHEN METABOLIC PANEL: CPT

## 2025-07-14 PROCEDURE — 97116 GAIT TRAINING THERAPY: CPT | Mod: GP

## 2025-07-14 PROCEDURE — 94640 AIRWAY INHALATION TREATMENT: CPT

## 2025-07-14 PROCEDURE — 71045 X-RAY EXAM CHEST 1 VIEW: CPT | Mod: 26

## 2025-07-14 PROCEDURE — 85610 PROTHROMBIN TIME: CPT

## 2025-07-14 PROCEDURE — 36415 COLL VENOUS BLD VENIPUNCTURE: CPT

## 2025-07-14 PROCEDURE — 71250 CT THORAX DX C-: CPT | Mod: 26

## 2025-07-14 PROCEDURE — 83880 ASSAY OF NATRIURETIC PEPTIDE: CPT

## 2025-07-14 PROCEDURE — 99285 EMERGENCY DEPT VISIT HI MDM: CPT

## 2025-07-14 PROCEDURE — 82784 ASSAY IGA/IGD/IGG/IGM EACH: CPT

## 2025-07-14 PROCEDURE — 83521 IG LIGHT CHAINS FREE EACH: CPT

## 2025-07-14 RX ORDER — ACETAMINOPHEN 500 MG/5ML
1000 LIQUID (ML) ORAL ONCE
Refills: 0 | Status: COMPLETED | OUTPATIENT
Start: 2025-07-14 | End: 2025-07-14

## 2025-07-14 RX ORDER — AZITHROMYCIN 250 MG
500 CAPSULE ORAL ONCE
Refills: 0 | Status: COMPLETED | OUTPATIENT
Start: 2025-07-14 | End: 2025-07-14

## 2025-07-14 RX ORDER — IPRATROPIUM BROMIDE AND ALBUTEROL SULFATE .5; 2.5 MG/3ML; MG/3ML
3 SOLUTION RESPIRATORY (INHALATION) ONCE
Refills: 0 | Status: COMPLETED | OUTPATIENT
Start: 2025-07-14 | End: 2025-07-14

## 2025-07-14 RX ORDER — CEFTRIAXONE 500 MG/1
1000 INJECTION, POWDER, FOR SOLUTION INTRAMUSCULAR; INTRAVENOUS ONCE
Refills: 0 | Status: COMPLETED | OUTPATIENT
Start: 2025-07-14 | End: 2025-07-14

## 2025-07-14 RX ADMIN — Medication 250 MILLIGRAM(S): at 15:16

## 2025-07-14 RX ADMIN — CEFTRIAXONE 1000 MILLIGRAM(S): 500 INJECTION, POWDER, FOR SOLUTION INTRAMUSCULAR; INTRAVENOUS at 15:16

## 2025-07-14 RX ADMIN — Medication 400 MILLIGRAM(S): at 15:06

## 2025-07-14 RX ADMIN — IPRATROPIUM BROMIDE AND ALBUTEROL SULFATE 3 MILLILITER(S): .5; 2.5 SOLUTION RESPIRATORY (INHALATION) at 15:06

## 2025-07-14 NOTE — ED PROVIDER NOTE - CLINICAL SUMMARY MEDICAL DECISION MAKING FREE TEXT BOX
89 yo male with multiple comorbidities with sob; fever; sepsis protocol initiated; 30cc/kg bolus held 2/2 due to hx of CHF; screening ekg, cxr, CT chest; basic labs; re-eval closely 89 yo male with multiple comorbidities with sob; fever; sepsis protocol initiated; 30cc/kg bolus held 2/2 due to hx of CHF; screening ekg, cxr, CT chest; basic labs; re-eval closely    Aubrey DO: patient with CLL- chronic leukocytosis; endorsed to Dr. Albrecht for admission.

## 2025-07-14 NOTE — ED PROVIDER NOTE - CONSTITUTIONAL, MLM
normal... awake, alert, oriented to person, place, time/situation and in moderate apparent respiratory distress.

## 2025-07-14 NOTE — ED ADULT NURSE NOTE - OBJECTIVE STATEMENT
pt arrives w/family for SOB and chest pains. Pt recently increased Lasix dose d/t fluid retention, family noted fevers at home. Rectal temp confirms low grade fever, pt noted to be tachypneic and sat was only 86%, notable belly breathing and audible wheezing. Pt placed on monitor and 6L NC with immediate improvement, IV established and blood work sent. Phlebotomy called for second BC and lactate. Swab sent, pnd imaging. Pt medical HX include leukemia, CHF, COPD

## 2025-07-14 NOTE — ED ADULT TRIAGE NOTE - BP NONINVASIVE DIASTOLIC (MM HG)
Here to place PICC line. Patient does not want at this time. He doesn't feel well and is extremely nauseated. He will discuss with MD in am and decide if he wants.     50

## 2025-07-14 NOTE — ED ADULT NURSE REASSESSMENT NOTE - NS ED NURSE REASSESS COMMENT FT1
Sepsis huddle complete w/MD August, pt unable to receive fluids d/t CHF and EF, medicated w/nebs, Acetaminohpen, XRAY and ABX

## 2025-07-14 NOTE — PATIENT PROFILE ADULT - FALL HARM RISK - HARM RISK INTERVENTIONS

## 2025-07-14 NOTE — ED ADULT NURSE NOTE - CHIEF COMPLAINT QUOTE
Pt presents to er with complaints of SOB and fever at home, had a recent change in Lasix dosing with a history of CHF.

## 2025-07-14 NOTE — ED ADULT NURSE REASSESSMENT NOTE - NS ED NURSE REASSESS COMMENT FT1
Pt received from ED RN Shelli. Pt in NAD, VSS, A+Ox3. Daughter at bedside. Denies current sob, N/V, CP. Tolerating 6L NC at this time satting 92%. Pt awaiting bed assignment and further orders. Care continues as ordered. Fall and safety precautions maintained. Call bell within reach. Voiding in urinal. Bl Hearing aids in place.

## 2025-07-14 NOTE — PHARMACOTHERAPY INTERVENTION NOTE - COMMENTS
Medication reconciliation completed.  Reviewed Medication list and confirmed med allergies with patient and daughter at bedside; confirmed with Dr. First Medtito.

## 2025-07-14 NOTE — PATIENT PROFILE ADULT - NSPROIMPLANTSMEDDEV_GEN_A_NUR
Automatic Implantable Cardioverter Defibrillator bilateral knee and ankle screws/Automatic Implantable Cardioverter Defibrillator/Artificial joint

## 2025-07-14 NOTE — PATIENT PROFILE ADULT - FALL HARM RISK - FALLEN IN PAST
Birth Control Pills Counseling: Birth Control Pill Counseling: I discussed with the patient the potential side effects of OCPs including but not limited to increased risk of stroke, heart attack, thrombophlebitis, deep venous thrombosis, hepatic adenomas, breast changes, GI upset, headaches, and depression.  The patient verbalized understanding of the proper use and possible adverse effects of OCPs. All of the patient's questions and concerns were addressed. Accidental fall No

## 2025-07-14 NOTE — ED ADULT TRIAGE NOTE - CHIEF COMPLAINT QUOTE
Pt presents to er with complaints of SOB and fever at home, had a recent change in Lasix dosing with a history of CHF Pt presents to er with complaints of SOB and fever at home, had a recent change in Lasix dosing with a history of CHF.

## 2025-07-14 NOTE — ED PROVIDER NOTE - INTERPRETATION
[facility] ([facility]) NOTE    CC: Acute severe hyponatremia    Referring Physician: Best practices  PCP:   Primary Nephrologist: Adrián MONTALVO    S: Patient was seen in his room this morning.  He is laying in bed on high flow nasal cannula oxygen, comfortable.  Not using accessory muscles.  He reported fair breathing.  Denied any nausea vomiting or diarrhea.    HPI:    Patient is 71 year old male who is well-known to our service.  He sees my associate Dr. Luna for CKD stage IIIa in the [place] office.  Last seen in June 2021.  He follows up with her CCM service on a monthly basis.  Patient is admitted to the hospital on August 13, 2021 with breakthrough COVID-19 even after being vaccinated.  He has had hypoxic respiratory failure.  He has been managed for the same.  Intermittently patient is noted to have a sodium drop to 129 in August 30, 123 and 120 on August 31 in September 1.  AIN was consulted yesterday.  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  He was given 1 dose of Lasix IV last night.  Even prior to the IV Lasix, he had started spontaneously making a lot of urine and having a water diuresis.  With Lasix he further had another 2 L urine output overnight.  Sodium has improved to 132.  At the time of bedside visit on this consultation, he is laying in bed on room air.  Oxygen weaned off.  Appetite is poor but he is trying to eat better.    Review of Systems:  No nausea, vomiting, dizziness, chest pain, shortness of breath, abdominal pain, diarrhea, dysuria, hematuria.  Appetite is poor. Rest of the review systems is negative.    Past Medical and Surgical History:   Past Medical History:   Diagnosis Date   • Essential (primary) hypertension    • GERD without esophagitis    • Hyperlipidemia    • Hyperlipidemia, mixed    • Hypertensive kidney disease with CKD stage III (CMS/HCC)    • Major depressive disorder, recurrent episode, moderate (CMS/HCC)    • Memory impairment    •  Microalbuminuria    • Overweight    • Sleep apnea, obstructive    • Type 2 diabetes mellitus with hyperglycemia, without long-term current use of insulin (CMS/[facility])        Past Surgical History:   Procedure Laterality Date   • Cataract extraction, bilateral     • Cholecystectomy         Family History:   Family History   Problem Relation Age of Onset   • Dementia/Alzheimers Mother    • Stroke/TIA Father    • Diabetes Father        Social and Occupational History:  Social History     Socioeconomic History   • Marital status: /Civil Union     Spouse name: Not on file   • Number of children: Not on file   • Years of education: Not on file   • Highest education level: Not on file   Occupational History   • Not on file   Tobacco Use   • Smoking status: Former Smoker     Packs/day: 0.00     Quit date:      Years since quittin.6   • Smokeless tobacco: Never Used   Substance and Sexual Activity   • Alcohol use: No     Comment: recovery    • Drug use: No   • Sexual activity: Yes     Partners: Female   Other Topics Concern   • Not on file   Social History Narrative   • Not on file     Social Determinants of Health     Financial Resource Strain:    • Social Determinants: Financial Resource Strain:    Food Insecurity:    • Social Determinants: Food Insecurity:    Transportation Needs:    • Lack of Transportation (Medical):    • Lack of Transportation (Non-Medical):    Physical Activity:    • Days of Exercise per Week:    • Minutes of Exercise per Session:    Stress:    • Social Determinants: Stress:    Social Connections:    • Social Determinants: Social Connections:    Intimate Partner Violence: Not At Risk   • Social Determinants: Intimate Partner Violence Past Fear: No   • Social Determinants: Intimate Partner Violence Current Fear: No       Allergies:  ALLERGIES:  No Known Allergies    Outpatient/Home Medications:  Medications Prior to Admission   Medication Sig Dispense Refill   • amlodipine-benazepril  (LOTREL) 5-20 MG per capsule Take 1 capsule by mouth once daily 90 capsule 0   • metFORMIN (GLUCOPHAGE-XR) 500 MG 24 hr tablet Take 2 tablets by mouth twice daily 360 tablet 0   • atorvastatin (LIPITOR) 20 MG tablet Take 1 tablet by mouth once daily (Patient taking differently: Take 20 mg by mouth nightly. ) 90 tablet 0   • memantine (NAMENDA) 10 MG tablet Take 10 mg by mouth 2 times daily.     • dapagliflozin (Farxiga) 5 MG tablet Take 1 tablet by mouth daily. 30 tablet 10   • escitalopram (LEXAPRO) 10 MG tablet Take 1 tablet by mouth once daily (Patient taking differently: Take 10 mg by mouth nightly. ) 90 tablet 3   • aspirin (ECOTRIN) 81 MG EC tablet Take 81 mg by mouth daily.      • thiamine (VITAMIN B1) 100 MG tablet Take 100 mg by mouth daily.      • [DISCONTINUED] azithromycin (ZITHROMAX) 250 MG tablet 2 tabs PO on day 1, than 1 tab PO daily for 4 days. 6 tablet 0       Inpatient Medications:  Current Facility-Administered Medications   Medication Dose Route Frequency Provider Last Rate Last Admin   • insulin lispro (ADMELOG,HumaLOG) - Correction Dose   Subcutaneous 4x Daily  Albertina Gamboa MD   10 Units at 09/02/21 2105   • insulin lispro (ADMELOG,HumaLOG) - Scheduled Mealtime Dose   Subcutaneous TID  Alena Luciano MD   5 Units at 09/02/21 1759   • polyethylene glycol (MIRALAX) packet 17 g  17 g Oral Daily Ronni Read MD   17 g at 09/01/21 0851   • senna (SENOKOT) 17.2 mg  2 tablet Oral Daily Ronni Read MD   17.2 mg at 09/03/21 0837   • insulin glargine (LANTUS) injection 20 Units  20 Units Subcutaneous Nightly Albertina Gamboa MD   20 Units at 09/02/21 2104   • apixaBAN (ELIQUIS) tablet 5 mg  5 mg Oral 2 times per day Prabhjot Tineo MD   5 mg at 09/03/21 0837   • cholecalciferol (VITAMIN D) tablet 125 mcg  125 mcg Oral Daily Prabhjot Tineo MD   125 mcg at 09/03/21 0837   • haloperidol lactate (HALDOL) 5 MG/ML injection 2 mg  2 mg Intravenous Q6H PRN Prabhjot Tineo MD   2 mg at  08/28/21 2315   • valproate (DEPACON) 250 mg in dextrose 5 % 50 mL IVPB  250 mg Intravenous Q8H PRN Prabhjot Tineo MD   Completed at 08/22/21 1900   • aspirin (ECOTRIN) enteric coated tablet 81 mg  81 mg Oral Daily Clarissa Richardson CNP   81 mg at 09/03/21 0837   • atorvastatin (LIPITOR) tablet 20 mg  20 mg Oral Nightly Clarissa Richardson CNP   20 mg at 09/02/21 2104   • escitalopram (LEXAPRO) tablet 10 mg  10 mg Oral Nightly Clarissa Richardson CNP   10 mg at 09/02/21 2104   • memantine (NAMENDA) tablet 10 mg  10 mg Oral BID Clarissa Richardson CNP   10 mg at 09/03/21 0837   • ascorbic acid (VITAMIN C) tablet 500 mg  500 mg Oral TID Clarissa Richardson CNP   500 mg at 09/03/21 0837   • zinc sulfate (ZINCATE) capsule 220 mg  220 mg Oral Daily with breakfast Clarissa Richardson CNP   220 mg at 09/03/21 0836   • pantoprazole (PROTONIX) EC tablet 40 mg  40 mg Oral Nightly Clarissa Richardson CNP   40 mg at 09/02/21 2104   • albuterol inhaler 2 puff  2 puff Inhalation Q4H Resp PRN Clarissa Richardson CNP       • acetaminophen (TYLENOL) tablet 650 mg  650 mg Oral Q6H PRN Clarissa Richardson CNP   650 mg at 09/02/21 2352   • ondansetron (ZOFRAN) injection 4 mg  4 mg Intravenous Q6H PRN Clarissa Richardson CNP       • benzonatate (TESSALON PERLES) capsule 100 mg  100 mg Oral TID PRN Clarissa Richardson CNP       • sodium chloride 0.9% infusion   Intravenous PRN Noemi Luque MD   Paused at 08/17/21 1524   • dextrose 50 % injection 25 g  25 g Intravenous PRN Clarissa Richardson CNP       • dextrose 50 % injection 12.5 g  12.5 g Intravenous PRN Clarissa Richardson CNP       • glucagon (GLUCAGEN) injection 1 mg  1 mg Intramuscular PRN Clarissa Richardson CNP       • dextrose (GLUTOSE) 40 % gel 15 g  15 g Oral PRN Clarissa Richardson CNP       • dextrose (GLUTOSE) 40 % gel 30 g  30 g Oral PRN Clarissa Richardson CNP       • [Held by provider] amLODIPine-lisinopril 5-20 mg   Oral Daily Clarissa Richardson CNP   Given at 08/27/21 0857       Physical Examination:  Vital Signs:   Patient Vitals for the  past 24 hrs:   BP Temp Temp src Pulse Resp SpO2 Weight   09/03/21 0500 -- -- -- -- 15 99 % --   09/03/21 0450 -- -- -- -- 14 98 % --   09/03/21 0410 100/69 96.8 °F (36 °C) Temporal 70 15 99 % --   09/03/21 0343 -- -- -- -- 17 93 % --   09/03/21 0000 -- -- -- -- -- -- 80.8 kg (178 lb 2.1 oz)   09/02/21 2323 -- -- -- -- 18 92 % --   09/02/21 2240 137/82 97.5 °F (36.4 °C) Temporal 87 17 (!) 89 % --   09/02/21 2040 136/90 97.9 °F (36.6 °C) Temporal (!) 107 20 92 % --   09/02/21 1940 -- -- -- -- 20 92 % --   09/02/21 1640 -- -- -- -- 20 94 % --   09/02/21 1630 (!) 143/92 97.7 °F (36.5 °C) Temporal (!) 106 (!) 21 96 % --   09/02/21 1322 -- -- -- -- 17 95 % --   09/02/21 1150 -- -- -- -- -- 91 % --   09/02/21 1142 115/73 97.7 °F (36.5 °C) Temporal (!) 101 19 91 % --        IOP -    Intake/Output Summary (Last 24 hours) at 9/3/2021 0903  Last data filed at 9/3/2021 0500  Gross per 24 hour   Intake 720 ml   Output 1400 ml   Net -680 ml       Exam:  GEN: AAO x 3, NAD pleasant and appropriate  HEENT: JVD 0, PERRL, EOMI, no pharyngeal erythema, No scleral icterus  LUNGS: On high flow nasal cannula oxygen, not short of breath, not using accessory muscles, fair effort CTA B/L. No r/r/w  CV: RRR. no m/r/g  ABD: +BS, soft, ND/NT, no organomegaly  EXTREMETIES: No edema, cyanosis or clubbing,  NEURO: AO X 3, grossly moving all 4 extremities  SKIN: normal turgor, no rash noted, except as stated    Labs and Data:     CBC, INR  Recent Labs   Lab 09/02/21  0424 09/01/21 0427 08/31/21  0409 08/30/21  0420 08/27/21  1119   WBC 15.3* 14.2* 17.3* 25.8* 15.2*   HGB 15.5 12.9* 13.7 14.5 15.6    242 255 300 327       BMP, Ca / Mg / Phos  Recent Labs     09/01/21  0422 09/01/21  0427 09/01/21  1828 09/02/21  0424 09/03/21  0417   SODIUM 123*  --  120* 132* 128*   POTASSIUM 4.6  --  4.6 4.4 4.2   CHLORIDE 89*  --  86* 95* 93*   CO2 25  --  25 27 30   BUN 29*  --  28* 30* 32*   CREATININE 0.90  --  0.70 0.82 0.99   ALBUMIN  --   --   --   2.8*  --    CALCIUM 8.3*  --  8.6 8.9 8.5   PHOS  --   --   --  3.7  --    WBC  --  14.2*  --  15.3*  --    HGB  --  12.9*  --  15.5  --    MCV  --  89.1  --  88.6  --    PLT  --  242  --  288  --         Echocardiogram  None on file     Renal Imaging -USG/CT    None on file    Review of Old/Other records:  Baseline creatinine 1.2-1.4 as of April 2021    This admission:  On September 1, urine osmolality 309, urine sodium 53  TSH 2.354  Serum a.m. cortisol 5.6 on September 2      ASSESSMENT:  Mainor Morales is a 71 year old male , now admitted 8/13/2021.    1.  Acute severe hyponatremia-in the setting of respiratory failure with hypoxia secondary to breakthrough COVID-19 pneumonitis despite vaccination.  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  Improving spontaneously with improved respiratory parameters as well as status post IV Lasix September 1.  Further down drift on September 3.    2.  SIADH-see discussion above    3.  Chronic kidney disease stage IIIB-type 2 diabetes, hypertension, dyslipidemia, metabolic syndrome, sleep apnea on CPAP.  Baseline creatinine 1.2-1.4.  Outpatient follow-up established with my associate Dr. Luna.    4.  COVID-19 pneumonitis/breakthrough infection despite vaccination.  Improving clinically.    PLAN:     -As of September 3, sodium levels drifted down again despite IV Lasix and good free water clearance with 3 L urine output.  Possibly secondary to poor p.o. solute intake.    -Recheck stat urine osmolality and urine sodium.  If suggestive of persistent SIADH physiology, will dose with tolvaptan.  RN will keep me posted with results.    -Stop IV Lasix.    -Encourage oral solute/protein intake.  Discussed with RN.  To follow-up and make sure patient eating his meals and drinking at least 2 protein shakes through the day.  Will add salt tablets through today.    -He remains on SSRI for depression.  He is tolerated Lexapro for many years without SIADH or hyponatremia in the outpatient  setting.  Continue to monitor.    -Continue best renal protective strategies.  Avoid nephrotoxins.  Renally dose all medications to creatinine clearance/GFR.  Suggest minimizing NSAIDs/IV contrast agents.    Disposition:  Pending further clinical improvement.    Thank you for this consultation. More recommendations to follow.     Speech recognition software was used in the preparation of this note, errors in transcription may be present. Please call/page if there are questions.     pacemaker: good capture, regular rhythm and appropriate intervals.

## 2025-07-14 NOTE — ED PROVIDER NOTE - OBJECTIVE STATEMENT
Patient is a 89 yo male who was brought in by grandson for 3 day hx of sob; productive cough; found to have fever upon arrival; per family- increase in lasix recently with 12lb weight loss; no chest pain; no abdominal pain; no trauma or injury. No known sick contacts; similar presentation with pneumonia in past.

## 2025-07-14 NOTE — ED ADULT NURSE NOTE - NSFALLRISKINTERV_ED_ALL_ED
Assistance OOB with selected safe patient handling equipment if applicable/Assistance with ambulation/Communicate fall risk and risk factors to all staff, patient, and family/Monitor gait and stability/Provide visual cue: yellow wristband, yellow gown, etc/Reinforce activity limits and safety measures with patient and family/Call bell, personal items and telephone in reach/Instruct patient to call for assistance before getting out of bed/chair/stretcher/Non-slip footwear applied when patient is off stretcher/[place] to call system/Physically safe environment - no spills, clutter or unnecessary equipment/Purposeful Proactive Rounding/Room/bathroom lighting operational, light cord in reach

## 2025-07-15 LAB
ANION GAP SERPL CALC-SCNC: 6 MMOL/L — SIGNIFICANT CHANGE UP (ref 5–17)
BUN SERPL-MCNC: 52 MG/DL — HIGH (ref 7–23)
CALCIUM SERPL-MCNC: 9.3 MG/DL — SIGNIFICANT CHANGE UP (ref 8.5–10.1)
CHLORIDE SERPL-SCNC: 94 MMOL/L — LOW (ref 96–108)
CO2 SERPL-SCNC: 36 MMOL/L — HIGH (ref 22–31)
CREAT SERPL-MCNC: 1.23 MG/DL — SIGNIFICANT CHANGE UP (ref 0.5–1.3)
EGFR: 56 ML/MIN/1.73M2 — LOW
EGFR: 56 ML/MIN/1.73M2 — LOW
GLUCOSE SERPL-MCNC: 112 MG/DL — HIGH (ref 70–99)
HCT VFR BLD CALC: 43.2 % — SIGNIFICANT CHANGE UP (ref 39–50)
HGB BLD-MCNC: 12.5 G/DL — LOW (ref 13–17)
INR BLD: 2.76 RATIO — HIGH (ref 0.85–1.16)
MCHC RBC-ENTMCNC: 28.9 G/DL — LOW (ref 32–36)
MCHC RBC-ENTMCNC: 29.5 PG — SIGNIFICANT CHANGE UP (ref 27–34)
MCV RBC AUTO: 101.9 FL — HIGH (ref 80–100)
NRBC # BLD AUTO: 0 K/UL — SIGNIFICANT CHANGE UP (ref 0–0)
NRBC # FLD: 0 K/UL — SIGNIFICANT CHANGE UP (ref 0–0)
NRBC BLD AUTO-RTO: 0 /100 WBCS — SIGNIFICANT CHANGE UP (ref 0–0)
PLATELET # BLD AUTO: 114 K/UL — LOW (ref 150–400)
PMV BLD: 9.7 FL — SIGNIFICANT CHANGE UP (ref 7–13)
POTASSIUM SERPL-MCNC: 4.4 MMOL/L — SIGNIFICANT CHANGE UP (ref 3.5–5.3)
POTASSIUM SERPL-SCNC: 4.4 MMOL/L — SIGNIFICANT CHANGE UP (ref 3.5–5.3)
PROTHROM AB SERPL-ACNC: 31.4 SEC — HIGH (ref 9.9–13.4)
RBC # BLD: 4.24 M/UL — SIGNIFICANT CHANGE UP (ref 4.2–5.8)
RBC # FLD: 14.2 % — SIGNIFICANT CHANGE UP (ref 10.3–14.5)
SODIUM SERPL-SCNC: 136 MMOL/L — SIGNIFICANT CHANGE UP (ref 135–145)
WBC # BLD: 158.9 K/UL — CRITICAL HIGH (ref 3.8–10.5)
WBC # FLD AUTO: 158.9 K/UL — CRITICAL HIGH (ref 3.8–10.5)

## 2025-07-15 PROCEDURE — 93284 PRGRMG EVAL IMPLANTABLE DFB: CPT | Mod: 26

## 2025-07-15 PROCEDURE — 99223 1ST HOSP IP/OBS HIGH 75: CPT

## 2025-07-15 PROCEDURE — 99223 1ST HOSP IP/OBS HIGH 75: CPT | Mod: FS

## 2025-07-15 RX ORDER — IPRATROPIUM BROMIDE AND ALBUTEROL SULFATE .5; 2.5 MG/3ML; MG/3ML
3 SOLUTION RESPIRATORY (INHALATION) EVERY 6 HOURS
Refills: 0 | Status: DISCONTINUED | OUTPATIENT
Start: 2025-07-15 | End: 2025-07-18

## 2025-07-15 RX ORDER — AMIODARONE HYDROCHLORIDE 50 MG/ML
400 INJECTION, SOLUTION INTRAVENOUS
Refills: 0 | Status: DISCONTINUED | OUTPATIENT
Start: 2025-07-15 | End: 2025-07-18

## 2025-07-15 RX ORDER — DORZOLAMIDE HYDROCHLORIDE AND TIMOLOL MALEATE 20; 5 MG/ML; MG/ML
1 SOLUTION/ DROPS OPHTHALMIC
Refills: 0 | Status: DISCONTINUED | OUTPATIENT
Start: 2025-07-15 | End: 2025-07-18

## 2025-07-15 RX ORDER — ALBUTEROL SULFATE 2.5 MG/3ML
2 VIAL, NEBULIZER (ML) INHALATION EVERY 6 HOURS
Refills: 0 | Status: DISCONTINUED | OUTPATIENT
Start: 2025-07-15 | End: 2025-07-18

## 2025-07-15 RX ORDER — MELATONIN 5 MG
3 TABLET ORAL AT BEDTIME
Refills: 0 | Status: DISCONTINUED | OUTPATIENT
Start: 2025-07-15 | End: 2025-07-18

## 2025-07-15 RX ORDER — AMIODARONE HYDROCHLORIDE 50 MG/ML
200 INJECTION, SOLUTION INTRAVENOUS DAILY
Refills: 0 | Status: CANCELLED | OUTPATIENT
Start: 2025-07-20 | End: 2025-07-18

## 2025-07-15 RX ORDER — SODIUM CHLORIDE 0.65 %
1 AEROSOL, SPRAY (ML) NASAL
Refills: 0 | Status: DISCONTINUED | OUTPATIENT
Start: 2025-07-15 | End: 2025-07-18

## 2025-07-15 RX ORDER — METOPROLOL SUCCINATE 50 MG/1
25 TABLET, EXTENDED RELEASE ORAL
Refills: 0 | Status: DISCONTINUED | OUTPATIENT
Start: 2025-07-15 | End: 2025-07-16

## 2025-07-15 RX ORDER — AMIODARONE HYDROCHLORIDE 50 MG/ML
INJECTION, SOLUTION INTRAVENOUS
Refills: 0 | Status: DISCONTINUED | OUTPATIENT
Start: 2025-07-15 | End: 2025-07-18

## 2025-07-15 RX ORDER — ONDANSETRON HCL/PF 4 MG/2 ML
4 VIAL (ML) INJECTION EVERY 8 HOURS
Refills: 0 | Status: DISCONTINUED | OUTPATIENT
Start: 2025-07-15 | End: 2025-07-18

## 2025-07-15 RX ORDER — DEXTROMETHORPHAN HBR, GUAIFENESIN 200 MG/10ML
1200 LIQUID ORAL ONCE
Refills: 0 | Status: COMPLETED | OUTPATIENT
Start: 2025-07-15 | End: 2025-07-15

## 2025-07-15 RX ORDER — FUROSEMIDE 10 MG/ML
80 INJECTION INTRAMUSCULAR; INTRAVENOUS
Refills: 0 | Status: DISCONTINUED | OUTPATIENT
Start: 2025-07-15 | End: 2025-07-16

## 2025-07-15 RX ORDER — CEFTRIAXONE 500 MG/1
1000 INJECTION, POWDER, FOR SOLUTION INTRAMUSCULAR; INTRAVENOUS EVERY 24 HOURS
Refills: 0 | Status: DISCONTINUED | OUTPATIENT
Start: 2025-07-15 | End: 2025-07-18

## 2025-07-15 RX ORDER — DEXTROMETHORPHAN HBR, GUAIFENESIN 200 MG/10ML
1200 LIQUID ORAL EVERY 12 HOURS
Refills: 0 | Status: DISCONTINUED | OUTPATIENT
Start: 2025-07-15 | End: 2025-07-18

## 2025-07-15 RX ORDER — AZITHROMYCIN 250 MG
500 CAPSULE ORAL EVERY 24 HOURS
Refills: 0 | Status: DISCONTINUED | OUTPATIENT
Start: 2025-07-15 | End: 2025-07-16

## 2025-07-15 RX ORDER — MAGNESIUM, ALUMINUM HYDROXIDE 200-200 MG
30 TABLET,CHEWABLE ORAL EVERY 4 HOURS
Refills: 0 | Status: DISCONTINUED | OUTPATIENT
Start: 2025-07-15 | End: 2025-07-18

## 2025-07-15 RX ORDER — LIDOCAINE HYDROCHLORIDE 20 MG/ML
1 JELLY TOPICAL EVERY 24 HOURS
Refills: 0 | Status: DISCONTINUED | OUTPATIENT
Start: 2025-07-15 | End: 2025-07-18

## 2025-07-15 RX ORDER — ACETAMINOPHEN 500 MG/5ML
650 LIQUID (ML) ORAL EVERY 6 HOURS
Refills: 0 | Status: DISCONTINUED | OUTPATIENT
Start: 2025-07-15 | End: 2025-07-18

## 2025-07-15 RX ADMIN — Medication 1 SPRAY(S): at 06:32

## 2025-07-15 RX ADMIN — DEXTROMETHORPHAN HBR, GUAIFENESIN 1200 MILLIGRAM(S): 200 LIQUID ORAL at 19:04

## 2025-07-15 RX ADMIN — Medication 3 MILLIGRAM(S): at 21:22

## 2025-07-15 RX ADMIN — Medication 250 MILLIGRAM(S): at 14:57

## 2025-07-15 RX ADMIN — DEXTROMETHORPHAN HBR, GUAIFENESIN 1200 MILLIGRAM(S): 200 LIQUID ORAL at 21:22

## 2025-07-15 RX ADMIN — Medication 300 MILLIGRAM(S): at 09:28

## 2025-07-15 RX ADMIN — CEFTRIAXONE 1000 MILLIGRAM(S): 500 INJECTION, POWDER, FOR SOLUTION INTRAMUSCULAR; INTRAVENOUS at 14:53

## 2025-07-15 RX ADMIN — Medication 1000 UNIT(S): at 09:28

## 2025-07-15 RX ADMIN — IPRATROPIUM BROMIDE AND ALBUTEROL SULFATE 3 MILLILITER(S): .5; 2.5 SOLUTION RESPIRATORY (INHALATION) at 15:09

## 2025-07-15 RX ADMIN — DORZOLAMIDE HYDROCHLORIDE AND TIMOLOL MALEATE 1 DROP(S): 20; 5 SOLUTION/ DROPS OPHTHALMIC at 21:23

## 2025-07-15 RX ADMIN — Medication 2.5 MILLIGRAM(S): at 21:23

## 2025-07-15 RX ADMIN — METOPROLOL SUCCINATE 25 MILLIGRAM(S): 50 TABLET, EXTENDED RELEASE ORAL at 21:22

## 2025-07-15 RX ADMIN — IPRATROPIUM BROMIDE AND ALBUTEROL SULFATE 3 MILLILITER(S): .5; 2.5 SOLUTION RESPIRATORY (INHALATION) at 06:54

## 2025-07-15 RX ADMIN — Medication 10 MILLIEQUIVALENT(S): at 09:28

## 2025-07-15 RX ADMIN — DORZOLAMIDE HYDROCHLORIDE AND TIMOLOL MALEATE 1 DROP(S): 20; 5 SOLUTION/ DROPS OPHTHALMIC at 10:44

## 2025-07-15 NOTE — H&P ADULT - NSHPLABSRESULTS_GEN_ALL_CORE
COMPARISON: March 20, 2024 chest CT.    LYMPH NODES/MEDIASTINUM: Numerous lymph nodes within the lower portion of   the partially imaged neck many of which are seen on the prior chest CT,   majority of which are small with the largest within the left lower neck   measuring about 1.1 cm. Small or benign appearing bilateral axillary   lymph nodes. Multiple mediastinal lymph nodes largest in the pretracheal   location measuring about 1.3 cm are either unchanged or demonstrate   slight interval decrease in size.    VASCULATURE/HEART: Left upper anterior chest wall cardiac device with the   leads terminating within the right ventricle and 1 coursing through the   coronary sinus. Cardiomegaly. No pericardial effusion. Aortic root and   valve calcifications. Vascular calcifications with involvement of the   aorta and the coronary arteries. Main pulmonary artery is enlarged   measuring about 3.9 cm unchanged can be seen in the setting of pulmonary   arterial hypertension.    UPPER ABDOMEN: Cholecystectomy. Partially imaged spleen is enlarged   measuring about 18 cm in anteroposterior dimension with significant   increase in size since 2024 when he measured about 13 cm. Cysts within   the partially imaged right kidney. Colonic diverticulosis.    LUNGS/PLEURA: No pleural effusions.    Nodular and ill-defined consolidations within the lower portions of the   left lower lobe with some associated volume loss new since 2024   representing combination of pneumonia and atelectasis.    Right lower lung volume loss within the right middle and right lower   lobes due to atelectasis possibly with superimposed right lower lobe   pneumonia.    CHEST WALL: Spinal degenerative changes.    IMPRESSION: Bilateral lower lung consolidations suggestive of combination   of atelectasis and pneumonia.    Partially imaged splenomegaly with marked interval increase in size since   March 20, 2024. Numerous lymph nodes within the partially imaged lower   neck bilateral axilla may be related to the new splenomegaly.

## 2025-07-15 NOTE — CONSULT NOTE ADULT - ASSESSMENT
PNA multifocal in setting of CHF.  VPC and ? of AICD firing.  EP interogated device and  PNA multifocal in setting of CHF.  VPC and ? of AICD firing.  EP interogated device and shows decreased sensitivity.  Recommendations for Amiodarone to start made and being loaded.  CHF appears euvolic currently.  DC lopressor and change to long acting metoprolol.  Currently on metolazone and monitor kidney fx and electrolytes.      D/w pt. and staff.

## 2025-07-15 NOTE — INPATIENT CERTIFICATION FOR MEDICARE PATIENTS - THE SEVERITY OF SIGNS/SYMPTOMS. (SEE ED/ADMIT DOCUMENTS)
1. The severity of signs/symptoms.(See ED/admit documents)
no back pain, no gout, no musculoskeletal pain, no neck pain, and no weakness.

## 2025-07-15 NOTE — H&P ADULT - ASSESSMENT
89 year old male w AFIB on AC, AICD, HFrEF, CLL, glaucoma, tophaceous gout, HLD, HTN, RA was brought in by darío c/o SOB productive cough      #PNA  1. admit to med  2. follow up cx  3. sputum cx  4. ceftriaxone + zithromax        #AFIB  1. on AC   89 year old male w AFIB on AC, AICD, HFrEF, CLL, glaucoma, tophaceous gout, HLD, HTN, RA was brought in by grandson c/o SOB productive cough      #PNA  #Dyspnea /SOB+ cough  CT scan as above  1. admit to med  2. ceftriaxone + zithromax  3. sputum cx  4. ID consult  not sure if he should be changed to cefepime  5. strep + legionella Ag in urine  6. duoneb q 6 hrs can start in AM        #AFIB  #AICD  EKG v-paced w PVCs @ 79  1. on AC  2. missed last night's dose of warfarin  3. PT/INR  4. adjust dose      #HFrEF  family reported wt loss of 12 lbs  appears stable  BNP elevation may reflect bilat PNA> CHF  1. BB  2. lasix 80 mg BID  3. metoloazone 2.5 mg BID  4. CARD consult for opinion of continuing current doses  5. daily weights      #CLL  since 2019 ; last seen by ONC  w   has not had B symptoms then and deferred treatment  today 177K  SM partially imaged on CT chest  1. trend        #Glaucoma  1. continue gtts        #Gout tophaceous  1. allopurinol 300 mg qd      #RA  no DMD      #VTE  on AC        #80 minutes     89 year old male w AFIB on AC, AICD, HFrEF, CLL, glaucoma, tophaceous gout, HLD, HTN, RA was brought in by grandson c/o SOB productive cough    #AHRF  #PNA  #Dyspnea /SOB+ cough  CT scan as above  1. admit to med  2. ceftriaxone + zithromax  3. sputum cx  4. ID consult  not sure if he should be changed to cefepime  5. strep + legionella Ag in urine  6. duoneb q 6 hrs can start in AM  7. nasal saline prn congestion  8. deferred steroid @ this time and had recent aggressive diuresis      #AFIB  #AICD  EKG v-paced w PVCs @ 79  1. on AC  2. missed last night's dose of warfarin  3. PT/INR  4. adjust dose      #HFrEF  family reported wt loss of 12 lbs  appears stable  BNP elevation may reflect bilat PNA> CHF  1. BB  2. lasix 80 mg BID  3. metoloazone 2.5 mg BID  4. CARD consult for opinion of continuing current doses  5. daily weights      #CLL  since 2019 ; last seen by ONC  w   has not had B symptoms then and deferred treatment  today 177K  SM partially imaged on CT chest  1. trend        #Glaucoma  1. continue gtts        #Gout tophaceous  1. allopurinol 300 mg qd      #RA  no DMD      #VTE  on AC        #80 minutes

## 2025-07-15 NOTE — H&P ADULT - HISTORY OF PRESENT ILLNESS
89 year old male w AFIB on AC, AICD, HFrEF, CLL, glaucoma, tophaceous gout, HLD, HTN, RA was brought in by grandson c/o SOB productive cough    3 day hx of sob; productive cough;   per family- increase in lasix recently with 12lb weight loss   no chest pain; no abdominal pain; no trauma or injury. No known sick contacts; similar presentation w pneumonia in past.      In ED /50   HR 58   T 98.6 /100 rectal            PAST MEDICAL HX  Atrial fibrillation   Arthritis  Basal cell CA  CLL (chronic lymphocytic leukemia) 2019       139K 04-  Glaucoma  Gout : tophaceous  HFrEF heart failure w reduced ejection fx  HLD hyperlipidemia  HTN hypertension  Pancreatitis  RA Rheumatoid arthritis  SCC squamous cell CA : face, trunk    VTach : NSVT        PAST SURGICAL HX  AICD automatic cardioverter/defibrillator   Skin biopsies  BCC removed from L skull  Cataract removal Cholecystectomy  Hernia repair Moh's surgery Trabeculoplasty  Tonsilectomy    FAMILY HX  heart disease : Father      SOCIAL HX  lives w family/daughter 89 year old male w AFIB on AC, AICD, HFrEF, CLL, glaucoma, tophaceous gout, HLD, HTN, RA was brought in by grandson c/o SOB productive cough    3 day hx of SOB w cough productive of yellowish sputum  per family- increase in lasix recently with 12lb weight loss  no chest pain; no abdominal pain; no trauma or injury.   no known sick contacts; similar presentation w pneumonia in past.      In ED /50   HR 58   T 98.6 /100 rectal  CT + bilateral PNA; ceftriaxone + zithromax    Being admitted w PNA      PAST MEDICAL HX  Atrial fibrillation   Arthritis  Basal cell CA  CLL (chronic lymphocytic leukemia) 2019       139K 04-  Glaucoma  Gout : tophaceous  HFrEF heart failure w reduced ejection fx  HLD hyperlipidemia  HTN hypertension  Pancreatitis  RA Rheumatoid arthritis  SCC squamous cell CA : face, trunk    VTach : NSVT        PAST SURGICAL HX  AICD automatic cardioverter/defibrillator   Skin biopsies  BCC removed from L skull  Cataract removal Cholecystectomy  Hernia repair Moh's surgery Trabeculoplasty  Tonsilectomy    FAMILY HX  heart disease : Father      SOCIAL HX  lives w family/daughter

## 2025-07-15 NOTE — CONSULT NOTE ADULT - NS ATTEND AMEND GEN_ALL_CORE FT
Agreed with plan for amiodarone therapy for suppression of polymorphic PVC and NSVT in order to increase biventricular pacing percentage.

## 2025-07-15 NOTE — CONSULT NOTE ADULT - ASSESSMENT
ASSESSMENT & PLAN: 89 year old male with above history with frequent PVC, bigeminy on echo.        Review of device interrogation shows slight decrease in effective CRT pacing due to frequent ventricular ectopy  Unable to increase Metoprolol due to borderline blood pressure  Will start Amiodarone 400mg PO BID x5 days and then decrease to 200mg PO daily for PVC suppression  Further management of PNA per medicine  Plan discussed with patient, hospitalist and Dr. Lynch

## 2025-07-15 NOTE — CHART NOTE - NSCHARTNOTEFT_GEN_A_CORE
H&P Adult [Charted Location: [facility] [address] [id number]] [Authored: 15-Jul-2025 03:06]- for Visit: [id number], Complete, Revised, Signed in Full, Available to Patient as of 15-Jul-2025 05:27    CC: Cough, SOB    S:  7/15: Seen at bedside, asleep, minimally arousable for me, but comfortable appearing.  Stated he had 16 beat wide complex tachycardia earlier.      Vital Signs Last 24 Hrs  T(C): 36.8 (15 Jul 2025 07:20), Max: 37.8 (14 Jul 2025 14:47)  T(F): 98.2 (15 Jul 2025 07:20), Max: 100 (14 Jul 2025 14:47)  HR: 79 (15 Jul 2025 07:20) (55 - 88)  BP: 102/60 (15 Jul 2025 07:20) (102/60 - 124/50)  BP(mean): 68 (14 Jul 2025 19:17) (68 - 68)  RR: 20 (15 Jul 2025 07:20) (20 - 26)  SpO2: 94% (15 Jul 2025 07:20) (89% - 95%)    Parameters below as of 15 Jul 2025 07:20  Patient On (Oxygen Delivery Method): nasal cannula  O2 Flow (L/min): 6    PHYSICAL EXAM:    Constitutional: NAD, asleep, minimally arousable  HEENT: PERR, EOMI, Normal Hearing, MMM  Neck: Soft and supple  Respiratory: Breath sounds are clear bilaterally, and decreased at bases  Cardiovascular: S1 and S2, regular rate and rhythm, no Murmurs, gallops or rubs  Gastrointestinal: Bowel Sounds present, soft, nontender, nondistended, no guarding, no rebound  Extremities: No peripheral edema  Neurological: A/O x 3, no focal deficits in my limited exam       Assessment/Plan	89 year old male w AFIB on AC, AICD, HFrEF, CLL, glaucoma, tophaceous gout, HLD, HTN, RA was brought in by grandson c/o SOB productive cough    #AHRF  #PNA  #Dyspnea /SOB+ cough    c/w IV antibiotics per ID  - f/u cultures  - profound leukocytosis in setting of CLL and infection - oncology notified  - supportive care    hx of paroxysmal afib/ has AICD / ectopy / HFrEF:  - EP eval for abnormal tele and concern for firing of aicd  - c/w home meds otherwise  - c/w coumadin for goal INR 2-3  - cardio eval for management of chf      #CLL  since 2019 ; last seen by ONC  w   has not had B symptoms then and deferred treatment  today 177K  SM partially imaged on CT chest  1. trend        #Glaucoma  1. continue gtts        #Gout tophaceous  1. allopurinol 300 mg qd      #RA  no DMD      #VTE  on AC        #80 minutes            Electronic Signatures:  Saida Albrecht)  (Signed 15-Jul-2025 05:59)  	Authored: History and Physical, History of Present Illness, Allergies/Medications, Patient History, Risk Assessment, Physical Exam, Labs and Results, Assessment and Plan      Last Updated: 15-Jul-2025 05:59 by Saida Albrecht)

## 2025-07-15 NOTE — CONSULT NOTE ADULT - SUBJECTIVE AND OBJECTIVE BOX
HPI:  89 year old male w AFIB on AC, AICD, HFrEF, CLL, glaucoma, tophaceous gout, HLD, HTN, RA was brought in by grandson c/o SOB productive cough    3 day hx of SOB w cough productive of yellowish sputum  per family- increase in lasix recently with 12lb weight loss  no chest pain; no abdominal pain; no trauma or injury.   no known sick contacts; similar presentation w pneumonia in past.      In ED /50   HR 58   T 98.6 /100 rectal  CT + bilateral PNA; ceftriaxone + zithromax    Being admitted w PNA      PAST MEDICAL HX  Atrial fibrillation   Arthritis  Basal cell CA  CLL (chronic lymphocytic leukemia) 2019       139K 04-  Glaucoma  Gout : tophaceous  HFrEF heart failure w reduced ejection fx  HLD hyperlipidemia  HTN hypertension  Pancreatitis  RA Rheumatoid arthritis  SCC squamous cell CA : face, trunk    VTach : NSVT    7/15/25: 89 year old male known to EP team with history of AF, CM with CRT-D in place, HFreEF presenting to  with pneumonia.  Patient noted to have frequent PVCs and bigeminy on telemetry.  EP asked to evaluate patient for ventricular ectopy.  Patient seen at bedside, reports that he is feeling better and breathing has improved since admission. Denies any cardiac complaints.    PAST MEDICAL & SURGICAL HISTORY:  Afib  HTN (hypertension)  Cholesterol serum elevated  BPH (Benign Prostatic Hyperplasia)  CA - skin cancer  History of ankle fracture  Intolerance, drug  INTOLERANCE  TO STATIN  Hearing loss  Myocardial infarction  Gout  Osteoarthritis  Glaucoma  Rheumatoid arthritis  CLL (chronic lymphocytic leukemia)  Chronic systolic heart failure  AICD present, double chamber  Hx of appendectomy  Hx of tonsillectomy  S/P TKR (total knee replacement)  bilateral  H/O coronary angiogram  x 2  H/O hernia repair  x  4 surgeries  S/P laser cataract surgery  b/l  H/O shoulder surgery  right  S/P ORIF (open reduction internal fixation) fracture  left ankle          MEDICATIONS  (STANDING):  albuterol    90 MICROgram(s) HFA Inhaler 2 Puff(s) Inhalation every 6 hours  albuterol/ipratropium for Nebulization 3 milliLiter(s) Nebulizer every 6 hours  allopurinol 300 milliGRAM(s) Oral daily  azithromycin  IVPB 500 milliGRAM(s) IV Intermittent every 24 hours  cefTRIAXone Injectable. 1000 milliGRAM(s) IV Push every 24 hours  cholecalciferol 1000 Unit(s) Oral daily  dorzolamide 2%/timolol 0.5% Ophthalmic Solution 1 Drop(s) Both EYES two times a day  furosemide    Tablet 80 milliGRAM(s) Oral <User Schedule>  metOLazone 2.5 milliGRAM(s) Oral <User Schedule>  metoprolol tartrate 25 milliGRAM(s) Oral two times a day  potassium chloride    Tablet ER 10 milliEquivalent(s) Oral daily    MEDICATIONS  (PRN):  acetaminophen     Tablet .. 650 milliGRAM(s) Oral every 6 hours PRN Temp greater or equal to 38C (100.4F), Mild Pain (1 - 3)  aluminum hydroxide/magnesium hydroxide/simethicone Suspension 30 milliLiter(s) Oral every 4 hours PRN Dyspepsia  lidocaine   4% Patch 1 Patch Transdermal every 24 hours PRN shoulder pain  melatonin 3 milliGRAM(s) Oral at bedtime PRN Insomnia  ondansetron Injectable 4 milliGRAM(s) IV Push every 8 hours PRN Nausea and/or Vomiting  sodium chloride 0.65% Nasal 1 Spray(s) Both Nostrils every 2 hours PRN Nasal Congestion      Allergies    penicillins (Unknown)    SOCIAL HISTORY: Denies tobacco, etoh abuse or illicit drug use    FAMILY HISTORY:  Family history of other heart disease (Father)    Family history of cardiac arrhythmia (Child)        Vital Signs Last 24 Hrs  T(C): 36.8 (15 Jul 2025 07:20), Max: 37.8 (14 Jul 2025 14:47)  T(F): 98.2 (15 Jul 2025 07:20), Max: 100 (14 Jul 2025 14:47)  HR: 79 (15 Jul 2025 07:20) (55 - 88)  BP: 102/60 (15 Jul 2025 07:20) (102/60 - 124/50)  BP(mean): 68 (14 Jul 2025 19:17) (68 - 68)  RR: 20 (15 Jul 2025 07:20) (20 - 26)  SpO2: 94% (15 Jul 2025 07:20) (89% - 95%)    Parameters below as of 15 Jul 2025 07:20  Patient On (Oxygen Delivery Method): nasal cannula  O2 Flow (L/min): 6      REVIEW OF SYSTEMS:    CONSTITUTIONAL:  As per HPI.  HEENT:  Eyes:  No diplopia or blurred vision. ENT:  No earache, sore throat or runny nose.  CARDIOVASCULAR:  No pressure, squeezing, strangling, tightness, heaviness or aching about the chest, neck, axilla or epigastrium.  RESPIRATORY:  No cough, shortness of breath, PND or orthopnea.  GASTROINTESTINAL:  No nausea, vomiting or diarrhea.  GENITOURINARY:  No dysuria, frequency or urgency.  MUSCULOSKELETAL:  As per HPI.  SKIN:  No change in skin, hair or nails.  NEUROLOGIC:  No paresthesias, fasciculations, seizures or weakness.  PSYCHIATRIC:  No disorder of thought or mood.  ENDOCRINE:  No heat or cold intolerance, polyuria or polydipsia.  HEMATOLOGICAL:  No easy bruising or bleedings:  .     PHYSICAL EXAMINATION:    GENERAL APPEARANCE:  Pt. is not currently dyspneic, in no distress. Pt. is alert, oriented, and pleasant. Oxygen via NC  HEENT:  Pupils are normal and react normally. No icterus. Mucous membranes well colored.  NECK:  Supple. No lymphadenopathy. Jugular venous pressure not elevated. Carotids equal.   HEART:   The cardiac impulse has a normal quality. There are no murmurs, rubs or gallops noted  CHEST:  Chest is clear to auscultation. Normal respiratory effort.  ABDOMEN:  Soft and nontender.   EXTREMITIES:  There is no edema.   SKIN:  No rash or significant lesions are noted.    I&O's Summary      LABS:                        12.5   158.90 )-----------( 114      ( 15 Jul 2025 06:36 )             43.2     07-15    136  |  94[L]  |  52[H]  ----------------------------<  112[H]  4.4   |  36[H]  |  1.23    Ca    9.3      15 Jul 2025 06:36    TPro  6.5  /  Alb  3.6  /  TBili  1.7[H]  /  DBili  x   /  AST  26  /  ALT  16  /  AlkPhos  139[H]  07-14    LIVER FUNCTIONS - ( 14 Jul 2025 17:09 )  Alb: 3.6 g/dL / Pro: 6.5 gm/dL / ALK PHOS: 139 U/L / ALT: 16 U/L / AST: 26 U/L / GGT: x           PT/INR - ( 15 Jul 2025 06:36 )   PT: 31.4 sec;   INR: 2.76 ratio         PTT - ( 14 Jul 2025 14:52 )  PTT:42.6 sec      Urinalysis Basic - ( 15 Jul 2025 06:36 )    Color: x / Appearance: x / SG: x / pH: x  Gluc: 112 mg/dL / Ketone: x  / Bili: x / Urobili: x   Blood: x / Protein: x / Nitrite: x   Leuk Esterase: x / RBC: x / WBC x   Sq Epi: x / Non Sq Epi: x / Bacteria: x    EKG: Vpaced, occ Pvc, bigeminy    TELEMETRY: underlying AF, BIV paced, frequent PVC, bigeminy    CARDIAC TESTS:  < from: TTE Echo Complete w/o Contrast w/ Doppler (03.29.24 @ 08:49) >  Summary     The left ventricle is normal in size, wall motion and contractility.   Mild concentric left ventricular hypertrophy is present.   Normal left ventricular systolic function.   Estimated left ventricular ejection fraction is 55-60 %.   The left atrium is severely dilated.   The right atrium appears severely dilated.   A device wire is seen in the RV and RA.   The right ventricle appears mildly dilated in some views.   The aortic valve appears mildly calcified. Valve opening seems to be   normal.   Mild (1+) aortic regurgitation is present.   The aortic root is mildly dilated .   The ascending aorta appears to be mildly dilated.   Mild mitral annular calcification is present.   The mitral valve leaflets appear mildly thickened.   Trace mitral regurgitation is present.   The tricuspid valve leaflets appear normal.   Mild to Moderate Tricuspid regurgitation is present.   Moderate pulmonary hypertension.   No evidence of pericardial effusion.   Pleural effusion -is present.   IVC is dilated and not collapsing with inspiration.            RADIOLOGY & ADDITIONAL STUDIES:

## 2025-07-15 NOTE — PROCEDURE NOTE - ADDITIONAL PROCEDURE DETAILS
CRT-D with normal function, adequate pacing and sensing thresholds.  Stored data with two episodes of VT with no intervention required.  Effective CRT pacing 85%, down from baseline of 90% likely due to frequent ventricular ectopy.  Will add Amiodarone for PVC suppression.  Recommend routine device follow up in three months.

## 2025-07-15 NOTE — CONSULT NOTE ADULT - SUBJECTIVE AND OBJECTIVE BOX
Patient is a 89y old  Male who presents with a chief complaint of Bilateral PNA (15 Jul 2025 03:06)    HPI:  89 year old male w AFIB on AC, AICD, HFrEF, CLL, glaucoma, tophaceous gout, HLD, HTN, RA was brought in by grandson c/o SOB productive cough  3 day hx of SOB w cough productive of yellowish sputum  per family- increase in lasix recently with 12lb weight loss  no chest pain; no abdominal pain; no trauma or injury.   no known sick contacts; similar presentation w pneumonia in past.(15 Jul 2025 03:06)  Above HPI reviewed and reconciled with patient    89M with AFIB on AC, AICD, HFrEF, CLL, glaucoma, tophaceous gout, HLD, HTN, RA presents 7/14 with dyspnea and cough for 3 days, associated with yellowish sputum production  Denies any fever or chills, abdominal pain, n/v, diarrhea or dysuria  No known sick contact  Afebrile on admission, on 6L NC   (baseline 100-150s)  Cr 1.23  UA negative  RVP negative  CT Chest with Bilateral lower lung consolidations suggestive of combination of atelectasis and pneumonia.  Partially imaged splenomegaly with marked interval increase in size since   March 20, 2024. Numerous lymph nodes within the partially imaged lower   neck bilateral axilla may be related to thenew splenomegaly.    PAST MEDICAL & SURGICAL HISTORY:  Afib      HTN (hypertension)      Cholesterol serum elevated      BPH (Benign Prostatic Hyperplasia)      CA - skin cancer      History of ankle fracture      Intolerance, drug  INTOLERANCE  TO STATIN      Hearing loss      Myocardial infarction      Gout      Osteoarthritis      Glaucoma      Rheumatoid arthritis      CLL (chronic lymphocytic leukemia)      Chronic systolic heart failure      AICD present, double chamber      Hx of appendectomy      Hx of tonsillectomy      S/P TKR (total knee replacement)  bilateral      H/O coronary angiogram  x 2      H/O hernia repair  x  4 surgeries      S/P laser cataract surgery  b/l      H/O shoulder surgery  right      S/P ORIF (open reduction internal fixation) fracture  left ankle        FAMILY HISTORY:  Family history of other heart disease (Father)    Family history of cardiac arrhythmia (Child)      Social Hx: Denies alcohol, tobacco, recreational drug use    Allergies  penicillins (Unknown)    ANTIMICROBIALS (past 90 days)  MEDICATIONS  (STANDING):  azithromycin  IVPB   250 mL/Hr IV Intermittent (07-14-25 @ 15:16)    cefTRIAXone Injectable.   1000 milliGRAM(s) IV Push (07-14-25 @ 15:16)        ACTIVE ANTIMICROBIALS  azithromycin  IVPB 500 every 24 hours (7/14 --- )  cefTRIAXone Injectable. 1000 every 24 hours (7/14 --- )    MEDICATIONS  (STANDING):  acetaminophen     Tablet .. 650 every 6 hours PRN  albuterol    90 MICROgram(s) HFA Inhaler 2 every 6 hours  albuterol/ipratropium for Nebulization 3 every 6 hours  allopurinol 300 daily  aluminum hydroxide/magnesium hydroxide/simethicone Suspension 30 every 4 hours PRN  furosemide    Tablet 80 <User Schedule>  melatonin 3 at bedtime PRN  metOLazone 2.5 <User Schedule>  metoprolol tartrate 25 two times a day  ondansetron Injectable 4 every 8 hours PRN      REVIEW OF SYSTEMS  [  ] ROS unobtainable because:    [ x ] All other systems negative except as noted below    Constitutional:  [ ] fever [ ] chills  [ ] weight loss  [ ]night sweat  [ ]poor appetite/PO intake [ ]fatigue   Skin:  [ ] rash [ ] phlebitis	  Eyes: [ ] icterus [ ] pain  [ ] discharge	  ENMT: [ ] sore throat  [ ] thrush [ ] ulcers [ ] exudates [ ]anosmia  Respiratory: [ ] dyspnea [ ] hemoptysis [ ] cough [ ] sputum	  Cardiovascular:  [ ] chest pain [ ] palpitations [ ] edema	  Gastrointestinal:  [ ] nausea [ ] vomiting [ ] diarrhea [ ] constipation [ ] pain	  Genitourinary:  [ ] dysuria [ ] frequency [ ] hematuria [ ] discharge [ ] flank pain  [ ] incontinence  Musculoskeletal:  [ ] myalgias [ ] arthralgias [ ] arthritis  [ ] back pain  Neurological:  [ ] headache [ ] weakness [ ] seizures  [ ] confusion/altered mental status    Vital Signs Last 24 Hrs  T(C): 36.8 (15 Jul 2025 07:20), Max: 37.8 (14 Jul 2025 14:47)  T(F): 98.2 (15 Jul 2025 07:20), Max: 100 (14 Jul 2025 14:47)  HR: 79 (15 Jul 2025 07:20) (55 - 88)  BP: 102/60 (15 Jul 2025 07:20) (102/60 - 124/50)  BP(mean): 68 (14 Jul 2025 19:17) (68 - 68)  RR: 20 (15 Jul 2025 07:20) (20 - 26)  SpO2: 94% (15 Jul 2025 07:20) (89% - 95%)    Parameters below as of 15 Jul 2025 07:20  Patient On (Oxygen Delivery Method): nasal cannula  O2 Flow (L/min): 6      Physical Exam:  Constitutional: frail, NAD  Head/Eyes: no icterus  LUNGS:  Crackles  CVS:  regular rhythm  Abd:  soft, non-tender; non-distended  Ext:  no edema  Vascular:  IV site no erythema tenderness or discharge  Neuro: AAO X 3, non- focal    Labs: all available labs reviewed                        12.5   158.90 )-----------( 114      ( 15 Jul 2025 06:36 )             43.2     07-15    136  |  94[L]  |  52[H]  ----------------------------<  112[H]  4.4   |  36[H]  |  1.23    Ca    9.3      15 Jul 2025 06:36    TPro  6.5  /  Alb  3.6  /  TBili  1.7[H]  /  DBili  x   /  AST  26  /  ALT  16  /  AlkPhos  139[H]  07-14     LIVER FUNCTIONS - ( 14 Jul 2025 17:09 )  Alb: 3.6 g/dL / Pro: 6.5 gm/dL / ALK PHOS: 139 U/L / ALT: 16 U/L / AST: 26 U/L / GGT: x           Urinalysis Basic - ( 15 Jul 2025 06:36 )    Color: x / Appearance: x / SG: x / pH: x  Gluc: 112 mg/dL / Ketone: x  / Bili: x / Urobili: x   Blood: x / Protein: x / Nitrite: x   Leuk Esterase: x / RBC: x / WBC x   Sq Epi: x / Non Sq Epi: x / Bacteria: x          Radiology: all available radiological tests reviewed    Advanced directives addressed: full resuscitation Patient is a 89y old  Male who presents with a chief complaint of Bilateral PNA (15 Jul 2025 03:06)    HPI:  89 year old male w AFIB on AC, AICD, HFrEF, CLL, glaucoma, tophaceous gout, HLD, HTN, RA was brought in by grandson c/o SOB productive cough  3 day hx of SOB w cough productive of yellowish sputum  per family- increase in lasix recently with 12lb weight loss  no chest pain; no abdominal pain; no trauma or injury.   no known sick contacts; similar presentation w pneumonia in past.(15 Jul 2025 03:06)  Above HPI reviewed and reconciled with patient    89M with AFIB on AC, AICD, HFrEF, CLL, glaucoma, tophaceous gout, HLD, HTN, RA presents 7/14 with dyspnea and cough for 3 days, associated with yellowish sputum production  Patient is very weak, reports feeling sleepy  Denies any fever or chills, abdominal pain, n/v, diarrhea or dysuria  No known sick contact  Afebrile on admission, on 6L NC   (baseline 100-150s)  Cr 1.23  UA negative  RVP negative  CT Chest with Bilateral lower lung consolidations suggestive of combination of atelectasis and pneumonia.  Partially imaged splenomegaly with marked interval increase in size since   March 20, 2024. Numerous lymph nodes within the partially imaged lower   neck bilateral axilla may be related to thenew splenomegaly.    PAST MEDICAL & SURGICAL HISTORY:  Afib      HTN (hypertension)      Cholesterol serum elevated      BPH (Benign Prostatic Hyperplasia)      CA - skin cancer      History of ankle fracture      Intolerance, drug  INTOLERANCE  TO STATIN      Hearing loss      Myocardial infarction      Gout      Osteoarthritis      Glaucoma      Rheumatoid arthritis      CLL (chronic lymphocytic leukemia)      Chronic systolic heart failure      AICD present, double chamber      Hx of appendectomy      Hx of tonsillectomy      S/P TKR (total knee replacement)  bilateral      H/O coronary angiogram  x 2      H/O hernia repair  x  4 surgeries      S/P laser cataract surgery  b/l      H/O shoulder surgery  right      S/P ORIF (open reduction internal fixation) fracture  left ankle        FAMILY HISTORY:  Family history of other heart disease (Father)    Family history of cardiac arrhythmia (Child)      Social Hx: Denies alcohol, tobacco, recreational drug use    Allergies  penicillins (Unknown)    ANTIMICROBIALS (past 90 days)  MEDICATIONS  (STANDING):  azithromycin  IVPB   250 mL/Hr IV Intermittent (07-14-25 @ 15:16)    cefTRIAXone Injectable.   1000 milliGRAM(s) IV Push (07-14-25 @ 15:16)        ACTIVE ANTIMICROBIALS  azithromycin  IVPB 500 every 24 hours (7/14 --- )  cefTRIAXone Injectable. 1000 every 24 hours (7/14 --- )    MEDICATIONS  (STANDING):  acetaminophen     Tablet .. 650 every 6 hours PRN  albuterol    90 MICROgram(s) HFA Inhaler 2 every 6 hours  albuterol/ipratropium for Nebulization 3 every 6 hours  allopurinol 300 daily  aluminum hydroxide/magnesium hydroxide/simethicone Suspension 30 every 4 hours PRN  furosemide    Tablet 80 <User Schedule>  melatonin 3 at bedtime PRN  metOLazone 2.5 <User Schedule>  metoprolol tartrate 25 two times a day  ondansetron Injectable 4 every 8 hours PRN      REVIEW OF SYSTEMS  [  ] ROS unobtainable because:    [ x ] All other systems negative except as noted below    Constitutional:  [ ] fever [ ] chills  [ ] weight loss  [ ]night sweat  [ ]poor appetite/PO intake [ ]fatigue   Skin:  [ ] rash [ ] phlebitis	  Eyes: [ ] icterus [ ] pain  [ ] discharge	  ENMT: [ ] sore throat  [ ] thrush [ ] ulcers [ ] exudates [ ]anosmia  Respiratory: [ ] dyspnea [ ] hemoptysis [ ] cough [ ] sputum	  Cardiovascular:  [ ] chest pain [ ] palpitations [ ] edema	  Gastrointestinal:  [ ] nausea [ ] vomiting [ ] diarrhea [ ] constipation [ ] pain	  Genitourinary:  [ ] dysuria [ ] frequency [ ] hematuria [ ] discharge [ ] flank pain  [ ] incontinence  Musculoskeletal:  [ ] myalgias [ ] arthralgias [ ] arthritis  [ ] back pain  Neurological:  [ ] headache [ ] weakness [ ] seizures  [ ] confusion/altered mental status    Vital Signs Last 24 Hrs  T(C): 36.8 (15 Jul 2025 07:20), Max: 37.8 (14 Jul 2025 14:47)  T(F): 98.2 (15 Jul 2025 07:20), Max: 100 (14 Jul 2025 14:47)  HR: 79 (15 Jul 2025 07:20) (55 - 88)  BP: 102/60 (15 Jul 2025 07:20) (102/60 - 124/50)  BP(mean): 68 (14 Jul 2025 19:17) (68 - 68)  RR: 20 (15 Jul 2025 07:20) (20 - 26)  SpO2: 94% (15 Jul 2025 07:20) (89% - 95%)    Parameters below as of 15 Jul 2025 07:20  Patient On (Oxygen Delivery Method): nasal cannula  O2 Flow (L/min): 6      Physical Exam:  Constitutional: frail, NAD  Head/Eyes: no icterus  LUNGS:  Crackles  CVS:  regular rhythm  Abd:  soft, non-tender; non-distended  Ext:  no edema  Vascular:  IV site no erythema tenderness or discharge  Neuro: AAO X 2    Labs: all available labs reviewed                        12.5   158.90 )-----------( 114      ( 15 Jul 2025 06:36 )             43.2     07-15    136  |  94[L]  |  52[H]  ----------------------------<  112[H]  4.4   |  36[H]  |  1.23    Ca    9.3      15 Jul 2025 06:36    TPro  6.5  /  Alb  3.6  /  TBili  1.7[H]  /  DBili  x   /  AST  26  /  ALT  16  /  AlkPhos  139[H]  07-14     LIVER FUNCTIONS - ( 14 Jul 2025 17:09 )  Alb: 3.6 g/dL / Pro: 6.5 gm/dL / ALK PHOS: 139 U/L / ALT: 16 U/L / AST: 26 U/L / GGT: x           Urinalysis Basic - ( 15 Jul 2025 06:36 )    Color: x / Appearance: x / SG: x / pH: x  Gluc: 112 mg/dL / Ketone: x  / Bili: x / Urobili: x   Blood: x / Protein: x / Nitrite: x   Leuk Esterase: x / RBC: x / WBC x   Sq Epi: x / Non Sq Epi: x / Bacteria: x          Radiology: all available radiological tests reviewed    Advanced directives addressed: full resuscitation

## 2025-07-15 NOTE — CONSULT NOTE ADULT - SUBJECTIVE AND OBJECTIVE BOX
HPI:  89 year old male with Chronic systolic HF,  Chronic AFIB on AC, AICD, HFrEF, CLL, glaucoma, tophaceous gout, HLD, HTN, RA was brought in by grandson c/o SOB and cough , productive cough of green sputum.  3 day hx of SOB w cough- increase in lasix recently with 12lb weight loss ; no chest pain; no abdominal pain; no trauma or injury.  no known sick contacts; similar presentation w pneumonia in past. Low grade fever in ER.  CT + bilateral PNA; ceftriaxone + zithromax; Now admitted w PNA. This AM Breathing better on oxygen.  No CP.  No edema.      PAST MEDICAL & SURGICAL HISTORY:  Atrial fibrillation   Arthritis  Basal cell CA  CLL (chronic lymphocytic leukemia) 2019 139K 04-  Glaucoma  Gout : tophaceous  HFrEF heart failure w reduced ejection fx  HLD hyperlipidemia  HTN hypertension  Pancreatitis  RA Rheumatoid arthritis  SCC squamous cell CA : face, trunk    VTach : NSVT  AICD automatic cardioverter/defibrillator   Skin biopsies  BCC removed from L skull  Cataract removal Cholecystectomy  Hernia repair Moh's surgery Trabeculoplasty  Tonsilectomy  Afib  HTN (hypertension)  Cholesterol serum elevated  BPH (Benign Prostatic Hyperplasia)  CA - skin cancer  History of ankle fracture  Intolerance, drug  INTOLERANCE  TO STATIN  Hearing loss  Myocardial infarction  Gout  Osteoarthritis  Glaucoma  Rheumatoid arthritis  CLL (chronic lymphocytic leukemia)  Chronic systolic heart failure  AICD present, double chamber  Hx of appendectomy  Hx of tonsillectomy  S/P TKR (total knee replacement) bilateral  H/O coronary angiogram x 2  H/O hernia repair x  4 surgeries  S/P laser cataract surgery b/l  H/O shoulder surgery right  S/P ORIF (open reduction internal fixation) fracture left ankle    SOCIAL HX  lives w family/daughter. Former smoker.  No ETOH    FAMILY HISTORY:  Family history of other heart disease (Father)  Family history of cardiac arrhythmia (Child)    Allergies  penicillins (Unknown)    Home Medications:  acetaminophen 650 mg oral tablet, extended release: 1 tab(s) orally 2 times a day (14 Jul 2025 18:39)  allopurinol 300 mg oral tablet: 1 tab(s) orally once a day (14 Jul 2025 17:09)  Biofreeze 3.5% topical gel: Apply topically to affected area prn (14 Jul 2025 18:39)  diclofenac 1% topical gel: Apply topically to affected area 2 times a day as needed for right shoulder pain (14 Jul 2025 17:09)  dorzolamide-timolol 2.23%-0.68% (2%-0.5% base) ophthalmic solution: 1 drop(s) in each eye 2 times a day (14 Jul 2025 17:09)  Lasix 40 mg oral tablet: 2 tab(s) orally 2 times a day in the morning and in the afternoon ~2:30 - 3PM (new directions for this med) (14 Jul 2025 18:39)  metOLazone 2.5 mg oral tablet: 1 tab(s) orally 2 times a day in the morning and the afternoon ~2:30 - 3PM (14 Jul 2025 18:39)  metoprolol tartrate 25 mg oral tablet: 1 tab(s) orally 2 times a day (14 Jul 2025 17:09)  potassium chloride 10 mEq oral tablet, extended release: 1 tab(s) orally once a day (14 Jul 2025 18:39)  Vitamin D3 25 mcg (1000 intl units) oral capsule: 1 cap(s) orally once a day (14 Jul 2025 18:39)  warfarin 2.5 mg oral tablet: 1 tab(s) orally once a day (in the evening) (14 Jul 2025 18:39)      HOSPITAL MEDICATIONS:   MEDICATIONS  (STANDING):  albuterol    90 MICROgram(s) HFA Inhaler 2 Puff(s) Inhalation every 6 hours  albuterol/ipratropium for Nebulization 3 milliLiter(s) Nebulizer every 6 hours  allopurinol 300 milliGRAM(s) Oral daily  aMIOdarone    Tablet   Oral   aMIOdarone    Tablet 400 milliGRAM(s) Oral two times a day  azithromycin  IVPB 500 milliGRAM(s) IV Intermittent every 24 hours  cefTRIAXone Injectable. 1000 milliGRAM(s) IV Push every 24 hours  cholecalciferol 1000 Unit(s) Oral daily  dorzolamide 2%/timolol 0.5% Ophthalmic Solution 1 Drop(s) Both EYES two times a day  furosemide    Tablet 80 milliGRAM(s) Oral <User Schedule>  guaiFENesin ER 1200 milliGRAM(s) Oral every 12 hours  metOLazone 2.5 milliGRAM(s) Oral <User Schedule>  metoprolol tartrate 25 milliGRAM(s) Oral two times a day  potassium chloride    Tablet ER 10 milliEquivalent(s) Oral daily  warfarin 2.5 milliGRAM(s) Oral at bedtime    MEDICATIONS  (PRN):  acetaminophen     Tablet .. 650 milliGRAM(s) Oral every 6 hours PRN Temp greater or equal to 38C (100.4F), Mild Pain (1 - 3)  aluminum hydroxide/magnesium hydroxide/simethicone Suspension 30 milliLiter(s) Oral every 4 hours PRN Dyspepsia  lidocaine   4% Patch 1 Patch Transdermal every 24 hours PRN shoulder pain  melatonin 3 milliGRAM(s) Oral at bedtime PRN Insomnia  ondansetron Injectable 4 milliGRAM(s) IV Push every 8 hours PRN Nausea and/or Vomiting  sodium chloride 0.65% Nasal 1 Spray(s) Both Nostrils every 2 hours PRN Nasal Congestion      REVIEW OF SYSTEMS: 13 systems were reviewed and all negative except for comments above.    Vital Signs Last 24 Hrs  T(C): 36.6 (15 Jul 2025 15:23), Max: 37 (15 Jul 2025 05:53)  T(F): 97.9 (15 Jul 2025 15:23), Max: 98.6 (15 Jul 2025 05:53)  HR: 65 (15 Jul 2025 21:20) (50 - 88)  BP: 119/50 (15 Jul 2025 21:20) (101/44 - 119/50)  BP(mean): --  RR: 18 (15 Jul 2025 15:23) (18 - 20)  SpO2: 96% (15 Jul 2025 15:23) (93% - 96%)    Parameters below as of 15 Jul 2025 15:23  Patient On (Oxygen Delivery Method): nasal cannula  O2 Flow (L/min): 6  Daily     Daily I&O's Summary    15 Jul 2025 07:01  -  15 Jul 2025 23:21  --------------------------------------------------------  IN: 0 mL / OUT: 180 mL / NET: -180 mL    PHYSICAL EXAM:  Constitutional: NAD, awake and sleepy, tired, well-developed  HEENT: PERRLA, EOMI,  No oral cyanosis. Oropharynx Clean and Dry.  Neck:  supple,  No JVD, No Thyroid enlargement. No Carotid Bruits bilaterally.  Respiratory: Breath sounds are rhonchi bilaterally,   Cardiovascular: NL S1 and S2, RRR, 1/6 DAVONTE, No gallops or rubs  Gastrointestinal: Bowel Sounds present, soft,  Extremities: No peripheral edema. No clubbing or cyanosis.    Vascular: 1+ peripheral pulses in LE   Neurological: A/O x 3,  Musculoskeletal: no calf tenderness.  Skin: No rashes.      LABS: All Labs Reviewed:                        12.5   158.90 )-----------( 114      ( 15 Jul 2025 06:36 )             43.2                         13.3   177.82 )-----------( 138      ( 14 Jul 2025 14:52 )             44.4     15 Jul 2025 06:36    136    |  94     |  52     ----------------------------<  112    4.4     |  36     |  1.23   14 Jul 2025 17:09    134    |  93     |  51     ----------------------------<  120    4.1     |  34     |  1.35     Ca    9.3        15 Jul 2025 06:36  Ca    9.3        14 Jul 2025 17:09    TPro  6.5    /  Alb  3.6    /  TBili  1.7    /  DBili  x      /  AST  26     /  ALT  16     /  AlkPhos  139    14 Jul 2025 17:09    PT/INR - ( 15 Jul 2025 06:36 )   PT: 31.4 sec;   INR: 2.76 ratio         PTT - ( 14 Jul 2025 14:52 )  PTT:42.6 sec    Troponin: <-22.32  RADIOLOGY:    < from: CT Chest No Cont (07.14.25 @ 16:35) >  IMPRESSION: Bilateral lower lung consolidations suggestive of combination   of atelectasis and pneumonia.    Partially imaged splenomegaly with marked interval increase in size since   March 20, 2024. Numerous lymph nodes within the partially imaged lower   neck bilateral axilla may be related to thenew splenomegaly    < end of copied text >  EKG:    < from: 12 Lead ECG (07.14.25 @ 15:21) >  Ventricular-paced rhythm with occasional Premature ventricular complexes in a pattern of bigeminy  Biventricular pacemaker detected  Abnormal ECG    < end of copied text >  ECHO:    < from: TTE Echo Complete w/o Contrast w/ Doppler (03.29.24 @ 08:49) >  Summary     The left ventricle is normal in size, wall motion and contractility.   Mild concentric left ventricular hypertrophy is present.   Normal left ventricular systolic function.   Estimated left ventricular ejection fraction is 55-60 %.   The left atrium is severely dilated.   The right atrium appears severely dilated.   A device wire is seen in the RV and RA.   The right ventricle appears mildly dilated in some views.   The aortic valve appears mildly calcified. Valve opening seems to be   normal.   Mild (1+) aortic regurgitation is present.   The aortic root is mildly dilated .   The ascending aorta appears to be mildly dilated.   Mild mitral annular calcification is present.   The mitral valve leaflets appear mildly thickened.   Trace mitral regurgitation is present.   The tricuspid valve leaflets appear normal.   Mild to Moderate Tricuspid regurgitation is present.   Moderate pulmonary hypertension.   No evidence of pericardial effusion.   Pleural effusion -is present.   IVC is dilated and not collapsing with inspiration.    < end of copied text >  CARDIAC CATHTERIZATION  < from: Cardiac Cath Lab (05.24.12 @ 09:52) >  Hemodynamics: Hemodynamic assessment demonstrates mildly to moderately  elevated LVEDP.  Ventricles: Global leftventricular function was mildly depressed. EF  calculated by contrast ventriculography was 48 %.  Coronary vessels: The coronary circulation is right dominant.  LM:      LM: Angiography showed mild atherosclerosis with no flow limiting  lesions.  LAD:      Proximal LAD: The vessel was calcified. There was a tubular 30 %  stenosis.  Mid LAD: There was a discrete 30 % stenosis.  CX:      Proximal circumflex: There was a tubular 35 % stenosis.  RCA:      Proximal RCA: The vessel was calcified. Therewas a tubular 35 %  stenosis.  Distal RCA: There was a discrete 40 % stenosis.  RPDA: Angiography showed moderate atherosclerosis with no clinical lesions  appreciated.  Complications: There were no complications.  Summary:  Hemodynamics: Hemodynamicassessment demonstrates mildly to moderately  elevated LVEDP.    < end of copied text >   Cardiology (Covering Dr. Mckeon)    HPI:  89 year old male with Chronic systolic HF,  Chronic AFIB on AC, AICD, HFrEF, CLL, glaucoma, tophaceous gout, HLD, HTN, RA was brought in by grandson c/o SOB and cough , productive cough of green sputum.  3 day hx of SOB w cough- increase in lasix recently with 12lb weight loss ; no chest pain; no abdominal pain; no trauma or injury.  no known sick contacts; similar presentation w pneumonia in past. Low grade fever in ER.  CT + bilateral PNA; ceftriaxone + zithromax; Now admitted w PNA. This AM Breathing better on oxygen.  No CP.  No edema.      PAST MEDICAL & SURGICAL HISTORY:  Atrial fibrillation   Arthritis  Basal cell CA  CLL (chronic lymphocytic leukemia) 2019 139K 04-  Glaucoma  Gout : tophaceous  HFrEF heart failure w reduced ejection fx  HLD hyperlipidemia  HTN hypertension  Pancreatitis  RA Rheumatoid arthritis  SCC squamous cell CA : face, trunk    VTach : NSVT  AICD automatic cardioverter/defibrillator   Skin biopsies  BCC removed from L skull  Cataract removal Cholecystectomy  Hernia repair Moh's surgery Trabeculoplasty  Tonsilectomy  Afib  HTN (hypertension)  Cholesterol serum elevated  BPH (Benign Prostatic Hyperplasia)  CA - skin cancer  History of ankle fracture  Intolerance, drug  INTOLERANCE  TO STATIN  Hearing loss  Myocardial infarction  Gout  Osteoarthritis  Glaucoma  Rheumatoid arthritis  CLL (chronic lymphocytic leukemia)  Chronic systolic heart failure  AICD present, double chamber  Hx of appendectomy  Hx of tonsillectomy  S/P TKR (total knee replacement) bilateral  H/O coronary angiogram x 2  H/O hernia repair x  4 surgeries  S/P laser cataract surgery b/l  H/O shoulder surgery right  S/P ORIF (open reduction internal fixation) fracture left ankle    SOCIAL HX  lives w family/daughter. Former smoker.  No ETOH    FAMILY HISTORY:  Family history of other heart disease (Father)  Family history of cardiac arrhythmia (Child)    Allergies  penicillins (Unknown)    Home Medications:  acetaminophen 650 mg oral tablet, extended release: 1 tab(s) orally 2 times a day (14 Jul 2025 18:39)  allopurinol 300 mg oral tablet: 1 tab(s) orally once a day (14 Jul 2025 17:09)  Biofreeze 3.5% topical gel: Apply topically to affected area prn (14 Jul 2025 18:39)  diclofenac 1% topical gel: Apply topically to affected area 2 times a day as needed for right shoulder pain (14 Jul 2025 17:09)  dorzolamide-timolol 2.23%-0.68% (2%-0.5% base) ophthalmic solution: 1 drop(s) in each eye 2 times a day (14 Jul 2025 17:09)  Lasix 40 mg oral tablet: 2 tab(s) orally 2 times a day in the morning and in the afternoon ~2:30 - 3PM (new directions for this med) (14 Jul 2025 18:39)  metOLazone 2.5 mg oral tablet: 1 tab(s) orally 2 times a day in the morning and the afternoon ~2:30 - 3PM (14 Jul 2025 18:39)  metoprolol tartrate 25 mg oral tablet: 1 tab(s) orally 2 times a day (14 Jul 2025 17:09)  potassium chloride 10 mEq oral tablet, extended release: 1 tab(s) orally once a day (14 Jul 2025 18:39)  Vitamin D3 25 mcg (1000 intl units) oral capsule: 1 cap(s) orally once a day (14 Jul 2025 18:39)  warfarin 2.5 mg oral tablet: 1 tab(s) orally once a day (in the evening) (14 Jul 2025 18:39)      HOSPITAL MEDICATIONS:   MEDICATIONS  (STANDING):  albuterol    90 MICROgram(s) HFA Inhaler 2 Puff(s) Inhalation every 6 hours  albuterol/ipratropium for Nebulization 3 milliLiter(s) Nebulizer every 6 hours  allopurinol 300 milliGRAM(s) Oral daily  aMIOdarone    Tablet   Oral   aMIOdarone    Tablet 400 milliGRAM(s) Oral two times a day  azithromycin  IVPB 500 milliGRAM(s) IV Intermittent every 24 hours  cefTRIAXone Injectable. 1000 milliGRAM(s) IV Push every 24 hours  cholecalciferol 1000 Unit(s) Oral daily  dorzolamide 2%/timolol 0.5% Ophthalmic Solution 1 Drop(s) Both EYES two times a day  furosemide    Tablet 80 milliGRAM(s) Oral <User Schedule>  guaiFENesin ER 1200 milliGRAM(s) Oral every 12 hours  metOLazone 2.5 milliGRAM(s) Oral <User Schedule>  metoprolol tartrate 25 milliGRAM(s) Oral two times a day  potassium chloride    Tablet ER 10 milliEquivalent(s) Oral daily  warfarin 2.5 milliGRAM(s) Oral at bedtime    MEDICATIONS  (PRN):  acetaminophen     Tablet .. 650 milliGRAM(s) Oral every 6 hours PRN Temp greater or equal to 38C (100.4F), Mild Pain (1 - 3)  aluminum hydroxide/magnesium hydroxide/simethicone Suspension 30 milliLiter(s) Oral every 4 hours PRN Dyspepsia  lidocaine   4% Patch 1 Patch Transdermal every 24 hours PRN shoulder pain  melatonin 3 milliGRAM(s) Oral at bedtime PRN Insomnia  ondansetron Injectable 4 milliGRAM(s) IV Push every 8 hours PRN Nausea and/or Vomiting  sodium chloride 0.65% Nasal 1 Spray(s) Both Nostrils every 2 hours PRN Nasal Congestion      REVIEW OF SYSTEMS: 13 systems were reviewed and all negative except for comments above.    Vital Signs Last 24 Hrs  T(C): 36.6 (15 Jul 2025 15:23), Max: 37 (15 Jul 2025 05:53)  T(F): 97.9 (15 Jul 2025 15:23), Max: 98.6 (15 Jul 2025 05:53)  HR: 65 (15 Jul 2025 21:20) (50 - 88)  BP: 119/50 (15 Jul 2025 21:20) (101/44 - 119/50)  BP(mean): --  RR: 18 (15 Jul 2025 15:23) (18 - 20)  SpO2: 96% (15 Jul 2025 15:23) (93% - 96%)    Parameters below as of 15 Jul 2025 15:23  Patient On (Oxygen Delivery Method): nasal cannula  O2 Flow (L/min): 6  Daily     Daily I&O's Summary    15 Jul 2025 07:01  -  15 Jul 2025 23:21  --------------------------------------------------------  IN: 0 mL / OUT: 180 mL / NET: -180 mL    PHYSICAL EXAM:  Constitutional: NAD, awake and sleepy, tired, well-developed  HEENT: PERRLA, EOMI,  No oral cyanosis. Oropharynx Clean and Dry.  Neck:  supple,  No JVD, No Thyroid enlargement. No Carotid Bruits bilaterally.  Respiratory: Breath sounds are rhonchi bilaterally,   Cardiovascular: NL S1 and S2, RRR, 1/6 DAVONTE, No gallops or rubs  Gastrointestinal: Bowel Sounds present, soft,  Extremities: No peripheral edema. No clubbing or cyanosis.    Vascular: 1+ peripheral pulses in LE   Neurological: A/O x 3,  Musculoskeletal: no calf tenderness.  Skin: No rashes.      LABS: All Labs Reviewed:                        12.5   158.90 )-----------( 114      ( 15 Jul 2025 06:36 )             43.2                         13.3   177.82 )-----------( 138      ( 14 Jul 2025 14:52 )             44.4     15 Jul 2025 06:36    136    |  94     |  52     ----------------------------<  112    4.4     |  36     |  1.23   14 Jul 2025 17:09    134    |  93     |  51     ----------------------------<  120    4.1     |  34     |  1.35     Ca    9.3        15 Jul 2025 06:36  Ca    9.3        14 Jul 2025 17:09    TPro  6.5    /  Alb  3.6    /  TBili  1.7    /  DBili  x      /  AST  26     /  ALT  16     /  AlkPhos  139    14 Jul 2025 17:09    PT/INR - ( 15 Jul 2025 06:36 )   PT: 31.4 sec;   INR: 2.76 ratio         PTT - ( 14 Jul 2025 14:52 )  PTT:42.6 sec    Troponin: <-22.32  RADIOLOGY:    < from: CT Chest No Cont (07.14.25 @ 16:35) >  IMPRESSION: Bilateral lower lung consolidations suggestive of combination   of atelectasis and pneumonia.    Partially imaged splenomegaly with marked interval increase in size since   March 20, 2024. Numerous lymph nodes within the partially imaged lower   neck bilateral axilla may be related to thenew splenomegaly    < end of copied text >  EKG:    < from: 12 Lead ECG (07.14.25 @ 15:21) >  Ventricular-paced rhythm with occasional Premature ventricular complexes in a pattern of bigeminy  Biventricular pacemaker detected  Abnormal ECG    < end of copied text >  ECHO:    < from: TTE Echo Complete w/o Contrast w/ Doppler (03.29.24 @ 08:49) >  Summary     The left ventricle is normal in size, wall motion and contractility.   Mild concentric left ventricular hypertrophy is present.   Normal left ventricular systolic function.   Estimated left ventricular ejection fraction is 55-60 %.   The left atrium is severely dilated.   The right atrium appears severely dilated.   A device wire is seen in the RV and RA.   The right ventricle appears mildly dilated in some views.   The aortic valve appears mildly calcified. Valve opening seems to be   normal.   Mild (1+) aortic regurgitation is present.   The aortic root is mildly dilated .   The ascending aorta appears to be mildly dilated.   Mild mitral annular calcification is present.   The mitral valve leaflets appear mildly thickened.   Trace mitral regurgitation is present.   The tricuspid valve leaflets appear normal.   Mild to Moderate Tricuspid regurgitation is present.   Moderate pulmonary hypertension.   No evidence of pericardial effusion.   Pleural effusion -is present.   IVC is dilated and not collapsing with inspiration.    < end of copied text >  CARDIAC CATHTERIZATION  < from: Cardiac Cath Lab (05.24.12 @ 09:52) >  Hemodynamics: Hemodynamic assessment demonstrates mildly to moderately  elevated LVEDP.  Ventricles: Global leftventricular function was mildly depressed. EF  calculated by contrast ventriculography was 48 %.  Coronary vessels: The coronary circulation is right dominant.  LM:      LM: Angiography showed mild atherosclerosis with no flow limiting  lesions.  LAD:      Proximal LAD: The vessel was calcified. There was a tubular 30 %  stenosis.  Mid LAD: There was a discrete 30 % stenosis.  CX:      Proximal circumflex: There was a tubular 35 % stenosis.  RCA:      Proximal RCA: The vessel was calcified. Therewas a tubular 35 %  stenosis.  Distal RCA: There was a discrete 40 % stenosis.  RPDA: Angiography showed moderate atherosclerosis with no clinical lesions  appreciated.  Complications: There were no complications.  Summary:  Hemodynamics: Hemodynamicassessment demonstrates mildly to moderately  elevated LVEDP.    < end of copied text >

## 2025-07-15 NOTE — CONSULT NOTE ADULT - ASSESSMENT
Assessment:  89M with AFIB on AC, AICD, HFrEF, CLL, glaucoma, tophaceous gout, HLD, HTN, RA presents 7/14 with dyspnea and cough for 3 days, associated with yellowish sputum production  Denies any fever or chills, abdominal pain, n/v, diarrhea or dysuria  No known sick contact  Afebrile on admission, on 6L NC   (baseline 100-150s)  Cr 1.23  UA negative  RVP negative  CT Chest with Bilateral lower lung consolidations suggestive of combination of atelectasis and pneumonia.  Partially imaged splenomegaly with marked interval increase in size since   March 20, 2024. Numerous lymph nodes within the partially imaged lower   neck bilateral axilla may be related to thenew splenomegaly.    Antimicrobials:  azithromycin  IVPB 500 every 24 hours (7/14 --- )  cefTRIAXone Injectable. 1000 every 24 hours (7/14 --- )    Impression:   #Acute Hypoxic Respiratory Failure, Multifocal Pneumonia  #Leukocytosis, Hx of CLL  #ADRIANA improving  #Penicillins Allergy (Unknown), tolerating CTX    Recommendations:  - continue empiric CTX 1G q24 (Day #2)  - continue empiric Azithro 500mg q24 (Day #2)  - monitor temperature curve  - trend WBC  - reason for abx use reviewed with patient  - side effects of antibiotic discussed, tolerating abx well so far  - Prior cultures reviewed. An epidemiologic assessment was performed. There is a significant risk for resistant microorganisms to spread to family members, and/or healthcare staff. Isolation precautions based on infection control policy. Will reconsider further isolation measures based on new culture results and other clinical data as appropriate Appropriate cultures collected and an appropriate broad spectrum antibiotic therapy will be considered  - check Sputum culture, Legionella/Strep Uag  - follow up BCx x2  - rest per primary team    Clinical team may change from intravenous to oral antibiotics when the following criteria are met:   1. Patient is clinically improving/stable       a)	Improved signs and symptoms of infection from initial presentation       b)	Afebrile for 24 hours       c)	Leukocytosis trending towards normal range   2. Patient is tolerating oral intake   3. Initial/repeat blood cultures are negative OR do not need to wait for preliminary blood cultures to result    Cannot advise changing to oral antibiotic therapy until culture sensitivity is available.   Assessment:  89M with AFIB on AC, AICD, HFrEF, CLL, glaucoma, tophaceous gout, HLD, HTN, RA presents 7/14 with dyspnea and cough for 3 days, associated with yellowish sputum production  Patient is very weak, reports feeling sleepy  Denies any fever or chills, abdominal pain, n/v, diarrhea or dysuria  No known sick contact  Afebrile on admission, on 6L NC   (baseline 100-150s)  Cr 1.23  UA negative  RVP negative  CT Chest with Bilateral lower lung consolidations suggestive of combination of atelectasis and pneumonia.  Partially imaged splenomegaly with marked interval increase in size since   March 20, 2024. Numerous lymph nodes within the partially imaged lower   neck bilateral axilla may be related to thenew splenomegaly.    Antimicrobials:  azithromycin  IVPB 500 every 24 hours (7/14 --- )  cefTRIAXone Injectable. 1000 every 24 hours (7/14 --- )    Impression:   #Acute Hypoxic Respiratory Failure, Multifocal Pneumonia  #Leukocytosis, Hx of CLL  #ADRIANA improving  #Penicillins Allergy (Unknown), tolerating CTX    Recommendations:  - continue empiric CTX 1G q24 (Day #2)  - continue empiric Azithro 500mg q24 (Day #2)  - monitor temperature curve  - trend WBC  - reason for abx use reviewed with patient  - side effects of antibiotic discussed, tolerating abx well so far  - Prior cultures reviewed. An epidemiologic assessment was performed. There is a significant risk for resistant microorganisms to spread to family members, and/or healthcare staff. Isolation precautions based on infection control policy. Will reconsider further isolation measures based on new culture results and other clinical data as appropriate Appropriate cultures collected and an appropriate broad spectrum antibiotic therapy will be considered  - check Sputum culture, Legionella/Strep Uag  - follow up BCx x2  - rest per primary team    Clinical team may change from intravenous to oral antibiotics when the following criteria are met:   1. Patient is clinically improving/stable       a)	Improved signs and symptoms of infection from initial presentation       b)	Afebrile for 24 hours       c)	Leukocytosis trending towards normal range   2. Patient is tolerating oral intake   3. Initial/repeat blood cultures are negative OR do not need to wait for preliminary blood cultures to result    Cannot advise changing to oral antibiotic therapy until culture sensitivity is available.   Assessment:  89M with AFIB on AC, AICD, HFrEF, CLL, glaucoma, tophaceous gout, HLD, HTN, RA presents 7/14 with dyspnea and cough for 3 days, associated with yellowish sputum production  Patient is very weak, reports feeling sleepy  Denies any fever or chills, abdominal pain, n/v, diarrhea or dysuria  No known sick contact  Afebrile on admission, on 6L NC   (baseline 100-150s)  Cr 1.23  UA negative  RVP negative  CT Chest with Bilateral lower lung consolidations suggestive of combination of atelectasis and pneumonia.  Partially imaged splenomegaly with marked interval increase in size since   March 20, 2024. Numerous lymph nodes within the partially imaged lower   neck bilateral axilla may be related to thenew splenomegaly.    Antimicrobials:  azithromycin  IVPB 500 every 24 hours (7/14 --- )  cefTRIAXone Injectable. 1000 every 24 hours (7/14 --- )    Impression:   #Acute Hypoxic Respiratory Failure, Multifocal Pneumonia  #Leukocytosis, Hx of CLL  #ADRIANA improving  #Penicillins Allergy (Unknown), tolerating CTX  - remains afebrile, leukocytosis multifactorial in setting of CLL, clinically appears very weak, sleepy    Recommendations:  - continue empiric CTX 1G q24 (Day #2)  - continue empiric Azithro 500mg q24 (Day #2)  - monitor temperature curve  - trend WBC  - reason for abx use reviewed with patient  - side effects of antibiotic discussed, tolerating abx well so far  - Prior cultures reviewed. An epidemiologic assessment was performed. There is a significant risk for resistant microorganisms to spread to family members, and/or healthcare staff. Isolation precautions based on infection control policy. Will reconsider further isolation measures based on new culture results and other clinical data as appropriate Appropriate cultures collected and an appropriate broad spectrum antibiotic therapy will be considered  - check Sputum culture, Legionella/Strep Uag  - follow up BCx x2  - rest per primary team    Clinical team may change from intravenous to oral antibiotics when the following criteria are met:   1. Patient is clinically improving/stable       a)	Improved signs and symptoms of infection from initial presentation       b)	Afebrile for 24 hours       c)	Leukocytosis trending towards normal range   2. Patient is tolerating oral intake   3. Initial/repeat blood cultures are negative OR do not need to wait for preliminary blood cultures to result    Cannot advise changing to oral antibiotic therapy until culture sensitivity is available.

## 2025-07-15 NOTE — H&P ADULT - NSHPPHYSICALEXAM_GEN_ALL_CORE
Vital Signs Last 24 Hrs  T(C): 36.8 (14 Jul 2025 23:25), Max: 37.8 (14 Jul 2025 14:47)  T(F): 98.2 (14 Jul 2025 23:25), Max: 100 (14 Jul 2025 14:47)  HR: 88 (14 Jul 2025 23:25) (55 - 88)  BP: 111/40 (14 Jul 2025 23:25) (106/50 - 124/50)  BP(mean): 68 (14 Jul 2025 19:17) (68 - 68)  RR: 20 (14 Jul 2025 23:25) (20 - 26)  SpO2: 95% (14 Jul 2025 23:25) (89% - 95%)    Parameters below as of 14 Jul 2025 23:25  Patient On (Oxygen Delivery Method): nasal cannula  O2 Flow (L/min): 6

## 2025-07-16 LAB
ALBUMIN SERPL ELPH-MCNC: 3.2 G/DL — LOW (ref 3.3–5)
ALP SERPL-CCNC: 132 U/L — HIGH (ref 40–120)
ALT FLD-CCNC: 10 U/L — LOW (ref 12–78)
AST SERPL-CCNC: 21 U/L — SIGNIFICANT CHANGE UP (ref 15–37)
BILIRUB SERPL-MCNC: 1.2 MG/DL — SIGNIFICANT CHANGE UP (ref 0.2–1.2)
BUN SERPL-MCNC: 55 MG/DL — HIGH (ref 7–23)
CALCIUM SERPL-MCNC: 9.6 MG/DL — SIGNIFICANT CHANGE UP (ref 8.5–10.1)
CHLORIDE SERPL-SCNC: 94 MMOL/L — LOW (ref 96–108)
CO2 SERPL-SCNC: 42 MMOL/L — HIGH (ref 22–31)
CREAT SERPL-MCNC: 1.18 MG/DL — SIGNIFICANT CHANGE UP (ref 0.5–1.3)
EGFR: 59 ML/MIN/1.73M2 — LOW
EGFR: 59 ML/MIN/1.73M2 — LOW
GLUCOSE SERPL-MCNC: 110 MG/DL — HIGH (ref 70–99)
HCT VFR BLD CALC: 42.3 % — SIGNIFICANT CHANGE UP (ref 39–50)
HGB BLD-MCNC: 11.9 G/DL — LOW (ref 13–17)
INR BLD: 2.19 RATIO — HIGH (ref 0.85–1.16)
MCHC RBC-ENTMCNC: 28.1 G/DL — LOW (ref 32–36)
MCHC RBC-ENTMCNC: 29.5 PG — SIGNIFICANT CHANGE UP (ref 27–34)
MCV RBC AUTO: 104.7 FL — HIGH (ref 80–100)
NRBC # BLD AUTO: 0 K/UL — SIGNIFICANT CHANGE UP (ref 0–0)
NRBC # FLD: 0 K/UL — SIGNIFICANT CHANGE UP (ref 0–0)
NRBC BLD AUTO-RTO: 0 /100 WBCS — SIGNIFICANT CHANGE UP (ref 0–0)
PLATELET # BLD AUTO: 117 K/UL — LOW (ref 150–400)
PMV BLD: 10.2 FL — SIGNIFICANT CHANGE UP (ref 7–13)
POTASSIUM SERPL-MCNC: 4.7 MMOL/L — SIGNIFICANT CHANGE UP (ref 3.5–5.3)
POTASSIUM SERPL-SCNC: 4.7 MMOL/L — SIGNIFICANT CHANGE UP (ref 3.5–5.3)
PROT SERPL-MCNC: 6.2 GM/DL — SIGNIFICANT CHANGE UP (ref 6–8.3)
PROTHROM AB SERPL-ACNC: 25.7 SEC — HIGH (ref 9.9–13.4)
RBC # BLD: 4.04 M/UL — LOW (ref 4.2–5.8)
RBC # FLD: 14.3 % — SIGNIFICANT CHANGE UP (ref 10.3–14.5)
SODIUM SERPL-SCNC: 138 MMOL/L — SIGNIFICANT CHANGE UP (ref 135–145)
WBC # BLD: 148.39 K/UL — CRITICAL HIGH (ref 3.8–10.5)
WBC # FLD AUTO: 148.39 K/UL — CRITICAL HIGH (ref 3.8–10.5)

## 2025-07-16 PROCEDURE — 99232 SBSQ HOSP IP/OBS MODERATE 35: CPT | Mod: FS

## 2025-07-16 PROCEDURE — 99233 SBSQ HOSP IP/OBS HIGH 50: CPT

## 2025-07-16 RX ORDER — METOPROLOL SUCCINATE 50 MG/1
25 TABLET, EXTENDED RELEASE ORAL EVERY 12 HOURS
Refills: 0 | Status: DISCONTINUED | OUTPATIENT
Start: 2025-07-16 | End: 2025-07-18

## 2025-07-16 RX ORDER — FLUTICASONE PROPIONATE 50 UG/1
1 SPRAY, METERED NASAL
Refills: 0 | Status: DISCONTINUED | OUTPATIENT
Start: 2025-07-16 | End: 2025-07-18

## 2025-07-16 RX ORDER — DOXYCYCLINE HYCLATE 100 MG
100 TABLET ORAL EVERY 12 HOURS
Refills: 0 | Status: DISCONTINUED | OUTPATIENT
Start: 2025-07-16 | End: 2025-07-18

## 2025-07-16 RX ADMIN — DORZOLAMIDE HYDROCHLORIDE AND TIMOLOL MALEATE 1 DROP(S): 20; 5 SOLUTION/ DROPS OPHTHALMIC at 11:01

## 2025-07-16 RX ADMIN — METOPROLOL SUCCINATE 25 MILLIGRAM(S): 50 TABLET, EXTENDED RELEASE ORAL at 21:07

## 2025-07-16 RX ADMIN — AMIODARONE HYDROCHLORIDE 400 MILLIGRAM(S): 50 INJECTION, SOLUTION INTRAVENOUS at 05:13

## 2025-07-16 RX ADMIN — DEXTROMETHORPHAN HBR, GUAIFENESIN 1200 MILLIGRAM(S): 200 LIQUID ORAL at 11:00

## 2025-07-16 RX ADMIN — IPRATROPIUM BROMIDE AND ALBUTEROL SULFATE 3 MILLILITER(S): .5; 2.5 SOLUTION RESPIRATORY (INHALATION) at 20:37

## 2025-07-16 RX ADMIN — METOPROLOL SUCCINATE 25 MILLIGRAM(S): 50 TABLET, EXTENDED RELEASE ORAL at 11:00

## 2025-07-16 RX ADMIN — DEXTROMETHORPHAN HBR, GUAIFENESIN 1200 MILLIGRAM(S): 200 LIQUID ORAL at 21:07

## 2025-07-16 RX ADMIN — Medication 10 MILLIEQUIVALENT(S): at 11:00

## 2025-07-16 RX ADMIN — Medication 2.5 MILLIGRAM(S): at 21:08

## 2025-07-16 RX ADMIN — Medication 100 MILLIGRAM(S): at 21:22

## 2025-07-16 RX ADMIN — IPRATROPIUM BROMIDE AND ALBUTEROL SULFATE 3 MILLILITER(S): .5; 2.5 SOLUTION RESPIRATORY (INHALATION) at 08:39

## 2025-07-16 RX ADMIN — Medication 3 MILLIGRAM(S): at 21:08

## 2025-07-16 RX ADMIN — CEFTRIAXONE 1000 MILLIGRAM(S): 500 INJECTION, POWDER, FOR SOLUTION INTRAMUSCULAR; INTRAVENOUS at 15:03

## 2025-07-16 RX ADMIN — IPRATROPIUM BROMIDE AND ALBUTEROL SULFATE 3 MILLILITER(S): .5; 2.5 SOLUTION RESPIRATORY (INHALATION) at 13:37

## 2025-07-16 RX ADMIN — DORZOLAMIDE HYDROCHLORIDE AND TIMOLOL MALEATE 1 DROP(S): 20; 5 SOLUTION/ DROPS OPHTHALMIC at 21:08

## 2025-07-16 RX ADMIN — FLUTICASONE PROPIONATE 1 SPRAY(S): 50 SPRAY, METERED NASAL at 21:22

## 2025-07-16 RX ADMIN — Medication 1000 UNIT(S): at 11:00

## 2025-07-16 RX ADMIN — AMIODARONE HYDROCHLORIDE 400 MILLIGRAM(S): 50 INJECTION, SOLUTION INTRAVENOUS at 18:14

## 2025-07-16 RX ADMIN — Medication 300 MILLIGRAM(S): at 11:00

## 2025-07-16 NOTE — CONSULT NOTE ADULT - SUBJECTIVE AND OBJECTIVE BOX
HPI:  89 year old male with CLL/ lymphocytosis ( > 100,000) / no sig anemia/ thrombocytopenia , not on therapy , followed by Dr Carson; He has  AFIB on AC, AICD, HFrEF,, HLD, HTN, RA was brought in by grandson c/o SOB productive cough  Diagnosed with Octavio pneumonia .   CT + splenomegaly / small volume adenopathy     Now on antibiotics  Feels a little better     3 day hx of SOB w cough productive of yellowish sputum  per family- increase in lasix recently with 12lb weight loss  no chest pain; no abdominal pain; no trauma or injury.   no known sick contacts; similar presentation w pneumonia in past.      In ED /50   HR 58   T 98.6 /100 rectal  CT + bilateral PNA; ceftriaxone + zithromax    Being admitted w PNA      PAST MEDICAL HX  Atrial fibrillation   Arthritis  Basal cell CA  CLL (chronic lymphocytic leukemia) 2019       139K 04-  Glaucoma  Gout : tophaceous  HFrEF heart failure w reduced ejection fx  HLD hyperlipidemia  HTN hypertension  Pancreatitis  RA Rheumatoid arthritis  SCC squamous cell CA : face, trunk    VTach : NSVT        PAST SURGICAL HX  AICD automatic cardioverter/defibrillator   Skin biopsies  BCC removed from L skull  Cataract removal Cholecystectomy  Hernia repair Moh's surgery Trabeculoplasty  Tonsilectomy    FAMILY HX  heart disease : Father      SOCIAL HX  lives w family/daughter (15 Jul 2025 03:06)      PAST MEDICAL & SURGICAL HISTORY:  Afib      HTN (hypertension)      Cholesterol serum elevated      BPH (Benign Prostatic Hyperplasia)      CA - skin cancer      History of ankle fracture      Intolerance, drug  INTOLERANCE  TO STATIN      Hearing loss      Myocardial infarction      Gout      Osteoarthritis      Glaucoma      Rheumatoid arthritis      CLL (chronic lymphocytic leukemia)      Chronic systolic heart failure      AICD present, double chamber      Hx of appendectomy      Hx of tonsillectomy      S/P TKR (total knee replacement)  bilateral      H/O coronary angiogram  x 2      H/O hernia repair  x  4 surgeries      S/P laser cataract surgery  b/l      H/O shoulder surgery  right      S/P ORIF (open reduction internal fixation) fracture  left ankle          MEDICATIONS  (STANDING):  albuterol    90 MICROgram(s) HFA Inhaler 2 Puff(s) Inhalation every 6 hours  albuterol/ipratropium for Nebulization 3 milliLiter(s) Nebulizer every 6 hours  allopurinol 300 milliGRAM(s) Oral daily  aMIOdarone    Tablet   Oral   aMIOdarone    Tablet 400 milliGRAM(s) Oral two times a day  cefTRIAXone Injectable. 1000 milliGRAM(s) IV Push every 24 hours  cholecalciferol 1000 Unit(s) Oral daily  dorzolamide 2%/timolol 0.5% Ophthalmic Solution 1 Drop(s) Both EYES two times a day  doxycycline monohydrate Capsule 100 milliGRAM(s) Oral every 12 hours  fluticasone propionate 50 MICROgram(s)/spray Nasal Spray 1 Spray(s) Both Nostrils two times a day  guaiFENesin ER 1200 milliGRAM(s) Oral every 12 hours  metOLazone 2.5 milliGRAM(s) Oral <User Schedule>  metoprolol succinate ER 25 milliGRAM(s) Oral every 12 hours  potassium chloride    Tablet ER 10 milliEquivalent(s) Oral daily  warfarin 2.5 milliGRAM(s) Oral at bedtime    MEDICATIONS  (PRN):  acetaminophen     Tablet .. 650 milliGRAM(s) Oral every 6 hours PRN Temp greater or equal to 38C (100.4F), Mild Pain (1 - 3)  aluminum hydroxide/magnesium hydroxide/simethicone Suspension 30 milliLiter(s) Oral every 4 hours PRN Dyspepsia  Biofreeze Pain Relief Roll-On Gel (Menthol 4% 1 Application(s) 1 Application(s) Topical three times a day PRN Pain Relief  lidocaine   4% Patch 1 Patch Transdermal every 24 hours PRN shoulder pain  melatonin 3 milliGRAM(s) Oral at bedtime PRN Insomnia  ondansetron Injectable 4 milliGRAM(s) IV Push every 8 hours PRN Nausea and/or Vomiting  sodium chloride 0.65% Nasal 1 Spray(s) Both Nostrils every 2 hours PRN Nasal Congestion      Allergies    penicillins (Unknown)    Intolerances        SOCIAL HISTORY:    FAMILY HISTORY:  Family history of other heart disease (Father)    Family history of cardiac arrhythmia (Child)        Vital Signs Last 24 Hrs  T(C): 36.7 (16 Jul 2025 15:12), Max: 36.7 (16 Jul 2025 15:12)  T(F): 98 (16 Jul 2025 15:12), Max: 98 (16 Jul 2025 15:12)  HR: 77 (16 Jul 2025 15:12) (50 - 77)  BP: 103/57 (16 Jul 2025 15:12) (103/57 - 119/50)  BP(mean): --  RR: 18 (16 Jul 2025 15:12) (17 - 18)  SpO2: 95% (16 Jul 2025 15:12) (95% - 97%)    Parameters below as of 16 Jul 2025 15:12  Patient On (Oxygen Delivery Method): nasal cannula  O2 Flow (L/min): 5    NAD  Neck no nodes  No sig peripheral nodes  Lungs crackles  COR II/VI murmur  Abd + splenomegaly  Ext neg   Neuro neg      LABS:                        11.9   148.39 )-----------( 117      ( 16 Jul 2025 06:28 )             42.3     07-16    138  |  94[L]  |  55[H]  ----------------------------<  110[H]  4.7   |  42[H]  |  1.18    Ca    9.6      16 Jul 2025 06:28    TPro  6.2  /  Alb  3.2[L]  /  TBili  1.2  /  DBili  x   /  AST  21  /  ALT  10[L]  /  AlkPhos  132[H]  07-16    PT/INR - ( 16 Jul 2025 06:28 )   PT: 25.7 sec;   INR: 2.19 ratio           Urinalysis Basic - ( 16 Jul 2025 06:28 )    Color: x / Appearance: x / SG: x / pH: x  Gluc: 110 mg/dL / Ketone: x  / Bili: x / Urobili: x   Blood: x / Protein: x / Nitrite: x   Leuk Esterase: x / RBC: x / WBC x   Sq Epi: x / Non Sq Epi: x / Bacteria: x        RADIOLOGY & ADDITIONAL STUDIES:

## 2025-07-16 NOTE — CHART NOTE - NSCHARTNOTEFT_GEN_A_CORE
7/16/25:  Nursing staff reported that pt may have had an ICD shock yesterday?  No episodes seen on tele, pt reported feeling twitching, it may have been diaphragmatic stimulation from LV lead.      TELE: atrial fib with bi-v pacing 70-80 bpm, frequent ventricular ectopy, brief NSVTs.    Device was re-interrogated today and no episodes of any therapies delivered. CRT-D optimization performed.      Vital Signs Last 24 Hrs  T(C): 36.6 (16 Jul 2025 07:14), Max: 36.6 (15 Jul 2025 15:23)  T(F): 97.9 (16 Jul 2025 07:14), Max: 97.9 (15 Jul 2025 15:23)  HR: 75 (16 Jul 2025 08:39) (50 - 77)  BP: 108/58 (16 Jul 2025 07:14) (101/44 - 119/50)  BP(mean): --  RR: 18 (16 Jul 2025 07:14) (17 - 18)  SpO2: 96% (16 Jul 2025 08:39) (96% - 97%)    Parameters below as of 16 Jul 2025 08:39  Patient On (Oxygen Delivery Method): nasal cannula                          11.9   148.39 )-----------( 117      ( 16 Jul 2025 06:28 )             42.3     07-16    138  |  94[L]  |  55[H]  ----------------------------<  110[H]  4.7   |  42[H]  |  1.18    Ca    9.6      16 Jul 2025 06:28    TPro  6.2  /  Alb  3.2[L]  /  TBili  1.2  /  DBili  x   /  AST  21  /  ALT  10[L]  /  AlkPhos  132[H]  07-16        ASSESSMENT & PLAN: 89 year old male with above history with frequent PVC, bigeminy on echo.      Continue tele monitoring  Device interrogation performed today does not reveal any therapies delivered.  CRT-D optimization performed.  Unable to increase Metoprolol due to borderline blood pressure  Continue Amiodarone 400mg PO BID x5 days and then decrease to 200mg PO daily for PVC suppression  Keep lytes repleted, K>4, Mg>2  Further management of PNA per medicine 7/16/25:  Nursing staff reported that pt may have had an ICD shock yesterday?  No episodes seen on tele, pt reported feeling twitching, it may have been diaphragmatic stimulation from LV lead.      TELE: atrial fib with bi-v pacing 70-80 bpm, frequent ventricular ectopy, brief NSVTs.    Device was re-interrogated today and no episodes of any therapies delivered. CRT-D optimization performed.      Vital Signs Last 24 Hrs  T(C): 36.6 (16 Jul 2025 07:14), Max: 36.6 (15 Jul 2025 15:23)  T(F): 97.9 (16 Jul 2025 07:14), Max: 97.9 (15 Jul 2025 15:23)  HR: 75 (16 Jul 2025 08:39) (50 - 77)  BP: 108/58 (16 Jul 2025 07:14) (101/44 - 119/50)  BP(mean): --  RR: 18 (16 Jul 2025 07:14) (17 - 18)  SpO2: 96% (16 Jul 2025 08:39) (96% - 97%)    Parameters below as of 16 Jul 2025 08:39  Patient On (Oxygen Delivery Method): nasal cannula                          11.9   148.39 )-----------( 117      ( 16 Jul 2025 06:28 )             42.3     07-16    138  |  94[L]  |  55[H]  ----------------------------<  110[H]  4.7   |  42[H]  |  1.18    Ca    9.6      16 Jul 2025 06:28    TPro  6.2  /  Alb  3.2[L]  /  TBili  1.2  /  DBili  x   /  AST  21  /  ALT  10[L]  /  AlkPhos  132[H]  07-16        ASSESSMENT & PLAN: 89 year old male with above history with frequent PVC, bigeminy on echo.      Continue tele monitoring  Device interrogation performed today does not reveal any therapies delivered.  CRT-D optimization performed.  Unable to increase Metoprolol due to borderline blood pressure    Addendum(7/17/2025) - agreed with plan of continuation in amiodarone loading. This will likely increase biventricular pacing percentage over time.  Continue Amiodarone 400mg PO BID x5 days and then decrease to 200mg PO daily for PVC suppression  Keep lytes repleted, K>4, Mg>2  Further management of PNA per medicine

## 2025-07-16 NOTE — PROGRESS NOTE ADULT - TIME BILLING
Time spent  coordinating the patient's care. This includes reviewing documentation pertinent to this admission, results and imaging. Further tests, medications, and procedures have been ordered as indicated. Laboratory results and the plan of care were communicated to the patient. Discussed care plan with consultants including TACOS RITTER. Supporting documentation was completed and added to the patient's chart.

## 2025-07-16 NOTE — CONSULT NOTE ADULT - ASSESSMENT
Stage II CLL ( Enlarged spleen)/ mild thrombocytopenia  Pneumonia, bilateral    PLAN    We will send IgG levels; if IgG low , consider IVIG  I discussed management of CLL with patient and son: after DC can FU with Dr Carson to discuss treatment options ( For example single agent BTK inhibitor)  Lymphocytosis is of no clinical concern   Continue antibiotics  DVT prophylaxis

## 2025-07-17 LAB
ALBUMIN SERPL ELPH-MCNC: 2.9 G/DL — LOW (ref 3.3–5)
ALP SERPL-CCNC: 131 U/L — HIGH (ref 40–120)
ALT FLD-CCNC: 13 U/L — SIGNIFICANT CHANGE UP (ref 12–78)
ANION GAP SERPL CALC-SCNC: 5 MMOL/L — SIGNIFICANT CHANGE UP (ref 5–17)
AST SERPL-CCNC: 25 U/L — SIGNIFICANT CHANGE UP (ref 15–37)
BILIRUB SERPL-MCNC: 0.9 MG/DL — SIGNIFICANT CHANGE UP (ref 0.2–1.2)
BUN SERPL-MCNC: 47 MG/DL — HIGH (ref 7–23)
CALCIUM SERPL-MCNC: 8.3 MG/DL — LOW (ref 8.5–10.1)
CHLORIDE SERPL-SCNC: 92 MMOL/L — LOW (ref 96–108)
CO2 SERPL-SCNC: 37 MMOL/L — HIGH (ref 22–31)
CREAT SERPL-MCNC: 1.19 MG/DL — SIGNIFICANT CHANGE UP (ref 0.5–1.3)
EGFR: 58 ML/MIN/1.73M2 — LOW
EGFR: 58 ML/MIN/1.73M2 — LOW
GLUCOSE SERPL-MCNC: 86 MG/DL — SIGNIFICANT CHANGE UP (ref 70–99)
HCT VFR BLD CALC: 40 % — SIGNIFICANT CHANGE UP (ref 39–50)
HGB BLD-MCNC: 11.5 G/DL — LOW (ref 13–17)
IGA FLD-MCNC: 63 MG/DL — LOW (ref 84–499)
IGG FLD-MCNC: 577 MG/DL — LOW (ref 610–1660)
IGM SERPL-MCNC: 14 MG/DL — LOW (ref 35–242)
INR BLD: 2.06 RATIO — HIGH (ref 0.85–1.16)
KAPPA LC SER QL IFE: 2.31 MG/DL — HIGH (ref 0.33–1.94)
KAPPA/LAMBDA FREE LIGHT CHAIN RATIO, SERUM: 1.63 RATIO — SIGNIFICANT CHANGE UP (ref 0.26–1.65)
LAMBDA LC SER QL IFE: 1.42 MG/DL — SIGNIFICANT CHANGE UP (ref 0.57–2.63)
MCHC RBC-ENTMCNC: 28.8 G/DL — LOW (ref 32–36)
MCHC RBC-ENTMCNC: 29.8 PG — SIGNIFICANT CHANGE UP (ref 27–34)
MCV RBC AUTO: 103.6 FL — HIGH (ref 80–100)
NRBC # BLD AUTO: 0 K/UL — SIGNIFICANT CHANGE UP (ref 0–0)
NRBC # FLD: 0 K/UL — SIGNIFICANT CHANGE UP (ref 0–0)
NRBC BLD AUTO-RTO: 0 /100 WBCS — SIGNIFICANT CHANGE UP (ref 0–0)
NT-PROBNP SERPL-SCNC: 4328 PG/ML — HIGH (ref 0–450)
PLATELET # BLD AUTO: 121 K/UL — LOW (ref 150–400)
PMV BLD: 9.7 FL — SIGNIFICANT CHANGE UP (ref 7–13)
POTASSIUM SERPL-MCNC: 4.9 MMOL/L — SIGNIFICANT CHANGE UP (ref 3.5–5.3)
POTASSIUM SERPL-SCNC: 4.9 MMOL/L — SIGNIFICANT CHANGE UP (ref 3.5–5.3)
PROT SERPL-MCNC: 6.1 GM/DL — SIGNIFICANT CHANGE UP (ref 6–8.3)
PROTHROM AB SERPL-ACNC: 23.5 SEC — HIGH (ref 9.9–13.4)
RBC # BLD: 3.86 M/UL — LOW (ref 4.2–5.8)
RBC # FLD: 14.1 % — SIGNIFICANT CHANGE UP (ref 10.3–14.5)
SODIUM SERPL-SCNC: 134 MMOL/L — LOW (ref 135–145)
WBC # BLD: 148.96 K/UL — CRITICAL HIGH (ref 3.8–10.5)
WBC # FLD AUTO: 148.96 K/UL — CRITICAL HIGH (ref 3.8–10.5)

## 2025-07-17 PROCEDURE — 99233 SBSQ HOSP IP/OBS HIGH 50: CPT

## 2025-07-17 PROCEDURE — 99232 SBSQ HOSP IP/OBS MODERATE 35: CPT | Mod: FS

## 2025-07-17 RX ORDER — POLYETHYLENE GLYCOL 3350 17 G/17G
17 POWDER, FOR SOLUTION ORAL ONCE
Refills: 0 | Status: COMPLETED | OUTPATIENT
Start: 2025-07-17 | End: 2025-07-17

## 2025-07-17 RX ORDER — BISACODYL 5 MG
5 TABLET, DELAYED RELEASE (ENTERIC COATED) ORAL AT BEDTIME
Refills: 0 | Status: COMPLETED | OUTPATIENT
Start: 2025-07-17 | End: 2025-07-17

## 2025-07-17 RX ORDER — FUROSEMIDE 10 MG/ML
80 INJECTION INTRAMUSCULAR; INTRAVENOUS
Refills: 0 | Status: DISCONTINUED | OUTPATIENT
Start: 2025-07-17 | End: 2025-07-18

## 2025-07-17 RX ADMIN — Medication 100 MILLIGRAM(S): at 10:06

## 2025-07-17 RX ADMIN — IPRATROPIUM BROMIDE AND ALBUTEROL SULFATE 3 MILLILITER(S): .5; 2.5 SOLUTION RESPIRATORY (INHALATION) at 21:42

## 2025-07-17 RX ADMIN — AMIODARONE HYDROCHLORIDE 400 MILLIGRAM(S): 50 INJECTION, SOLUTION INTRAVENOUS at 19:00

## 2025-07-17 RX ADMIN — IPRATROPIUM BROMIDE AND ALBUTEROL SULFATE 3 MILLILITER(S): .5; 2.5 SOLUTION RESPIRATORY (INHALATION) at 14:02

## 2025-07-17 RX ADMIN — FUROSEMIDE 80 MILLIGRAM(S): 10 INJECTION INTRAMUSCULAR; INTRAVENOUS at 15:42

## 2025-07-17 RX ADMIN — Medication 30 MILLILITER(S): at 21:33

## 2025-07-17 RX ADMIN — AMIODARONE HYDROCHLORIDE 400 MILLIGRAM(S): 50 INJECTION, SOLUTION INTRAVENOUS at 05:26

## 2025-07-17 RX ADMIN — FLUTICASONE PROPIONATE 1 SPRAY(S): 50 SPRAY, METERED NASAL at 10:09

## 2025-07-17 RX ADMIN — DEXTROMETHORPHAN HBR, GUAIFENESIN 1200 MILLIGRAM(S): 200 LIQUID ORAL at 21:23

## 2025-07-17 RX ADMIN — Medication 300 MILLIGRAM(S): at 10:07

## 2025-07-17 RX ADMIN — DORZOLAMIDE HYDROCHLORIDE AND TIMOLOL MALEATE 1 DROP(S): 20; 5 SOLUTION/ DROPS OPHTHALMIC at 21:24

## 2025-07-17 RX ADMIN — DORZOLAMIDE HYDROCHLORIDE AND TIMOLOL MALEATE 1 DROP(S): 20; 5 SOLUTION/ DROPS OPHTHALMIC at 10:05

## 2025-07-17 RX ADMIN — FLUTICASONE PROPIONATE 1 SPRAY(S): 50 SPRAY, METERED NASAL at 21:23

## 2025-07-17 RX ADMIN — METOPROLOL SUCCINATE 25 MILLIGRAM(S): 50 TABLET, EXTENDED RELEASE ORAL at 10:05

## 2025-07-17 RX ADMIN — Medication 1000 UNIT(S): at 10:08

## 2025-07-17 RX ADMIN — CEFTRIAXONE 1000 MILLIGRAM(S): 500 INJECTION, POWDER, FOR SOLUTION INTRAMUSCULAR; INTRAVENOUS at 15:03

## 2025-07-17 RX ADMIN — Medication 10 MILLIEQUIVALENT(S): at 10:07

## 2025-07-17 RX ADMIN — Medication 100 MILLIGRAM(S): at 21:23

## 2025-07-17 RX ADMIN — Medication 5 MILLIGRAM(S): at 23:17

## 2025-07-17 RX ADMIN — METOPROLOL SUCCINATE 25 MILLIGRAM(S): 50 TABLET, EXTENDED RELEASE ORAL at 21:23

## 2025-07-17 RX ADMIN — IPRATROPIUM BROMIDE AND ALBUTEROL SULFATE 3 MILLILITER(S): .5; 2.5 SOLUTION RESPIRATORY (INHALATION) at 08:50

## 2025-07-17 RX ADMIN — IPRATROPIUM BROMIDE AND ALBUTEROL SULFATE 3 MILLILITER(S): .5; 2.5 SOLUTION RESPIRATORY (INHALATION) at 02:00

## 2025-07-17 RX ADMIN — DEXTROMETHORPHAN HBR, GUAIFENESIN 1200 MILLIGRAM(S): 200 LIQUID ORAL at 10:08

## 2025-07-17 RX ADMIN — Medication 2.5 MILLIGRAM(S): at 21:23

## 2025-07-17 RX ADMIN — POLYETHYLENE GLYCOL 3350 17 GRAM(S): 17 POWDER, FOR SOLUTION ORAL at 23:17

## 2025-07-17 NOTE — DIETITIAN INITIAL EVALUATION ADULT - OTHER INFO
89 year old male w AFIB on AC, AICD, HFrEF, CLL, glaucoma, tophaceous gout, HLD, HTN, RA was brought in by grandson c/o SOB productive cough. 3 day hx of SOB w cough productive of yellowish sputum. per family- increase in lasix recently with 12lb weight loss. no chest pain; no abdominal pain; no trauma or injury.   no known sick contacts; similar presentation w pneumonia in past. CT + bilateral PNA; ceftriaxone + zithromax. Noted w/ AHRF, PNA and dyspnea/ SOB. Per heme/onc 7/16: Stage II CLL ( Enlarged spleen)/ mild thrombocytopenia. Admitting diagnosis: PNA    Pt known to RD services, prev met criteria for PCM on multiple admits. Continues to meet criteria on this admit. Reports no change in appetite. Per RN flowsheet 51-75% consumed at most meals. #, denies any recent wt changes. RD unable to obtain bedscale wt d/t pt in chair at time of visit. Per EMR admit wt 210# on 7/14. Wt history reviewed: 209# (EMR wt on 3/28/24), 205# (taken by RD on 5/12/23). No pertinent wt changes at this time. Pt appears overweight, NFPE reveals sev muscle/fat wasting. Recommend to Liberalize diet to regular to maximize caloric and nutrient intake. Pt willing to trial High-protein mousse 1x daily (provides 394 kcal, 14g protein/ container). Confirm goals of care regarding nutrition support. Nutrition support is not recommended due to overall declining medical status which evidenced based studies indicate EN is not effective in prolonging survival and improving quality of life. It can also increase risk of aspiration pneumonia as well as other related issues (infection, GI complications, and worsening/ non-healing PI's). However, will provide nutrition/ hydration within GOC. Please see additional recommendations below.

## 2025-07-17 NOTE — PHYSICAL THERAPY INITIAL EVALUATION ADULT - MODALITIES TREATMENT COMMENTS
Pt took steps to chair with assist and RW. B LE weakness noted, buckling at times. +AWAN, O2 via NC in place.

## 2025-07-17 NOTE — DIETITIAN INITIAL EVALUATION ADULT - PERTINENT LABORATORY DATA
07-17    134[L]  |  92[L]  |  47[H]  ----------------------------<  86  4.9   |  37[H]  |  1.19    Ca    8.3[L]      17 Jul 2025 07:14    TPro  6.1  /  Alb  2.9[L]  /  TBili  0.9  /  DBili  x   /  AST  25  /  ALT  13  /  AlkPhos  131[H]  07-17

## 2025-07-17 NOTE — PHYSICAL THERAPY INITIAL EVALUATION ADULT - ADDITIONAL COMMENTS
Pt lives in a one level home with his family. Has 5 TIARRA, with handrails. Uses RW or cane for ambulation.

## 2025-07-17 NOTE — DIETITIAN INITIAL EVALUATION ADULT - PERTINENT MEDS FT
MEDICATIONS  (STANDING):  albuterol    90 MICROgram(s) HFA Inhaler 2 Puff(s) Inhalation every 6 hours  albuterol/ipratropium for Nebulization 3 milliLiter(s) Nebulizer every 6 hours  allopurinol 300 milliGRAM(s) Oral daily  aMIOdarone    Tablet   Oral   aMIOdarone    Tablet 400 milliGRAM(s) Oral two times a day  cefTRIAXone Injectable. 1000 milliGRAM(s) IV Push every 24 hours  cholecalciferol 1000 Unit(s) Oral daily  dorzolamide 2%/timolol 0.5% Ophthalmic Solution 1 Drop(s) Both EYES two times a day  doxycycline monohydrate Capsule 100 milliGRAM(s) Oral every 12 hours  fluticasone propionate 50 MICROgram(s)/spray Nasal Spray 1 Spray(s) Both Nostrils two times a day  furosemide    Tablet 80 milliGRAM(s) Oral two times a day  guaiFENesin ER 1200 milliGRAM(s) Oral every 12 hours  metOLazone 2.5 milliGRAM(s) Oral <User Schedule>  metoprolol succinate ER 25 milliGRAM(s) Oral every 12 hours  potassium chloride    Tablet ER 10 milliEquivalent(s) Oral daily  warfarin 2.5 milliGRAM(s) Oral at bedtime    MEDICATIONS  (PRN):  acetaminophen     Tablet .. 650 milliGRAM(s) Oral every 6 hours PRN Temp greater or equal to 38C (100.4F), Mild Pain (1 - 3)  aluminum hydroxide/magnesium hydroxide/simethicone Suspension 30 milliLiter(s) Oral every 4 hours PRN Dyspepsia  Biofreeze Pain Relief Roll-On Gel (Menthol 4% 1 Application(s) 1 Application(s) Topical three times a day PRN Pain Relief  lidocaine   4% Patch 1 Patch Transdermal every 24 hours PRN shoulder pain  melatonin 3 milliGRAM(s) Oral at bedtime PRN Insomnia  ondansetron Injectable 4 milliGRAM(s) IV Push every 8 hours PRN Nausea and/or Vomiting  sodium chloride 0.65% Nasal 1 Spray(s) Both Nostrils every 2 hours PRN Nasal Congestion

## 2025-07-17 NOTE — DIETITIAN INITIAL EVALUATION ADULT - HEIGHT FOR BMI (CENTIMETERS)
Subjective   Maricruz Cherry is a 42 year old  female with (1-7) that presents in biannual f/u        1)HTN-DX 2020 started on HCTZ 12.5 mg daily.  )Vitamin D deficiency   Recent level was  34.6(Feb2021)/19.8 (Feb 2020); has started 2000 Iu daily after 8 week loading dose.  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  )mild carpal tunnel   11/6/20  ) covid 19      DX 12/22/21 and 2020  7)over weight BMI  29.26%28.85%28.63%28.70%/27.70%/27.50% had been 27.17%      Annual exam (biannual exam): 2/8/20 NPA; 2/15/21    Prevention  Pap: 2019 s/p TAHBS   10/13/21   Mammogram: 4/10/21; 4/22/22  Bone density: N/A  Colonoscopy: 1/25/21  Hept C screen:2/9/21      Immunizations  Flu: Reaction in the past  Pneumo vax 23: N/A  [id number]: N/A  Shingles vaccine:N /A  TdaP: recommended?  PPD:  covid vaccine: recommended/DECLINED      Consultants/Patient Care Team  Juan López PCP  [place]/[facility]      REVIEW OF SYSTEM:  Review of Systems   Constitutional: Negative for activity change, appetite change, fatigue and unexpected weight change.   HENT: Negative for congestion, ear pain, hearing loss, nosebleeds, sinus pressure, sore throat, trouble swallowing and voice change.    Eyes: Negative for discharge, redness and visual disturbance.   Respiratory: Negative for cough, shortness of breath and wheezing.    Cardiovascular: Negative for chest pain, palpitations and leg swelling.   Gastrointestinal: Negative for abdominal pain, blood in stool and rectal pain.   Endocrine: Negative for cold intolerance, heat intolerance, polydipsia, polyphagia and polyuria.   Genitourinary: Negative for difficulty urinating, frequency, hematuria, pelvic pain, urgency, vaginal bleeding and vaginal discharge.   Musculoskeletal: Negative for arthralgias, gait problem and myalgias.   Skin: Negative for color change and rash.   Allergic/Immunologic: Negative for food allergies.   Neurological: Negative for tremors,  syncope, speech difficulty and headaches.   Hematological: Does not bruise/bleed easily.   Psychiatric/Behavioral: Negative for confusion, hallucinations and suicidal ideas. The patient is not nervous/anxious.        ALLERGIES:  No Known Allergies    Current Outpatient Medications   Medication Sig Dispense Refill   • losartan (COZAAR) 25 MG tablet Take 1 tablet by mouth daily. 30 tablet 0   • hydrochlorothiazide (HYDRODIURIL) 12.5 MG tablet Take 1 tablet by mouth daily. 90 tablet 1   • Cholecalciferol (VITAMIN D3 PO) Take by mouth daily.        No current facility-administered medications for this visit.         PAST MEDICAL HISTORY:  Patient Active Problem List   Diagnosis   • Overweight (BMI 25.0-29.9)   • Vitamin D deficiency   • Essential hypertension   • Bilateral carpal tunnel syndrome   • Blood glucose elevated   • Hair thinning   • Annual physical exam       PAST SURGICAL HISTORY:  Multiple abortions  Ectopic pregancy with fallopian tube removal    Ectopic pregancy   with fallopian tube removal   EGD  12  Colonoscopy    (brijesh)  Abdominal/post thorax-liposuction   Colonoscopy     21    Sander  TAHBS  10/13/21      Deon Xavier      FAMILY HISTORY:  Mother:  age 56 from colon cancer, HTN, DM, ESRD on diaysis  Father:polycystic kidney disease; ESRD/dialysis  MGM:  HTN, DM  PGM:  pancreatic cancer  Sister x 3 : () HTN; (Lupe) Migraines; (Shaketta)HTN      SOCIAL HISTORY:  Marital Status: since   Residence: independently in her own home in [facility]  Occupation: Admin Coordinator at [employer]  Children x2: (Chuy Morin)  DME:blood pressure  Tobacco use: never  Alcohol use: 1 drink every other month  Caffeine use: rare coffee  Illicit drug use:none  Exercise: no routine  POA: Completed 2/15/21 sister Lupe Burns      Review  Patient's medications, allergies, past medical, surgical, social and family histories were reviewed and updated as  appropriate.    No visits with results within 1 Month(s) from this visit.   Latest known visit with results is:   Walk In on 03/01/2022   Component Date Value Ref Range Status   • Rapid Influenza A Ag 03/01/2022 Negative  Negative, Indeterminate Final   • Rapid Influenza B Ag 03/01/2022 Negative  Negative, Indeterminate Final   • GRP A STREP 03/01/2022 Negative  Negative Final   • Internal Procedural Controls Accep* 03/01/2022 Yes   Final   • POCT SARS-COV-2 ANTIGEN 03/01/2022 Not Detected  Not Detected Final   • CULTURE, THROAT 03/01/2022 Moderate normal upper respiratory vincent   Final            Objective  Visit Vitals  LMP 02/21/2022       PHYSICAL EXAM:  Physical Exam  Vitals and nursing note reviewed.   Constitutional:       Appearance: She is well-developed.   HENT:      Head: Normocephalic and atraumatic.      Right Ear: Tympanic membrane is not erythematous.      Nose: No mucosal edema.      Right Sinus: No maxillary sinus tenderness.      Left Sinus: No maxillary sinus tenderness.   Eyes:      Conjunctiva/sclera: Conjunctivae normal.   Cardiovascular:      Rate and Rhythm: Normal rate and regular rhythm.      Heart sounds: Normal heart sounds.   Pulmonary:      Breath sounds: Normal breath sounds.   Chest:   Breasts:      Right: No inverted nipple, nipple discharge or tenderness.      Left: No inverted nipple, nipple discharge or tenderness.       Abdominal:      General: Bowel sounds are normal.      Palpations: Abdomen is soft.   Musculoskeletal:         General: Normal range of motion.      Cervical back: Normal range of motion and neck supple.   Skin:     General: Skin is warm and dry.   Neurological:      Mental Status: She is alert and oriented to person, place, and time.      Deep Tendon Reflexes: Reflexes are normal and symmetric.   Psychiatric:         Behavior: Behavior normal.         Judgment: Judgment normal.         ASSESSMENT AND PLAN:    Problem List Items Addressed This Visit     Overweight  (BMI 25.0-29.9)    Vitamin D deficiency    Essential hypertension - Primary    Bilateral carpal tunnel syndrome        I reviewed and discussed current active medical problems, findings of physical examination, explained treatment options, and plan of care. The patient was given the opportunity to ask questions. Patient expresses understanding of the following  plan.     )HTN-HCTZ 12.5mg daily  )mild BCTS-continue volar splent  )vitamin D deficiency-12 week loading dose has been ordered with eventual transition to daily dose  )family H/O colon cancer-needs colonoscopy  )overweight BMI goal <25%        -------------------  Next visit will be for an annual exam (target 2/25/23) labs have been entered and stool card given      Follow up  No follow-ups on file.    Juan López MD   172.72

## 2025-07-17 NOTE — DIETITIAN INITIAL EVALUATION ADULT - ORAL INTAKE PTA/DIET HISTORY
Pt reports lives at home w/ family. Dtr will shop and cook for household. Reports "okay" appetite, just doesn't eat big meals anymore. C/o constipation on and off. Pt likely meeting <75% ENN chronically.

## 2025-07-17 NOTE — PHYSICAL THERAPY INITIAL EVALUATION ADULT - PERTINENT HX OF CURRENT PROBLEM, REHAB EVAL
Pt admitted to  on 7/14/25 secondary to SOB, cough, PNA. Pt states he has felt weak for a couple of days. Pt agreeable to PT.

## 2025-07-17 NOTE — PROGRESS NOTE ADULT - NS ATTEND AMEND GEN_ALL_CORE FT
Agreed with plan as outlined with amiodarone therapy in PVC suppression. Follow up in EP Clinic to reassess biventricular pacing percentage.

## 2025-07-17 NOTE — DIETITIAN INITIAL EVALUATION ADULT - NSFNSGIIOFT_GEN_A_CORE
I&O's Detail    16 Jul 2025 07:01  -  17 Jul 2025 07:00  --------------------------------------------------------  IN:    Oral Fluid: 620 mL  Total IN: 620 mL    OUT:    Voided (mL): 750 mL  Total OUT: 750 mL    Total NET: -130 mL      17 Jul 2025 07:01  -  17 Jul 2025 13:23  --------------------------------------------------------  IN:  Total IN: 0 mL    OUT:    Voided (mL): 250 mL  Total OUT: 250 mL    Total NET: -250 mL

## 2025-07-17 NOTE — DIETITIAN INITIAL EVALUATION ADULT - ADD RECOMMEND
1. Recommend to Liberalize diet to regular to maximize caloric and nutrient intake.   2. High-protein mousse 1x daily (provides 394 kcal, 14g protein/ container)   3. MVI w/ minerals daily to ensure 100% RDA met  4. Consider adding thiamine 100 mg daily 2/2 poor PO intake/ malnutrition  5. Monitor bowel movements, if no BM for >3 days, consider implementing bowel regimen.  6. Monitor daily lytes/min and replete prn   7. Encourage protein-rich foods, maximize food preferences  8. Obtain weekly wt to track/trend changes  9. Confirm goals of care regarding nutrition support. LONG TERM Nutrition support is not recommended due to overall declining medical status which evidenced based studies indicate EN is not effective in prolonging survival and improving quality of life. It can also increase risk of aspiration pneumonia as well as other related issues (infection, GI complications, and worsening/ non-healing PI's). However, will provide nutrition/ hydration within GOC.  10. Nutrition recommendations to continue upon discharge. Goal to continue to meet >/= 80% ENN via tolerated route. RD to F/U prn for changes to nutrition dc plan.  RD will continue to monitor PO intake, labs, hydration, and wt prn.

## 2025-07-18 ENCOUNTER — TRANSCRIPTION ENCOUNTER (OUTPATIENT)
Age: 89
End: 2025-07-18

## 2025-07-18 VITALS
HEART RATE: 45 BPM | SYSTOLIC BLOOD PRESSURE: 104 MMHG | TEMPERATURE: 97 F | RESPIRATION RATE: 18 BRPM | OXYGEN SATURATION: 95 % | DIASTOLIC BLOOD PRESSURE: 60 MMHG

## 2025-07-18 LAB
ANION GAP SERPL CALC-SCNC: 3 MMOL/L — LOW (ref 5–17)
BUN SERPL-MCNC: 59 MG/DL — HIGH (ref 7–23)
CALCIUM SERPL-MCNC: 9.7 MG/DL — SIGNIFICANT CHANGE UP (ref 8.5–10.1)
CHLORIDE SERPL-SCNC: 91 MMOL/L — LOW (ref 96–108)
CO2 SERPL-SCNC: 40 MMOL/L — HIGH (ref 22–31)
CREAT SERPL-MCNC: 1.2 MG/DL — SIGNIFICANT CHANGE UP (ref 0.5–1.3)
EGFR: 58 ML/MIN/1.73M2 — LOW
EGFR: 58 ML/MIN/1.73M2 — LOW
GLUCOSE SERPL-MCNC: 110 MG/DL — HIGH (ref 70–99)
HCT VFR BLD CALC: 42.7 % — SIGNIFICANT CHANGE UP (ref 39–50)
HGB BLD-MCNC: 12 G/DL — LOW (ref 13–17)
INR BLD: 2.07 RATIO — HIGH (ref 0.85–1.16)
MCHC RBC-ENTMCNC: 28.1 G/DL — LOW (ref 32–36)
MCHC RBC-ENTMCNC: 29.3 PG — SIGNIFICANT CHANGE UP (ref 27–34)
MCV RBC AUTO: 104.1 FL — HIGH (ref 80–100)
NRBC # BLD AUTO: 0 K/UL — SIGNIFICANT CHANGE UP (ref 0–0)
NRBC # FLD: 0 K/UL — SIGNIFICANT CHANGE UP (ref 0–0)
NRBC BLD AUTO-RTO: 0 /100 WBCS — SIGNIFICANT CHANGE UP (ref 0–0)
PLATELET # BLD AUTO: 132 K/UL — LOW (ref 150–400)
PMV BLD: 8.9 FL — SIGNIFICANT CHANGE UP (ref 7–13)
POTASSIUM SERPL-MCNC: 4.9 MMOL/L — SIGNIFICANT CHANGE UP (ref 3.5–5.3)
POTASSIUM SERPL-SCNC: 4.9 MMOL/L — SIGNIFICANT CHANGE UP (ref 3.5–5.3)
PROTHROM AB SERPL-ACNC: 24.3 SEC — HIGH (ref 9.9–13.4)
RBC # BLD: 4.1 M/UL — LOW (ref 4.2–5.8)
RBC # FLD: 14 % — SIGNIFICANT CHANGE UP (ref 10.3–14.5)
SODIUM SERPL-SCNC: 134 MMOL/L — LOW (ref 135–145)
WBC # BLD: 154.87 K/UL — CRITICAL HIGH (ref 3.8–10.5)
WBC # FLD AUTO: 154.87 K/UL — CRITICAL HIGH (ref 3.8–10.5)

## 2025-07-18 PROCEDURE — 99238 HOSP IP/OBS DSCHRG MGMT 30/<: CPT

## 2025-07-18 RX ORDER — DOXYCYCLINE HYCLATE 100 MG
1 TABLET ORAL
Qty: 8 | Refills: 0
Start: 2025-07-18 | End: 2025-07-21

## 2025-07-18 RX ORDER — CEFUROXIME SODIUM 1.5 G
1 VIAL (EA) INJECTION
Qty: 8 | Refills: 0
Start: 2025-07-18 | End: 2025-07-21

## 2025-07-18 RX ORDER — ALBUTEROL SULFATE 2.5 MG/3ML
2 VIAL, NEBULIZER (ML) INHALATION
Qty: 0 | Refills: 0 | DISCHARGE
Start: 2025-07-18

## 2025-07-18 RX ORDER — AMIODARONE HYDROCHLORIDE 50 MG/ML
400 INJECTION, SOLUTION INTRAVENOUS
Qty: 12 | Refills: 0
Start: 2025-07-18 | End: 2025-07-20

## 2025-07-18 RX ORDER — AMIODARONE HYDROCHLORIDE 50 MG/ML
1 INJECTION, SOLUTION INTRAVENOUS
Qty: 30 | Refills: 0
Start: 2025-07-18 | End: 2025-08-16

## 2025-07-18 RX ADMIN — IPRATROPIUM BROMIDE AND ALBUTEROL SULFATE 3 MILLILITER(S): .5; 2.5 SOLUTION RESPIRATORY (INHALATION) at 15:34

## 2025-07-18 RX ADMIN — FLUTICASONE PROPIONATE 1 SPRAY(S): 50 SPRAY, METERED NASAL at 10:43

## 2025-07-18 RX ADMIN — Medication 1000 UNIT(S): at 10:41

## 2025-07-18 RX ADMIN — DORZOLAMIDE HYDROCHLORIDE AND TIMOLOL MALEATE 1 DROP(S): 20; 5 SOLUTION/ DROPS OPHTHALMIC at 10:42

## 2025-07-18 RX ADMIN — IPRATROPIUM BROMIDE AND ALBUTEROL SULFATE 3 MILLILITER(S): .5; 2.5 SOLUTION RESPIRATORY (INHALATION) at 10:24

## 2025-07-18 RX ADMIN — FUROSEMIDE 80 MILLIGRAM(S): 10 INJECTION INTRAMUSCULAR; INTRAVENOUS at 05:44

## 2025-07-18 RX ADMIN — AMIODARONE HYDROCHLORIDE 400 MILLIGRAM(S): 50 INJECTION, SOLUTION INTRAVENOUS at 05:44

## 2025-07-18 RX ADMIN — METOPROLOL SUCCINATE 25 MILLIGRAM(S): 50 TABLET, EXTENDED RELEASE ORAL at 10:42

## 2025-07-18 RX ADMIN — DEXTROMETHORPHAN HBR, GUAIFENESIN 1200 MILLIGRAM(S): 200 LIQUID ORAL at 10:41

## 2025-07-18 RX ADMIN — Medication 300 MILLIGRAM(S): at 10:42

## 2025-07-18 RX ADMIN — CEFTRIAXONE 1000 MILLIGRAM(S): 500 INJECTION, POWDER, FOR SOLUTION INTRAMUSCULAR; INTRAVENOUS at 13:52

## 2025-07-18 RX ADMIN — Medication 10 MILLIEQUIVALENT(S): at 10:42

## 2025-07-18 RX ADMIN — Medication 100 MILLIGRAM(S): at 10:43

## 2025-07-18 NOTE — DISCHARGE NOTE PROVIDER - CARE PROVIDER_API CALL
Federico Combs)  Internal Medicine  [address], [address]  [place] [address]  Phone: [phone]  Fax: [phone]  Established Patient  Follow Up Time: 1 week    Abel Welsh)  Hematology  [address]  [place] [address]  Phone: [phone]  Fax: [phone]  Follow Up Time: 1 week

## 2025-07-18 NOTE — DISCHARGE NOTE PROVIDER - CARE PROVIDERS DIRECT ADDRESSES
[email or web address]

## 2025-07-18 NOTE — DISCHARGE NOTE PROVIDER - PROVIDER TOKENS
PROVIDER:[TOKEN:[[id number]],FOLLOWUP:[1 week],ESTABLISHEDPATIENT:[T]],PROVIDER:[TOKEN:[[id number]],FOLLOWUP:[1 week]]

## 2025-07-18 NOTE — DISCHARGE NOTE NURSING/CASE MANAGEMENT/SOCIAL WORK - PATIENT PORTAL LINK FT
You can access the FollowMyHealth Patient Portal offered by [facility] by registering at the following website: [email or web address]. By joining [facility]’s FollowMyHealth portal, you will also be able to view your health information using other applications (apps) compatible with our system.

## 2025-07-18 NOTE — DISCHARGE NOTE PROVIDER - ATTENDING DISCHARGE PHYSICAL EXAMINATION:
Physical Exam  T(C): 36.4 (07-18-25 @ 07:29), Max: 36.6 (07-17-25 @ 23:23)  HR: 62 (07-18-25 @ 13:33) (61 - 80)  BP: 104/52 (07-18-25 @ 13:33) (104/49 - 116/59)  RR: 18 (07-18-25 @ 07:29) (17 - 18)  SpO2: 95% (07-18-25 @ 07:29) (87% - 96%)  General Appearance nad, no head trauma.   EYES no scleral icterus.   CHEST clear to auscultation bilaterally, no wheezes/rhonchi/rales.   HEART RRR, normal S1 S2, no murmurs, clicks or rubs.   BACK no CVA , no spinal tenderness.   ABDOMEN soft NT/ND, BS present.   EXTREMITIES no clubbing, no cyanosis, no edema.   Skin normal skin, limited exam.   Psych normal affect.

## 2025-07-18 NOTE — DISCHARGE NOTE PROVIDER - DETAILS OF MALNUTRITION DIAGNOSIS/DIAGNOSES
This patient has been assessed with a concern for Malnutrition and was treated during this hospitalization for the following Nutrition diagnosis/diagnoses:     -  07/17/2025: Severe protein-calorie malnutrition

## 2025-07-18 NOTE — CHART NOTE - NSCHARTNOTEFT_GEN_A_CORE
Dx : PNA (ICD10: J18.9)  - Room air pulse ox. at rest:  84%   - Room air pulse ox. while ambulatin%  - Pulse ox while ambulating on 2 liters n/c  O2 96%    Patient will require home O2 for discharge.  Patient is aware and agreeable to home O2.  Patient is in a chronic stable state of HFrEF.

## 2025-07-18 NOTE — DISCHARGE NOTE PROVIDER - HOSPITAL COURSE
89 year old male w AFIB on AC, AICD, HFrEF, CLL, glaucoma, tophaceous gout, HLD, HTN, RA was brought in by grandson c/o SOB productive cough. found to have PNA. his cultures were negative and he improved on antibiotics. he required oxygen and so was discharged on that. oncology saw him for CLL and suggested outpatient follow up   89 year old male w AFIB on AC, AICD, HFrEF, CLL, glaucoma, tophaceous gout, HLD, HTN, RA was brought in by grandson c/o SOB productive cough. found to have PNA. his cultures were negative and he improved on antibiotics. he required oxygen and so was discharged on that. oncology saw him for CLL and suggested outpatient follow up. he was placed on amiodarone for afib

## 2025-07-18 NOTE — DISCHARGE NOTE NURSING/CASE MANAGEMENT/SOCIAL WORK - NSDCPEFALRISK_GEN_ALL_CORE
For information on Fall & Injury Prevention, visit: [email or web address] OR  [email or web address] OR  https://www.cdc.gov/steadi/patient.html

## 2025-07-18 NOTE — DISCHARGE NOTE PROVIDER - NSDCFUSCHEDAPPT_GEN_ALL_CORE_FT
[facility] Physician [address]  Scheduled Appointment: 08/18/2025

## 2025-07-18 NOTE — DISCHARGE NOTE NURSING/CASE MANAGEMENT/SOCIAL WORK - FINANCIAL ASSISTANCE
[facility] provides services at a reduced cost to those who are determined to be eligible through [facility]’s financial assistance program. Information regarding [facility]’s financial assistance program can be found by going to [email or web address] or by calling [phone].

## 2025-07-18 NOTE — DISCHARGE NOTE PROVIDER - NSDCMRMEDTOKEN_GEN_ALL_CORE_FT
acetaminophen 650 mg oral tablet, extended release: 1 tab(s) orally 2 times a day  allopurinol 300 mg oral tablet: 1 tab(s) orally once a day  Biofreeze 3.5% topical gel: Apply topically to affected area prn  diclofenac 1% topical gel: Apply topically to affected area 2 times a day as needed for right shoulder pain  dorzolamide-timolol 2.23%-0.68% (2%-0.5% base) ophthalmic solution: 1 drop(s) in each eye 2 times a day  Lasix 40 mg oral tablet: 2 tab(s) orally 2 times a day in the morning and in the afternoon ~2:30 - 3PM (new directions for this med)  metOLazone 2.5 mg oral tablet: 1 tab(s) orally 2 times a day in the morning and the afternoon ~2:30 - 3PM  metoprolol tartrate 25 mg oral tablet: 1 tab(s) orally 2 times a day  potassium chloride 10 mEq oral tablet, extended release: 1 tab(s) orally once a day  Vitamin D3 25 mcg (1000 intl units) oral capsule: 1 cap(s) orally once a day  warfarin 2.5 mg oral tablet: 1 tab(s) orally once a day (in the evening)   acetaminophen 650 mg oral tablet, extended release: 1 tab(s) orally 2 times a day  albuterol 90 mcg/inh inhalation aerosol: 2 puff(s) inhaled every 6 hours  allopurinol 300 mg oral tablet: 1 tab(s) orally once a day  amiodarone 200 mg oral tablet: 400 milligram(s) orally 2 times a day  amiodarone 200 mg oral tablet: 1 tab(s) orally once a day after you complete the 2 tabs twice a day for 3 days  Biofreeze 3.5% topical gel: Apply topically to affected area prn  cefuroxime 500 mg oral tablet: 1 tab(s) orally 2 times a day  diclofenac 1% topical gel: Apply topically to affected area 2 times a day as needed for right shoulder pain  dorzolamide-timolol 2.23%-0.68% (2%-0.5% base) ophthalmic solution: 1 drop(s) in each eye 2 times a day  doxycycline hyclate 100 mg oral capsule: 1 cap(s) orally 2 times a day  Lasix 40 mg oral tablet: 2 tab(s) orally 2 times a day in the morning and in the afternoon ~2:30 - 3PM (new directions for this med)  metOLazone 2.5 mg oral tablet: 1 tab(s) orally 2 times a day in the morning and the afternoon ~2:30 - 3PM  metoprolol tartrate 25 mg oral tablet: 1 tab(s) orally 2 times a day  potassium chloride 10 mEq oral tablet, extended release: 1 tab(s) orally once a day  Vitamin D3 25 mcg (1000 intl units) oral capsule: 1 cap(s) orally once a day  warfarin 2.5 mg oral tablet: 1 tab(s) orally once a day (in the evening)

## 2025-07-18 NOTE — PROGRESS NOTE ADULT - ASSESSMENT
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  
ASSESSMENT & PLAN: 89 year old male with above history with frequent PVC, bigeminy on echo.      Continue tele monitoring, ventricular ectopy has decreased.  CRT-D optimization was performed.  Unable to increase Metoprolol due to borderline blood pressure  Continue Amiodarone 400mg PO BID x5 days and then decrease to 200mg PO daily for PVC suppression, and this will likely increase biventricular pacing percentage over time.  He will need yearly PFT and eye exam as well as [id number] TFTs and LFTs while on Amiodarone.  Continue warfarin  Keep lytes repleted, K>4, Mg>2  Further management of PNA per medicine.EP will sign off, we will follow his remotely from EP clinic.  Plan discussed with patient and Dr. Lynch
Assessment:  89M with AFIB on AC, AICD, HFrEF, CLL, glaucoma, tophaceous gout, HLD, HTN, RA presents 7/14 with dyspnea and cough for 3 days, associated with yellowish sputum production  Patient is very weak, reports feeling sleepy  Denies any fever or chills, abdominal pain, n/v, diarrhea or dysuria  No known sick contact  Afebrile on admission, on 6L NC   (baseline 100-150s)  Cr 1.23  UA negative  RVP negative  CT Chest with Bilateral lower lung consolidations suggestive of combination of atelectasis and pneumonia.  Partially imaged splenomegaly with marked interval increase in size since   March 20, 2024. Numerous lymph nodes within the partially imaged lower   neck bilateral axilla may be related to thenew splenomegaly.  BCx 7/14 no growth    Antimicrobials:  azithromycin  IVPB 500 every 24 hours (7/14 --- ) Doxy (7/16 --- )  cefTRIAXone Injectable. 1000 every 24 hours (7/14 --- )    Impression:   #Acute Hypoxic Respiratory Failure, Multifocal Pneumonia  #Leukocytosis, Hx of CLL  #ADRIANA improving  #Penicillins Allergy (Unknown), tolerating CTX  - remains afebrile, leukocytosis multifactorial in setting of CLL, clinically appears very weak, but nontoxic    Recommendations:  - continue empiric CTX 1G q24 (Day #3)  - continue empiric Doxy 100mg q12 (Day #3)  - monitor temperature curve  - trend WBC  - reason for abx use reviewed with patient  - side effects of antibiotic discussed, tolerating abx well so far  - Prior cultures reviewed. An epidemiologic assessment was performed. There is a significant risk for resistant microorganisms to spread to family members, and/or healthcare staff. Isolation precautions based on infection control policy. Will reconsider further isolation measures based on new culture results and other clinical data as appropriate Appropriate cultures collected and an appropriate broad spectrum antibiotic therapy will be considered  - check Sputum culture if able, Legionella/Strep Uag  - rest per primary team    Clinical team may change from intravenous to oral antibiotics when the following criteria are met:   1. Patient is clinically improving/stable       a)	Improved signs and symptoms of infection from initial presentation       b)	Afebrile for 24 hours       c)	Leukocytosis trending towards normal range   2. Patient is tolerating oral intake   3. Initial/repeat blood cultures are negative OR do not need to wait for preliminary blood cultures to result    Cannot advise changing to oral antibiotic therapy until culture sensitivity is available.
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

## 2025-07-18 NOTE — PROGRESS NOTE ADULT - SUBJECTIVE AND OBJECTIVE BOX
89 year old male w AFIB on AC, AICD, HFrEF, CLL, glaucoma, tophaceous gout, HLD, HTN, RA was brought in by grandson c/o SOB productive cough, found to have PNA      7/15/25: 89 year old male known to EP team with history of AF, CM with CRT-D in place, HFreEF presenting to  with pneumonia.  Patient noted to have frequent PVCs and bigeminy on telemetry.  EP asked to evaluate patient for ventricular ectopy.  Patient seen at bedside, reports that he is feeling better and breathing has improved since admission. Denies any cardiac complaints.      7/16/25:  Nursing staff reported that pt may have had an ICD shock yesterday?  No episodes seen on tele, pt reported feeling twitching, it may have been diaphragmatic stimulation from LV lead.      TELE: atrial fib with bi-v pacing 70-80 bpm, frequent ventricular ectopy, brief NSVTs.    Device was re-interrogated today and no episodes of any therapies delivered. CRT-D optimization performed.    7/17/25: pt seen OOB in chair in NAD, wearing O2 2L NC, states he is feeling much better and would like to get home.  He denies any CP, SOB, palpitations or any type of LV lead stimulation.  He is tolerating PO Amiodarone load.  TELE: Afib with v-pacing, ventricular ectopy is less      MEDICATIONS  (STANDING):  albuterol    90 MICROgram(s) HFA Inhaler 2 Puff(s) Inhalation every 6 hours  albuterol/ipratropium for Nebulization 3 milliLiter(s) Nebulizer every 6 hours  allopurinol 300 milliGRAM(s) Oral daily  aMIOdarone    Tablet   Oral   aMIOdarone    Tablet 400 milliGRAM(s) Oral two times a day  cefTRIAXone Injectable. 1000 milliGRAM(s) IV Push every 24 hours  cholecalciferol 1000 Unit(s) Oral daily  dorzolamide 2%/timolol 0.5% Ophthalmic Solution 1 Drop(s) Both EYES two times a day  doxycycline monohydrate Capsule 100 milliGRAM(s) Oral every 12 hours  fluticasone propionate 50 MICROgram(s)/spray Nasal Spray 1 Spray(s) Both Nostrils two times a day  furosemide    Tablet 80 milliGRAM(s) Oral two times a day  guaiFENesin ER 1200 milliGRAM(s) Oral every 12 hours  metOLazone 2.5 milliGRAM(s) Oral <User Schedule>  metoprolol succinate ER 25 milliGRAM(s) Oral every 12 hours  potassium chloride    Tablet ER 10 milliEquivalent(s) Oral daily  warfarin 2.5 milliGRAM(s) Oral at bedtime    MEDICATIONS  (PRN):  acetaminophen     Tablet .. 650 milliGRAM(s) Oral every 6 hours PRN Temp greater or equal to 38C (100.4F), Mild Pain (1 - 3)  aluminum hydroxide/magnesium hydroxide/simethicone Suspension 30 milliLiter(s) Oral every 4 hours PRN Dyspepsia  Biofreeze Pain Relief Roll-On Gel (Menthol 4% 1 Application(s) 1 Application(s) Topical three times a day PRN Pain Relief  lidocaine   4% Patch 1 Patch Transdermal every 24 hours PRN shoulder pain  melatonin 3 milliGRAM(s) Oral at bedtime PRN Insomnia  ondansetron Injectable 4 milliGRAM(s) IV Push every 8 hours PRN Nausea and/or Vomiting  sodium chloride 0.65% Nasal 1 Spray(s) Both Nostrils every 2 hours PRN Nasal Congestion    Allergies    penicillins (Unknown)        Vital Signs Last 24 Hrs  T(C): 36.2 (17 Jul 2025 07:43), Max: 36.9 (16 Jul 2025 23:17)  T(F): 97.2 (17 Jul 2025 07:43), Max: 98.4 (16 Jul 2025 23:17)  HR: 60 (17 Jul 2025 09:23) (60 - 96)  BP: 111/54 (17 Jul 2025 07:43) (103/57 - 115/53)  BP(mean): --  RR: 18 (17 Jul 2025 07:43) (17 - 18)  SpO2: 95% (17 Jul 2025 09:23) (92% - 97%)    Parameters below as of 17 Jul 2025 09:23  Patient On (Oxygen Delivery Method): nasal cannula, 4L                          11.5   148.96 )-----------( 121      ( 17 Jul 2025 07:14 )             40.0     07-17    134[L]  |  92[L]  |  47[H]  ----------------------------<  86  4.9   |  37[H]  |  1.19    Ca    8.3[L]      17 Jul 2025 07:14    TPro  6.1  /  Alb  2.9[L]  /  TBili  0.9  /  DBili  x   /  AST  25  /  ALT  13  /  AlkPhos  131[H]  07-17      PHYSICAL EXAM:    General: WN/WD NAD  Neurology: A&Ox3, nonfocal, AGUDELO x 4  HEENT: NC/AT, neck supple, no JVD, EOMI, PERRL  Respiratory: decreased BS b/l bases  CV: RRR, S1S2, no murmurs, rubs or gallops  Abdominal: Soft, NT, ND +BS  Extremities: +1 b/l LE edema   Skin: No rashes      < from: CT Chest No Cont (07.14.25 @ 16:35) >  IMPRESSION: Bilateral lower lung consolidations suggestive of combination   of atelectasis and pneumonia.      
Date of Service:07-16-25 @ 10:37  Interval History/ROS: Afebrile overnight, on 5L NC  ICD reportedly had been firing overnight, but patient reports not feeling any shocks  Still having some cough but no fever or chills     REVIEW OF SYSTEMS  [  ] ROS unobtainable because:    [ x ] All other systems negative except as noted below    Constitutional:  [ ] fever [ ] chills  [ ] weight loss  [ ]night sweat  [ ]poor appetite/PO intake [ ]fatigue   Skin:  [ ] rash [ ] phlebitis	  Eyes: [ ] icterus [ ] pain  [ ] discharge	  ENMT: [ ] sore throat  [ ] thrush [ ] ulcers [ ] exudates [ ]anosmia  Respiratory: [ ] dyspnea [ ] hemoptysis [ ] cough [ ] sputum	  Cardiovascular:  [ ] chest pain [ ] palpitations [ ] edema	  Gastrointestinal:  [ ] nausea [ ] vomiting [ ] diarrhea [ ] constipation [ ] pain	  Genitourinary:  [ ] dysuria [ ] frequency [ ] hematuria [ ] discharge [ ] flank pain  [ ] incontinence  Musculoskeletal:  [ ] myalgias [ ] arthralgias [ ] arthritis  [ ] back pain  Neurological:  [ ] headache [ ] weakness [ ] seizures  [ ] confusion/altered mental status    Allergies  penicillins (Unknown)        ANTIMICROBIALS:    cefTRIAXone Injectable. 1000 every 24 hours  doxycycline monohydrate Capsule 100 every 12 hours        OTHER MEDS: MEDICATIONS  (STANDING):  acetaminophen     Tablet .. 650 every 6 hours PRN  albuterol    90 MICROgram(s) HFA Inhaler 2 every 6 hours  albuterol/ipratropium for Nebulization 3 every 6 hours  allopurinol 300 daily  aluminum hydroxide/magnesium hydroxide/simethicone Suspension 30 every 4 hours PRN  aMIOdarone    Tablet    aMIOdarone    Tablet 400 two times a day  guaiFENesin ER 1200 every 12 hours  melatonin 3 at bedtime PRN  metOLazone 2.5 <User Schedule>  metoprolol succinate ER 25 every 12 hours  ondansetron Injectable 4 every 8 hours PRN  warfarin 2.5 at bedtime      Vital Signs Last 24 Hrs  T(F): 97.9 (07-16-25 @ 07:14), Max: 100 (07-14-25 @ 14:47)    Vital Signs Last 24 Hrs  HR: 62 (07-16-25 @ 07:14) (50 - 77)  BP: 108/58 (07-16-25 @ 07:14) (101/44 - 119/50)  RR: 18 (07-16-25 @ 07:14)  SpO2: 96% (07-16-25 @ 07:14) (96% - 97%)  Wt(kg): --    EXAM:    Constitutional: frail, NAD  Head/Eyes: no icterus  LUNGS:  Crackles  CVS:  regular rhythm  Abd:  soft, non-tender; non-distended  Ext:  no edema  Vascular:  IV site no erythema tenderness or discharge  Neuro: AAO X 2    Labs:                        11.9   148.39 )-----------( 117      ( 16 Jul 2025 06:28 )             42.3     07-16    138  |  94[L]  |  55[H]  ----------------------------<  110[H]  4.7   |  42[H]  |  1.18    Ca    9.6      16 Jul 2025 06:28    TPro  6.2  /  Alb  3.2[L]  /  TBili  1.2  /  DBili  x   /  AST  21  /  ALT  10[L]  /  AlkPhos  132[H]  07-16      WBC Trend:  WBC Count: 148.39 (07-16-25 @ 06:28)  WBC Count: 158.90 (07-15-25 @ 06:36)  WBC Count: 177.82 (07-14-25 @ 14:52)      Creatine Trend:  Creatinine: 1.18 (07-16)  Creatinine: 1.23 (07-15)  Creatinine: 1.35 (07-14)      Liver Biochemical Testing Trend:  Alanine Aminotransferase (ALT/SGPT): 10 *L* (07-16)  Alanine Aminotransferase (ALT/SGPT): 16 (07-14)  Aspartate Aminotransferase (AST/SGOT): 21 (07-16-25 @ 06:28)  Aspartate Aminotransferase (AST/SGOT): 26 (07-14-25 @ 17:09)  Bilirubin Total: 1.2 (07-16)  Bilirubin Total: 1.7 (07-14)      Trend LDH  05-11-23 @ 17:22  234      Urinalysis Basic - ( 16 Jul 2025 06:28 )    Color: x / Appearance: x / SG: x / pH: x  Gluc: 110 mg/dL / Ketone: x  / Bili: x / Urobili: x   Blood: x / Protein: x / Nitrite: x   Leuk Esterase: x / RBC: x / WBC x   Sq Epi: x / Non Sq Epi: x / Bacteria: x        MICROBIOLOGY:        Urinalysis with Rflx Culture (collected 14 Jul 2025 16:50)    Culture - Blood (collected 14 Jul 2025 15:13)  Source: Blood None  Preliminary Report:    No growth at 24 hours    Culture - Blood (collected 14 Jul 2025 14:52)  Source: Blood Blood-Peripheral  Preliminary Report:    No growth at 24 hours    Culture - Blood (collected 28 Mar 2024 17:04)  Source: .Blood None  Final Report:    No growth at 5 days    Culture - Blood (collected 28 Mar 2024 17:04)  Source: .Blood None  Final Report:    No growth at 5 days        ABS CD4: 561 /uL (09-16-22 @ 12:43)      Lactate, Blood: 0.8 (07-14 @ 15:13)        RADIOLOGY:  imaging below personally reviewed        CT Chest No Cont:   ACC: [id number] EXAM:  CT CHEST   ORDERED BY: NAYLA KENDRICK     PROCEDURE DATE:  07/14/2025          INTERPRETATION:  CLINICAL INFORMATION: Evaluate for pneumonia    Axial CT images of the chest are obtained without intravenous   administration of contrast.    COMPARISON: March 20, 2024 chest CT.    LYMPH NODES/MEDIASTINUM: Numerous lymph nodes within the lower portion of   the partially imaged neck many of which are seen on the prior chest CT,   majority of which are small with the largest within the left lower neck   measuring about 1.1 cm. Small or benign appearing bilateral axillary   lymph nodes. Multiple mediastinal lymph nodes largest in the pretracheal   location measuring about 1.3 cm are either unchanged or demonstrate   slight interval decrease in size.    VASCULATURE/HEART: Left upper anterior chest wall cardiac device with the   leads terminating within the right ventricle and 1 coursing through the   coronary sinus. Cardiomegaly. No pericardial effusion. Aortic root and   valve calcifications. Vascular calcifications with involvement of the   aorta and the coronary arteries. Main pulmonary artery is enlarged   measuring about 3.9 cm unchanged can be seen in the setting of pulmonary   arterial hypertension.    UPPER ABDOMEN: Cholecystectomy. Partially imaged spleen is enlarged   measuring about 18 cm in anteroposterior dimension with significant   increase in size since 2024 when he measured about 13 cm. Cysts within   the partially imaged right kidney. Colonic diverticulosis.    LUNGS/PLEURA: No pleural effusions.    Nodular and ill-defined consolidations within the lower portions of the   left lower lobe with some associated volume loss new since 2024   representing combination of pneumonia and atelectasis.    Right lower lung volume loss within the right middle and right lower   lobes due to atelectasis possibly with superimposed right lower lobe   pneumonia.    CHEST WALL: Spinal degenerative changes.    IMPRESSION: Bilateral lower lung consolidations suggestive of combination   of atelectasis and pneumonia.    Partially imaged splenomegaly with marked interval increase in size since   March 20, 2024. Numerous lymph nodes within the partially imaged lower   neck bilateral axilla may be related to thenew splenomegaly.    --- End of Report ---      IGNACIA DOSHI MD; Attending Radiologist  This document has been electronically signed. Jul 14 2025  4:56PM (07-14-25 @ 16:35)      
Pt seen sitting up in chair, feeling somewhat improved, still with congestion    MEDICATIONS  (STANDING):  albuterol    90 MICROgram(s) HFA Inhaler 2 Puff(s) Inhalation every 6 hours  albuterol/ipratropium for Nebulization 3 milliLiter(s) Nebulizer every 6 hours  allopurinol 300 milliGRAM(s) Oral daily  aMIOdarone    Tablet   Oral   aMIOdarone    Tablet 400 milliGRAM(s) Oral two times a day  cefTRIAXone Injectable. 1000 milliGRAM(s) IV Push every 24 hours  cholecalciferol 1000 Unit(s) Oral daily  dorzolamide 2%/timolol 0.5% Ophthalmic Solution 1 Drop(s) Both EYES two times a day  doxycycline monohydrate Capsule 100 milliGRAM(s) Oral every 12 hours  fluticasone propionate 50 MICROgram(s)/spray Nasal Spray 1 Spray(s) Both Nostrils two times a day  furosemide    Tablet 80 milliGRAM(s) Oral two times a day  guaiFENesin ER 1200 milliGRAM(s) Oral every 12 hours  metOLazone 2.5 milliGRAM(s) Oral <User Schedule>  metoprolol succinate ER 25 milliGRAM(s) Oral every 12 hours  potassium chloride    Tablet ER 10 milliEquivalent(s) Oral daily  warfarin 2.5 milliGRAM(s) Oral at bedtime    MEDICATIONS  (PRN):  acetaminophen     Tablet .. 650 milliGRAM(s) Oral every 6 hours PRN Temp greater or equal to 38C (100.4F), Mild Pain (1 - 3)  aluminum hydroxide/magnesium hydroxide/simethicone Suspension 30 milliLiter(s) Oral every 4 hours PRN Dyspepsia  Biofreeze Pain Relief Roll-On Gel (Menthol 4% 1 Application(s) 1 Application(s) Topical three times a day PRN Pain Relief  lidocaine   4% Patch 1 Patch Transdermal every 24 hours PRN shoulder pain  melatonin 3 milliGRAM(s) Oral at bedtime PRN Insomnia  ondansetron Injectable 4 milliGRAM(s) IV Push every 8 hours PRN Nausea and/or Vomiting  sodium chloride 0.65% Nasal 1 Spray(s) Both Nostrils every 2 hours PRN Nasal Congestion      ROS  No fever, sweats, chills       Vital Signs Last 24 Hrs  T(C): 36.2 (17 Jul 2025 07:43), Max: 36.9 (16 Jul 2025 23:17)  T(F): 97.2 (17 Jul 2025 07:43), Max: 98.4 (16 Jul 2025 23:17)  HR: 60 (17 Jul 2025 09:23) (60 - 96)  BP: 111/54 (17 Jul 2025 07:43) (103/57 - 115/53)  BP(mean): --  RR: 18 (17 Jul 2025 07:43) (17 - 18)  SpO2: 95% (17 Jul 2025 09:23) (92% - 97%)    Parameters below as of 17 Jul 2025 09:23  Patient On (Oxygen Delivery Method): nasal cannula, 4L        PE  NAD  Awake, alert     No rash grossly  FROM                          11.5   148.96 )-----------( 121      ( 17 Jul 2025 07:14 )             40.0       07-17    134[L]  |  92[L]  |  47[H]  ----------------------------<  86  4.9   |  37[H]  |  1.19    Ca    8.3[L]      17 Jul 2025 07:14    TPro  6.1  /  Alb  2.9[L]  /  TBili  0.9  /  DBili  x   /  AST  25  /  ALT  13  /  AlkPhos  131[H]  07-17      
Pt seen, still congested and cough but feeling slightly better    MEDICATIONS  (STANDING):  albuterol    90 MICROgram(s) HFA Inhaler 2 Puff(s) Inhalation every 6 hours  albuterol/ipratropium for Nebulization 3 milliLiter(s) Nebulizer every 6 hours  allopurinol 300 milliGRAM(s) Oral daily  aMIOdarone    Tablet   Oral   aMIOdarone    Tablet 400 milliGRAM(s) Oral two times a day  cefTRIAXone Injectable. 1000 milliGRAM(s) IV Push every 24 hours  cholecalciferol 1000 Unit(s) Oral daily  dorzolamide 2%/timolol 0.5% Ophthalmic Solution 1 Drop(s) Both EYES two times a day  doxycycline monohydrate Capsule 100 milliGRAM(s) Oral every 12 hours  fluticasone propionate 50 MICROgram(s)/spray Nasal Spray 1 Spray(s) Both Nostrils two times a day  furosemide    Tablet 80 milliGRAM(s) Oral two times a day  guaiFENesin ER 1200 milliGRAM(s) Oral every 12 hours  metOLazone 2.5 milliGRAM(s) Oral <User Schedule>  metoprolol succinate ER 25 milliGRAM(s) Oral every 12 hours  potassium chloride    Tablet ER 10 milliEquivalent(s) Oral daily    MEDICATIONS  (PRN):  acetaminophen     Tablet .. 650 milliGRAM(s) Oral every 6 hours PRN Temp greater or equal to 38C (100.4F), Mild Pain (1 - 3)  aluminum hydroxide/magnesium hydroxide/simethicone Suspension 30 milliLiter(s) Oral every 4 hours PRN Dyspepsia  Biofreeze Pain Relief Roll-On Gel (Menthol 4% 1 Application(s) 1 Application(s) Topical three times a day PRN Pain Relief  lidocaine   4% Patch 1 Patch Transdermal every 24 hours PRN shoulder pain  melatonin 3 milliGRAM(s) Oral at bedtime PRN Insomnia  ondansetron Injectable 4 milliGRAM(s) IV Push every 8 hours PRN Nausea and/or Vomiting  sodium chloride 0.65% Nasal 1 Spray(s) Both Nostrils every 2 hours PRN Nasal Congestion      ROS  No fever, sweats, chills       Vital Signs Last 24 Hrs  T(C): 36.4 (18 Jul 2025 07:29), Max: 36.6 (17 Jul 2025 23:23)  T(F): 97.5 (18 Jul 2025 07:29), Max: 97.9 (17 Jul 2025 23:23)  HR: 61 (18 Jul 2025 07:29) (61 - 80)  BP: 104/49 (18 Jul 2025 07:29) (104/49 - 116/59)  BP(mean): --  RR: 18 (18 Jul 2025 07:29) (17 - 18)  SpO2: 95% (18 Jul 2025 07:29) (87% - 96%)    Parameters below as of 18 Jul 2025 07:29  Patient On (Oxygen Delivery Method): nasal cannula  O2 Flow (L/min): 3      PE  NAD  Awake, alert     No rash grossly  FROM                          12.0   154.87 )-----------( 132      ( 18 Jul 2025 07:21 )             42.7       07-18    134[L]  |  91[L]  |  59[H]  ----------------------------<  110[H]  4.9   |  40[H]  |  1.20    Ca    9.7      18 Jul 2025 07:21    TPro  6.1  /  Alb  2.9[L]  /  TBili  0.9  /  DBili  x   /  AST  25  /  ALT  13  /  AlkPhos  131[H]  07-17      
CC: SOB    History of Present Illness:   89 year old male w AFIB on AC, AICD, HFrEF, CLL, glaucoma, tophaceous gout, HLD, HTN, RA was brought in by grandson c/o SOB productive cough.  3 day hx of SOB w cough productive of yellowish sputum.  per family- increase in lasix recently with 12lb weight loss. no chest pain; no abdominal pain; no trauma or injury. no known sick contacts; similar presentation w pneumonia in past. In ED /50   HR 58   T 98.6 /100 rectal. CT + bilateral PNA; ceftriaxone + zithromax.    S:  7/16: Lying in bed, asleep but easily arousable, states he feels congested but overall better than when he came in.  Per nursing staff there was concern of possible aicd firing though no evidence of this.  Pt denies any CP, SOB, fever, chills at this time.  Tele showing ventricular ectopy.    7/17: Pt feeling better, seen in chair, family at bedside.  Discussed continued treatment of pneumonia and plan to wean down oxygen.  Discussed cardio and onc plan as well.  Pt eager to go home but discussed may need a couple more days for which family is in agreement with.  No fever, chills, n, v.    REVIEW OF SYSTEMS: All other review of systems is negative unless indicated above.      Vital Signs Last 24 Hrs  T(C): 36.2 (17 Jul 2025 07:43), Max: 36.9 (16 Jul 2025 23:17)  T(F): 97.2 (17 Jul 2025 07:43), Max: 98.4 (16 Jul 2025 23:17)  HR: 60 (17 Jul 2025 09:23) (60 - 96)  BP: 111/54 (17 Jul 2025 07:43) (103/57 - 115/53)  BP(mean): --  RR: 18 (17 Jul 2025 07:43) (17 - 18)  SpO2: 95% (17 Jul 2025 09:23) (92% - 97%)    Parameters below as of 17 Jul 2025 09:23  Patient On (Oxygen Delivery Method): nasal cannula, 4L        PHYSICAL EXAM:    Constitutional: NAD, weak appearing  HEENT: PERR, EOMI, Normal Hearing, MMM  Neck: Soft and supple  Respiratory: Breath sounds are decreased at bases  Cardiovascular: S1 and S2, regular rate and rhythm, no Murmurs, gallops or rubs  Gastrointestinal: Bowel Sounds present, soft, nontender, nondistended, no guarding, no rebound  Extremities: No peripheral edema  Neurological: A/O x 3, no focal deficits in my limited exam      MEDICATIONS  (STANDING):  albuterol    90 MICROgram(s) HFA Inhaler 2 Puff(s) Inhalation every 6 hours  albuterol/ipratropium for Nebulization 3 milliLiter(s) Nebulizer every 6 hours  allopurinol 300 milliGRAM(s) Oral daily  aMIOdarone    Tablet   Oral   aMIOdarone    Tablet 400 milliGRAM(s) Oral two times a day  cefTRIAXone Injectable. 1000 milliGRAM(s) IV Push every 24 hours  cholecalciferol 1000 Unit(s) Oral daily  dorzolamide 2%/timolol 0.5% Ophthalmic Solution 1 Drop(s) Both EYES two times a day  doxycycline monohydrate Capsule 100 milliGRAM(s) Oral every 12 hours  fluticasone propionate 50 MICROgram(s)/spray Nasal Spray 1 Spray(s) Both Nostrils two times a day  furosemide    Tablet 80 milliGRAM(s) Oral two times a day  guaiFENesin ER 1200 milliGRAM(s) Oral every 12 hours  metOLazone 2.5 milliGRAM(s) Oral <User Schedule>  metoprolol succinate ER 25 milliGRAM(s) Oral every 12 hours  potassium chloride    Tablet ER 10 milliEquivalent(s) Oral daily  warfarin 2.5 milliGRAM(s) Oral at bedtime    MEDICATIONS  (PRN):  acetaminophen     Tablet .. 650 milliGRAM(s) Oral every 6 hours PRN Temp greater or equal to 38C (100.4F), Mild Pain (1 - 3)  aluminum hydroxide/magnesium hydroxide/simethicone Suspension 30 milliLiter(s) Oral every 4 hours PRN Dyspepsia  Biofreeze Pain Relief Roll-On Gel (Menthol 4% 1 Application(s) 1 Application(s) Topical three times a day PRN Pain Relief  lidocaine   4% Patch 1 Patch Transdermal every 24 hours PRN shoulder pain  melatonin 3 milliGRAM(s) Oral at bedtime PRN Insomnia  ondansetron Injectable 4 milliGRAM(s) IV Push every 8 hours PRN Nausea and/or Vomiting  sodium chloride 0.65% Nasal 1 Spray(s) Both Nostrils every 2 hours PRN Nasal Congestion                                11.5   148.96 )-----------( 121      ( 17 Jul 2025 07:14 )             40.0     07-17    134[L]  |  92[L]  |  47[H]  ----------------------------<  86  4.9   |  37[H]  |  1.19    Ca    8.3[L]      17 Jul 2025 07:14    TPro  6.1  /  Alb  2.9[L]  /  TBili  0.9  /  DBili  x   /  AST  25  /  ALT  13  /  AlkPhos  131[H]  07-17    CAPILLARY BLOOD GLUCOSE        LIVER FUNCTIONS - ( 17 Jul 2025 07:14 )  Alb: 2.9 g/dL / Pro: 6.1 gm/dL / ALK PHOS: 131 U/L / ALT: 13 U/L / AST: 25 U/L / GGT: x           PT/INR - ( 17 Jul 2025 10:33 )   PT: 23.5 sec;   INR: 2.06 ratio           Urinalysis Basic - ( 17 Jul 2025 07:14 )    Color: x / Appearance: x / SG: x / pH: x  Gluc: 86 mg/dL / Ketone: x  / Bili: x / Urobili: x   Blood: x / Protein: x / Nitrite: x   Leuk Esterase: x / RBC: x / WBC x   Sq Epi: x / Non Sq Epi: x / Bacteria: x      Assessment/Plan:  89 year old male w AFIB on AC, AICD, HFrEF, CLL, glaucoma, tophaceous gout, HLD, HTN, RA was brought in by grandson c/o SOB productive cough.    SOB / weakness / cough: Due to pneumonia with acute hypoxic respiratory failure / CLL:  - CT chest noted  - s/p azithro  - c/w ceftriaxone  and doxy for probable gram neg tita PNA  - BCx neg x 2  - supportive care, nebs  - oncology following - will check Ig levels.  If low, recommend IV infusion.  Will also need outpatient follow up and teatment for CLL with Dr. Gutierrez.       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  Need to slowly wean off oxygen.  f/u Ig levels.  d/c on oral antibiotics when ready.  Follow up EP prior to d/c.  Pt high risk re-admission.  Family updated on plan of care.     Attestation Statements:   Attestation Statements:  Time-based billing (NON-critical care).     52 minutes spent on total encounter. The necessity of the time spent during the encounter on this date of service was due to:     Time spent  coordinating the patient's care. This includes reviewing documentation pertinent to this admission, results and imaging. Further tests, medications, and procedures have been ordered as indicated. Laboratory results and the plan of care were communicated to the patient. Discussed care plan with consultants including EP, ID. Supporting documentation was completed and added to the patient's chart.  
Reason for Admission: Bilateral PNA    History of Present Illness:   89 year old male w AFIB on AC, AICD, HFrEF, CLL, glaucoma, tophaceous gout, HLD, HTN, RA was brought in by grandson c/o SOB productive cough.  3 day hx of SOB w cough productive of yellowish sputum.  per family- increase in lasix recently with 12lb weight loss. no chest pain; no abdominal pain; no trauma or injury. no known sick contacts; similar presentation w pneumonia in past. In ED /50   HR 58   T 98.6 /100 rectal. CT + bilateral PNA; ceftriaxone + zithromax.    S:  7/16: Lying in bed, asleep but easily arousable, states he feels congested but overall better than when he came in.  Per nursing staff there was concern of possible aicd firing though no evidence of this.  Pt denies any CP, SOB, fever, chills at this time.  Tele showing ventricular ectopy.    REVIEW OF SYSTEMS: All other review of systems is negative unless indicated above.    Vital Signs Last 24 Hrs  T(C): 36.6 (16 Jul 2025 07:14), Max: 36.6 (15 Jul 2025 15:23)  T(F): 97.9 (16 Jul 2025 07:14), Max: 97.9 (15 Jul 2025 15:23)  HR: 75 (16 Jul 2025 08:39) (50 - 77)  BP: 108/58 (16 Jul 2025 07:14) (101/44 - 119/50)  BP(mean): --  RR: 18 (16 Jul 2025 07:14) (17 - 20)  SpO2: 96% (16 Jul 2025 08:39) (96% - 97%)    Parameters below as of 16 Jul 2025 08:39  Patient On (Oxygen Delivery Method): nasal cannula      PHYSICAL EXAM:    Constitutional: NAD, weak appearing  HEENT: PERR, EOMI, Normal Hearing, MMM  Neck: Soft and supple  Respiratory: Breath sounds are decreased at bases  Cardiovascular: S1 and S2, regular rate and rhythm, no Murmurs, gallops or rubs  Gastrointestinal: Bowel Sounds present, soft, nontender, nondistended, no guarding, no rebound  Extremities: No peripheral edema  Neurological: A/O x 3, no focal deficits in my limited exam      MEDICATIONS  (STANDING):  albuterol    90 MICROgram(s) HFA Inhaler 2 Puff(s) Inhalation every 6 hours  albuterol/ipratropium for Nebulization 3 milliLiter(s) Nebulizer every 6 hours  allopurinol 300 milliGRAM(s) Oral daily  aMIOdarone    Tablet   Oral   aMIOdarone    Tablet 400 milliGRAM(s) Oral two times a day  cefTRIAXone Injectable. 1000 milliGRAM(s) IV Push every 24 hours  cholecalciferol 1000 Unit(s) Oral daily  dorzolamide 2%/timolol 0.5% Ophthalmic Solution 1 Drop(s) Both EYES two times a day  doxycycline monohydrate Capsule 100 milliGRAM(s) Oral every 12 hours  fluticasone propionate 50 MICROgram(s)/spray Nasal Spray 1 Spray(s) Both Nostrils two times a day  guaiFENesin ER 1200 milliGRAM(s) Oral every 12 hours  metOLazone 2.5 milliGRAM(s) Oral <User Schedule>  metoprolol succinate ER 25 milliGRAM(s) Oral every 12 hours  potassium chloride    Tablet ER 10 milliEquivalent(s) Oral daily  warfarin 2.5 milliGRAM(s) Oral at bedtime    MEDICATIONS  (PRN):  acetaminophen     Tablet .. 650 milliGRAM(s) Oral every 6 hours PRN Temp greater or equal to 38C (100.4F), Mild Pain (1 - 3)  aluminum hydroxide/magnesium hydroxide/simethicone Suspension 30 milliLiter(s) Oral every 4 hours PRN Dyspepsia  Biofreeze Pain Relief Roll-On Gel (Menthol 4% 1 Application(s) 1 Application(s) Topical three times a day PRN Pain Relief  lidocaine   4% Patch 1 Patch Transdermal every 24 hours PRN shoulder pain  melatonin 3 milliGRAM(s) Oral at bedtime PRN Insomnia  ondansetron Injectable 4 milliGRAM(s) IV Push every 8 hours PRN Nausea and/or Vomiting  sodium chloride 0.65% Nasal 1 Spray(s) Both Nostrils every 2 hours PRN Nasal Congestion                              11.9   148.39 )-----------( 117      ( 16 Jul 2025 06:28 )             42.3     07-16    138  |  94[L]  |  55[H]  ----------------------------<  110[H]  4.7   |  42[H]  |  1.18    Ca    9.6      16 Jul 2025 06:28    TPro  6.2  /  Alb  3.2[L]  /  TBili  1.2  /  DBili  x   /  AST  21  /  ALT  10[L]  /  AlkPhos  132[H]  07-16    CAPILLARY BLOOD GLUCOSE        LIVER FUNCTIONS - ( 16 Jul 2025 06:28 )  Alb: 3.2 g/dL / Pro: 6.2 gm/dL / ALK PHOS: 132 U/L / ALT: 10 U/L / AST: 21 U/L / GGT: x           PT/INR - ( 16 Jul 2025 06:28 )   PT: 25.7 sec;   INR: 2.19 ratio         PTT - ( 14 Jul 2025 14:52 )  PTT:42.6 sec  Urinalysis Basic - ( 16 Jul 2025 06:28 )    Color: x / Appearance: x / SG: x / pH: x  Gluc: 110 mg/dL / Ketone: x  / Bili: x / Urobili: x   Blood: x / Protein: x / Nitrite: x   Leuk Esterase: x / RBC: x / WBC x   Sq Epi: x / Non Sq Epi: x / Bacteria: x          Assessment/Plan:  89 year old male w AFIB on AC, AICD, HFrEF, CLL, glaucoma, tophaceous gout, HLD, HTN, RA was brought in by darío c/o SOB productive cough.    SOB / weakness / cough: Due to pneumonia with acute hypoxic respiratory failure / CLL:  - CT chest noted  - c/w ceftriaxone and change azithro to doxy due to ectopy for probable gram neg tita PNA  - BCx neg x 2  - supportive care, nebs  - oncology following - will check Ig levels.  Will need outpatient treatment for CLL.       Paroxysmal AFIB / ventricular ectopy / AICD / HFrEF:  - EP following, started amio due to borderline low BP  - tele monitoring  - f/u cardio/EP  - continue coumadin for goal INR 2-3      HFrEF: Euvolemic   - BB  - c/w diuretics      ADRIANA: Resolved  - monitor closely on diuretics      Glaucoma  - continue gtts      Gout tophaceous  - allopurinol 300 mg qd      VTE:  - coumadin

## 2025-07-24 DIAGNOSIS — N17.9 ACUTE KIDNEY FAILURE, UNSPECIFIED: ICD-10-CM

## 2025-07-24 DIAGNOSIS — M06.9 RHEUMATOID ARTHRITIS, UNSPECIFIED: ICD-10-CM

## 2025-07-24 DIAGNOSIS — I50.22 CHRONIC SYSTOLIC (CONGESTIVE) HEART FAILURE: ICD-10-CM

## 2025-07-24 DIAGNOSIS — Z79.01 LONG TERM (CURRENT) USE OF ANTICOAGULANTS: ICD-10-CM

## 2025-07-24 DIAGNOSIS — I48.0 PAROXYSMAL ATRIAL FIBRILLATION: ICD-10-CM

## 2025-07-24 DIAGNOSIS — I49.3 VENTRICULAR PREMATURE DEPOLARIZATION: ICD-10-CM

## 2025-07-24 DIAGNOSIS — C91.10 CHRONIC LYMPHOCYTIC LEUKEMIA OF B-CELL TYPE NOT HAVING ACHIEVED REMISSION: ICD-10-CM

## 2025-07-24 DIAGNOSIS — J96.01 ACUTE RESPIRATORY FAILURE WITH HYPOXIA: ICD-10-CM

## 2025-07-24 DIAGNOSIS — E43 UNSPECIFIED SEVERE PROTEIN-CALORIE MALNUTRITION: ICD-10-CM

## 2025-07-24 DIAGNOSIS — E78.5 HYPERLIPIDEMIA, UNSPECIFIED: ICD-10-CM

## 2025-07-24 DIAGNOSIS — J15.69 PNEUMONIA DUE TO OTHER GRAM-NEGATIVE BACTERIA: ICD-10-CM

## 2025-07-24 DIAGNOSIS — Z88.0 ALLERGY STATUS TO PENICILLIN: ICD-10-CM

## 2025-07-24 DIAGNOSIS — Z95.810 PRESENCE OF AUTOMATIC (IMPLANTABLE) CARDIAC DEFIBRILLATOR: ICD-10-CM

## 2025-07-27 ENCOUNTER — INPATIENT (INPATIENT)
Facility: HOSPITAL | Age: 89
LOS: 10 days | Discharge: HOSPICE | DRG: 193 | End: 2025-08-07
Attending: FAMILY MEDICINE | Admitting: INTERNAL MEDICINE
Payer: MEDICARE

## 2025-07-27 VITALS
WEIGHT: 181.22 LBS | HEIGHT: 68 IN | OXYGEN SATURATION: 91 % | RESPIRATION RATE: 25 BRPM | TEMPERATURE: 99 F | HEART RATE: 73 BPM | DIASTOLIC BLOOD PRESSURE: 55 MMHG | SYSTOLIC BLOOD PRESSURE: 112 MMHG

## 2025-07-27 DIAGNOSIS — Z98.890 OTHER SPECIFIED POSTPROCEDURAL STATES: Chronic | ICD-10-CM

## 2025-07-27 DIAGNOSIS — Z98.49 CATARACT EXTRACTION STATUS, UNSPECIFIED EYE: Chronic | ICD-10-CM

## 2025-07-27 DIAGNOSIS — Z96.7 PRESENCE OF OTHER BONE AND TENDON IMPLANTS: Chronic | ICD-10-CM

## 2025-07-27 LAB
ADD ON TEST-SPECIMEN IN LAB: SIGNIFICANT CHANGE UP
ALBUMIN SERPL ELPH-MCNC: 3.7 G/DL — SIGNIFICANT CHANGE UP (ref 3.3–5)
ALP SERPL-CCNC: 105 U/L — SIGNIFICANT CHANGE UP (ref 40–120)
ALT FLD-CCNC: 22 U/L — SIGNIFICANT CHANGE UP (ref 12–78)
ANION GAP SERPL CALC-SCNC: 6 MMOL/L — SIGNIFICANT CHANGE UP (ref 5–17)
APPEARANCE UR: CLEAR — SIGNIFICANT CHANGE UP
APTT BLD: 42 SEC — HIGH (ref 26.1–36.8)
AST SERPL-CCNC: 36 U/L — SIGNIFICANT CHANGE UP (ref 15–37)
BASOPHILS # BLD AUTO: 0.28 K/UL — HIGH (ref 0–0.2)
BASOPHILS # BLD MANUAL: 0 K/UL — SIGNIFICANT CHANGE UP (ref 0–0.2)
BASOPHILS NFR BLD AUTO: 0.1 % — SIGNIFICANT CHANGE UP (ref 0–2)
BASOPHILS NFR BLD MANUAL: 0 % — SIGNIFICANT CHANGE UP (ref 0–2)
BILIRUB SERPL-MCNC: 1.5 MG/DL — HIGH (ref 0.2–1.2)
BILIRUB UR-MCNC: NEGATIVE — SIGNIFICANT CHANGE UP
BUN SERPL-MCNC: 88 MG/DL — HIGH (ref 7–23)
CALCIUM SERPL-MCNC: 9.8 MG/DL — SIGNIFICANT CHANGE UP (ref 8.5–10.1)
CHLORIDE SERPL-SCNC: 85 MMOL/L — LOW (ref 96–108)
CHLORIDE UR-SCNC: <20 MMOL/L — SIGNIFICANT CHANGE UP
CO2 SERPL-SCNC: 44 MMOL/L — HIGH (ref 22–31)
COLOR SPEC: YELLOW — SIGNIFICANT CHANGE UP
CREAT ?TM UR-MCNC: 66 MG/DL — SIGNIFICANT CHANGE UP
CREAT SERPL-MCNC: 1.96 MG/DL — HIGH (ref 0.5–1.3)
DIFF PNL FLD: NEGATIVE — SIGNIFICANT CHANGE UP
EGFR: 32 ML/MIN/1.73M2 — LOW
EGFR: 32 ML/MIN/1.73M2 — LOW
EOSINOPHIL # BLD AUTO: 0.23 K/UL — SIGNIFICANT CHANGE UP (ref 0–0.5)
EOSINOPHIL # BLD MANUAL: 0 K/UL — SIGNIFICANT CHANGE UP (ref 0–0.5)
EOSINOPHIL NFR BLD AUTO: 0.1 % — SIGNIFICANT CHANGE UP (ref 0–6)
EOSINOPHIL NFR BLD MANUAL: 0 % — SIGNIFICANT CHANGE UP (ref 0–6)
FLUAV AG NPH QL: SIGNIFICANT CHANGE UP
FLUBV AG NPH QL: SIGNIFICANT CHANGE UP
GLUCOSE SERPL-MCNC: 109 MG/DL — HIGH (ref 70–99)
GLUCOSE UR QL: NEGATIVE MG/DL — SIGNIFICANT CHANGE UP
HCT VFR BLD CALC: 43.4 % — SIGNIFICANT CHANGE UP (ref 39–50)
HGB BLD-MCNC: 12.3 G/DL — LOW (ref 13–17)
IMM GRANULOCYTES # BLD AUTO: 1.74 K/UL — HIGH (ref 0–0.07)
IMM GRANULOCYTES NFR BLD AUTO: 0.7 % — SIGNIFICANT CHANGE UP (ref 0–0.9)
INR BLD: 3.05 RATIO — HIGH (ref 0.85–1.16)
KETONES UR QL: NEGATIVE MG/DL — SIGNIFICANT CHANGE UP
LACTATE SERPL-SCNC: 0.9 MMOL/L — SIGNIFICANT CHANGE UP (ref 0.7–2)
LEUKOCYTE ESTERASE UR-ACNC: NEGATIVE — SIGNIFICANT CHANGE UP
LYMPHOCYTES # BLD AUTO: 224.83 K/UL — HIGH (ref 1–3.3)
LYMPHOCYTES # BLD MANUAL: 219.22 K/UL — HIGH (ref 1–3.3)
LYMPHOCYTES NFR BLD AUTO: 90.3 % — HIGH (ref 13–44)
LYMPHOCYTES NFR BLD MANUAL: 88 % — HIGH (ref 13–44)
MANUAL REACTIVE LYMPHOCYTES #: 2.49 K/UL — HIGH (ref 0–0.63)
MANUAL SMEAR VERIFICATION: SIGNIFICANT CHANGE UP
MCHC RBC-ENTMCNC: 28.3 G/DL — LOW (ref 32–36)
MCHC RBC-ENTMCNC: 29.3 PG — SIGNIFICANT CHANGE UP (ref 27–34)
MCV RBC AUTO: 103.3 FL — HIGH (ref 80–100)
MONOCYTES # BLD AUTO: 4.32 K/UL — HIGH (ref 0–0.9)
MONOCYTES # BLD MANUAL: 2.49 K/UL — HIGH (ref 0–0.9)
MONOCYTES NFR BLD AUTO: 1.7 % — LOW (ref 2–14)
MONOCYTES NFR BLD MANUAL: 1 % — LOW (ref 2–14)
NEUTROPHILS # BLD AUTO: 17.71 K/UL — HIGH (ref 1.8–7.4)
NEUTROPHILS # BLD MANUAL: 24.91 K/UL — HIGH (ref 1.8–7.4)
NEUTROPHILS NFR BLD AUTO: 7.1 % — LOW (ref 43–77)
NEUTROPHILS NFR BLD MANUAL: 10 % — LOW (ref 43–77)
NITRITE UR-MCNC: NEGATIVE — SIGNIFICANT CHANGE UP
NRBC # BLD AUTO: 0 K/UL — SIGNIFICANT CHANGE UP (ref 0–0)
NRBC # FLD: 0 K/UL — SIGNIFICANT CHANGE UP (ref 0–0)
NRBC BLD AUTO-RTO: 0 /100 WBCS — SIGNIFICANT CHANGE UP (ref 0–0)
NT-PROBNP SERPL-SCNC: 2511 PG/ML — HIGH (ref 0–450)
NT-PROBNP SERPL-SCNC: 2554 PG/ML — HIGH (ref 0–450)
OVALOCYTES BLD QL SMEAR: SLIGHT — SIGNIFICANT CHANGE UP
PH UR: 5.5 — SIGNIFICANT CHANGE UP (ref 5–8)
PLAT MORPH BLD: NORMAL — SIGNIFICANT CHANGE UP
PLATELET # BLD AUTO: 126 K/UL — LOW (ref 150–400)
PMV BLD: 9.6 FL — SIGNIFICANT CHANGE UP (ref 7–13)
POTASSIUM SERPL-MCNC: 3.9 MMOL/L — SIGNIFICANT CHANGE UP (ref 3.5–5.3)
POTASSIUM SERPL-SCNC: 3.9 MMOL/L — SIGNIFICANT CHANGE UP (ref 3.5–5.3)
PROCALCITONIN SERPL-MCNC: 0.27 NG/ML — HIGH (ref 0.02–0.1)
PROT SERPL-MCNC: 6.4 GM/DL — SIGNIFICANT CHANGE UP (ref 6–8.3)
PROT UR-MCNC: SIGNIFICANT CHANGE UP MG/DL
PROTHROM AB SERPL-ACNC: 35.6 SEC — HIGH (ref 9.9–13.4)
RBC # BLD: 4.2 M/UL — SIGNIFICANT CHANGE UP (ref 4.2–5.8)
RBC # FLD: 13.9 % — SIGNIFICANT CHANGE UP (ref 10.3–14.5)
RBC BLD AUTO: ABNORMAL
RSV RNA NPH QL NAA+NON-PROBE: SIGNIFICANT CHANGE UP
SARS-COV-2 RNA SPEC QL NAA+PROBE: SIGNIFICANT CHANGE UP
SMUDGE CELLS # BLD: PRESENT
SODIUM SERPL-SCNC: 135 MMOL/L — SIGNIFICANT CHANGE UP (ref 135–145)
SODIUM UR-SCNC: <20 MMOL/L — SIGNIFICANT CHANGE UP
SOURCE RESPIRATORY: SIGNIFICANT CHANGE UP
SP GR SPEC: 1.01 — SIGNIFICANT CHANGE UP (ref 1–1.03)
STOMATOCYTES BLD QL SMEAR: ABNORMAL
URATE UR-MCNC: 12.1 MG/DL — SIGNIFICANT CHANGE UP
UROBILINOGEN FLD QL: 0.2 MG/DL — SIGNIFICANT CHANGE UP (ref 0.2–1)
UUN UR-MCNC: 814 MG/DL — SIGNIFICANT CHANGE UP
VARIANT LYMPHS # BLD: 1 % — SIGNIFICANT CHANGE UP (ref 0–6)
VARIANT LYMPHS NFR BLD MANUAL: 1 % — SIGNIFICANT CHANGE UP (ref 0–6)
WBC # BLD: 249.11 K/UL — CRITICAL HIGH (ref 3.8–10.5)
WBC # FLD AUTO: 249.11 K/UL — CRITICAL HIGH (ref 3.8–10.5)
WBC MORPHOLOGY: NORMAL — SIGNIFICANT CHANGE UP

## 2025-07-27 PROCEDURE — 99285 EMERGENCY DEPT VISIT HI MDM: CPT

## 2025-07-27 RX ORDER — CEFEPIME 2 G/20ML
1000 INJECTION, POWDER, FOR SOLUTION INTRAVENOUS ONCE
Refills: 0 | Status: DISCONTINUED | OUTPATIENT
Start: 2025-07-27 | End: 2025-07-27

## 2025-07-27 RX ORDER — ACETAMINOPHEN 500 MG/5ML
650 LIQUID (ML) ORAL EVERY 6 HOURS
Refills: 0 | Status: DISCONTINUED | OUTPATIENT
Start: 2025-07-27 | End: 2025-08-07

## 2025-07-27 RX ORDER — CEFEPIME 2 G/20ML
2000 INJECTION, POWDER, FOR SOLUTION INTRAVENOUS EVERY 12 HOURS
Refills: 0 | Status: DISCONTINUED | OUTPATIENT
Start: 2025-07-27 | End: 2025-07-27

## 2025-07-27 RX ORDER — MEROPENEM 1 G/30ML
1000 INJECTION INTRAVENOUS ONCE
Refills: 0 | Status: COMPLETED | OUTPATIENT
Start: 2025-07-27 | End: 2025-07-27

## 2025-07-27 RX ORDER — IPRATROPIUM BROMIDE AND ALBUTEROL SULFATE .5; 2.5 MG/3ML; MG/3ML
3 SOLUTION RESPIRATORY (INHALATION) EVERY 6 HOURS
Refills: 0 | Status: DISCONTINUED | OUTPATIENT
Start: 2025-07-27 | End: 2025-08-07

## 2025-07-27 RX ORDER — MAGNESIUM, ALUMINUM HYDROXIDE 200-200 MG
30 TABLET,CHEWABLE ORAL EVERY 4 HOURS
Refills: 0 | Status: DISCONTINUED | OUTPATIENT
Start: 2025-07-27 | End: 2025-08-07

## 2025-07-27 RX ORDER — METHYLPREDNISOLONE ACETATE 80 MG/ML
125 INJECTION, SUSPENSION INTRA-ARTICULAR; INTRALESIONAL; INTRAMUSCULAR; SOFT TISSUE ONCE
Refills: 0 | Status: COMPLETED | OUTPATIENT
Start: 2025-07-27 | End: 2025-07-27

## 2025-07-27 RX ORDER — VANCOMYCIN HCL IN 5 % DEXTROSE 1.5G/250ML
1000 PLASTIC BAG, INJECTION (ML) INTRAVENOUS ONCE
Refills: 0 | Status: DISCONTINUED | OUTPATIENT
Start: 2025-07-27 | End: 2025-07-27

## 2025-07-27 RX ORDER — CEFEPIME 2 G/20ML
1000 INJECTION, POWDER, FOR SOLUTION INTRAVENOUS ONCE
Refills: 0 | Status: COMPLETED | OUTPATIENT
Start: 2025-07-27 | End: 2025-07-27

## 2025-07-27 RX ORDER — MEROPENEM 1 G/30ML
INJECTION INTRAVENOUS
Refills: 0 | Status: DISCONTINUED | OUTPATIENT
Start: 2025-07-27 | End: 2025-07-31

## 2025-07-27 RX ORDER — IPRATROPIUM BROMIDE AND ALBUTEROL SULFATE .5; 2.5 MG/3ML; MG/3ML
3 SOLUTION RESPIRATORY (INHALATION) EVERY 6 HOURS
Refills: 0 | Status: DISCONTINUED | OUTPATIENT
Start: 2025-07-27 | End: 2025-07-27

## 2025-07-27 RX ORDER — VANCOMYCIN HCL IN 5 % DEXTROSE 1.5G/250ML
1500 PLASTIC BAG, INJECTION (ML) INTRAVENOUS ONCE
Refills: 0 | Status: COMPLETED | OUTPATIENT
Start: 2025-07-27 | End: 2025-07-27

## 2025-07-27 RX ORDER — ONDANSETRON HCL/PF 4 MG/2 ML
4 VIAL (ML) INJECTION EVERY 6 HOURS
Refills: 0 | Status: DISCONTINUED | OUTPATIENT
Start: 2025-07-27 | End: 2025-08-07

## 2025-07-27 RX ORDER — ALBUTEROL SULFATE 2.5 MG/3ML
2 VIAL, NEBULIZER (ML) INHALATION
Refills: 0 | Status: DISCONTINUED | OUTPATIENT
Start: 2025-07-27 | End: 2025-08-07

## 2025-07-27 RX ORDER — IPRATROPIUM BROMIDE AND ALBUTEROL SULFATE .5; 2.5 MG/3ML; MG/3ML
3 SOLUTION RESPIRATORY (INHALATION) ONCE
Refills: 0 | Status: COMPLETED | OUTPATIENT
Start: 2025-07-27 | End: 2025-07-27

## 2025-07-27 RX ORDER — MEROPENEM 1 G/30ML
1000 INJECTION INTRAVENOUS EVERY 12 HOURS
Refills: 0 | Status: DISCONTINUED | OUTPATIENT
Start: 2025-07-27 | End: 2025-07-27

## 2025-07-27 RX ORDER — MEROPENEM 1 G/30ML
1000 INJECTION INTRAVENOUS EVERY 12 HOURS
Refills: 0 | Status: DISCONTINUED | OUTPATIENT
Start: 2025-07-28 | End: 2025-07-31

## 2025-07-27 RX ORDER — DOXYCYCLINE HYCLATE 100 MG
100 TABLET ORAL EVERY 12 HOURS
Refills: 0 | Status: DISCONTINUED | OUTPATIENT
Start: 2025-07-27 | End: 2025-07-27

## 2025-07-27 RX ORDER — METOPROLOL SUCCINATE 50 MG/1
25 TABLET, EXTENDED RELEASE ORAL
Refills: 0 | Status: DISCONTINUED | OUTPATIENT
Start: 2025-07-27 | End: 2025-08-01

## 2025-07-27 RX ORDER — AMIODARONE HYDROCHLORIDE 50 MG/ML
200 INJECTION, SOLUTION INTRAVENOUS DAILY
Refills: 0 | Status: DISCONTINUED | OUTPATIENT
Start: 2025-07-27 | End: 2025-08-07

## 2025-07-27 RX ORDER — MELATONIN 5 MG
3 TABLET ORAL AT BEDTIME
Refills: 0 | Status: DISCONTINUED | OUTPATIENT
Start: 2025-07-27 | End: 2025-08-07

## 2025-07-27 RX ORDER — ACETAMINOPHEN 500 MG/5ML
1 LIQUID (ML) ORAL
Refills: 0 | DISCHARGE

## 2025-07-27 RX ADMIN — METHYLPREDNISOLONE ACETATE 125 MILLIGRAM(S): 80 INJECTION, SUSPENSION INTRA-ARTICULAR; INTRALESIONAL; INTRAMUSCULAR; SOFT TISSUE at 03:45

## 2025-07-27 RX ADMIN — CEFEPIME 1000 MILLIGRAM(S): 2 INJECTION, POWDER, FOR SOLUTION INTRAVENOUS at 06:00

## 2025-07-27 RX ADMIN — Medication 300 MILLIGRAM(S): at 06:03

## 2025-07-27 RX ADMIN — IPRATROPIUM BROMIDE AND ALBUTEROL SULFATE 3 MILLILITER(S): .5; 2.5 SOLUTION RESPIRATORY (INHALATION) at 20:09

## 2025-07-27 RX ADMIN — METOPROLOL SUCCINATE 25 MILLIGRAM(S): 50 TABLET, EXTENDED RELEASE ORAL at 10:33

## 2025-07-27 RX ADMIN — IPRATROPIUM BROMIDE AND ALBUTEROL SULFATE 3 MILLILITER(S): .5; 2.5 SOLUTION RESPIRATORY (INHALATION) at 14:43

## 2025-07-27 RX ADMIN — MEROPENEM 1000 MILLIGRAM(S): 1 INJECTION INTRAVENOUS at 17:59

## 2025-07-27 RX ADMIN — CEFEPIME 1000 MILLIGRAM(S): 2 INJECTION, POWDER, FOR SOLUTION INTRAVENOUS at 12:05

## 2025-07-27 RX ADMIN — Medication 110 MILLIGRAM(S): at 10:33

## 2025-07-27 RX ADMIN — Medication 500 MILLILITER(S): at 06:31

## 2025-07-27 RX ADMIN — IPRATROPIUM BROMIDE AND ALBUTEROL SULFATE 3 MILLILITER(S): .5; 2.5 SOLUTION RESPIRATORY (INHALATION) at 04:20

## 2025-07-27 RX ADMIN — Medication 2.5 MILLIGRAM(S): at 22:24

## 2025-07-27 RX ADMIN — IPRATROPIUM BROMIDE AND ALBUTEROL SULFATE 3 MILLILITER(S): .5; 2.5 SOLUTION RESPIRATORY (INHALATION) at 03:44

## 2025-07-27 RX ADMIN — AMIODARONE HYDROCHLORIDE 200 MILLIGRAM(S): 50 INJECTION, SOLUTION INTRAVENOUS at 22:24

## 2025-07-27 RX ADMIN — Medication 300 MILLIGRAM(S): at 10:33

## 2025-07-28 LAB
ALBUMIN SERPL ELPH-MCNC: 2.7 G/DL — LOW (ref 3.3–5)
ALP SERPL-CCNC: 97 U/L — SIGNIFICANT CHANGE UP (ref 40–120)
ALT FLD-CCNC: 22 U/L — SIGNIFICANT CHANGE UP (ref 12–78)
ANION GAP SERPL CALC-SCNC: 7 MMOL/L — SIGNIFICANT CHANGE UP (ref 5–17)
AST SERPL-CCNC: 32 U/L — SIGNIFICANT CHANGE UP (ref 15–37)
BASOPHILS # BLD AUTO: 0.12 K/UL — SIGNIFICANT CHANGE UP (ref 0–0.2)
BASOPHILS NFR BLD AUTO: 0 % — SIGNIFICANT CHANGE UP (ref 0–2)
BILIRUB SERPL-MCNC: 1.2 MG/DL — SIGNIFICANT CHANGE UP (ref 0.2–1.2)
BUN SERPL-MCNC: 82 MG/DL — HIGH (ref 7–23)
CALCIUM SERPL-MCNC: 8.8 MG/DL — SIGNIFICANT CHANGE UP (ref 8.5–10.1)
CHLORIDE SERPL-SCNC: 88 MMOL/L — LOW (ref 96–108)
CO2 SERPL-SCNC: 37 MMOL/L — HIGH (ref 22–31)
CREAT SERPL-MCNC: 1.63 MG/DL — HIGH (ref 0.5–1.3)
EGFR: 40 ML/MIN/1.73M2 — LOW
EGFR: 40 ML/MIN/1.73M2 — LOW
EOSINOPHIL # BLD AUTO: 0 K/UL — SIGNIFICANT CHANGE UP (ref 0–0.5)
EOSINOPHIL NFR BLD AUTO: 0 % — SIGNIFICANT CHANGE UP (ref 0–6)
GLUCOSE SERPL-MCNC: 135 MG/DL — HIGH (ref 70–99)
HCT VFR BLD CALC: 40 % — SIGNIFICANT CHANGE UP (ref 39–50)
HGB BLD-MCNC: 11.5 G/DL — LOW (ref 13–17)
IMM GRANULOCYTES # BLD AUTO: 2.09 K/UL — HIGH (ref 0–0.07)
IMM GRANULOCYTES NFR BLD AUTO: 0.8 % — SIGNIFICANT CHANGE UP (ref 0–0.9)
IMMATURE PLATELET FRACTION #: 3 K/UL — LOW (ref 3.9–12.5)
IMMATURE PLATELET FRACTION %: 2.9 % — SIGNIFICANT CHANGE UP (ref 1.6–7.1)
INR BLD: 3.71 RATIO — HIGH (ref 0.85–1.16)
LYMPHOCYTES # BLD AUTO: 221.2 K/UL — HIGH (ref 1–3.3)
LYMPHOCYTES NFR BLD AUTO: 89.7 % — HIGH (ref 13–44)
MCHC RBC-ENTMCNC: 28.8 G/DL — LOW (ref 32–36)
MCHC RBC-ENTMCNC: 29.3 PG — SIGNIFICANT CHANGE UP (ref 27–34)
MCV RBC AUTO: 102 FL — HIGH (ref 80–100)
MONOCYTES # BLD AUTO: 3.41 K/UL — HIGH (ref 0–0.9)
MONOCYTES NFR BLD AUTO: 1.4 % — LOW (ref 2–14)
NEUTROPHILS # BLD AUTO: 19.85 K/UL — HIGH (ref 1.8–7.4)
NEUTROPHILS NFR BLD AUTO: 8.1 % — LOW (ref 43–77)
NRBC # BLD AUTO: 0 K/UL — SIGNIFICANT CHANGE UP (ref 0–0)
NRBC # FLD: 0 K/UL — SIGNIFICANT CHANGE UP (ref 0–0)
NRBC BLD AUTO-RTO: 0 /100 WBCS — SIGNIFICANT CHANGE UP (ref 0–0)
PLATELET # BLD AUTO: 105 K/UL — LOW (ref 150–400)
PMV BLD: 10.4 FL — SIGNIFICANT CHANGE UP (ref 7–13)
POTASSIUM SERPL-MCNC: 5.3 MMOL/L — SIGNIFICANT CHANGE UP (ref 3.5–5.3)
POTASSIUM SERPL-SCNC: 5.3 MMOL/L — SIGNIFICANT CHANGE UP (ref 3.5–5.3)
PROT SERPL-MCNC: 5.8 GM/DL — LOW (ref 6–8.3)
PROTHROM AB SERPL-ACNC: 43.2 SEC — HIGH (ref 9.9–13.4)
RBC # BLD: 3.92 M/UL — LOW (ref 4.2–5.8)
RBC # FLD: 14.2 % — SIGNIFICANT CHANGE UP (ref 10.3–14.5)
SODIUM SERPL-SCNC: 132 MMOL/L — LOW (ref 135–145)
WBC # BLD: 246.67 K/UL — CRITICAL HIGH (ref 3.8–10.5)
WBC # FLD AUTO: 246.67 K/UL — CRITICAL HIGH (ref 3.8–10.5)

## 2025-07-28 PROCEDURE — 84484 ASSAY OF TROPONIN QUANT: CPT

## 2025-07-28 PROCEDURE — 85027 COMPLETE CBC AUTOMATED: CPT

## 2025-07-28 PROCEDURE — 84443 ASSAY THYROID STIM HORMONE: CPT

## 2025-07-28 PROCEDURE — 82746 ASSAY OF FOLIC ACID SERUM: CPT

## 2025-07-28 PROCEDURE — 94667 MNPJ CHEST WALL 1ST: CPT

## 2025-07-28 PROCEDURE — 83880 ASSAY OF NATRIURETIC PEPTIDE: CPT

## 2025-07-28 PROCEDURE — 83735 ASSAY OF MAGNESIUM: CPT

## 2025-07-28 PROCEDURE — 94668 MNPJ CHEST WALL SBSQ: CPT

## 2025-07-28 PROCEDURE — 94760 N-INVAS EAR/PLS OXIMETRY 1: CPT

## 2025-07-28 PROCEDURE — 87899 AGENT NOS ASSAY W/OPTIC: CPT

## 2025-07-28 PROCEDURE — 93306 TTE W/DOPPLER COMPLETE: CPT

## 2025-07-28 PROCEDURE — 71250 CT THORAX DX C-: CPT

## 2025-07-28 PROCEDURE — 82140 ASSAY OF AMMONIA: CPT

## 2025-07-28 PROCEDURE — 71045 X-RAY EXAM CHEST 1 VIEW: CPT

## 2025-07-28 PROCEDURE — 82607 VITAMIN B-12: CPT

## 2025-07-28 PROCEDURE — 97530 THERAPEUTIC ACTIVITIES: CPT | Mod: GP

## 2025-07-28 PROCEDURE — 80053 COMPREHEN METABOLIC PANEL: CPT

## 2025-07-28 PROCEDURE — 82550 ASSAY OF CK (CPK): CPT

## 2025-07-28 PROCEDURE — 97162 PT EVAL MOD COMPLEX 30 MIN: CPT | Mod: GP

## 2025-07-28 PROCEDURE — 94640 AIRWAY INHALATION TREATMENT: CPT

## 2025-07-28 PROCEDURE — 84100 ASSAY OF PHOSPHORUS: CPT

## 2025-07-28 PROCEDURE — 87070 CULTURE OTHR SPECIMN AEROBIC: CPT

## 2025-07-28 PROCEDURE — 99223 1ST HOSP IP/OBS HIGH 75: CPT

## 2025-07-28 PROCEDURE — 93005 ELECTROCARDIOGRAM TRACING: CPT

## 2025-07-28 PROCEDURE — 82533 TOTAL CORTISOL: CPT

## 2025-07-28 PROCEDURE — 97116 GAIT TRAINING THERAPY: CPT | Mod: GP

## 2025-07-28 PROCEDURE — 80048 BASIC METABOLIC PNL TOTAL CA: CPT

## 2025-07-28 PROCEDURE — 36415 COLL VENOUS BLD VENIPUNCTURE: CPT

## 2025-07-28 PROCEDURE — 80074 ACUTE HEPATITIS PANEL: CPT

## 2025-07-28 PROCEDURE — 85610 PROTHROMBIN TIME: CPT

## 2025-07-28 PROCEDURE — 80076 HEPATIC FUNCTION PANEL: CPT

## 2025-07-28 RX ORDER — ALBUTEROL SULFATE 2.5 MG/3ML
2.5 VIAL, NEBULIZER (ML) INHALATION ONCE
Refills: 0 | Status: DISCONTINUED | OUTPATIENT
Start: 2025-07-28 | End: 2025-07-28

## 2025-07-28 RX ORDER — DORZOLAMIDE HYDROCHLORIDE AND TIMOLOL MALEATE 20; 5 MG/ML; MG/ML
1 SOLUTION/ DROPS OPHTHALMIC EVERY 12 HOURS
Refills: 0 | Status: DISCONTINUED | OUTPATIENT
Start: 2025-07-28 | End: 2025-08-07

## 2025-07-28 RX ORDER — ALBUTEROL SULFATE 2.5 MG/3ML
2.5 VIAL, NEBULIZER (ML) INHALATION ONCE
Refills: 0 | Status: COMPLETED | OUTPATIENT
Start: 2025-07-28 | End: 2025-07-28

## 2025-07-28 RX ORDER — ACETYLCYSTEINE 200 MG/ML
4 INHALANT RESPIRATORY (INHALATION) ONCE
Refills: 0 | Status: COMPLETED | OUTPATIENT
Start: 2025-07-28 | End: 2025-07-28

## 2025-07-28 RX ADMIN — Medication 2.5 MILLIGRAM(S): at 15:42

## 2025-07-28 RX ADMIN — METOPROLOL SUCCINATE 25 MILLIGRAM(S): 50 TABLET, EXTENDED RELEASE ORAL at 21:27

## 2025-07-28 RX ADMIN — IPRATROPIUM BROMIDE AND ALBUTEROL SULFATE 3 MILLILITER(S): .5; 2.5 SOLUTION RESPIRATORY (INHALATION) at 20:41

## 2025-07-28 RX ADMIN — MEROPENEM 1000 MILLIGRAM(S): 1 INJECTION INTRAVENOUS at 05:47

## 2025-07-28 RX ADMIN — IPRATROPIUM BROMIDE AND ALBUTEROL SULFATE 3 MILLILITER(S): .5; 2.5 SOLUTION RESPIRATORY (INHALATION) at 13:40

## 2025-07-28 RX ADMIN — ACETYLCYSTEINE 4 MILLILITER(S): 200 INHALANT RESPIRATORY (INHALATION) at 15:41

## 2025-07-28 RX ADMIN — Medication 300 MILLIGRAM(S): at 10:22

## 2025-07-28 RX ADMIN — DORZOLAMIDE HYDROCHLORIDE AND TIMOLOL MALEATE 1 DROP(S): 20; 5 SOLUTION/ DROPS OPHTHALMIC at 21:28

## 2025-07-28 RX ADMIN — IPRATROPIUM BROMIDE AND ALBUTEROL SULFATE 3 MILLILITER(S): .5; 2.5 SOLUTION RESPIRATORY (INHALATION) at 08:16

## 2025-07-28 RX ADMIN — AMIODARONE HYDROCHLORIDE 200 MILLIGRAM(S): 50 INJECTION, SOLUTION INTRAVENOUS at 10:23

## 2025-07-28 RX ADMIN — MEROPENEM 1000 MILLIGRAM(S): 1 INJECTION INTRAVENOUS at 17:55

## 2025-07-29 ENCOUNTER — TRANSCRIPTION ENCOUNTER (OUTPATIENT)
Age: 89
End: 2025-07-29

## 2025-07-29 LAB
ALBUMIN SERPL ELPH-MCNC: 3.1 G/DL — LOW (ref 3.3–5)
ALP SERPL-CCNC: 89 U/L — SIGNIFICANT CHANGE UP (ref 40–120)
ALT FLD-CCNC: 17 U/L — SIGNIFICANT CHANGE UP (ref 12–78)
ANION GAP SERPL CALC-SCNC: 1 MMOL/L — LOW (ref 5–17)
AST SERPL-CCNC: 30 U/L — SIGNIFICANT CHANGE UP (ref 15–37)
BILIRUB SERPL-MCNC: 1 MG/DL — SIGNIFICANT CHANGE UP (ref 0.2–1.2)
BUN SERPL-MCNC: 82 MG/DL — HIGH (ref 7–23)
CALCIUM SERPL-MCNC: 9.7 MG/DL — SIGNIFICANT CHANGE UP (ref 8.5–10.1)
CHLORIDE SERPL-SCNC: 91 MMOL/L — LOW (ref 96–108)
CO2 SERPL-SCNC: 44 MMOL/L — HIGH (ref 22–31)
CREAT SERPL-MCNC: 1.38 MG/DL — HIGH (ref 0.5–1.3)
EGFR: 49 ML/MIN/1.73M2 — LOW
EGFR: 49 ML/MIN/1.73M2 — LOW
GLUCOSE SERPL-MCNC: 91 MG/DL — SIGNIFICANT CHANGE UP (ref 70–99)
HCT VFR BLD CALC: 37.8 % — LOW (ref 39–50)
HGB BLD-MCNC: 10.7 G/DL — LOW (ref 13–17)
INR BLD: 3.34 RATIO — HIGH (ref 0.85–1.16)
LEGIONELLA AG UR QL: NEGATIVE — SIGNIFICANT CHANGE UP
MAGNESIUM SERPL-MCNC: 2.5 MG/DL — SIGNIFICANT CHANGE UP (ref 1.6–2.6)
MCHC RBC-ENTMCNC: 28.3 G/DL — LOW (ref 32–36)
MCHC RBC-ENTMCNC: 29.2 PG — SIGNIFICANT CHANGE UP (ref 27–34)
MCV RBC AUTO: 103.3 FL — HIGH (ref 80–100)
NRBC # BLD AUTO: 0 K/UL — SIGNIFICANT CHANGE UP (ref 0–0)
NRBC # FLD: 0 K/UL — SIGNIFICANT CHANGE UP (ref 0–0)
NRBC BLD AUTO-RTO: 0 /100 WBCS — SIGNIFICANT CHANGE UP (ref 0–0)
PHOSPHATE SERPL-MCNC: 3 MG/DL — SIGNIFICANT CHANGE UP (ref 2.5–4.5)
PLATELET # BLD AUTO: 112 K/UL — LOW (ref 150–400)
PMV BLD: 10.5 FL — SIGNIFICANT CHANGE UP (ref 7–13)
POTASSIUM SERPL-MCNC: 4.4 MMOL/L — SIGNIFICANT CHANGE UP (ref 3.5–5.3)
POTASSIUM SERPL-SCNC: 4.4 MMOL/L — SIGNIFICANT CHANGE UP (ref 3.5–5.3)
PROT SERPL-MCNC: 5.5 GM/DL — LOW (ref 6–8.3)
PROTHROM AB SERPL-ACNC: 38 SEC — HIGH (ref 9.9–13.4)
RBC # BLD: 3.66 M/UL — LOW (ref 4.2–5.8)
RBC # FLD: 14.2 % — SIGNIFICANT CHANGE UP (ref 10.3–14.5)
S PNEUM AG UR QL: POSITIVE
SODIUM SERPL-SCNC: 136 MMOL/L — SIGNIFICANT CHANGE UP (ref 135–145)
WBC # BLD: 243.45 K/UL — CRITICAL HIGH (ref 3.8–10.5)
WBC # FLD AUTO: 243.45 K/UL — CRITICAL HIGH (ref 3.8–10.5)

## 2025-07-29 PROCEDURE — 99233 SBSQ HOSP IP/OBS HIGH 50: CPT

## 2025-07-29 RX ORDER — SENNA 187 MG
2 TABLET ORAL AT BEDTIME
Refills: 0 | Status: DISCONTINUED | OUTPATIENT
Start: 2025-07-29 | End: 2025-08-06

## 2025-07-29 RX ORDER — POLYETHYLENE GLYCOL 3350 17 G/17G
17 POWDER, FOR SOLUTION ORAL DAILY
Refills: 0 | Status: DISCONTINUED | OUTPATIENT
Start: 2025-07-29 | End: 2025-08-07

## 2025-07-29 RX ORDER — AMIODARONE HYDROCHLORIDE 50 MG/ML
1 INJECTION, SOLUTION INTRAVENOUS
Refills: 0 | DISCHARGE

## 2025-07-29 RX ADMIN — AMIODARONE HYDROCHLORIDE 200 MILLIGRAM(S): 50 INJECTION, SOLUTION INTRAVENOUS at 09:59

## 2025-07-29 RX ADMIN — Medication 300 MILLIGRAM(S): at 09:59

## 2025-07-29 RX ADMIN — METOPROLOL SUCCINATE 25 MILLIGRAM(S): 50 TABLET, EXTENDED RELEASE ORAL at 10:01

## 2025-07-29 RX ADMIN — DORZOLAMIDE HYDROCHLORIDE AND TIMOLOL MALEATE 1 DROP(S): 20; 5 SOLUTION/ DROPS OPHTHALMIC at 10:01

## 2025-07-29 RX ADMIN — Medication 2 TABLET(S): at 20:38

## 2025-07-29 RX ADMIN — IPRATROPIUM BROMIDE AND ALBUTEROL SULFATE 3 MILLILITER(S): .5; 2.5 SOLUTION RESPIRATORY (INHALATION) at 08:30

## 2025-07-29 RX ADMIN — MEROPENEM 1000 MILLIGRAM(S): 1 INJECTION INTRAVENOUS at 05:18

## 2025-07-29 RX ADMIN — DORZOLAMIDE HYDROCHLORIDE AND TIMOLOL MALEATE 1 DROP(S): 20; 5 SOLUTION/ DROPS OPHTHALMIC at 20:38

## 2025-07-29 RX ADMIN — IPRATROPIUM BROMIDE AND ALBUTEROL SULFATE 3 MILLILITER(S): .5; 2.5 SOLUTION RESPIRATORY (INHALATION) at 02:50

## 2025-07-29 RX ADMIN — POLYETHYLENE GLYCOL 3350 17 GRAM(S): 17 POWDER, FOR SOLUTION ORAL at 17:27

## 2025-07-29 RX ADMIN — IPRATROPIUM BROMIDE AND ALBUTEROL SULFATE 3 MILLILITER(S): .5; 2.5 SOLUTION RESPIRATORY (INHALATION) at 20:56

## 2025-07-29 RX ADMIN — MEROPENEM 1000 MILLIGRAM(S): 1 INJECTION INTRAVENOUS at 17:28

## 2025-07-29 RX ADMIN — METOPROLOL SUCCINATE 25 MILLIGRAM(S): 50 TABLET, EXTENDED RELEASE ORAL at 20:38

## 2025-07-29 RX ADMIN — IPRATROPIUM BROMIDE AND ALBUTEROL SULFATE 3 MILLILITER(S): .5; 2.5 SOLUTION RESPIRATORY (INHALATION) at 14:09

## 2025-07-30 LAB
BUN SERPL-MCNC: 67 MG/DL — HIGH (ref 7–23)
CALCIUM SERPL-MCNC: 9.7 MG/DL — SIGNIFICANT CHANGE UP (ref 8.5–10.1)
CHLORIDE SERPL-SCNC: 92 MMOL/L — LOW (ref 96–108)
CO2 SERPL-SCNC: 45 MMOL/L — CRITICAL HIGH (ref 22–31)
CREAT SERPL-MCNC: 1.19 MG/DL — SIGNIFICANT CHANGE UP (ref 0.5–1.3)
EGFR: 58 ML/MIN/1.73M2 — LOW
EGFR: 58 ML/MIN/1.73M2 — LOW
GLUCOSE SERPL-MCNC: 92 MG/DL — SIGNIFICANT CHANGE UP (ref 70–99)
INR BLD: 2.27 RATIO — HIGH (ref 0.85–1.16)
POTASSIUM SERPL-MCNC: 4.8 MMOL/L — SIGNIFICANT CHANGE UP (ref 3.5–5.3)
POTASSIUM SERPL-SCNC: 4.8 MMOL/L — SIGNIFICANT CHANGE UP (ref 3.5–5.3)
PROTHROM AB SERPL-ACNC: 26.6 SEC — HIGH (ref 9.9–13.4)
SODIUM SERPL-SCNC: 136 MMOL/L — SIGNIFICANT CHANGE UP (ref 135–145)

## 2025-07-30 PROCEDURE — 99233 SBSQ HOSP IP/OBS HIGH 50: CPT

## 2025-07-30 PROCEDURE — 99232 SBSQ HOSP IP/OBS MODERATE 35: CPT

## 2025-07-30 PROCEDURE — 99497 ADVNCD CARE PLAN 30 MIN: CPT

## 2025-07-30 RX ADMIN — IPRATROPIUM BROMIDE AND ALBUTEROL SULFATE 3 MILLILITER(S): .5; 2.5 SOLUTION RESPIRATORY (INHALATION) at 13:42

## 2025-07-30 RX ADMIN — MEROPENEM 1000 MILLIGRAM(S): 1 INJECTION INTRAVENOUS at 05:07

## 2025-07-30 RX ADMIN — DORZOLAMIDE HYDROCHLORIDE AND TIMOLOL MALEATE 1 DROP(S): 20; 5 SOLUTION/ DROPS OPHTHALMIC at 10:38

## 2025-07-30 RX ADMIN — AMIODARONE HYDROCHLORIDE 200 MILLIGRAM(S): 50 INJECTION, SOLUTION INTRAVENOUS at 10:38

## 2025-07-30 RX ADMIN — Medication 2.5 MILLIGRAM(S): at 21:01

## 2025-07-30 RX ADMIN — MEROPENEM 1000 MILLIGRAM(S): 1 INJECTION INTRAVENOUS at 18:15

## 2025-07-30 RX ADMIN — IPRATROPIUM BROMIDE AND ALBUTEROL SULFATE 3 MILLILITER(S): .5; 2.5 SOLUTION RESPIRATORY (INHALATION) at 08:15

## 2025-07-30 RX ADMIN — Medication 2 TABLET(S): at 21:01

## 2025-07-30 RX ADMIN — Medication 300 MILLIGRAM(S): at 10:38

## 2025-07-30 RX ADMIN — IPRATROPIUM BROMIDE AND ALBUTEROL SULFATE 3 MILLILITER(S): .5; 2.5 SOLUTION RESPIRATORY (INHALATION) at 02:24

## 2025-07-30 RX ADMIN — DORZOLAMIDE HYDROCHLORIDE AND TIMOLOL MALEATE 1 DROP(S): 20; 5 SOLUTION/ DROPS OPHTHALMIC at 21:00

## 2025-07-30 RX ADMIN — POLYETHYLENE GLYCOL 3350 17 GRAM(S): 17 POWDER, FOR SOLUTION ORAL at 18:16

## 2025-07-30 RX ADMIN — METOPROLOL SUCCINATE 25 MILLIGRAM(S): 50 TABLET, EXTENDED RELEASE ORAL at 21:01

## 2025-07-30 RX ADMIN — IPRATROPIUM BROMIDE AND ALBUTEROL SULFATE 3 MILLILITER(S): .5; 2.5 SOLUTION RESPIRATORY (INHALATION) at 21:13

## 2025-07-31 LAB
GRAM STN FLD: ABNORMAL
INR BLD: 1.82 RATIO — HIGH (ref 0.85–1.16)
PROTHROM AB SERPL-ACNC: 20.8 SEC — HIGH (ref 9.9–13.4)
SPECIMEN SOURCE: SIGNIFICANT CHANGE UP

## 2025-07-31 PROCEDURE — 99233 SBSQ HOSP IP/OBS HIGH 50: CPT

## 2025-07-31 RX ORDER — MINERAL OIL
133 OIL (ML) MISCELLANEOUS ONCE
Refills: 0 | Status: COMPLETED | OUTPATIENT
Start: 2025-07-31 | End: 2025-07-31

## 2025-07-31 RX ORDER — SALINE 7; 19 G/118ML; G/118ML
1 ENEMA RECTAL ONCE
Refills: 0 | Status: DISCONTINUED | OUTPATIENT
Start: 2025-07-31 | End: 2025-07-31

## 2025-07-31 RX ORDER — CEFTRIAXONE 500 MG/1
2000 INJECTION, POWDER, FOR SOLUTION INTRAMUSCULAR; INTRAVENOUS EVERY 24 HOURS
Refills: 0 | Status: DISCONTINUED | OUTPATIENT
Start: 2025-07-31 | End: 2025-08-04

## 2025-07-31 RX ORDER — MAGNESIUM HYDROXIDE 400 MG/5ML
30 SUSPENSION ORAL AT BEDTIME
Refills: 0 | Status: DISCONTINUED | OUTPATIENT
Start: 2025-07-31 | End: 2025-08-06

## 2025-07-31 RX ADMIN — Medication 133 MILLILITER(S): at 16:21

## 2025-07-31 RX ADMIN — Medication 3 MILLIGRAM(S): at 21:24

## 2025-07-31 RX ADMIN — MEROPENEM 1000 MILLIGRAM(S): 1 INJECTION INTRAVENOUS at 05:45

## 2025-07-31 RX ADMIN — CEFTRIAXONE 2000 MILLIGRAM(S): 500 INJECTION, POWDER, FOR SOLUTION INTRAMUSCULAR; INTRAVENOUS at 08:14

## 2025-07-31 RX ADMIN — METOPROLOL SUCCINATE 25 MILLIGRAM(S): 50 TABLET, EXTENDED RELEASE ORAL at 21:23

## 2025-07-31 RX ADMIN — Medication 2 TABLET(S): at 21:24

## 2025-07-31 RX ADMIN — IPRATROPIUM BROMIDE AND ALBUTEROL SULFATE 3 MILLILITER(S): .5; 2.5 SOLUTION RESPIRATORY (INHALATION) at 21:37

## 2025-07-31 RX ADMIN — AMIODARONE HYDROCHLORIDE 200 MILLIGRAM(S): 50 INJECTION, SOLUTION INTRAVENOUS at 11:27

## 2025-07-31 RX ADMIN — DORZOLAMIDE HYDROCHLORIDE AND TIMOLOL MALEATE 1 DROP(S): 20; 5 SOLUTION/ DROPS OPHTHALMIC at 11:25

## 2025-07-31 RX ADMIN — MAGNESIUM HYDROXIDE 30 MILLILITER(S): 400 SUSPENSION ORAL at 21:23

## 2025-07-31 RX ADMIN — Medication 300 MILLIGRAM(S): at 10:36

## 2025-07-31 RX ADMIN — IPRATROPIUM BROMIDE AND ALBUTEROL SULFATE 3 MILLILITER(S): .5; 2.5 SOLUTION RESPIRATORY (INHALATION) at 08:59

## 2025-07-31 RX ADMIN — POLYETHYLENE GLYCOL 3350 17 GRAM(S): 17 POWDER, FOR SOLUTION ORAL at 15:46

## 2025-07-31 RX ADMIN — DORZOLAMIDE HYDROCHLORIDE AND TIMOLOL MALEATE 1 DROP(S): 20; 5 SOLUTION/ DROPS OPHTHALMIC at 21:23

## 2025-08-01 LAB
ANION GAP SERPL CALC-SCNC: <4 MMOL/L — LOW (ref 5–17)
BUN SERPL-MCNC: 51 MG/DL — HIGH (ref 7–23)
CALCIUM SERPL-MCNC: 10.2 MG/DL — HIGH (ref 8.5–10.1)
CHLORIDE SERPL-SCNC: 91 MMOL/L — LOW (ref 96–108)
CO2 SERPL-SCNC: >45 MMOL/L — CRITICAL HIGH (ref 22–31)
CREAT SERPL-MCNC: 1.09 MG/DL — SIGNIFICANT CHANGE UP (ref 0.5–1.3)
CULTURE RESULTS: SIGNIFICANT CHANGE UP
CULTURE RESULTS: SIGNIFICANT CHANGE UP
EGFR: 65 ML/MIN/1.73M2 — SIGNIFICANT CHANGE UP
EGFR: 65 ML/MIN/1.73M2 — SIGNIFICANT CHANGE UP
GLUCOSE SERPL-MCNC: 158 MG/DL — HIGH (ref 70–99)
HCT VFR BLD CALC: 40.1 % — SIGNIFICANT CHANGE UP (ref 39–50)
HGB BLD-MCNC: 11.1 G/DL — LOW (ref 13–17)
IMMATURE PLATELET FRACTION #: 2.4 K/UL — LOW (ref 3.9–12.5)
IMMATURE PLATELET FRACTION %: 2.4 % — SIGNIFICANT CHANGE UP (ref 1.6–7.1)
INR BLD: 1.63 RATIO — HIGH (ref 0.85–1.16)
MCHC RBC-ENTMCNC: 27.7 G/DL — LOW (ref 32–36)
MCHC RBC-ENTMCNC: 29.4 PG — SIGNIFICANT CHANGE UP (ref 27–34)
MCV RBC AUTO: 106.4 FL — HIGH (ref 80–100)
NRBC # BLD AUTO: 0 K/UL — SIGNIFICANT CHANGE UP (ref 0–0)
NRBC # FLD: 0 K/UL — SIGNIFICANT CHANGE UP (ref 0–0)
NRBC BLD AUTO-RTO: 0 /100 WBCS — SIGNIFICANT CHANGE UP (ref 0–0)
PLATELET # BLD AUTO: 100 K/UL — LOW (ref 150–400)
PMV BLD: 10.1 FL — SIGNIFICANT CHANGE UP (ref 7–13)
POTASSIUM SERPL-MCNC: 3.4 MMOL/L — LOW (ref 3.5–5.3)
POTASSIUM SERPL-SCNC: 3.4 MMOL/L — LOW (ref 3.5–5.3)
PROTHROM AB SERPL-ACNC: 19.1 SEC — HIGH (ref 9.9–13.4)
RBC # BLD: 3.77 M/UL — LOW (ref 4.2–5.8)
RBC # FLD: 14.3 % — SIGNIFICANT CHANGE UP (ref 10.3–14.5)
SODIUM SERPL-SCNC: 140 MMOL/L — SIGNIFICANT CHANGE UP (ref 135–145)
SPECIMEN SOURCE: SIGNIFICANT CHANGE UP
SPECIMEN SOURCE: SIGNIFICANT CHANGE UP
WBC # BLD: 183.45 K/UL — CRITICAL HIGH (ref 3.8–10.5)
WBC # FLD AUTO: 183.45 K/UL — CRITICAL HIGH (ref 3.8–10.5)

## 2025-08-01 PROCEDURE — 99232 SBSQ HOSP IP/OBS MODERATE 35: CPT

## 2025-08-01 RX ORDER — LACTULOSE 10 G/15ML
10 SOLUTION ORAL DAILY
Refills: 0 | Status: DISCONTINUED | OUTPATIENT
Start: 2025-08-01 | End: 2025-08-07

## 2025-08-01 RX ORDER — METOPROLOL SUCCINATE 50 MG/1
25 TABLET, EXTENDED RELEASE ORAL
Refills: 0 | Status: DISCONTINUED | OUTPATIENT
Start: 2025-08-01 | End: 2025-08-05

## 2025-08-01 RX ADMIN — IPRATROPIUM BROMIDE AND ALBUTEROL SULFATE 3 MILLILITER(S): .5; 2.5 SOLUTION RESPIRATORY (INHALATION) at 08:39

## 2025-08-01 RX ADMIN — LACTULOSE 10 GRAM(S): 10 SOLUTION ORAL at 10:39

## 2025-08-01 RX ADMIN — CEFTRIAXONE 2000 MILLIGRAM(S): 500 INJECTION, POWDER, FOR SOLUTION INTRAMUSCULAR; INTRAVENOUS at 10:39

## 2025-08-01 RX ADMIN — DORZOLAMIDE HYDROCHLORIDE AND TIMOLOL MALEATE 1 DROP(S): 20; 5 SOLUTION/ DROPS OPHTHALMIC at 21:31

## 2025-08-01 RX ADMIN — Medication 300 MILLIGRAM(S): at 10:39

## 2025-08-01 RX ADMIN — IPRATROPIUM BROMIDE AND ALBUTEROL SULFATE 3 MILLILITER(S): .5; 2.5 SOLUTION RESPIRATORY (INHALATION) at 21:15

## 2025-08-01 RX ADMIN — IPRATROPIUM BROMIDE AND ALBUTEROL SULFATE 3 MILLILITER(S): .5; 2.5 SOLUTION RESPIRATORY (INHALATION) at 14:24

## 2025-08-01 RX ADMIN — Medication 3 MILLIGRAM(S): at 21:30

## 2025-08-01 RX ADMIN — AMIODARONE HYDROCHLORIDE 200 MILLIGRAM(S): 50 INJECTION, SOLUTION INTRAVENOUS at 10:40

## 2025-08-01 RX ADMIN — Medication 50 MILLILITER(S): at 16:17

## 2025-08-01 RX ADMIN — Medication 40 MILLIEQUIVALENT(S): at 16:16

## 2025-08-01 RX ADMIN — DORZOLAMIDE HYDROCHLORIDE AND TIMOLOL MALEATE 1 DROP(S): 20; 5 SOLUTION/ DROPS OPHTHALMIC at 11:54

## 2025-08-02 ENCOUNTER — RESULT REVIEW (OUTPATIENT)
Age: 89
End: 2025-08-02

## 2025-08-02 LAB
ANION GAP SERPL CALC-SCNC: 4 MMOL/L — LOW (ref 5–17)
BUN SERPL-MCNC: 44 MG/DL — HIGH (ref 7–23)
CALCIUM SERPL-MCNC: 9.7 MG/DL — SIGNIFICANT CHANGE UP (ref 8.5–10.1)
CHLORIDE SERPL-SCNC: 91 MMOL/L — LOW (ref 96–108)
CO2 SERPL-SCNC: 41 MMOL/L — HIGH (ref 22–31)
CREAT SERPL-MCNC: 0.91 MG/DL — SIGNIFICANT CHANGE UP (ref 0.5–1.3)
CULTURE RESULTS: ABNORMAL
EGFR: 81 ML/MIN/1.73M2 — SIGNIFICANT CHANGE UP
EGFR: 81 ML/MIN/1.73M2 — SIGNIFICANT CHANGE UP
GLUCOSE SERPL-MCNC: 98 MG/DL — SIGNIFICANT CHANGE UP (ref 70–99)
INR BLD: 2 RATIO — HIGH (ref 0.85–1.16)
POTASSIUM SERPL-MCNC: 4.4 MMOL/L — SIGNIFICANT CHANGE UP (ref 3.5–5.3)
POTASSIUM SERPL-SCNC: 4.4 MMOL/L — SIGNIFICANT CHANGE UP (ref 3.5–5.3)
PROTHROM AB SERPL-ACNC: 22.8 SEC — HIGH (ref 9.9–13.4)
SODIUM SERPL-SCNC: 136 MMOL/L — SIGNIFICANT CHANGE UP (ref 135–145)
SPECIMEN SOURCE: SIGNIFICANT CHANGE UP

## 2025-08-02 PROCEDURE — 93306 TTE W/DOPPLER COMPLETE: CPT | Mod: 26

## 2025-08-02 PROCEDURE — 99233 SBSQ HOSP IP/OBS HIGH 50: CPT

## 2025-08-02 PROCEDURE — 71250 CT THORAX DX C-: CPT | Mod: 26

## 2025-08-02 RX ORDER — IMMUNE GLOBULIN (HUMAN) 10 G/100ML
25 INJECTION INTRAVENOUS; SUBCUTANEOUS ONCE
Refills: 0 | Status: COMPLETED | OUTPATIENT
Start: 2025-08-02 | End: 2025-08-02

## 2025-08-02 RX ORDER — ACETYLCYSTEINE 200 MG/ML
4 INHALANT RESPIRATORY (INHALATION) EVERY 6 HOURS
Refills: 0 | Status: DISCONTINUED | OUTPATIENT
Start: 2025-08-02 | End: 2025-08-07

## 2025-08-02 RX ADMIN — IPRATROPIUM BROMIDE AND ALBUTEROL SULFATE 3 MILLILITER(S): .5; 2.5 SOLUTION RESPIRATORY (INHALATION) at 07:56

## 2025-08-02 RX ADMIN — IMMUNE GLOBULIN (HUMAN) 250 GRAM(S): 10 INJECTION INTRAVENOUS; SUBCUTANEOUS at 16:13

## 2025-08-02 RX ADMIN — DORZOLAMIDE HYDROCHLORIDE AND TIMOLOL MALEATE 1 DROP(S): 20; 5 SOLUTION/ DROPS OPHTHALMIC at 21:38

## 2025-08-02 RX ADMIN — Medication 3 MILLIGRAM(S): at 21:36

## 2025-08-02 RX ADMIN — CEFTRIAXONE 2000 MILLIGRAM(S): 500 INJECTION, POWDER, FOR SOLUTION INTRAMUSCULAR; INTRAVENOUS at 08:28

## 2025-08-02 RX ADMIN — Medication 300 MILLIGRAM(S): at 09:26

## 2025-08-02 RX ADMIN — AMIODARONE HYDROCHLORIDE 200 MILLIGRAM(S): 50 INJECTION, SOLUTION INTRAVENOUS at 09:26

## 2025-08-02 RX ADMIN — LACTULOSE 10 GRAM(S): 10 SOLUTION ORAL at 09:25

## 2025-08-02 RX ADMIN — IPRATROPIUM BROMIDE AND ALBUTEROL SULFATE 3 MILLILITER(S): .5; 2.5 SOLUTION RESPIRATORY (INHALATION) at 14:39

## 2025-08-02 RX ADMIN — MAGNESIUM HYDROXIDE 30 MILLILITER(S): 400 SUSPENSION ORAL at 21:35

## 2025-08-02 RX ADMIN — POLYETHYLENE GLYCOL 3350 17 GRAM(S): 17 POWDER, FOR SOLUTION ORAL at 15:32

## 2025-08-02 RX ADMIN — Medication 2 TABLET(S): at 21:36

## 2025-08-02 RX ADMIN — DORZOLAMIDE HYDROCHLORIDE AND TIMOLOL MALEATE 1 DROP(S): 20; 5 SOLUTION/ DROPS OPHTHALMIC at 09:26

## 2025-08-02 RX ADMIN — ACETYLCYSTEINE 4 MILLILITER(S): 200 INHALANT RESPIRATORY (INHALATION) at 20:42

## 2025-08-02 RX ADMIN — IPRATROPIUM BROMIDE AND ALBUTEROL SULFATE 3 MILLILITER(S): .5; 2.5 SOLUTION RESPIRATORY (INHALATION) at 02:00

## 2025-08-02 RX ADMIN — IPRATROPIUM BROMIDE AND ALBUTEROL SULFATE 3 MILLILITER(S): .5; 2.5 SOLUTION RESPIRATORY (INHALATION) at 20:42

## 2025-08-02 RX ADMIN — ACETYLCYSTEINE 4 MILLILITER(S): 200 INHALANT RESPIRATORY (INHALATION) at 14:39

## 2025-08-03 LAB
INR BLD: 2.08 RATIO — HIGH (ref 0.85–1.16)
PROTHROM AB SERPL-ACNC: 24.4 SEC — HIGH (ref 9.9–13.4)

## 2025-08-03 PROCEDURE — 99233 SBSQ HOSP IP/OBS HIGH 50: CPT

## 2025-08-03 RX ORDER — MAGNESIUM CITRATE
296 SOLUTION, ORAL ORAL ONCE
Refills: 0 | Status: COMPLETED | OUTPATIENT
Start: 2025-08-03 | End: 2025-08-03

## 2025-08-03 RX ADMIN — IPRATROPIUM BROMIDE AND ALBUTEROL SULFATE 3 MILLILITER(S): .5; 2.5 SOLUTION RESPIRATORY (INHALATION) at 01:25

## 2025-08-03 RX ADMIN — ACETYLCYSTEINE 4 MILLILITER(S): 200 INHALANT RESPIRATORY (INHALATION) at 21:11

## 2025-08-03 RX ADMIN — CEFTRIAXONE 2000 MILLIGRAM(S): 500 INJECTION, POWDER, FOR SOLUTION INTRAMUSCULAR; INTRAVENOUS at 08:44

## 2025-08-03 RX ADMIN — Medication 300 MILLIGRAM(S): at 09:12

## 2025-08-03 RX ADMIN — ACETYLCYSTEINE 4 MILLILITER(S): 200 INHALANT RESPIRATORY (INHALATION) at 14:38

## 2025-08-03 RX ADMIN — DORZOLAMIDE HYDROCHLORIDE AND TIMOLOL MALEATE 1 DROP(S): 20; 5 SOLUTION/ DROPS OPHTHALMIC at 22:04

## 2025-08-03 RX ADMIN — ACETYLCYSTEINE 4 MILLILITER(S): 200 INHALANT RESPIRATORY (INHALATION) at 07:49

## 2025-08-03 RX ADMIN — AMIODARONE HYDROCHLORIDE 200 MILLIGRAM(S): 50 INJECTION, SOLUTION INTRAVENOUS at 09:12

## 2025-08-03 RX ADMIN — ACETYLCYSTEINE 4 MILLILITER(S): 200 INHALANT RESPIRATORY (INHALATION) at 01:25

## 2025-08-03 RX ADMIN — IPRATROPIUM BROMIDE AND ALBUTEROL SULFATE 3 MILLILITER(S): .5; 2.5 SOLUTION RESPIRATORY (INHALATION) at 21:12

## 2025-08-03 RX ADMIN — LACTULOSE 10 GRAM(S): 10 SOLUTION ORAL at 09:12

## 2025-08-03 RX ADMIN — IPRATROPIUM BROMIDE AND ALBUTEROL SULFATE 3 MILLILITER(S): .5; 2.5 SOLUTION RESPIRATORY (INHALATION) at 07:50

## 2025-08-03 RX ADMIN — Medication 296 MILLILITER(S): at 11:29

## 2025-08-03 RX ADMIN — IPRATROPIUM BROMIDE AND ALBUTEROL SULFATE 3 MILLILITER(S): .5; 2.5 SOLUTION RESPIRATORY (INHALATION) at 14:37

## 2025-08-03 RX ADMIN — Medication 3 MILLIGRAM(S): at 23:10

## 2025-08-03 RX ADMIN — DORZOLAMIDE HYDROCHLORIDE AND TIMOLOL MALEATE 1 DROP(S): 20; 5 SOLUTION/ DROPS OPHTHALMIC at 09:13

## 2025-08-04 LAB
HAV IGM SER-ACNC: SIGNIFICANT CHANGE UP
HBV CORE IGM SER-ACNC: SIGNIFICANT CHANGE UP
HBV SURFACE AG SER-ACNC: SIGNIFICANT CHANGE UP
HCV AB S/CO SERPL IA: 0.18 S/CO — SIGNIFICANT CHANGE UP (ref 0–0.79)
HCV AB SERPL-IMP: SIGNIFICANT CHANGE UP

## 2025-08-04 PROCEDURE — 99233 SBSQ HOSP IP/OBS HIGH 50: CPT

## 2025-08-04 RX ORDER — CEFUROXIME SODIUM 1.5 G
500 VIAL (EA) INJECTION EVERY 12 HOURS
Refills: 0 | Status: DISCONTINUED | OUTPATIENT
Start: 2025-08-04 | End: 2025-08-07

## 2025-08-04 RX ADMIN — IPRATROPIUM BROMIDE AND ALBUTEROL SULFATE 3 MILLILITER(S): .5; 2.5 SOLUTION RESPIRATORY (INHALATION) at 08:29

## 2025-08-04 RX ADMIN — LACTULOSE 10 GRAM(S): 10 SOLUTION ORAL at 10:25

## 2025-08-04 RX ADMIN — IPRATROPIUM BROMIDE AND ALBUTEROL SULFATE 3 MILLILITER(S): .5; 2.5 SOLUTION RESPIRATORY (INHALATION) at 22:50

## 2025-08-04 RX ADMIN — MAGNESIUM HYDROXIDE 30 MILLILITER(S): 400 SUSPENSION ORAL at 21:31

## 2025-08-04 RX ADMIN — AMIODARONE HYDROCHLORIDE 200 MILLIGRAM(S): 50 INJECTION, SOLUTION INTRAVENOUS at 10:25

## 2025-08-04 RX ADMIN — Medication 300 MILLIGRAM(S): at 10:25

## 2025-08-04 RX ADMIN — ACETYLCYSTEINE 4 MILLILITER(S): 200 INHALANT RESPIRATORY (INHALATION) at 08:29

## 2025-08-04 RX ADMIN — ACETYLCYSTEINE 4 MILLILITER(S): 200 INHALANT RESPIRATORY (INHALATION) at 14:54

## 2025-08-04 RX ADMIN — IPRATROPIUM BROMIDE AND ALBUTEROL SULFATE 3 MILLILITER(S): .5; 2.5 SOLUTION RESPIRATORY (INHALATION) at 02:24

## 2025-08-04 RX ADMIN — Medication 3 MILLIGRAM(S): at 21:32

## 2025-08-04 RX ADMIN — CEFTRIAXONE 2000 MILLIGRAM(S): 500 INJECTION, POWDER, FOR SOLUTION INTRAMUSCULAR; INTRAVENOUS at 10:24

## 2025-08-04 RX ADMIN — ACETYLCYSTEINE 4 MILLILITER(S): 200 INHALANT RESPIRATORY (INHALATION) at 02:24

## 2025-08-04 RX ADMIN — Medication 500 MILLIGRAM(S): at 21:32

## 2025-08-04 RX ADMIN — DORZOLAMIDE HYDROCHLORIDE AND TIMOLOL MALEATE 1 DROP(S): 20; 5 SOLUTION/ DROPS OPHTHALMIC at 21:31

## 2025-08-04 RX ADMIN — IPRATROPIUM BROMIDE AND ALBUTEROL SULFATE 3 MILLILITER(S): .5; 2.5 SOLUTION RESPIRATORY (INHALATION) at 14:54

## 2025-08-04 RX ADMIN — DORZOLAMIDE HYDROCHLORIDE AND TIMOLOL MALEATE 1 DROP(S): 20; 5 SOLUTION/ DROPS OPHTHALMIC at 10:25

## 2025-08-05 ENCOUNTER — TRANSCRIPTION ENCOUNTER (OUTPATIENT)
Age: 89
End: 2025-08-05

## 2025-08-05 LAB
ANION GAP SERPL CALC-SCNC: 0 MMOL/L — LOW (ref 5–17)
BUN SERPL-MCNC: 41 MG/DL — HIGH (ref 7–23)
CALCIUM SERPL-MCNC: 9.4 MG/DL — SIGNIFICANT CHANGE UP (ref 8.5–10.1)
CHLORIDE SERPL-SCNC: 92 MMOL/L — LOW (ref 96–108)
CO2 SERPL-SCNC: 44 MMOL/L — HIGH (ref 22–31)
CREAT SERPL-MCNC: 1.18 MG/DL — SIGNIFICANT CHANGE UP (ref 0.5–1.3)
EGFR: 59 ML/MIN/1.73M2 — LOW
EGFR: 59 ML/MIN/1.73M2 — LOW
GLUCOSE SERPL-MCNC: 186 MG/DL — HIGH (ref 70–99)
HCT VFR BLD CALC: 39.6 % — SIGNIFICANT CHANGE UP (ref 39–50)
HGB BLD-MCNC: 11.1 G/DL — LOW (ref 13–17)
IMMATURE PLATELET FRACTION #: 3.4 K/UL — LOW (ref 3.9–12.5)
IMMATURE PLATELET FRACTION %: 3.7 % — SIGNIFICANT CHANGE UP (ref 1.6–7.1)
INR BLD: 2.48 RATIO — HIGH (ref 0.85–1.16)
MANUAL SMEAR VERIFICATION: SIGNIFICANT CHANGE UP
MCHC RBC-ENTMCNC: 28 G/DL — LOW (ref 32–36)
MCHC RBC-ENTMCNC: 29.8 PG — SIGNIFICANT CHANGE UP (ref 27–34)
MCV RBC AUTO: 106.2 FL — HIGH (ref 80–100)
NRBC # BLD AUTO: 0 K/UL — SIGNIFICANT CHANGE UP (ref 0–0)
NRBC # FLD: 0 K/UL — SIGNIFICANT CHANGE UP (ref 0–0)
NRBC BLD AUTO-RTO: 0 /100 WBCS — SIGNIFICANT CHANGE UP (ref 0–0)
PLAT MORPH BLD: NORMAL — SIGNIFICANT CHANGE UP
PLATELET # BLD AUTO: 91 K/UL — LOW (ref 150–400)
PLATELET COUNT - ESTIMATE: ABNORMAL
PMV BLD: 10.8 FL — SIGNIFICANT CHANGE UP (ref 7–13)
POTASSIUM SERPL-MCNC: 5.3 MMOL/L — SIGNIFICANT CHANGE UP (ref 3.5–5.3)
POTASSIUM SERPL-SCNC: 5.3 MMOL/L — SIGNIFICANT CHANGE UP (ref 3.5–5.3)
PROTHROM AB SERPL-ACNC: 28.3 SEC — HIGH (ref 9.9–13.4)
RBC # BLD: 3.73 M/UL — LOW (ref 4.2–5.8)
RBC # FLD: 14.1 % — SIGNIFICANT CHANGE UP (ref 10.3–14.5)
RBC BLD AUTO: SIGNIFICANT CHANGE UP
SODIUM SERPL-SCNC: 136 MMOL/L — SIGNIFICANT CHANGE UP (ref 135–145)
WBC # BLD: 173.29 K/UL — CRITICAL HIGH (ref 3.8–10.5)
WBC # FLD AUTO: 173.29 K/UL — CRITICAL HIGH (ref 3.8–10.5)

## 2025-08-05 PROCEDURE — 71045 X-RAY EXAM CHEST 1 VIEW: CPT | Mod: 26

## 2025-08-05 PROCEDURE — 99233 SBSQ HOSP IP/OBS HIGH 50: CPT

## 2025-08-05 RX ORDER — IPRATROPIUM BROMIDE AND ALBUTEROL SULFATE .5; 2.5 MG/3ML; MG/3ML
3 SOLUTION RESPIRATORY (INHALATION)
Qty: 360 | Refills: 0
Start: 2025-08-05 | End: 2025-09-03

## 2025-08-05 RX ORDER — METOPROLOL SUCCINATE 50 MG/1
1 TABLET, EXTENDED RELEASE ORAL
Qty: 30 | Refills: 0
Start: 2025-08-05 | End: 2025-09-03

## 2025-08-05 RX ORDER — CEFUROXIME SODIUM 1.5 G
1 VIAL (EA) INJECTION
Qty: 6 | Refills: 0
Start: 2025-08-05 | End: 2025-08-07

## 2025-08-05 RX ADMIN — POLYETHYLENE GLYCOL 3350 17 GRAM(S): 17 POWDER, FOR SOLUTION ORAL at 16:29

## 2025-08-05 RX ADMIN — Medication 3 MILLIGRAM(S): at 21:10

## 2025-08-05 RX ADMIN — DORZOLAMIDE HYDROCHLORIDE AND TIMOLOL MALEATE 1 DROP(S): 20; 5 SOLUTION/ DROPS OPHTHALMIC at 21:12

## 2025-08-05 RX ADMIN — DORZOLAMIDE HYDROCHLORIDE AND TIMOLOL MALEATE 1 DROP(S): 20; 5 SOLUTION/ DROPS OPHTHALMIC at 09:42

## 2025-08-05 RX ADMIN — Medication 2 TABLET(S): at 21:10

## 2025-08-05 RX ADMIN — ACETYLCYSTEINE 4 MILLILITER(S): 200 INHALANT RESPIRATORY (INHALATION) at 22:06

## 2025-08-05 RX ADMIN — Medication 300 MILLIGRAM(S): at 09:42

## 2025-08-05 RX ADMIN — Medication 500 MILLIGRAM(S): at 21:11

## 2025-08-05 RX ADMIN — ACETYLCYSTEINE 4 MILLILITER(S): 200 INHALANT RESPIRATORY (INHALATION) at 13:52

## 2025-08-05 RX ADMIN — IPRATROPIUM BROMIDE AND ALBUTEROL SULFATE 3 MILLILITER(S): .5; 2.5 SOLUTION RESPIRATORY (INHALATION) at 08:02

## 2025-08-05 RX ADMIN — IPRATROPIUM BROMIDE AND ALBUTEROL SULFATE 3 MILLILITER(S): .5; 2.5 SOLUTION RESPIRATORY (INHALATION) at 22:05

## 2025-08-05 RX ADMIN — IPRATROPIUM BROMIDE AND ALBUTEROL SULFATE 3 MILLILITER(S): .5; 2.5 SOLUTION RESPIRATORY (INHALATION) at 13:52

## 2025-08-05 RX ADMIN — MAGNESIUM HYDROXIDE 30 MILLILITER(S): 400 SUSPENSION ORAL at 21:11

## 2025-08-05 RX ADMIN — ACETYLCYSTEINE 4 MILLILITER(S): 200 INHALANT RESPIRATORY (INHALATION) at 08:02

## 2025-08-05 RX ADMIN — Medication 500 MILLIGRAM(S): at 09:42

## 2025-08-05 RX ADMIN — LACTULOSE 10 GRAM(S): 10 SOLUTION ORAL at 09:42

## 2025-08-06 DIAGNOSIS — J96.01 ACUTE RESPIRATORY FAILURE WITH HYPOXIA: ICD-10-CM

## 2025-08-06 LAB
ADD ON TEST-SPECIMEN IN LAB: SIGNIFICANT CHANGE UP
ALBUMIN SERPL ELPH-MCNC: 3 G/DL — LOW (ref 3.3–5)
ALP SERPL-CCNC: 121 U/L — HIGH (ref 40–120)
ALT FLD-CCNC: 62 U/L — SIGNIFICANT CHANGE UP (ref 12–78)
ANION GAP SERPL CALC-SCNC: 3 MMOL/L — LOW (ref 5–17)
AST SERPL-CCNC: 58 U/L — HIGH (ref 15–37)
BILIRUB SERPL-MCNC: 0.7 MG/DL — SIGNIFICANT CHANGE UP (ref 0.2–1.2)
BUN SERPL-MCNC: 44 MG/DL — HIGH (ref 7–23)
CALCIUM SERPL-MCNC: 9.2 MG/DL — SIGNIFICANT CHANGE UP (ref 8.5–10.1)
CHLORIDE SERPL-SCNC: 92 MMOL/L — LOW (ref 96–108)
CO2 SERPL-SCNC: 42 MMOL/L — HIGH (ref 22–31)
CORTIS SERPL-MCNC: 16.4 UG/DL — SIGNIFICANT CHANGE UP
CREAT SERPL-MCNC: 1.15 MG/DL — SIGNIFICANT CHANGE UP (ref 0.5–1.3)
EGFR: 61 ML/MIN/1.73M2 — SIGNIFICANT CHANGE UP
EGFR: 61 ML/MIN/1.73M2 — SIGNIFICANT CHANGE UP
FOLATE SERPL-MCNC: 11.2 NG/ML — SIGNIFICANT CHANGE UP
GLUCOSE SERPL-MCNC: 155 MG/DL — HIGH (ref 70–99)
INR BLD: 2.42 RATIO — HIGH (ref 0.85–1.16)
MAGNESIUM SERPL-MCNC: 3.5 MG/DL — HIGH (ref 1.6–2.6)
NT-PROBNP SERPL-SCNC: 2453 PG/ML — HIGH (ref 0–450)
PHOSPHATE SERPL-MCNC: 2.2 MG/DL — LOW (ref 2.5–4.5)
POTASSIUM SERPL-MCNC: 4.3 MMOL/L — SIGNIFICANT CHANGE UP (ref 3.5–5.3)
POTASSIUM SERPL-SCNC: 4.3 MMOL/L — SIGNIFICANT CHANGE UP (ref 3.5–5.3)
PROT SERPL-MCNC: 5.8 GM/DL — LOW (ref 6–8.3)
PROTHROM AB SERPL-ACNC: 28.3 SEC — HIGH (ref 9.9–13.4)
SODIUM SERPL-SCNC: 137 MMOL/L — SIGNIFICANT CHANGE UP (ref 135–145)
TROPONIN I, HIGH SENSITIVITY RESULT: 31.45 NG/L — SIGNIFICANT CHANGE UP
TSH SERPL-MCNC: 1.7 UU/ML — SIGNIFICANT CHANGE UP (ref 0.34–4.82)
VIT B12 SERPL-MCNC: 410 PG/ML — SIGNIFICANT CHANGE UP (ref 232–1245)

## 2025-08-06 PROCEDURE — 99233 SBSQ HOSP IP/OBS HIGH 50: CPT

## 2025-08-06 PROCEDURE — 93290 INTERROG DEV EVAL ICPMS IP: CPT | Mod: 26

## 2025-08-06 PROCEDURE — 93010 ELECTROCARDIOGRAM REPORT: CPT

## 2025-08-06 PROCEDURE — 93284 PRGRMG EVAL IMPLANTABLE DFB: CPT | Mod: 26

## 2025-08-06 RX ORDER — SIMETHICONE 80 MG
80 TABLET,CHEWABLE ORAL
Refills: 0 | Status: DISCONTINUED | OUTPATIENT
Start: 2025-08-06 | End: 2025-08-07

## 2025-08-06 RX ORDER — BISACODYL 5 MG
5 TABLET, DELAYED RELEASE (ENTERIC COATED) ORAL EVERY 12 HOURS
Refills: 0 | Status: DISCONTINUED | OUTPATIENT
Start: 2025-08-06 | End: 2025-08-07

## 2025-08-06 RX ADMIN — DORZOLAMIDE HYDROCHLORIDE AND TIMOLOL MALEATE 1 DROP(S): 20; 5 SOLUTION/ DROPS OPHTHALMIC at 09:31

## 2025-08-06 RX ADMIN — IPRATROPIUM BROMIDE AND ALBUTEROL SULFATE 3 MILLILITER(S): .5; 2.5 SOLUTION RESPIRATORY (INHALATION) at 14:29

## 2025-08-06 RX ADMIN — Medication 80 MILLIGRAM(S): at 11:57

## 2025-08-06 RX ADMIN — LACTULOSE 10 GRAM(S): 10 SOLUTION ORAL at 09:29

## 2025-08-06 RX ADMIN — Medication 3 MILLIGRAM(S): at 21:33

## 2025-08-06 RX ADMIN — IPRATROPIUM BROMIDE AND ALBUTEROL SULFATE 3 MILLILITER(S): .5; 2.5 SOLUTION RESPIRATORY (INHALATION) at 08:15

## 2025-08-06 RX ADMIN — Medication 5 MILLIGRAM(S): at 21:33

## 2025-08-06 RX ADMIN — POLYETHYLENE GLYCOL 3350 17 GRAM(S): 17 POWDER, FOR SOLUTION ORAL at 15:53

## 2025-08-06 RX ADMIN — ACETYLCYSTEINE 4 MILLILITER(S): 200 INHALANT RESPIRATORY (INHALATION) at 20:39

## 2025-08-06 RX ADMIN — Medication 80 MILLIGRAM(S): at 17:52

## 2025-08-06 RX ADMIN — IPRATROPIUM BROMIDE AND ALBUTEROL SULFATE 3 MILLILITER(S): .5; 2.5 SOLUTION RESPIRATORY (INHALATION) at 20:40

## 2025-08-06 RX ADMIN — ACETYLCYSTEINE 4 MILLILITER(S): 200 INHALANT RESPIRATORY (INHALATION) at 14:30

## 2025-08-06 RX ADMIN — Medication 5 MILLIGRAM(S): at 11:57

## 2025-08-06 RX ADMIN — AMIODARONE HYDROCHLORIDE 200 MILLIGRAM(S): 50 INJECTION, SOLUTION INTRAVENOUS at 09:30

## 2025-08-06 RX ADMIN — Medication 500 MILLIGRAM(S): at 21:33

## 2025-08-06 RX ADMIN — Medication 500 MILLIGRAM(S): at 09:30

## 2025-08-06 RX ADMIN — Medication 300 MILLIGRAM(S): at 09:30

## 2025-08-06 RX ADMIN — DORZOLAMIDE HYDROCHLORIDE AND TIMOLOL MALEATE 1 DROP(S): 20; 5 SOLUTION/ DROPS OPHTHALMIC at 21:32

## 2025-08-06 RX ADMIN — ACETYLCYSTEINE 4 MILLILITER(S): 200 INHALANT RESPIRATORY (INHALATION) at 08:15

## 2025-08-07 VITALS
DIASTOLIC BLOOD PRESSURE: 40 MMHG | HEART RATE: 72 BPM | TEMPERATURE: 98 F | OXYGEN SATURATION: 98 % | SYSTOLIC BLOOD PRESSURE: 96 MMHG | RESPIRATION RATE: 17 BRPM

## 2025-08-07 LAB
ALBUMIN SERPL ELPH-MCNC: 3.1 G/DL — LOW (ref 3.3–5)
ALP SERPL-CCNC: 118 U/L — SIGNIFICANT CHANGE UP (ref 40–120)
ALT FLD-CCNC: 57 U/L — SIGNIFICANT CHANGE UP (ref 12–78)
AMMONIA BLD-MCNC: 65 UMOL/L — HIGH (ref 11–32)
ANION GAP SERPL CALC-SCNC: <-2 MMOL/L — LOW (ref 5–17)
AST SERPL-CCNC: 55 U/L — HIGH (ref 15–37)
BILIRUB DIRECT SERPL-MCNC: 0.3 MG/DL — SIGNIFICANT CHANGE UP (ref 0–0.3)
BILIRUB INDIRECT FLD-MCNC: 0.6 MG/DL — SIGNIFICANT CHANGE UP (ref 0.2–1)
BILIRUB SERPL-MCNC: 0.9 MG/DL — SIGNIFICANT CHANGE UP (ref 0.2–1.2)
BUN SERPL-MCNC: 40 MG/DL — HIGH (ref 7–23)
CALCIUM SERPL-MCNC: 9.2 MG/DL — SIGNIFICANT CHANGE UP (ref 8.5–10.1)
CHLORIDE SERPL-SCNC: 92 MMOL/L — LOW (ref 96–108)
CK SERPL-CCNC: 15 U/L — LOW (ref 26–308)
CO2 SERPL-SCNC: >45 MMOL/L — CRITICAL HIGH (ref 22–31)
CORTIS AM PEAK SERPL-MCNC: 16.8 UG/DL — SIGNIFICANT CHANGE UP (ref 6–18.4)
CREAT SERPL-MCNC: 1.06 MG/DL — SIGNIFICANT CHANGE UP (ref 0.5–1.3)
EGFR: 67 ML/MIN/1.73M2 — SIGNIFICANT CHANGE UP
EGFR: 67 ML/MIN/1.73M2 — SIGNIFICANT CHANGE UP
GLUCOSE SERPL-MCNC: 96 MG/DL — SIGNIFICANT CHANGE UP (ref 70–99)
HCT VFR BLD CALC: 37.9 % — LOW (ref 39–50)
HGB BLD-MCNC: 10.7 G/DL — LOW (ref 13–17)
IMMATURE PLATELET FRACTION #: 2.8 K/UL — LOW (ref 3.9–12.5)
IMMATURE PLATELET FRACTION %: 3.4 % — SIGNIFICANT CHANGE UP (ref 1.6–7.1)
INR BLD: 2.65 RATIO — HIGH (ref 0.85–1.16)
MAGNESIUM SERPL-MCNC: 3.3 MG/DL — HIGH (ref 1.6–2.6)
MCHC RBC-ENTMCNC: 28.2 G/DL — LOW (ref 32–36)
MCHC RBC-ENTMCNC: 29.6 PG — SIGNIFICANT CHANGE UP (ref 27–34)
MCV RBC AUTO: 105 FL — HIGH (ref 80–100)
NRBC # BLD AUTO: 0 K/UL — SIGNIFICANT CHANGE UP (ref 0–0)
NRBC # FLD: 0 K/UL — SIGNIFICANT CHANGE UP (ref 0–0)
NRBC BLD AUTO-RTO: 0 /100 WBCS — SIGNIFICANT CHANGE UP (ref 0–0)
PHOSPHATE SERPL-MCNC: 2.9 MG/DL — SIGNIFICANT CHANGE UP (ref 2.5–4.5)
PLATELET # BLD AUTO: 83 K/UL — LOW (ref 150–400)
PMV BLD: 10.3 FL — SIGNIFICANT CHANGE UP (ref 7–13)
POTASSIUM SERPL-MCNC: 5.4 MMOL/L — HIGH (ref 3.5–5.3)
POTASSIUM SERPL-SCNC: 5.4 MMOL/L — HIGH (ref 3.5–5.3)
PROT SERPL-MCNC: 5.6 GM/DL — LOW (ref 6–8.3)
PROTHROM AB SERPL-ACNC: 30.2 SEC — HIGH (ref 9.9–13.4)
RBC # BLD: 3.61 M/UL — LOW (ref 4.2–5.8)
RBC # FLD: 14.2 % — SIGNIFICANT CHANGE UP (ref 10.3–14.5)
SODIUM SERPL-SCNC: 135 MMOL/L — SIGNIFICANT CHANGE UP (ref 135–145)
WBC # BLD: 177.18 K/UL — CRITICAL HIGH (ref 3.8–10.5)
WBC # FLD AUTO: 177.18 K/UL — CRITICAL HIGH (ref 3.8–10.5)

## 2025-08-07 PROCEDURE — 99232 SBSQ HOSP IP/OBS MODERATE 35: CPT

## 2025-08-07 PROCEDURE — 93287 PERI-PX DEVICE EVAL & PRGR: CPT | Mod: 26

## 2025-08-07 PROCEDURE — 99239 HOSP IP/OBS DSCHRG MGMT >30: CPT

## 2025-08-07 RX ORDER — BISACODYL 5 MG
1 TABLET, DELAYED RELEASE (ENTERIC COATED) ORAL
Qty: 60 | Refills: 0
Start: 2025-08-07 | End: 2025-09-05

## 2025-08-07 RX ORDER — IPRATROPIUM BROMIDE AND ALBUTEROL SULFATE .5; 2.5 MG/3ML; MG/3ML
3 SOLUTION RESPIRATORY (INHALATION)
Qty: 30 | Refills: 0
Start: 2025-08-07 | End: 2025-09-05

## 2025-08-07 RX ORDER — LACTULOSE 10 G/15ML
10 SOLUTION ORAL EVERY 12 HOURS
Refills: 0 | Status: DISCONTINUED | OUTPATIENT
Start: 2025-08-07 | End: 2025-08-07

## 2025-08-07 RX ORDER — LACTULOSE 10 G/15ML
15 SOLUTION ORAL
Qty: 900 | Refills: 0
Start: 2025-08-07 | End: 2025-09-05

## 2025-08-07 RX ORDER — SODIUM ZIRCONIUM CYCLOSILICATE 5 G/5G
5 POWDER, FOR SUSPENSION ORAL ONCE
Refills: 0 | Status: COMPLETED | OUTPATIENT
Start: 2025-08-07 | End: 2025-08-07

## 2025-08-07 RX ORDER — SIMETHICONE 80 MG
1 TABLET,CHEWABLE ORAL
Qty: 120 | Refills: 0
Start: 2025-08-07 | End: 2025-09-05

## 2025-08-07 RX ADMIN — SODIUM ZIRCONIUM CYCLOSILICATE 5 GRAM(S): 5 POWDER, FOR SUSPENSION ORAL at 10:50

## 2025-08-07 RX ADMIN — Medication 80 MILLIGRAM(S): at 11:04

## 2025-08-07 RX ADMIN — Medication 80 MILLIGRAM(S): at 05:30

## 2025-08-07 RX ADMIN — IPRATROPIUM BROMIDE AND ALBUTEROL SULFATE 3 MILLILITER(S): .5; 2.5 SOLUTION RESPIRATORY (INHALATION) at 01:21

## 2025-08-07 RX ADMIN — AMIODARONE HYDROCHLORIDE 200 MILLIGRAM(S): 50 INJECTION, SOLUTION INTRAVENOUS at 09:46

## 2025-08-07 RX ADMIN — LACTULOSE 10 GRAM(S): 10 SOLUTION ORAL at 09:46

## 2025-08-07 RX ADMIN — DORZOLAMIDE HYDROCHLORIDE AND TIMOLOL MALEATE 1 DROP(S): 20; 5 SOLUTION/ DROPS OPHTHALMIC at 09:46

## 2025-08-07 RX ADMIN — IPRATROPIUM BROMIDE AND ALBUTEROL SULFATE 3 MILLILITER(S): .5; 2.5 SOLUTION RESPIRATORY (INHALATION) at 08:01

## 2025-08-07 RX ADMIN — Medication 300 MILLIGRAM(S): at 09:45

## 2025-08-07 RX ADMIN — IPRATROPIUM BROMIDE AND ALBUTEROL SULFATE 3 MILLILITER(S): .5; 2.5 SOLUTION RESPIRATORY (INHALATION) at 13:38

## 2025-08-07 RX ADMIN — Medication 5 MILLIGRAM(S): at 09:45

## 2025-08-07 RX ADMIN — ACETYLCYSTEINE 4 MILLILITER(S): 200 INHALANT RESPIRATORY (INHALATION) at 08:01

## 2025-08-07 RX ADMIN — ACETYLCYSTEINE 4 MILLILITER(S): 200 INHALANT RESPIRATORY (INHALATION) at 01:21

## 2025-08-07 RX ADMIN — LACTULOSE 10 GRAM(S): 10 SOLUTION ORAL at 10:50

## 2025-08-07 RX ADMIN — Medication 500 MILLIGRAM(S): at 09:46

## 2025-08-13 DIAGNOSIS — Z95.810 PRESENCE OF AUTOMATIC (IMPLANTABLE) CARDIAC DEFIBRILLATOR: ICD-10-CM

## 2025-08-13 DIAGNOSIS — M10.9 GOUT, UNSPECIFIED: ICD-10-CM

## 2025-08-13 DIAGNOSIS — Z11.52 ENCOUNTER FOR SCREENING FOR COVID-19: ICD-10-CM

## 2025-08-13 DIAGNOSIS — R79.1 ABNORMAL COAGULATION PROFILE: ICD-10-CM

## 2025-08-13 DIAGNOSIS — Z96.653 PRESENCE OF ARTIFICIAL KNEE JOINT, BILATERAL: ICD-10-CM

## 2025-08-13 DIAGNOSIS — E83.41 HYPERMAGNESEMIA: ICD-10-CM

## 2025-08-13 DIAGNOSIS — H40.9 UNSPECIFIED GLAUCOMA: ICD-10-CM

## 2025-08-13 DIAGNOSIS — I50.23 ACUTE ON CHRONIC SYSTOLIC (CONGESTIVE) HEART FAILURE: ICD-10-CM

## 2025-08-13 DIAGNOSIS — Z79.01 LONG TERM (CURRENT) USE OF ANTICOAGULANTS: ICD-10-CM

## 2025-08-13 DIAGNOSIS — I48.0 PAROXYSMAL ATRIAL FIBRILLATION: ICD-10-CM

## 2025-08-13 DIAGNOSIS — N17.9 ACUTE KIDNEY FAILURE, UNSPECIFIED: ICD-10-CM

## 2025-08-13 DIAGNOSIS — F32.A DEPRESSION, UNSPECIFIED: ICD-10-CM

## 2025-08-13 DIAGNOSIS — C91.10 CHRONIC LYMPHOCYTIC LEUKEMIA OF B-CELL TYPE NOT HAVING ACHIEVED REMISSION: ICD-10-CM

## 2025-08-13 DIAGNOSIS — N18.30 CHRONIC KIDNEY DISEASE, STAGE 3 UNSPECIFIED: ICD-10-CM

## 2025-08-13 DIAGNOSIS — M19.91 PRIMARY OSTEOARTHRITIS, UNSPECIFIED SITE: ICD-10-CM

## 2025-08-13 DIAGNOSIS — J96.21 ACUTE AND CHRONIC RESPIRATORY FAILURE WITH HYPOXIA: ICD-10-CM

## 2025-08-13 DIAGNOSIS — I42.9 CARDIOMYOPATHY, UNSPECIFIED: ICD-10-CM

## 2025-08-13 DIAGNOSIS — R29.6 REPEATED FALLS: ICD-10-CM

## 2025-08-13 DIAGNOSIS — E86.0 DEHYDRATION: ICD-10-CM

## 2025-08-13 DIAGNOSIS — H91.93 UNSPECIFIED HEARING LOSS, BILATERAL: ICD-10-CM

## 2025-08-13 DIAGNOSIS — K59.00 CONSTIPATION, UNSPECIFIED: ICD-10-CM

## 2025-08-13 DIAGNOSIS — E43 UNSPECIFIED SEVERE PROTEIN-CALORIE MALNUTRITION: ICD-10-CM

## 2025-08-13 DIAGNOSIS — N40.0 BENIGN PROSTATIC HYPERPLASIA WITHOUT LOWER URINARY TRACT SYMPTOMS: ICD-10-CM

## 2025-08-13 DIAGNOSIS — I25.2 OLD MYOCARDIAL INFARCTION: ICD-10-CM

## 2025-08-13 DIAGNOSIS — D84.9 IMMUNODEFICIENCY, UNSPECIFIED: ICD-10-CM

## 2025-08-13 DIAGNOSIS — Z51.5 ENCOUNTER FOR PALLIATIVE CARE: ICD-10-CM

## 2025-08-13 DIAGNOSIS — Z88.0 ALLERGY STATUS TO PENICILLIN: ICD-10-CM

## 2025-08-13 DIAGNOSIS — E78.5 HYPERLIPIDEMIA, UNSPECIFIED: ICD-10-CM

## 2025-08-13 DIAGNOSIS — J13 PNEUMONIA DUE TO STREPTOCOCCUS PNEUMONIAE: ICD-10-CM

## 2025-08-13 DIAGNOSIS — Z85.828 PERSONAL HISTORY OF OTHER MALIGNANT NEOPLASM OF SKIN: ICD-10-CM

## 2025-08-13 DIAGNOSIS — I27.20 PULMONARY HYPERTENSION, UNSPECIFIED: ICD-10-CM

## 2025-08-18 ENCOUNTER — APPOINTMENT (OUTPATIENT)
Dept: ELECTROPHYSIOLOGY | Facility: CLINIC | Age: 89
End: 2025-08-18